# Patient Record
Sex: FEMALE | Race: WHITE | NOT HISPANIC OR LATINO | Employment: OTHER | RURAL
[De-identification: names, ages, dates, MRNs, and addresses within clinical notes are randomized per-mention and may not be internally consistent; named-entity substitution may affect disease eponyms.]

---

## 2019-07-30 ENCOUNTER — HISTORICAL (OUTPATIENT)
Dept: ADMINISTRATIVE | Facility: HOSPITAL | Age: 59
End: 2019-07-30

## 2019-07-31 LAB
LAB AP CLINICAL INFORMATION: NORMAL
LAB AP DIAGNOSIS - HISTORICAL: NORMAL
LAB AP GROSS PATHOLOGY - HISTORICAL: NORMAL
LAB AP SPECIMEN SUBMITTED - HISTORICAL: NORMAL

## 2020-06-02 ENCOUNTER — HISTORICAL (OUTPATIENT)
Dept: ADMINISTRATIVE | Facility: HOSPITAL | Age: 60
End: 2020-06-02

## 2020-06-02 LAB — GLUCOSE SERPL-MCNC: 100 MG/DL (ref 70–105)

## 2020-06-03 ENCOUNTER — HISTORICAL (OUTPATIENT)
Dept: ADMINISTRATIVE | Facility: HOSPITAL | Age: 60
End: 2020-06-03

## 2020-06-03 LAB
AMYLASE SERPL-CCNC: 42 U/L (ref 25–115)
BACTERIA #/AREA URNS HPF: ABNORMAL /HPF
BASOPHILS # BLD AUTO: 0.11 X10E3/UL (ref 0–0.2)
BASOPHILS NFR BLD AUTO: 1.1 % (ref 0–1)
BILIRUB UR QL STRIP: NEGATIVE MG/DL
BUN SERPL-MCNC: 6 MG/DL (ref 7–18)
CALCIUM SERPL-MCNC: 9.4 MG/DL (ref 8.5–10.1)
CHLORIDE SERPL-SCNC: 93 MMOL/L (ref 98–107)
CLARITY UR: ABNORMAL
CLARITY UR: ABNORMAL
CO2 SERPL-SCNC: 33 MMOL/L (ref 21–32)
COLOR UR: ABNORMAL
COLOR UR: ABNORMAL
CREAT SERPL-MCNC: 0.97 MG/DL (ref 0.55–1.02)
EOSINOPHIL # BLD AUTO: 0.26 X10E3/UL (ref 0–0.5)
EOSINOPHIL NFR BLD AUTO: 2.7 % (ref 1–4)
ERYTHROCYTE [DISTWIDTH] IN BLOOD BY AUTOMATED COUNT: 13.9 % (ref 11.5–14.5)
GLUCOSE SERPL-MCNC: 100 MG/DL (ref 74–106)
GLUCOSE UR STRIP-MCNC: NEGATIVE MG/DL
HCT VFR BLD AUTO: 47.7 % (ref 38–47)
HGB BLD-MCNC: 15.3 G/DL (ref 12–16)
IMM GRANULOCYTES # BLD AUTO: 0.07 X10E3/UL (ref 0–0.04)
IMM GRANULOCYTES NFR BLD: 0.7 % (ref 0–0.4)
INSULIN SERPL-ACNC: NORMAL U[IU]/ML
KETONES UR STRIP-SCNC: NEGATIVE MG/DL
LAB AP CLINICAL INFORMATION: NORMAL
LAB AP DIAGNOSIS - HISTORICAL: NORMAL
LAB AP GROSS PATHOLOGY - HISTORICAL: NORMAL
LAB AP SPECIMEN SUBMITTED - HISTORICAL: NORMAL
LEUKOCYTE ESTERASE UR QL STRIP: ABNORMAL LEU/UL
LIPASE SERPL-CCNC: 275 U/L (ref 73–393)
LYMPHOCYTES # BLD AUTO: 3.05 X10E3/UL (ref 1–4.8)
LYMPHOCYTES NFR BLD AUTO: 31.6 % (ref 27–41)
MCH RBC QN AUTO: 29 PG (ref 27–31)
MCHC RBC AUTO-ENTMCNC: 32.1 G/DL (ref 32–36)
MCV RBC AUTO: 90.3 FL (ref 80–96)
MONOCYTES # BLD AUTO: 0.67 X10E3/UL (ref 0–0.8)
MONOCYTES NFR BLD AUTO: 6.9 % (ref 2–6)
MPC BLD CALC-MCNC: 10.5 FL (ref 9.4–12.4)
MUCOUS THREADS #/AREA URNS HPF: ABNORMAL /HPF
NEUTROPHILS # BLD AUTO: 5.5 X10E3/UL (ref 1.8–7.7)
NEUTROPHILS NFR BLD AUTO: 57 % (ref 53–65)
NITRITE UR QL STRIP: NEGATIVE
NRBC # BLD AUTO: 0 X10E3/UL (ref 0–0)
NRBC, AUTO (.00): 0 /100 (ref 0–0)
PH UR STRIP: 7 PH UNITS (ref 5–8)
PLATELET # BLD AUTO: 306 X10E3/UL (ref 150–400)
POTASSIUM SERPL-SCNC: 4.1 MMOL/L (ref 3.5–5.1)
PROT UR QL STRIP: NEGATIVE MG/DL
RBC # BLD AUTO: 5.28 X10E6/UL (ref 4.2–5.4)
RBC # UR STRIP: NEGATIVE ERY/UL
RBC #/AREA URNS HPF: ABNORMAL /HPF (ref 0–3)
SODIUM SERPL-SCNC: 131 MMOL/L (ref 136–145)
SP GR UR STRIP: <=1.005 (ref 1–1.03)
SQUAMOUS #/AREA URNS LPF: ABNORMAL /LPF
TRICHOMONAS #/AREA URNS HPF: ABNORMAL /HPF
UROBILINOGEN UR STRIP-ACNC: 0.2 EU/DL
WBC # BLD AUTO: 9.66 X10E3/UL (ref 4.5–11)
WBC #/AREA URNS HPF: ABNORMAL /HPF (ref 0–5)
YEAST #/AREA URNS HPF: ABNORMAL /HPF

## 2020-06-05 LAB
REPORT: 38
REPORT: NORMAL

## 2020-06-11 ENCOUNTER — HISTORICAL (OUTPATIENT)
Dept: ADMINISTRATIVE | Facility: HOSPITAL | Age: 60
End: 2020-06-11

## 2020-06-11 LAB — GLUCOSE SERPL-MCNC: 100 MG/DL (ref 70–105)

## 2020-06-12 LAB

## 2020-07-01 ENCOUNTER — HISTORICAL (OUTPATIENT)
Dept: ADMINISTRATIVE | Facility: HOSPITAL | Age: 60
End: 2020-07-01

## 2020-07-01 LAB — GLUCOSE SERPL-MCNC: 99 MG/DL (ref 70–105)

## 2020-08-27 ENCOUNTER — HISTORICAL (OUTPATIENT)
Dept: ADMINISTRATIVE | Facility: HOSPITAL | Age: 60
End: 2020-08-27

## 2020-10-22 ENCOUNTER — HISTORICAL (OUTPATIENT)
Dept: ADMINISTRATIVE | Facility: HOSPITAL | Age: 60
End: 2020-10-22

## 2020-10-22 LAB
ANION GAP SERPL CALCULATED.3IONS-SCNC: 10 MMOL/L (ref 7–16)
BASOPHILS # BLD AUTO: 0.14 X10E3/UL (ref 0–0.2)
BASOPHILS NFR BLD AUTO: 1.8 % (ref 0–1)
BUN SERPL-MCNC: 12 MG/DL (ref 7–18)
CALCIUM SERPL-MCNC: 9.5 MG/DL (ref 8.5–10.1)
CHLORIDE SERPL-SCNC: 99 MMOL/L (ref 98–107)
CO2 SERPL-SCNC: 30 MMOL/L (ref 21–32)
CREAT SERPL-MCNC: 0.72 MG/DL (ref 0.5–1.02)
CRP SERPL-MCNC: 0.48 MG/DL (ref 0–0.8)
EOSINOPHIL # BLD AUTO: 0.46 X10E3/UL (ref 0–0.5)
EOSINOPHIL NFR BLD AUTO: 6 % (ref 1–4)
ERYTHROCYTE [DISTWIDTH] IN BLOOD BY AUTOMATED COUNT: 12 % (ref 11.5–14.5)
ERYTHROCYTE [SEDIMENTATION RATE] IN BLOOD BY WESTERGREN METHOD: 7 MM/HR (ref 0–30)
GLUCOSE SERPL-MCNC: 114 MG/DL (ref 74–106)
HCT VFR BLD AUTO: 41.1 % (ref 38–47)
HGB BLD-MCNC: 13.2 G/DL (ref 12–16)
IMM GRANULOCYTES # BLD AUTO: 0.07 X10E3/UL (ref 0–0.04)
IMM GRANULOCYTES NFR BLD: 0.9 % (ref 0–0.4)
LYMPHOCYTES # BLD AUTO: 3.01 X10E3/UL (ref 1–4.8)
LYMPHOCYTES NFR BLD AUTO: 39.3 % (ref 27–41)
MCH RBC QN AUTO: 29.3 PG (ref 27–31)
MCHC RBC AUTO-ENTMCNC: 32.1 G/DL (ref 32–36)
MCV RBC AUTO: 91.3 FL (ref 80–96)
MONOCYTES # BLD AUTO: 0.61 X10E3/UL (ref 0–0.8)
MONOCYTES NFR BLD AUTO: 8 % (ref 2–6)
MPC BLD CALC-MCNC: 10.3 FL (ref 9.4–12.4)
NEUTROPHILS # BLD AUTO: 3.37 X10E3/UL (ref 1.8–7.7)
NEUTROPHILS NFR BLD AUTO: 44 % (ref 53–65)
NRBC # BLD AUTO: 0 X10E3/UL (ref 0–0)
NRBC, AUTO (.00): 0 /100 (ref 0–0)
PLATELET # BLD AUTO: 328 X10E3/UL (ref 150–400)
POTASSIUM SERPL-SCNC: 4.2 MMOL/L (ref 3.5–5.1)
RBC # BLD AUTO: 4.5 X10E6/UL (ref 4.2–5.4)
SODIUM SERPL-SCNC: 135 MMOL/L (ref 136–145)
WBC # BLD AUTO: 7.66 X10E3/UL (ref 4.5–11)

## 2020-11-11 ENCOUNTER — HISTORICAL (OUTPATIENT)
Dept: ADMINISTRATIVE | Facility: HOSPITAL | Age: 60
End: 2020-11-11

## 2020-11-11 LAB
ALBUMIN SERPL BCP-MCNC: 3.8 G/DL (ref 3.5–5)
ALBUMIN/GLOB SERPL: 1.1 {RATIO}
ALP SERPL-CCNC: 72 U/L (ref 46–118)
ALT SERPL W P-5'-P-CCNC: 7 U/L (ref 13–56)
ANION GAP SERPL CALCULATED.3IONS-SCNC: 13 MMOL/L
AST SERPL W P-5'-P-CCNC: 14 U/L (ref 15–37)
BASOPHILS # BLD AUTO: 0.1 X10E3/UL (ref 0–0.2)
BASOPHILS NFR BLD AUTO: 1.6 % (ref 0–1)
BILIRUB SERPL-MCNC: 0.2 MG/DL (ref 0–1.2)
BUN SERPL-MCNC: 10 MG/DL (ref 7–18)
BUN/CREAT SERPL: 10.9
CALCIUM SERPL-MCNC: 9.3 MG/DL (ref 8.5–10.1)
CHLORIDE SERPL-SCNC: 100 MMOL/L (ref 98–107)
CO2 SERPL-SCNC: 29 MMOL/L (ref 21–32)
CREAT SERPL-MCNC: 0.92 MG/DL (ref 0.55–1.02)
EOSINOPHIL # BLD AUTO: 0.26 X10E3/UL (ref 0–0.5)
EOSINOPHIL NFR BLD AUTO: 4.1 % (ref 1–4)
EOSINOPHIL NFR BLD MANUAL: 6 % (ref 1–4)
ERYTHROCYTE [DISTWIDTH] IN BLOOD BY AUTOMATED COUNT: 11.7 % (ref 11.5–14.5)
GLOBULIN SER-MCNC: 3.6 G/DL (ref 2–4)
GLUCOSE SERPL-MCNC: 149 MG/DL (ref 74–106)
HCT VFR BLD AUTO: 38.1 % (ref 38–47)
HGB BLD-MCNC: 12.9 G/DL (ref 12–16)
IMM GRANULOCYTES # BLD AUTO: 0.04 X10E3/UL (ref 0–0.04)
IMM GRANULOCYTES NFR BLD: 0.6 % (ref 0–0.4)
LYMPHOCYTES # BLD AUTO: 2.73 X10E3/UL (ref 1–4.8)
LYMPHOCYTES NFR BLD AUTO: 43.5 % (ref 27–41)
LYMPHOCYTES NFR BLD MANUAL: 45 % (ref 27–41)
MCH RBC QN AUTO: 30.4 PG (ref 27–31)
MCHC RBC AUTO-ENTMCNC: 33.9 G/DL (ref 32–36)
MCV RBC AUTO: 89.9 FL (ref 80–96)
MONOCYTES # BLD AUTO: 0.6 X10E3/UL (ref 0–0.8)
MONOCYTES NFR BLD AUTO: 9.6 % (ref 2–6)
MONOCYTES NFR BLD MANUAL: 6 % (ref 2–6)
MPC BLD CALC-MCNC: 10.1 FL (ref 9.4–12.4)
NEUTROPHILS # BLD AUTO: 2.54 X10E3/UL (ref 1.8–7.7)
NEUTROPHILS NFR BLD AUTO: 40.6 % (ref 53–65)
NEUTS SEG NFR BLD MANUAL: 43 % (ref 50–62)
NRBC # BLD AUTO: 0 X10E3/UL (ref 0–0)
NRBC, AUTO (.00): 0 /100 (ref 0–0)
PLATELET # BLD AUTO: 274 X10E3/UL (ref 150–400)
PLATELET MORPHOLOGY: ABNORMAL
POTASSIUM SERPL-SCNC: 3.5 MMOL/L (ref 3.5–5.1)
PROT SERPL-MCNC: 7.4 G/DL (ref 6.4–8.2)
RBC # BLD AUTO: 4.24 X10E6/UL (ref 4.2–5.4)
RBC MORPH BLD: NORMAL
SODIUM SERPL-SCNC: 138 MMOL/L (ref 136–145)
TROPONIN I SERPL-MCNC: <0.017 NG/ML (ref 0–0.06)
TSH SERPL DL<=0.005 MIU/L-ACNC: 1.85 UIU/ML (ref 0.36–3.74)
WBC # BLD AUTO: 6.27 X10E3/UL (ref 4.5–11)

## 2020-11-12 ENCOUNTER — HISTORICAL (OUTPATIENT)
Dept: ADMINISTRATIVE | Facility: HOSPITAL | Age: 60
End: 2020-11-12

## 2021-01-02 ENCOUNTER — HISTORICAL (OUTPATIENT)
Dept: ADMINISTRATIVE | Facility: HOSPITAL | Age: 61
End: 2021-01-02

## 2021-03-30 ENCOUNTER — OFFICE VISIT (OUTPATIENT)
Dept: DERMATOLOGY | Facility: CLINIC | Age: 61
End: 2021-03-30
Payer: MEDICAID

## 2021-03-30 VITALS — BODY MASS INDEX: 27.23 KG/M2 | RESPIRATION RATE: 18 BRPM | WEIGHT: 148 LBS | HEIGHT: 62 IN

## 2021-03-30 DIAGNOSIS — L82.1 SEBORRHEIC KERATOSES: ICD-10-CM

## 2021-03-30 DIAGNOSIS — L57.8 OTHER SKIN CHANGES DUE TO CHRONIC EXPOSURE TO NONIONIZING RADIATION: ICD-10-CM

## 2021-03-30 DIAGNOSIS — L70.0 ACNE VULGARIS: Primary | ICD-10-CM

## 2021-03-30 PROCEDURE — 99213 OFFICE O/P EST LOW 20 MIN: CPT | Mod: ,,, | Performed by: DERMATOLOGY

## 2021-03-30 PROCEDURE — 99213 PR OFFICE/OUTPT VISIT, EST, LEVL III, 20-29 MIN: ICD-10-PCS | Mod: ,,, | Performed by: DERMATOLOGY

## 2021-03-30 RX ORDER — PRAZOSIN HYDROCHLORIDE 5 MG/1
5 CAPSULE ORAL NIGHTLY
COMMUNITY
Start: 2021-02-01 | End: 2023-01-04

## 2021-03-30 RX ORDER — FLUTICASONE PROPIONATE 50 MCG
2 SPRAY, SUSPENSION (ML) NASAL DAILY
COMMUNITY
Start: 2021-01-04 | End: 2023-03-24 | Stop reason: SDUPTHER

## 2021-03-30 RX ORDER — TRETINOIN 0.25 MG/G
CREAM TOPICAL
Qty: 20 G | Refills: 2 | Status: SHIPPED | OUTPATIENT
Start: 2021-03-30 | End: 2023-01-04

## 2021-03-30 RX ORDER — CARBIDOPA AND LEVODOPA 25; 100 MG/1; MG/1
TABLET ORAL
COMMUNITY
Start: 2021-03-04 | End: 2021-06-07 | Stop reason: SDUPTHER

## 2021-03-30 RX ORDER — LEVOTHYROXINE SODIUM 75 UG/1
TABLET ORAL
COMMUNITY
Start: 2021-03-03 | End: 2023-07-27 | Stop reason: DRUGHIGH

## 2021-03-30 RX ORDER — DOXYCYCLINE 100 MG/1
100 CAPSULE ORAL 2 TIMES DAILY
Qty: 60 CAPSULE | Refills: 2 | Status: SHIPPED | OUTPATIENT
Start: 2021-03-30 | End: 2022-01-11

## 2021-03-30 RX ORDER — CARVEDILOL 6.25 MG/1
TABLET ORAL
COMMUNITY
Start: 2021-02-11 | End: 2021-10-04 | Stop reason: SDUPTHER

## 2021-03-30 RX ORDER — PREGABALIN 100 MG/1
CAPSULE ORAL
COMMUNITY
Start: 2021-03-03 | End: 2021-06-03 | Stop reason: SDUPTHER

## 2021-03-30 RX ORDER — TRETINOIN 0.25 MG/G
CREAM TOPICAL
Qty: 20 G | Refills: 2 | Status: SHIPPED | OUTPATIENT
Start: 2021-03-30 | End: 2021-03-30

## 2021-03-30 RX ORDER — ESOMEPRAZOLE MAGNESIUM 40 MG/1
40 CAPSULE, DELAYED RELEASE ORAL DAILY
COMMUNITY
Start: 2021-03-03

## 2021-03-30 RX ORDER — CYANOCOBALAMIN 1000 UG/ML
INJECTION, SOLUTION INTRAMUSCULAR; SUBCUTANEOUS
COMMUNITY
Start: 2021-03-03

## 2021-03-30 RX ORDER — DULOXETIN HYDROCHLORIDE 60 MG/1
60 CAPSULE, DELAYED RELEASE ORAL 2 TIMES DAILY
COMMUNITY
Start: 2021-03-03 | End: 2021-10-04

## 2021-03-30 RX ORDER — BUSPIRONE HYDROCHLORIDE 15 MG/1
30 TABLET ORAL 2 TIMES DAILY
COMMUNITY
Start: 2021-03-03 | End: 2023-07-27 | Stop reason: DRUGHIGH

## 2021-03-30 RX ORDER — ACETAMINOPHEN AND CODEINE PHOSPHATE 300; 30 MG/1; MG/1
1 TABLET ORAL 3 TIMES DAILY
COMMUNITY
Start: 2021-03-22 | End: 2021-06-02 | Stop reason: SDUPTHER

## 2021-05-27 RX ORDER — METHOCARBAMOL 750 MG/1
500 TABLET, FILM COATED ORAL 3 TIMES DAILY
COMMUNITY
End: 2021-06-02 | Stop reason: SDUPTHER

## 2021-06-02 ENCOUNTER — OFFICE VISIT (OUTPATIENT)
Dept: PAIN MEDICINE | Facility: CLINIC | Age: 61
End: 2021-06-02
Attending: PAIN MEDICINE
Payer: MEDICAID

## 2021-06-02 VITALS
HEIGHT: 62 IN | WEIGHT: 151 LBS | DIASTOLIC BLOOD PRESSURE: 74 MMHG | SYSTOLIC BLOOD PRESSURE: 128 MMHG | HEART RATE: 82 BPM | BODY MASS INDEX: 27.79 KG/M2

## 2021-06-02 DIAGNOSIS — Z79.899 ENCOUNTER FOR LONG-TERM (CURRENT) USE OF OTHER MEDICATIONS: Primary | ICD-10-CM

## 2021-06-02 DIAGNOSIS — M54.17 LUMBOSACRAL RADICULOPATHY: Chronic | ICD-10-CM

## 2021-06-02 DIAGNOSIS — G89.4 CHRONIC PAIN DISORDER: Chronic | ICD-10-CM

## 2021-06-02 DIAGNOSIS — G62.9 NEUROPATHY: Chronic | ICD-10-CM

## 2021-06-02 LAB
CTP QC/QA: YES
POC (AMP) AMPHETAMINE: NEGATIVE
POC (BAR) BARBITURATES: NEGATIVE
POC (BUP) BUPRENORPHINE: NEGATIVE
POC (BZO) BENZODIAZEPINES: NEGATIVE
POC (COC) COCAINE: NEGATIVE
POC (MDMA) METHYLENEDIOXYMETHAMPHETAMINE 3,4: NEGATIVE
POC (MET) METHAMPHETAMINE: NEGATIVE
POC (MOP) OPIATES: ABNORMAL
POC (MTD) METHADONE: NEGATIVE
POC (OXY) OXYCODONE: NEGATIVE
POC (PCP) PHENCYCLIDINE: NEGATIVE
POC (TCA) TRICYCLIC ANTIDEPRESSANTS: NEGATIVE
POC TEMPERATURE (URINE): 92
POC THC: NEGATIVE

## 2021-06-02 PROCEDURE — 99214 PR OFFICE/OUTPT VISIT, EST, LEVL IV, 30-39 MIN: ICD-10-PCS | Mod: S$PBB,,, | Performed by: PAIN MEDICINE

## 2021-06-02 PROCEDURE — 80305 DRUG TEST PRSMV DIR OPT OBS: CPT | Mod: PBBFAC | Performed by: PAIN MEDICINE

## 2021-06-02 PROCEDURE — 99213 OFFICE O/P EST LOW 20 MIN: CPT | Mod: PBBFAC | Performed by: PAIN MEDICINE

## 2021-06-02 PROCEDURE — 99214 OFFICE O/P EST MOD 30 MIN: CPT | Mod: S$PBB,,, | Performed by: PAIN MEDICINE

## 2021-06-02 RX ORDER — METHOCARBAMOL 750 MG/1
750 TABLET, FILM COATED ORAL 3 TIMES DAILY
Qty: 90 TABLET | Refills: 5 | Status: SHIPPED | OUTPATIENT
Start: 2021-06-02 | End: 2021-11-30 | Stop reason: SDUPTHER

## 2021-06-02 RX ORDER — ACETAMINOPHEN AND CODEINE PHOSPHATE 300; 30 MG/1; MG/1
1 TABLET ORAL EVERY 6 HOURS PRN
Qty: 120 TABLET | Refills: 2 | Status: SHIPPED | OUTPATIENT
Start: 2021-06-02 | End: 2021-08-31

## 2021-06-02 RX ORDER — PREGABALIN 200 MG/1
200 CAPSULE ORAL 3 TIMES DAILY
Qty: 90 CAPSULE | Refills: 2 | Status: SHIPPED | OUTPATIENT
Start: 2021-06-02 | End: 2021-06-03 | Stop reason: SDUPTHER

## 2021-06-03 RX ORDER — PREGABALIN 100 MG/1
100 CAPSULE ORAL 3 TIMES DAILY
Qty: 180 CAPSULE | Refills: 2 | Status: SHIPPED | OUTPATIENT
Start: 2021-06-03 | End: 2021-09-01 | Stop reason: SDUPTHER

## 2021-06-03 RX ORDER — PREGABALIN 100 MG/1
100 CAPSULE ORAL
COMMUNITY
End: 2021-06-03 | Stop reason: SDUPTHER

## 2021-06-07 RX ORDER — CARBIDOPA AND LEVODOPA 25; 100 MG/1; MG/1
TABLET ORAL
Qty: 60 TABLET | Refills: 1 | Status: SHIPPED | OUTPATIENT
Start: 2021-06-07 | End: 2021-09-02 | Stop reason: SDUPTHER

## 2021-06-29 RX ORDER — ALBUTEROL SULFATE 90 UG/1
AEROSOL, METERED RESPIRATORY (INHALATION)
COMMUNITY
Start: 2021-04-01 | End: 2023-07-26 | Stop reason: SDUPTHER

## 2021-06-29 RX ORDER — LISINOPRIL AND HYDROCHLOROTHIAZIDE 10; 12.5 MG/1; MG/1
1 TABLET ORAL DAILY
COMMUNITY
Start: 2021-05-01 | End: 2021-10-04

## 2021-06-29 RX ORDER — CETIRIZINE HYDROCHLORIDE 10 MG/1
TABLET ORAL
COMMUNITY
Start: 2021-04-01 | End: 2022-08-19

## 2021-06-29 RX ORDER — ATORVASTATIN CALCIUM 40 MG/1
80 TABLET, FILM COATED ORAL DAILY
COMMUNITY
Start: 2022-05-09 | End: 2023-07-27 | Stop reason: DRUGHIGH

## 2021-06-29 RX ORDER — SITAGLIPTIN 100 MG/1
100 TABLET, FILM COATED ORAL DAILY
COMMUNITY
Start: 2021-06-02

## 2021-06-29 RX ORDER — PRAMIPEXOLE DIHYDROCHLORIDE 0.25 MG/1
0.25 TABLET ORAL NIGHTLY
COMMUNITY
Start: 2021-04-01 | End: 2022-01-11

## 2021-06-29 RX ORDER — ROSUVASTATIN CALCIUM 20 MG/1
TABLET, COATED ORAL
COMMUNITY
Start: 2021-05-01 | End: 2023-07-27

## 2021-06-30 ENCOUNTER — OFFICE VISIT (OUTPATIENT)
Dept: DERMATOLOGY | Facility: CLINIC | Age: 61
End: 2021-06-30
Payer: MEDICAID

## 2021-06-30 VITALS — RESPIRATION RATE: 18 BRPM | WEIGHT: 151 LBS | BODY MASS INDEX: 27.79 KG/M2 | HEIGHT: 62 IN

## 2021-06-30 DIAGNOSIS — L70.0 ACNE VULGARIS: Primary | ICD-10-CM

## 2021-06-30 DIAGNOSIS — L82.1 SEBORRHEIC KERATOSES: ICD-10-CM

## 2021-06-30 DIAGNOSIS — D22.9 BENIGN NEVUS OF SKIN: ICD-10-CM

## 2021-06-30 DIAGNOSIS — L57.8 OTHER SKIN CHANGES DUE TO CHRONIC EXPOSURE TO NONIONIZING RADIATION: ICD-10-CM

## 2021-06-30 PROCEDURE — 99213 PR OFFICE/OUTPT VISIT, EST, LEVL III, 20-29 MIN: ICD-10-PCS | Mod: ,,, | Performed by: DERMATOLOGY

## 2021-06-30 PROCEDURE — 99213 OFFICE O/P EST LOW 20 MIN: CPT | Mod: ,,, | Performed by: DERMATOLOGY

## 2021-06-30 RX ORDER — TRETINOIN 0.5 MG/G
CREAM TOPICAL
Qty: 20 G | Refills: 11 | Status: SHIPPED | OUTPATIENT
Start: 2021-06-30 | End: 2023-01-04

## 2021-08-04 ENCOUNTER — TELEPHONE (OUTPATIENT)
Dept: CARDIOLOGY | Facility: CLINIC | Age: 61
End: 2021-08-04

## 2021-09-01 ENCOUNTER — OFFICE VISIT (OUTPATIENT)
Dept: PAIN MEDICINE | Facility: CLINIC | Age: 61
End: 2021-09-01
Payer: MEDICAID

## 2021-09-01 VITALS
SYSTOLIC BLOOD PRESSURE: 119 MMHG | HEART RATE: 92 BPM | WEIGHT: 146 LBS | HEIGHT: 63 IN | DIASTOLIC BLOOD PRESSURE: 71 MMHG | BODY MASS INDEX: 25.87 KG/M2

## 2021-09-01 DIAGNOSIS — G89.4 CHRONIC PAIN DISORDER: ICD-10-CM

## 2021-09-01 DIAGNOSIS — M54.17 LUMBOSACRAL RADICULOPATHY: ICD-10-CM

## 2021-09-01 DIAGNOSIS — Z79.899 OTHER LONG TERM (CURRENT) DRUG THERAPY: ICD-10-CM

## 2021-09-01 DIAGNOSIS — G62.9 NEUROPATHY: Primary | ICD-10-CM

## 2021-09-01 PROCEDURE — 99215 OFFICE O/P EST HI 40 MIN: CPT | Mod: PBBFAC | Performed by: PAIN MEDICINE

## 2021-09-01 PROCEDURE — 99213 PR OFFICE/OUTPT VISIT, EST, LEVL III, 20-29 MIN: ICD-10-PCS | Mod: S$PBB,,, | Performed by: PAIN MEDICINE

## 2021-09-01 PROCEDURE — 80305 DRUG TEST PRSMV DIR OPT OBS: CPT | Mod: PBBFAC | Performed by: PAIN MEDICINE

## 2021-09-01 PROCEDURE — 99213 OFFICE O/P EST LOW 20 MIN: CPT | Mod: S$PBB,,, | Performed by: PAIN MEDICINE

## 2021-09-01 RX ORDER — ACETAMINOPHEN AND CODEINE PHOSPHATE 300; 30 MG/1; MG/1
1 TABLET ORAL EVERY 6 HOURS PRN
Qty: 120 TABLET | Refills: 2 | Status: SHIPPED | OUTPATIENT
Start: 2021-09-01 | End: 2021-11-30 | Stop reason: SDUPTHER

## 2021-09-01 RX ORDER — PREGABALIN 100 MG/1
100 CAPSULE ORAL 3 TIMES DAILY
Qty: 30 CAPSULE | Refills: 5 | Status: SHIPPED | OUTPATIENT
Start: 2021-09-01 | End: 2021-11-30 | Stop reason: SDUPTHER

## 2021-09-02 ENCOUNTER — OFFICE VISIT (OUTPATIENT)
Dept: NEUROLOGY | Facility: CLINIC | Age: 61
End: 2021-09-02
Payer: MEDICAID

## 2021-09-02 VITALS
SYSTOLIC BLOOD PRESSURE: 130 MMHG | OXYGEN SATURATION: 97 % | BODY MASS INDEX: 27.51 KG/M2 | HEIGHT: 62 IN | HEART RATE: 66 BPM | DIASTOLIC BLOOD PRESSURE: 72 MMHG | WEIGHT: 149.5 LBS

## 2021-09-02 DIAGNOSIS — R29.6 FREQUENT FALLS: ICD-10-CM

## 2021-09-02 DIAGNOSIS — M54.50 LOW BACK PAIN, UNSPECIFIED BACK PAIN LATERALITY, UNSPECIFIED CHRONICITY, UNSPECIFIED WHETHER SCIATICA PRESENT: ICD-10-CM

## 2021-09-02 DIAGNOSIS — F32.A DEPRESSION, UNSPECIFIED DEPRESSION TYPE: ICD-10-CM

## 2021-09-02 DIAGNOSIS — E11.42 DIABETIC POLYNEUROPATHY ASSOCIATED WITH TYPE 2 DIABETES MELLITUS: ICD-10-CM

## 2021-09-02 DIAGNOSIS — G25.81 RLS (RESTLESS LEGS SYNDROME): Primary | ICD-10-CM

## 2021-09-02 PROBLEM — E11.9 DIABETES MELLITUS WITHOUT COMPLICATION: Status: ACTIVE | Noted: 2021-09-02

## 2021-09-02 PROBLEM — E11.9 DIABETES MELLITUS WITHOUT COMPLICATION: Chronic | Status: ACTIVE | Noted: 2021-09-02

## 2021-09-02 PROCEDURE — 99213 PR OFFICE/OUTPT VISIT, EST, LEVL III, 20-29 MIN: ICD-10-PCS | Mod: S$PBB,,, | Performed by: NURSE PRACTITIONER

## 2021-09-02 PROCEDURE — 99215 OFFICE O/P EST HI 40 MIN: CPT | Mod: PBBFAC | Performed by: NURSE PRACTITIONER

## 2021-09-02 PROCEDURE — 99213 OFFICE O/P EST LOW 20 MIN: CPT | Mod: S$PBB,,, | Performed by: NURSE PRACTITIONER

## 2021-09-02 RX ORDER — DULOXETINE 40 MG/1
60 CAPSULE, DELAYED RELEASE ORAL 2 TIMES DAILY
COMMUNITY
Start: 2021-08-02 | End: 2023-07-27 | Stop reason: DRUGHIGH

## 2021-09-02 RX ORDER — CARBIDOPA AND LEVODOPA 25; 100 MG/1; MG/1
TABLET ORAL
Qty: 60 TABLET | Refills: 6 | Status: SHIPPED | OUTPATIENT
Start: 2021-09-02 | End: 2021-12-09 | Stop reason: SDUPTHER

## 2021-09-02 RX ORDER — LISINOPRIL AND HYDROCHLOROTHIAZIDE 20; 25 MG/1; MG/1
1 TABLET ORAL DAILY
COMMUNITY
Start: 2021-06-02 | End: 2021-10-04 | Stop reason: SDUPTHER

## 2021-09-02 RX ORDER — ADAPALENE 1 MG/G
GEL TOPICAL DAILY
COMMUNITY
End: 2023-01-04

## 2021-09-02 RX ORDER — CHOLECALCIFEROL (VITAMIN D3) 25 MCG
1000 TABLET ORAL DAILY
COMMUNITY
End: 2023-07-27

## 2021-10-04 ENCOUNTER — OFFICE VISIT (OUTPATIENT)
Dept: CARDIOLOGY | Facility: CLINIC | Age: 61
End: 2021-10-04
Payer: MEDICAID

## 2021-10-04 VITALS
DIASTOLIC BLOOD PRESSURE: 90 MMHG | HEART RATE: 82 BPM | BODY MASS INDEX: 27.6 KG/M2 | SYSTOLIC BLOOD PRESSURE: 160 MMHG | RESPIRATION RATE: 16 BRPM | HEIGHT: 62 IN | WEIGHT: 150 LBS

## 2021-10-04 DIAGNOSIS — E11.9 DIABETES MELLITUS WITHOUT COMPLICATION: ICD-10-CM

## 2021-10-04 DIAGNOSIS — I10 PRIMARY HYPERTENSION: ICD-10-CM

## 2021-10-04 DIAGNOSIS — E11.9 DIABETES MELLITUS WITHOUT COMPLICATION: Primary | ICD-10-CM

## 2021-10-04 PROCEDURE — 99214 OFFICE O/P EST MOD 30 MIN: CPT | Mod: S$PBB,,, | Performed by: STUDENT IN AN ORGANIZED HEALTH CARE EDUCATION/TRAINING PROGRAM

## 2021-10-04 PROCEDURE — 93010 EKG 12-LEAD: ICD-10-PCS | Mod: S$PBB,,, | Performed by: STUDENT IN AN ORGANIZED HEALTH CARE EDUCATION/TRAINING PROGRAM

## 2021-10-04 PROCEDURE — 99215 OFFICE O/P EST HI 40 MIN: CPT | Mod: PBBFAC | Performed by: STUDENT IN AN ORGANIZED HEALTH CARE EDUCATION/TRAINING PROGRAM

## 2021-10-04 PROCEDURE — 99214 PR OFFICE/OUTPT VISIT, EST, LEVL IV, 30-39 MIN: ICD-10-PCS | Mod: S$PBB,,, | Performed by: STUDENT IN AN ORGANIZED HEALTH CARE EDUCATION/TRAINING PROGRAM

## 2021-10-04 PROCEDURE — 93010 ELECTROCARDIOGRAM REPORT: CPT | Mod: S$PBB,,, | Performed by: STUDENT IN AN ORGANIZED HEALTH CARE EDUCATION/TRAINING PROGRAM

## 2021-10-04 PROCEDURE — 93005 ELECTROCARDIOGRAM TRACING: CPT | Mod: PBBFAC | Performed by: STUDENT IN AN ORGANIZED HEALTH CARE EDUCATION/TRAINING PROGRAM

## 2021-10-04 RX ORDER — LISINOPRIL AND HYDROCHLOROTHIAZIDE 20; 25 MG/1; MG/1
1 TABLET ORAL DAILY
Qty: 90 TABLET | Refills: 3 | Status: SHIPPED | OUTPATIENT
Start: 2021-10-04 | End: 2022-08-19

## 2021-10-04 RX ORDER — CARVEDILOL 12.5 MG/1
12.5 TABLET ORAL 2 TIMES DAILY WITH MEALS
Qty: 180 TABLET | Refills: 3 | Status: SHIPPED | OUTPATIENT
Start: 2021-10-04 | End: 2022-12-05 | Stop reason: SDUPTHER

## 2021-11-05 ENCOUNTER — TELEPHONE (OUTPATIENT)
Dept: PULMONOLOGY | Facility: CLINIC | Age: 61
End: 2021-11-05
Payer: MEDICAID

## 2021-11-08 ENCOUNTER — TELEPHONE (OUTPATIENT)
Dept: GASTROENTEROLOGY | Facility: CLINIC | Age: 61
End: 2021-11-08
Payer: MEDICAID

## 2021-11-09 ENCOUNTER — TELEPHONE (OUTPATIENT)
Dept: GASTROENTEROLOGY | Facility: CLINIC | Age: 61
End: 2021-11-09
Payer: MEDICAID

## 2021-11-25 ENCOUNTER — HOSPITAL ENCOUNTER (EMERGENCY)
Facility: HOSPITAL | Age: 61
Discharge: HOME | End: 2021-11-25
Payer: MEDICAID

## 2021-11-25 VITALS
OXYGEN SATURATION: 98 % | TEMPERATURE: 98 F | HEART RATE: 62 BPM | WEIGHT: 148 LBS | SYSTOLIC BLOOD PRESSURE: 152 MMHG | DIASTOLIC BLOOD PRESSURE: 89 MMHG | HEIGHT: 62 IN | RESPIRATION RATE: 20 BRPM | BODY MASS INDEX: 27.23 KG/M2

## 2021-11-25 DIAGNOSIS — M62.838 MUSCLE SPASM: Primary | ICD-10-CM

## 2021-11-25 PROCEDURE — 99283 PR EMERGENCY DEPT VISIT,LEVEL III: ICD-10-PCS | Mod: ,,, | Performed by: FAMILY MEDICINE

## 2021-11-25 PROCEDURE — 99284 EMERGENCY DEPT VISIT MOD MDM: CPT

## 2021-11-25 PROCEDURE — 63600175 PHARM REV CODE 636 W HCPCS: Performed by: FAMILY MEDICINE

## 2021-11-25 PROCEDURE — 96372 THER/PROPH/DIAG INJ SC/IM: CPT

## 2021-11-25 PROCEDURE — 99283 EMERGENCY DEPT VISIT LOW MDM: CPT | Mod: ,,, | Performed by: FAMILY MEDICINE

## 2021-11-25 RX ORDER — KETOROLAC TROMETHAMINE 30 MG/ML
60 INJECTION, SOLUTION INTRAMUSCULAR; INTRAVENOUS
Status: COMPLETED | OUTPATIENT
Start: 2021-11-25 | End: 2021-11-25

## 2021-11-25 RX ADMIN — KETOROLAC TROMETHAMINE 60 MG: 30 INJECTION, SOLUTION INTRAMUSCULAR at 05:11

## 2021-11-26 ENCOUNTER — TELEPHONE (OUTPATIENT)
Dept: EMERGENCY MEDICINE | Facility: HOSPITAL | Age: 61
End: 2021-11-26
Payer: MEDICAID

## 2021-11-30 ENCOUNTER — OFFICE VISIT (OUTPATIENT)
Dept: PAIN MEDICINE | Facility: CLINIC | Age: 61
End: 2021-11-30
Payer: MEDICAID

## 2021-11-30 VITALS
WEIGHT: 146 LBS | DIASTOLIC BLOOD PRESSURE: 84 MMHG | BODY MASS INDEX: 25.87 KG/M2 | HEART RATE: 87 BPM | HEIGHT: 63 IN | RESPIRATION RATE: 18 BRPM | SYSTOLIC BLOOD PRESSURE: 147 MMHG

## 2021-11-30 DIAGNOSIS — M54.16 LUMBAR RADICULOPATHY: Chronic | ICD-10-CM

## 2021-11-30 DIAGNOSIS — E11.42 DIABETIC POLYNEUROPATHY ASSOCIATED WITH TYPE 2 DIABETES MELLITUS: Chronic | ICD-10-CM

## 2021-11-30 DIAGNOSIS — Z96.9 PRESENCE OF FUNCTIONAL IMPLANT: ICD-10-CM

## 2021-11-30 DIAGNOSIS — M79.10 MYALGIA: ICD-10-CM

## 2021-11-30 DIAGNOSIS — M54.2 NECK PAIN: Primary | ICD-10-CM

## 2021-11-30 PROCEDURE — 99215 OFFICE O/P EST HI 40 MIN: CPT | Mod: PBBFAC | Performed by: PHYSICIAN ASSISTANT

## 2021-11-30 PROCEDURE — 99214 OFFICE O/P EST MOD 30 MIN: CPT | Mod: 25,S$PBB,, | Performed by: PHYSICIAN ASSISTANT

## 2021-11-30 PROCEDURE — 20552 TRIGGER POINT INJECTION: ICD-10-PCS | Mod: S$PBB,,, | Performed by: PHYSICIAN ASSISTANT

## 2021-11-30 PROCEDURE — 99214 PR OFFICE/OUTPT VISIT, EST, LEVL IV, 30-39 MIN: ICD-10-PCS | Mod: 25,S$PBB,, | Performed by: PHYSICIAN ASSISTANT

## 2021-11-30 PROCEDURE — 20552 NJX 1/MLT TRIGGER POINT 1/2: CPT | Mod: PBBFAC | Performed by: PHYSICIAN ASSISTANT

## 2021-11-30 RX ORDER — METHOCARBAMOL 750 MG/1
750 TABLET, FILM COATED ORAL 3 TIMES DAILY
Qty: 90 TABLET | Refills: 2 | Status: SHIPPED | OUTPATIENT
Start: 2021-11-30 | End: 2021-12-14 | Stop reason: SDUPTHER

## 2021-11-30 RX ORDER — PREGABALIN 100 MG/1
100 CAPSULE ORAL 3 TIMES DAILY
Qty: 180 CAPSULE | Refills: 2 | Status: SHIPPED | OUTPATIENT
Start: 2021-11-30 | End: 2021-12-14 | Stop reason: SDUPTHER

## 2021-11-30 RX ORDER — ACETAMINOPHEN AND CODEINE PHOSPHATE 300; 30 MG/1; MG/1
1 TABLET ORAL EVERY 6 HOURS PRN
Qty: 120 TABLET | Refills: 2 | Status: SHIPPED | OUTPATIENT
Start: 2021-11-30 | End: 2021-12-14 | Stop reason: SDUPTHER

## 2021-12-09 DIAGNOSIS — G25.81 RLS (RESTLESS LEGS SYNDROME): ICD-10-CM

## 2021-12-09 RX ORDER — CARBIDOPA AND LEVODOPA 25; 100 MG/1; MG/1
TABLET ORAL
Qty: 60 TABLET | Refills: 0 | Status: SHIPPED | OUTPATIENT
Start: 2021-12-09 | End: 2021-12-10 | Stop reason: SDUPTHER

## 2021-12-10 DIAGNOSIS — G25.81 RLS (RESTLESS LEGS SYNDROME): ICD-10-CM

## 2021-12-10 RX ORDER — CARBIDOPA AND LEVODOPA 25; 100 MG/1; MG/1
TABLET ORAL
Qty: 180 TABLET | Refills: 0 | Status: SHIPPED | OUTPATIENT
Start: 2021-12-10 | End: 2021-12-10 | Stop reason: SDUPTHER

## 2021-12-10 RX ORDER — CARBIDOPA AND LEVODOPA 25; 100 MG/1; MG/1
TABLET ORAL
Qty: 180 TABLET | Refills: 0 | Status: SHIPPED | OUTPATIENT
Start: 2021-12-10 | End: 2022-01-11 | Stop reason: SDUPTHER

## 2021-12-15 ENCOUNTER — OFFICE VISIT (OUTPATIENT)
Dept: PAIN MEDICINE | Facility: CLINIC | Age: 61
End: 2021-12-15
Payer: MEDICAID

## 2021-12-15 VITALS
BODY MASS INDEX: 26.13 KG/M2 | SYSTOLIC BLOOD PRESSURE: 148 MMHG | DIASTOLIC BLOOD PRESSURE: 84 MMHG | WEIGHT: 142 LBS | HEART RATE: 78 BPM | RESPIRATION RATE: 17 BRPM | HEIGHT: 62 IN

## 2021-12-15 DIAGNOSIS — Z79.899 ENCOUNTER FOR LONG-TERM (CURRENT) USE OF OTHER MEDICATIONS: ICD-10-CM

## 2021-12-15 DIAGNOSIS — M54.12 RADICULOPATHY, CERVICAL REGION: ICD-10-CM

## 2021-12-15 DIAGNOSIS — E11.42 DIABETIC POLYNEUROPATHY ASSOCIATED WITH TYPE 2 DIABETES MELLITUS: Chronic | ICD-10-CM

## 2021-12-15 DIAGNOSIS — M54.16 LUMBAR RADICULOPATHY: Primary | Chronic | ICD-10-CM

## 2021-12-15 PROCEDURE — 99214 PR OFFICE/OUTPT VISIT, EST, LEVL IV, 30-39 MIN: ICD-10-PCS | Mod: S$PBB,,, | Performed by: PHYSICIAN ASSISTANT

## 2021-12-15 PROCEDURE — 4010F PR ACE/ARB THEARPY RXD/TAKEN: ICD-10-PCS | Mod: CPTII,,, | Performed by: PHYSICIAN ASSISTANT

## 2021-12-15 PROCEDURE — 80305 DRUG TEST PRSMV DIR OPT OBS: CPT | Mod: PBBFAC | Performed by: PHYSICIAN ASSISTANT

## 2021-12-15 PROCEDURE — 99214 OFFICE O/P EST MOD 30 MIN: CPT | Mod: S$PBB,,, | Performed by: PHYSICIAN ASSISTANT

## 2021-12-15 PROCEDURE — 99215 OFFICE O/P EST HI 40 MIN: CPT | Mod: PBBFAC | Performed by: PHYSICIAN ASSISTANT

## 2021-12-15 PROCEDURE — 4010F ACE/ARB THERAPY RXD/TAKEN: CPT | Mod: CPTII,,, | Performed by: PHYSICIAN ASSISTANT

## 2021-12-15 RX ORDER — METHOCARBAMOL 750 MG/1
750 TABLET, FILM COATED ORAL 3 TIMES DAILY
Qty: 90 TABLET | Refills: 2 | Status: SHIPPED | OUTPATIENT
Start: 2021-12-15 | End: 2022-01-31 | Stop reason: SDUPTHER

## 2021-12-15 RX ORDER — PREGABALIN 100 MG/1
100 CAPSULE ORAL 3 TIMES DAILY
Qty: 180 CAPSULE | Refills: 2 | Status: SHIPPED | OUTPATIENT
Start: 2021-12-15 | End: 2022-01-31 | Stop reason: SDUPTHER

## 2021-12-15 RX ORDER — ACETAMINOPHEN AND CODEINE PHOSPHATE 300; 30 MG/1; MG/1
1 TABLET ORAL EVERY 6 HOURS PRN
Qty: 120 TABLET | Refills: 0 | Status: SHIPPED | OUTPATIENT
Start: 2021-12-15 | End: 2022-01-31 | Stop reason: SDUPTHER

## 2021-12-21 ENCOUNTER — CLINICAL SUPPORT (OUTPATIENT)
Dept: REHABILITATION | Facility: HOSPITAL | Age: 61
End: 2021-12-21
Payer: MEDICAID

## 2021-12-21 DIAGNOSIS — M79.18 MYOFASCIAL PAIN ON RIGHT SIDE: ICD-10-CM

## 2021-12-21 DIAGNOSIS — M54.16 LUMBAR RADICULOPATHY: Chronic | ICD-10-CM

## 2021-12-21 DIAGNOSIS — M47.812 CERVICAL SPONDYLOSIS WITHOUT MYELOPATHY: Primary | ICD-10-CM

## 2021-12-21 PROCEDURE — 97161 PT EVAL LOW COMPLEX 20 MIN: CPT

## 2021-12-29 ENCOUNTER — CLINICAL SUPPORT (OUTPATIENT)
Dept: REHABILITATION | Facility: HOSPITAL | Age: 61
End: 2021-12-29
Payer: MEDICAID

## 2021-12-29 DIAGNOSIS — M79.18 MYOFASCIAL PAIN ON RIGHT SIDE: Primary | ICD-10-CM

## 2021-12-29 PROCEDURE — 97110 THERAPEUTIC EXERCISES: CPT

## 2021-12-29 PROCEDURE — 97140 MANUAL THERAPY 1/> REGIONS: CPT

## 2022-01-05 ENCOUNTER — CLINICAL SUPPORT (OUTPATIENT)
Dept: REHABILITATION | Facility: HOSPITAL | Age: 62
End: 2022-01-05
Payer: MEDICAID

## 2022-01-05 DIAGNOSIS — M54.12 RADICULOPATHY, CERVICAL REGION: ICD-10-CM

## 2022-01-05 PROCEDURE — 97140 MANUAL THERAPY 1/> REGIONS: CPT

## 2022-01-05 NOTE — PROGRESS NOTES
[facility]   Physical Therapy Treatment Note     Name: Magali Cheung  Clinic Number: [id number]    Therapy Diagnosis: No diagnosis found.  Physician: Hung Crespo PA    Visit Date: 2022  Physician Orders: PT Eval and Treat   Medical Diagnosis from Referral: lumbar radiculopathy  Evaluation Date: 2021  Authorization Period Expiration: 12/15/2022  Plan of Care Expiration: 2022  Progress Note Due: 2022  Visit # / Visits authorized:    FOTO: 55%     PTA Visit #: 0/5     Time In: 1520  Time Out: 1655  Total Billable Time: 35 minutes    SUBJECTIVE     Pt reports: neck still hurts.    Response to previous treatment: no change  Functional change: still hurts when head is moved    Pain: 5/10  Location: bilateral neck      OBJECTIVE     Objective Measures updated at progress report unless specified.     Treatment     MAGALI received the treatments listed below:      therapeutic exercises to develop strength, endurance, ROM, flexibility, posture and core stabilization for 0 minutes includin    manual therapy techniques: Joint mobilizations to correct left cervical rotation; Manual traction, Myofacial release and Soft tissue Mobilization were applied to the: cervical suboccipitals, upper traps, C/T spine, grade 3-4 for 35 minutes    neuromuscular re-education activities to improve:  for 0 minutes. The following activities were included:  0    therapeutic activities to improve functional performance for 0  minutes, includin    gait training to improve functional mobility and safety for 0  minutes, includin    direct contact modalities after being cleared for contraindications:     supervised modalities after being cleared for contradictions:     hot pack for 0 minutes to 0.    cold pack for 0 minutes to 0.      Patient Education and Home Exercises     Home Exercises Provided and Patient Education Provided     Education provided:   - a/p of cervical and thoracic  spine      ASSESSMENT     Patient reported mile decrease in neck pain.   Patient demonstrated left rotation of cervical spine-  ZEV Is progressing slowly towards her goals.     Pt prognosis is Good.     Pt will continue to benefit from skilled outpatient physical therapy to address the deficits listed in the problem list box on initial evaluation, provide pt/family education and to maximize pt's level of independence in the home and community environment.     Pt's spiritual, cultural and educational needs considered and pt agreeable to plan of care and goals.     Anticipated barriers to physical therapy: pain, poor posture  Short Term Goals: 4 weeks      1. Pt will be independent with HEP to supplement PT in improving pain free cervical mobility.  2. Pt will improve cervical  AROM 10 deg to improve mobility for driving.. .  3.  Tolerate 45 minutes of exercise with <4/10 pain in cervical spine.  Long Term Goals (8 Weeks):   1. Pt will improve FOTO to </=55% limitation to improve perceived limitation with changing and maintaining mobility.  2.  Pt will report no pain with cervical rotation right, unsupported sitting.     PLAN     Continue with plan of care, progressing towards achievement of established rehab goals and increased function.    MILAD HAZEL, PT

## 2022-01-07 NOTE — PROGRESS NOTES
Subjective:       Patient ID: Magali Cheung is a 61 y.o. female.    Chief Complaint:  No chief complaint on file.      History of Present Illness  This pleasant right-handed 61-year-old  female presents to clinic with her  for follow-up of restless leg syndrome and neuropathy. Patient states she is doing well with the RLS and diabetic polyneuropathy. Patient states neuropathy started 4 years ago with the burning, numbness, and tingling that she rates as a 5 on a scale of 0-5 in the bilateral feet. She states the symptoms are constant and the pain is a 6 on a scale of 0-10 that is made worse with walking or standing. She reports diabetes and her last hemoglobin A1c was 7.0 on December 29, 2020. She states her more recent A1C was better but we do not have a copy of this. She denies neck or back injuries. Patient is also followed by pain treatment PA Shows for the neuropathy, back pain, and muscle spasms. She currently has Robaxin 750 mg for the muscle spasms and Lyrica was increased by Dr. Barclay to 200 mg three times a day.  She has Tylenol with codeine for the back pain. Patient reports today that her mental health provider has her on Cymbalta 40 mg twice a day that also helps with the neuropathy. She is tolerating all her medications without side effects. Patient reports neuropathy is much improved with the increase of the Lyrica. Discussed keeping her blood sugars under control. She denies alcohol but reports vaping instead of smoking. Discussed vaping cessation.      Patient reports  4 falls since her last visit in September 2021. She denies any head injuries. Discussed fall precautions in detail. She is currently doing physical therapy for her neck, back and feet. She will discuss with them adding gait and balance. We will provide a prescription for this if needed. She reports dizziness with the falls that started a little over one year ago. She states the dizziness is worse with head movement.  She states the dizziness occurs after she gets up and walks a short distance. She is followed by Dr. Zhu in cardiology. She has not had any imaging of the head to evaluate the recent falls or dizziness. Discussed this with Neurologist Dr. ELDER Muñoz and the patient. We ordered a CT of the head to evaluate the falls and dizziness at the last visit that the patient states was declined by the insurance. We will order the CT of the head and carotid doppler US to evaluate the dizziness and frequent falls. We cannot do an MRI due to the implanted pain stimulator. We will consider referral to ENT after work up if needed.      Patient was previously followed by a pain specialist in [place], [place] Dr. Gallo but is no longer seeing Dr. Gallo. Patient has an implanted pain stimulator that is turned off. She is currently on Sinemet 25 mg/100 mg two tablets three times a day for her RLS. Discussed with the patient that the sinemet may cause orthostatic hypotension that may be contributing to her dizziness. She is also on multiple medications that may also contribute to her dizziness. She desires to decrease the sinemet to 25/100 mg one tablet in the morning, one tablet in the afternoon, and two tablets at bedtime.  Patient states her restless leg syndrome is worse at night and she has a constant desire to move her legs that is relieved with movement and returns shortly thereafter without the sinemet. She reports this was interrupting her sleep. Discussed the plan in detail with the patient and  and they are in agreement with the plan. All their questions were answered at today's visit.     EMG nerve conduction study of the right upper and lower extremity done on April 4, 2017 showed a normal nerve conduction study and needle EMG of the right upper and lower extremity.     EMG nerve conduction study of the BLE done on April 10, 2021 by Dr. LEXY Givens suggest a distal symmetrical axonal polyneuropathy affecting  sensory more than motor fibers. Compared with the prior study in 2017 there has been progression in the polyneuropathy. In conjunction with the history of diabetes, the findings suggest diabetic polyneuropathy. There is no evidence of coexisting diabetic amyotrophy.      EKG done on 10/4/21 showed NSR, with 1 degree AVB, LAD,  IRBBB, LVH    Echocardiogram done on 3/3/21 showed a Limited study; LVEF 55-60%      Orthostatic blood pressure check: layin/74, sittin/82, standin/70        Review of Systems  Review of Systems   Constitutional: Negative for activity change, diaphoresis, fever and unexpected weight change.   HENT: Negative for congestion, ear pain, facial swelling, hearing loss, tinnitus, trouble swallowing and voice change.    Eyes: Negative for photophobia, pain and visual disturbance.   Respiratory: Negative for chest tightness, shortness of breath and wheezing.    Cardiovascular: Negative for chest pain, palpitations and leg swelling.   Gastrointestinal: Negative for constipation, diarrhea, nausea and vomiting.   Genitourinary: Negative for difficulty urinating.   Musculoskeletal: Positive for back pain and gait problem. Negative for neck pain and neck stiffness.   Skin: Negative for color change, pallor, rash and wound.   Neurological: Positive for dizziness and numbness. Negative for tremors, seizures, syncope, facial asymmetry, speech difficulty, weakness, light-headedness and headaches.        RLS   Psychiatric/Behavioral: Negative for agitation, behavioral problems, confusion, decreased concentration and hallucinations. The patient is not nervous/anxious and is not hyperactive.        Objective:      Neurologic Exam     Mental Status   Oriented to person, place, and time.   Oriented to person.   Oriented to place.   Oriented to time.   Speech: speech is normal   Level of consciousness: alert  Knowledge: good.     Cranial Nerves     CN II   Visual fields full to confrontation.     CN  III, IV, VI   Pupils are equal, round, and reactive to light.  Extraocular motions are normal.   Right pupil: Size: 3 mm. Shape: regular. Reactivity: brisk.   Left pupil: Size: 3 mm. Shape: regular. Reactivity: brisk.   CN III: no CN III palsy  CN VI: no CN VI palsy    CN V   Facial sensation intact.     CN VII   Facial expression full, symmetric.     CN VIII   CN VIII normal.   Hearing: intact    CN IX, X   CN IX normal.   CN X normal.     CN XI   CN XI normal.     CN XII   CN XII normal.     Motor Exam   Muscle bulk: normal  Overall muscle tone: normal  Right arm pronator drift: absent  Left arm pronator drift: absent    Strength   Right deltoid: 5/5  Left deltoid: 5/5  Right biceps: 5/5  Left biceps: 5/5  Right triceps: 5/5  Left triceps: 5/5  Right quadriceps: 4/5  Left quadriceps: 5/5  Right hamstrin/5  Left hamstrin/5    Sensory Exam   Light touch normal.     Gait, Coordination, and Reflexes     Gait  Gait: normal    Coordination   Romberg: positive  Finger to nose coordination: normal    Tremor   Resting tremor: absent  Intention tremor: absent  Action tremor: absent    Reflexes   Right brachioradialis: 2+  Left brachioradialis: 2+  Right biceps: 2+  Left biceps: 2+  Right triceps: 2+  Left triceps: 2+  Right patellar: 2+  Left patellar: 2+  Right achilles: 2+  Left achilles: 2+  Right : 4+  Left : 4+  Right Madrid: absent  Left Madrid: absent      Physical Exam  Constitutional:       General: She is not in acute distress.  HENT:      Head: Normocephalic.      Nose: Nose normal.      Mouth/Throat:      Mouth: Mucous membranes are moist.   Eyes:      Extraocular Movements: Extraocular movements intact and EOM normal.      Pupils: Pupils are equal, round, and reactive to light.   Cardiovascular:      Rate and Rhythm: Normal rate and regular rhythm.      Heart sounds: Normal heart sounds. No murmur heard.      Pulmonary:      Effort: Pulmonary effort is normal. No respiratory distress.       Breath sounds: Normal breath sounds. No wheezing, rhonchi or rales.   Musculoskeletal:         General: No swelling, tenderness, deformity or signs of injury. Normal range of motion.      Cervical back: Normal range of motion. No rigidity or tenderness.      Right lower leg: No edema.      Left lower leg: No edema.   Skin:     General: Skin is warm and dry.      Capillary Refill: Capillary refill takes less than 2 seconds.      Coloration: Skin is not jaundiced or pale.      Findings: No bruising, erythema, lesion or rash.   Neurological:      Mental Status: She is alert and oriented to person, place, and time.      Coordination: Romberg Test abnormal. Finger-Nose-Finger Test normal.      Gait: Gait is intact.      Deep Tendon Reflexes:      Reflex Scores:       Tricep reflexes are 2+ on the right side and 2+ on the left side.       Bicep reflexes are 2+ on the right side and 2+ on the left side.       Brachioradialis reflexes are 2+ on the right side and 2+ on the left side.       Patellar reflexes are 2+ on the right side and 2+ on the left side.       Achilles reflexes are 2+ on the right side and 2+ on the left side.  Psychiatric:         Attention and Perception: Attention and perception normal.         Mood and Affect: Mood normal.         Speech: Speech normal.         Behavior: Behavior normal. Behavior is cooperative.         Thought Content: Thought content normal.           Assessment:     Problem List Items Addressed This Visit        Neuro    RLS (restless legs syndrome) - Primary (Chronic)    Relevant Medications    carbidopa-levodopa  mg (SINEMET)  mg per tablet    Other Relevant Orders    CBC Auto Differential    Comprehensive Metabolic Panel    Diabetic polyneuropathy associated with type 2 diabetes mellitus (Chronic)    Relevant Orders    CBC Auto Differential    Comprehensive Metabolic Panel       Psychiatric    Depression (Chronic)       Orthopedic    Low back pain (Chronic)        Other    Frequent falls    Relevant Orders    Radiology US Carotid Bilateral    CT Head Without Contrast    Dizziness    Relevant Orders    Radiology US Carotid Bilateral    CT Head Without Contrast            Plan:       1. CT of the head without contrast to evaluate frequent falls and dizziness  2. Renew and continue sinemet 25/100 mg one tablet in the morning, one tablet in afternoon and two tablets at bedtime  3. Vaping cessation  4. Keep follow up with GUSTABO Crespo for neuropathy, back pain  5. Keep follow up with Dr. Hines for depression  6. Keep follow up with Dr. Zhu cardiologist for dizziness  7. Keep follow up with PCP for medical management  8. Keep blood sugars under control  9. Fall precautions, stay well hydrated if no contraindications  10. Labs cbc, cmp  11. Carotid Doppler US to evaluate dizziness  11. Follow up with neurology in 3 months or sooner if needed

## 2022-01-11 ENCOUNTER — OFFICE VISIT (OUTPATIENT)
Dept: NEUROLOGY | Facility: CLINIC | Age: 62
End: 2022-01-11
Payer: MEDICAID

## 2022-01-11 VITALS
HEART RATE: 86 BPM | SYSTOLIC BLOOD PRESSURE: 118 MMHG | DIASTOLIC BLOOD PRESSURE: 76 MMHG | OXYGEN SATURATION: 95 % | HEIGHT: 62 IN | BODY MASS INDEX: 27.9 KG/M2 | WEIGHT: 151.63 LBS

## 2022-01-11 DIAGNOSIS — R29.6 FREQUENT FALLS: ICD-10-CM

## 2022-01-11 DIAGNOSIS — E11.42 DIABETIC POLYNEUROPATHY ASSOCIATED WITH TYPE 2 DIABETES MELLITUS: Chronic | ICD-10-CM

## 2022-01-11 DIAGNOSIS — R42 DIZZINESS: ICD-10-CM

## 2022-01-11 DIAGNOSIS — M54.50 LOW BACK PAIN, UNSPECIFIED BACK PAIN LATERALITY, UNSPECIFIED CHRONICITY, UNSPECIFIED WHETHER SCIATICA PRESENT: Chronic | ICD-10-CM

## 2022-01-11 DIAGNOSIS — G25.81 RLS (RESTLESS LEGS SYNDROME): Primary | Chronic | ICD-10-CM

## 2022-01-11 DIAGNOSIS — F32.A DEPRESSION, UNSPECIFIED DEPRESSION TYPE: Chronic | ICD-10-CM

## 2022-01-11 PROCEDURE — 3074F PR MOST RECENT SYSTOLIC BLOOD PRESSURE < 130 MM HG: ICD-10-PCS | Mod: CPTII,,, | Performed by: NURSE PRACTITIONER

## 2022-01-11 PROCEDURE — 4010F ACE/ARB THERAPY RXD/TAKEN: CPT | Mod: CPTII,,, | Performed by: NURSE PRACTITIONER

## 2022-01-11 PROCEDURE — 99214 PR OFFICE/OUTPT VISIT, EST, LEVL IV, 30-39 MIN: ICD-10-PCS | Mod: S$PBB,,, | Performed by: NURSE PRACTITIONER

## 2022-01-11 PROCEDURE — 3074F SYST BP LT 130 MM HG: CPT | Mod: CPTII,,, | Performed by: NURSE PRACTITIONER

## 2022-01-11 PROCEDURE — 1159F PR MEDICATION LIST DOCUMENTED IN MEDICAL RECORD: ICD-10-PCS | Mod: CPTII,,, | Performed by: NURSE PRACTITIONER

## 2022-01-11 PROCEDURE — 3078F DIAST BP <80 MM HG: CPT | Mod: CPTII,,, | Performed by: NURSE PRACTITIONER

## 2022-01-11 PROCEDURE — 4010F PR ACE/ARB THEARPY RXD/TAKEN: ICD-10-PCS | Mod: CPTII,,, | Performed by: NURSE PRACTITIONER

## 2022-01-11 PROCEDURE — 99214 OFFICE O/P EST MOD 30 MIN: CPT | Mod: S$PBB,,, | Performed by: NURSE PRACTITIONER

## 2022-01-11 PROCEDURE — 99215 OFFICE O/P EST HI 40 MIN: CPT | Mod: PBBFAC | Performed by: NURSE PRACTITIONER

## 2022-01-11 PROCEDURE — 1159F MED LIST DOCD IN RCRD: CPT | Mod: CPTII,,, | Performed by: NURSE PRACTITIONER

## 2022-01-11 PROCEDURE — 3078F PR MOST RECENT DIASTOLIC BLOOD PRESSURE < 80 MM HG: ICD-10-PCS | Mod: CPTII,,, | Performed by: NURSE PRACTITIONER

## 2022-01-11 PROCEDURE — 1160F RVW MEDS BY RX/DR IN RCRD: CPT | Mod: CPTII,,, | Performed by: NURSE PRACTITIONER

## 2022-01-11 PROCEDURE — 1160F PR REVIEW ALL MEDS BY PRESCRIBER/CLIN PHARMACIST DOCUMENTED: ICD-10-PCS | Mod: CPTII,,, | Performed by: NURSE PRACTITIONER

## 2022-01-11 PROCEDURE — 3008F PR BODY MASS INDEX (BMI) DOCUMENTED: ICD-10-PCS | Mod: CPTII,,, | Performed by: NURSE PRACTITIONER

## 2022-01-11 PROCEDURE — 3008F BODY MASS INDEX DOCD: CPT | Mod: CPTII,,, | Performed by: NURSE PRACTITIONER

## 2022-01-11 RX ORDER — LEVOTHYROXINE SODIUM 50 UG/1
50 TABLET ORAL EVERY MORNING
COMMUNITY
Start: 2022-01-03 | End: 2023-01-04

## 2022-01-11 RX ORDER — CARBIDOPA AND LEVODOPA 25; 100 MG/1; MG/1
TABLET ORAL
Qty: 360 TABLET | Refills: 1 | Status: SHIPPED | OUTPATIENT
Start: 2022-01-11 | End: 2022-01-13 | Stop reason: SDUPTHER

## 2022-01-11 NOTE — PATIENT INSTRUCTIONS
Patient Education       Chronic Pain Discharge Instructions   About this topic   Pain can be an unpleasant feeling that happens in any part of the body. It can be mild or very bad. Pain may come and go or you may feel it all of the time. It may be dull, sharp, or throbbing. When you are in pain, you may not feel hungry. Pain can cause upset stomach and throwing up. You may also feel nervous or have problems sleeping.  Pain is most often a warning that something is wrong. You may have had surgery or an injury. Pain may be from health problems such as migraine or cancer. Acute pain lasts for only a short time and then goes away. Chronic pain lasts for a long time and most often does not go away completely. Chronic pain may disrupt your daily activities. How a doctor treats chronic pain depends on things like the kind of pain, how bad it is, and what is causing the pain.       What care is needed at home?   · Ask your doctor what you need to do when you go home. Make sure you ask questions if you do not understand what the doctor says. This way you will know what you need to do.  · Pay attention to your levels of pain.  · Take your drugs safely.  ? Take drugs only as directed and take only drugs ordered for you. Do not share drugs.  · Store your drugs safely.  ? Keep drugs out of the reach of children and pets. A locked cabinet is the safest place to store drugs.  ? Make sure you store your drug in a safe location after every use. Set an alarm to remind you of the next dosing time rather than leaving the drug out to serve as a reminder.  · It is a good idea to keep a diary and write about your pain. This might help to see if there is a pattern to your pain. Make notes about:  ? Where your pain is  ? When you have the pain  ? How your pain feels. Is it dull, sharp, burning, stabbing, or cramping?  ? What causes your pain  ? What makes your pain better or worse  · Ice or heat may be used to ease pain.  ? Ice may help  with swelling that may happen with some pain. Place an ice pack or a bag of frozen peas wrapped in a towel over the painful part. Never put ice right on the skin. Do not leave the ice on more than 10 to 15 minutes at a time.  ? If your doctor tells you to use heat, put a heating pad on the painful part for no more than 20 minutes at a time. Never go to sleep with a heating pad on as this can cause burns.  · Try to stay calm. Anxiety and stress may make your pain worse.  · Try using massage, relaxation, breathing exercises, yoga, amador chi, and music therapy.  · You may consider other ways to help with pain. Some of them are acupuncture, biofeedback, physical therapy, electrical stimulation, counseling, or meditation. Ask your doctor if these may help manage your pain.  What follow-up care is needed?   · Your doctor may ask you to make visits to the office to check on your progress. Be sure to keep these visits.  · If the pain is worse or comes more often, see your doctor. Also talk to your doctor if you are having trouble sleeping at night.  · You may also need to see a:  ? Physical therapist to teach you exercises to help you stretch  ? Occupational therapist to help you find ways to make you more comfortable doing your regular daily activities  ? Psychological therapist to help deal with the stress of chronic pain  ? Doctor who specializes in managing ongoing pain  What drugs may be needed?   The doctor may order drugs to:  · Help with pain and swelling  · Help treat other problems that may go along with chronic pain, such as low mood or being worried  Take your drugs as ordered by your doctor. Some of these drugs can be habit forming and may cause side effects.  Will physical activity be limited?   Physical activities may be limited due to the pain that you have.  What changes to diet are needed?   Changes in food or diet may depend on what kind of pain you have. Talk with your doctor about what kind of food is  good for you.  What problems could happen?   · Not able to function well  · Irritation, sadness, anxiety, and low mood  · Sexual dysfunction  · Loss of appetite  · Need more drugs for pain  · Side effects from drugs for pain, such as constipation, upset stomach, and dizziness  · Addiction to certain drugs for pain  What can be done to prevent this health problem?   The best thing you can do is talk to your doctor about any pain you have. Your doctor can help you make a plan to lower your pain.  Some causes of pain get better by staying active and working out. Your doctor may send you to a physical therapist to help you work on strength exercises and stretching.  Stop smoking if you are a smoker. Studies show that smokers tend to have more pain than people who do not smoke.  When do I need to call the doctor?   · Signs of infection. These include a fever of 100.4°F (38°C) or higher, chills, very bad sore throat, ear or sinus pain, pain or blood with passing urine.  · Very bad upset stomach, throwing up, or belly pain; not able to eat or drink anything  · Weight loss without trying to lose weight  · Dizziness or seeing things that are not really there  · Chest pain or trouble breathing  · Back or side pain that lasts and you dont know why. (You have not done any hard exercises or other activity that may have pulled a muscle.)  · Not able to move or do daily actions  · Very bad pain that is not helped by your drugs  · Health problem is not better or you are feeling worse  Helpful tips   · Get rid of any drug that is no longer needed. Check with your pharmacy to learn about how to get rid of unused drugs.  ? Most drugs may be thrown away in household trash after mixing with coffee grounds or dylan litter and sealing in a plastic bag.  ? In [place], take any unused drugs to the pharmacy.  Teach Back: Helping You Understand   The Teach Back Method helps you understand the information we are giving you. After you talk with  the staff, tell them in your own words what you learned. This helps to make sure the staff has described each thing clearly. It also helps to explain things that may have been confusing. Before going home, make sure you can do these:  · I can tell you about my pain.  · I can tell you what may help ease my pain.  · I can tell you what I will do if I have very bad back, side, chest, or belly pain, or the pain is not helped by my drugs.  Where can I learn more?   American Academy of Family Physicians  [email or web address]   [facility]  https://www.Count includes the [facility].nih.gov/health/chronic-pain-in-depth   National [place] of Neurological Disorders and Stroke  https://www.ninds.nih.gov/Disorders/All-Disorders/Chronic-Pain-Information-Page   Last Reviewed Date   2021-07-09  Consumer Information Use and Disclaimer   This information is not specific medical advice and does not replace information you receive from your health care provider. This is only a brief summary of general information. It does NOT include all information about conditions, illnesses, injuries, tests, procedures, treatments, therapies, discharge instructions or life-style choices that may apply to you. You must talk with your health care provider for complete information about your health and treatment options. This information should not be used to decide whether or not to accept your health care providers advice, instructions or recommendations. Only your health care provider has the knowledge and training to provide advice that is right for you.  Copyright   Copyright © 2021 UpToDate, Inc. and its affiliates and/or licensors. All rights reserved.  Patient Education       Depression   The Basics   Written by the doctors and editors at [facility]   What is depression? -- Depression is a disorder that makes you sad, but it is different than normal sadness (figure 1). Depression can make it hard for  "you to work, study, or do everyday tasks.  How do I know if I am depressed? -- Depressed people feel down most of the time for at least 2 weeks. They also have at least 1 of these 2 symptoms:  · They no longer enjoy or care about doing the things they used to like to do.  · They feel sad, down, hopeless, or cranky most of the day, almost every day.  Depression can also make you:  · Lose or gain weight  · Sleep too much or too little  · Feel tired or like you have no energy  · Feel guilty or like you are worth nothing  · Forget things or feel confused  · Move and speak more slowly than usual  · Act restless or have trouble staying still  · Think about death or suicide  If you think you might be depressed, see your doctor or nurse. Only someone trained in mental health can tell for sure if you are depressed.  See someone right away if you want to hurt or kill yourself! -- If you ever feel like you might hurt yourself or someone else, do one of these things:  · Call your doctor or nurse and tell them it is urgent  · Call for an ambulance (in the [place] and [place], dial 9-1-1)  · Go to the emergency room at your local hospital  · Call the National Suicide Prevention Lifeline:  ? 1-871.936.2933  ? www.suicidepreventionlifeline.org  What are the treatments for depression? -- People who have depression can get 1 or more of the following treatments:  · Medicines that relieve depression  · Counseling (with a psychiatrist, psychologist, nurse, or )  · A device that passes magnetic waves or electricity into the brain  People with depression that is not too severe can get better by taking medicines or talking with a counselor. People with severe depression usually need medicines to get better, and might also need to see a counselor.  Another treatment involves placing a device against the scalp to pass magnetic waves into the brain. This is called "transcranial magnetic stimulation" or "TMS." Doctors might suggest TMS " "if medicines and counseling have not helped.   Some people whose depression is severe might need a treatment called "electroconvulsive therapy" or "ECT." During ECT, doctors pass an electric current through a person's brain in a safe way.  When will I feel better? -- Both treatment options take a little while to start working.  · Many people who take medicines start to feel better within 2 weeks, but it might be 4 to 8 weeks before the medicine has its full effect.  · Many people who see a counselor start to feel better within a few weeks, but it might take 8 to 10 weeks to get the greatest benefit.  If the first treatment you try does not help you, tell your doctor or nurse, but do not give up. Some people need to try different treatments or combinations of treatments before they find an approach that works. Your doctor, nurse, or counselor can work with you to find the treatment that is right for you. They can also help you figure out how to cope while you search for the right treatment or are waiting for your treatment to start working.  How do I decide which treatment to have? -- You and your doctor or nurse will need to work together to choose a treatment for you. Medicines might work a little faster than counseling. But medicines can also cause side effects. Plus, some people do not like the idea of taking medicine.  On the other hand, seeing a counselor involves talking about your feelings with a stranger. That is hard for some people.  Is depression the same for teenagers? -- No. The symptoms of depression are a little different for teenagers than they are for adults. Some teenagers are jones or sad a lot of the time. That makes it hard to tell when they are really depressed. Teenagers who are depressed often seem cranky. They get easily "annoyed" or "bothered." They might even pick fights with people. Also, when treating a teenager, doctors and nurses usually suggest trying counseling first, before trying " medicine. That's because there is a small chance that depression medicines can cause problems for some teenagers. Even so, some depressed teenagers need medicine. And most experts agree that depression medicine is safe and appropriate to use in teenagers who really need it.  What if I take medicine for depression and I want to have a baby? -- Some depression medicines can cause problems for an unborn baby. But having untreated depression during pregnancy can also cause problems. If you want to get pregnant, tell your doctor but do not stop taking your medicines. The two of you can plan the safest way for you to have your baby.  It's also important to talk with your doctor if you want to breastfeed after your baby is born. Breastfeeding has lots of benefits for both mother and baby. Some depression medicines are safer than others to use while breastfeeding. But having untreated depression after giving birth can also cause problems, so do not stop taking your medicines. Your doctor can work with you to plan the safest way for you to feed your baby.  All topics are updated as new evidence becomes available and our peer review process is complete.  This topic retrieved from [employer] on: Sep 21, 2021.  Topic 57224 Version 16.0  Release: 29.4.2 - C29.263  © 2021 UpToDate, Inc. and/or its affiliates. All rights reserved.  figure 1: Mood disorders caused by problems in the brain     Mood disorders, such as depression and bipolar disorder, are caused by chemical imbalances in the brain. Treatments for these conditions work by changing the chemistry of the brain.  Graphic 07005 Version 3.0    Consumer Information Use and Disclaimer   This information is not specific medical advice and does not replace information you receive from your health care provider. This is only a brief summary of general information. It does NOT include all information about conditions, illnesses, injuries, tests, procedures, treatments, therapies,  discharge instructions or life-style choices that may apply to you. You must talk with your health care provider for complete information about your health and treatment options. This information should not be used to decide whether or not to accept your health care provider's advice, instructions or recommendations. Only your health care provider has the knowledge and training to provide advice that is right for you. The use of this information is governed by the [employer] End User License Agreement, available at https://www.YEVVO/en/solutions/Webchutney/about/bernabe.The use of [employer] content is governed by the [employer] Terms of Use. ©2021 UpToDate, Inc. All rights reserved.  Copyright   © 2021 UpToDate, Inc. and/or its affiliates. All rights reserved.  Patient Education       Diabetic Neuropathy Discharge Instructions   About this topic   Diabetes is an illness that makes your blood sugar too high. If your blood sugar is not controlled, you may have problems with how well your nerves work. High blood sugars can damage the small blood vessels that carry food and oxygen to these nerves. Nerve damage in patients with diabetes is called diabetic neuropathy.  Your nerves carry information to the brain. This gives your body information about sensation, movement, and your environment. Damage to these nerves can cause problems with your legs, feet, arms, and hands. Your nerves in your hands and feet carry information to the brain about pain and the sense of touch, such as something being too hot or too cold. Specific nerves in the face or eyes may also be affected.  There is no cure for this illness. Diabetic neuropathy can be prevented by managing your blood sugar levels. Doctors treat this illness by managing the signs.  What care is needed at home?   · Ask your doctor what you need to do when you go home. Make sure you ask questions if you do not understand what the doctor says. This way you will know what you  need to do.  · Take care of your blood sugar.  ? Check your blood sugar. Keep a record of your results.  ? Note how you feel when your blood sugar is high versus when your blood sugar is within normal limits.  ? Control your blood sugar by following your diet. Work with a nurse or dietician if needed.  ? Take your antidiabetic drugs each day as ordered.  · Take care of your skin.  ? Bathe each day. Pat yourself dry. Dry skin between toes and any skin folds.  ? Check your skin daily for any signs of sores, wounds, or infection.  ? Use lotion or moisturizer to avoid skin dryness.  · Take care of your feet.  ? Keep feet moisturized. Do not put lotion in between toes.  ? Do not walk bare foot. Do not wear shoes without socks.  ? Wear shoes that fit the right way. Wear socks made of cotton.  ? Regular walking helps blood flow. You can wear a support stocking to treat blood pressure problems. Make sure the stocking fits properly to avoid pressure on one area of the foot or leg.  ? Check your feet using a mirror if you cannot see the bottoms.  · Use lubricating creams to prevent vaginal dryness.  · Your doctor may suggest you use drugs or devices to help erections.     What follow-up care is needed?   Your doctor may ask you to make visits to the office to check on your progress. Be sure to keep these visits.  What drugs may be needed?   The doctor may order drugs to:  · Control blood sugar  · Help with pain  · Fight an infection  · Help lower blood pressure and cholesterol levels  Will physical activity be limited?   · Your activity may be limited due to signs from diabetic neuropathy.  · You may need a cane or walker to help with balance.  · Ask your doctor when you can return to your normal activities.  · Ask your doctor about the right amount and type of exercise for you.  What changes to diet are needed?   · Limit your intake of beer, wine, and mixed drinks (alcohol).  · Stop smoking.  What problems could happen?    · Damage to the feet because of loss of feeling  · Muscle injury  · Skin and soft tissue injury or infection that may lead to amputation  When do I need to call the doctor?   · Signs of infection. These include a fever of 100.4°F (38°C) or higher, chills, or a wound that will not heal.  · New sores or signs of wound infection. These include swelling, redness, warmth around the wound; too much pain when touched; yellowish, greenish, or bloody discharge; foul smell coming from the wound.  · Numbness on the foot or legs  · Blood sugar is lower or higher than normal  · Chest pain  · Loose or hard stools  · Upset stomach and throwing up  · Sexual dysfunction  · You are not feeling better in 2 to 3 days or you are feeling worse  Teach Back: Helping You Understand   The Teach Back Method helps you understand the information we are giving you. After you talk with the staff, tell them in your own words what you learned. This helps to make sure the staff has described each thing clearly. It also helps to explain things that may have been confusing. Before going home, make sure you can do these:  · I can tell you about my condition.  · I can tell you how I will take care of my feet and skin.  · I can tell you what I will do if I have a new sore or signs of a wound infection.  Where can I learn more?   American Academy of Family Physicians  https://familydoctor.org/condition/diabetic-neuropathy/   National [place] of Diabetes and Digestive and Kidney Diseases  https://www.niddk.nih.gov/health-information/diabetes/overview/preventing-problems/nerve-damage-diabetic-neuropathies/what-is-diabetic-neuropathy   Last Reviewed Date   2020-10-30  Consumer Information Use and Disclaimer   This information is not specific medical advice and does not replace information you receive from your health care provider. This is only a brief summary of general information. It does NOT include all information about conditions, illnesses,  injuries, tests, procedures, treatments, therapies, discharge instructions or life-style choices that may apply to you. You must talk with your health care provider for complete information about your health and treatment options. This information should not be used to decide whether or not to accept your health care providers advice, instructions or recommendations. Only your health care provider has the knowledge and training to provide advice that is right for you.  Copyright   Copyright © 2021 UpToDate, Inc. and its affiliates and/or licensors. All rights reserved.  Patient Education       Low Back Pain in Adults   The Basics   Written by the doctors and editors at [employer]   How worried should I be about low back pain? -- Do not assume the worst. Almost everyone gets back pain at some point. Low back pain can be scary. But even when the pain is severe, it usually goes away on its own within a few weeks. The cases that require urgent care or surgery are rare.  See your doctor or nurse if you have back pain and you:  · Recently had a fall or an injury to your back  · Have numbness or weakness in your legs  · Have problems with bladder or bowel control  · Have unexplained weight loss  · Have a fever or feel sick in other ways  · Take steroid medicine, such as prednisone, on a regular basis  · Have diabetes or a medical problem that weakens your immune system  · Have a history of cancer or osteoporosis  You should also see a doctor if:  · Your back pain is so severe that you cannot perform simple tasks  · Your back pain does not start to improve within 4 weeks   What are the parts of the back? -- The back is made up of (figure 1):  · Vertebrae - A stack of bones that sit on top of one another like a stack of coins. Each of these bones has a hole in the center. When stacked, the bones form a hollow tube that protects the spinal cord.  · Discs - Rubbery discs sit in between each of the vertebrae to add cushion and  "allow movement.  · Spinal cord and nerves - The spinal cord is the highway of nerves that connects the brain to the rest of the body. It runs through the vertebrae within the spinal canal. Nerves branch from the spinal cord and pass in between the vertebrae. From there they connect to the arms, the legs, and the organs. (This is why problems in the back can cause leg pain or bladder or bowel problems.)  · Muscles, tendons, and ligaments - Together the muscles, tendons, and ligaments are called the "soft tissues" of the back. These soft tissues support the back and help hold it together.  What causes low back pain? -- Many different things can cause low back pain. Most of the time, doctors do not know the exact cause.  Back pain can happen if you strain a muscle. This is often what has happened when a person "throws out" their back. This refers to pain that starts suddenly after physical activity, like lifting something heavy or bending the back.  Back pain can also happen if you have:  · Damaged, bulging, or torn discs  · Arthritis affecting the joints of the spine  · Bony growths on the vertebrae that crowd nearby nerves  · A vertebra out of place  · Narrowing in the spinal canal  · A tumor or infection (but this is very rare)  Should I get an imaging test? -- Most people do not need an imaging test such an X-ray, CT scan, or MRI. Most cases of back pain go away a few weeks. Doctors usually do not order imaging tests unless there are signs of something unusual.  If your doctor does not order an imaging test, do not worry. They can still learn a lot about your pain just from looking you over and talking with you.  How can the doctor or nurse tell what is wrong just by talking to me? -- Your symptoms tell your doctor or nurse a lot about the cause of your pain. For example:  · If your pain started after you did something specific, like lifting a heavy object or twisting your back, you might have strained a " "muscle.  · If your pain spreads down the back of one thigh, it could be a sign that one of the nerves that go to your leg is being pinched by a bulging or torn disc.  · If your pain goes all the way down both legs, it could be a sign that you have a narrowed spinal canal. This is most often due to bony growths on your spine.  How is back pain treated? -- Most people with an episode of low back pain do not have a serious medical problem, and can try simple treatments such as:  · Staying active - The best thing you can do is to stay as active as possible. People with low back pain recover faster if they stay active. If your pain is severe, you might need to rest for a day or 2. But it's important to get back to walking and moving as soon as possible. While you should avoid heavy lifting and sports while your back hurts, try to keep doing your normal daily activities.  · Heat - Some people find that it helps to use a heating pad or heated wrap. Be careful to avoid high heat settings to prevent skin burns.  · Medicines - First, you can try pain medicines that you can get without a prescription. In many cases, doctors suggest first trying a nonsteroidal antiinflammatory drug, or "NSAID." NSAIDs include ibuprofen (sample brand names: Advil, Motrin) and naproxen (sample brand name: Aleve). These might work better than acetaminophen (Tylenol) for back pain.  If non-prescription medicines do not help, let your doctor or nurse know. In some cases, doctors prescribe a medicine to relax the muscles (called a "muscle relaxant"). But keep in mind that muscle relaxants are not generally used in people older than 65. In older people, these medicines can cause side effects such as trouble urinating or confusion.  · Treatments to help with symptoms - Some treatments might help you feel better for a little while. They include:  ? Spinal manipulation - This is when a chiropractor, physical therapist, or other professional moves or " ""adjusts" the joints of your back. If you want to try this, talk to your doctor or nurse first.  ? Acupuncture - This is when someone who knows traditional Chinese medicine inserts tiny needles into your body to block pain signals.  ? Massage  While back pain usually goes away within a few weeks, some people do continue to have pain for longer. In this case, additional treatments might include:  · Self care - This involves being aware of your pain. While you should rest when you need to, it's important to stay active as much as you can. Things like applying heat and doing gentle stretches can help you feel better, too.  · Physical therapy - A physical therapist is an exercise expert who can teach you stretches and movements to help strengthen your muscles. The goal is to relieve pain but also help you get back to your normal activities.   Exercises you can try include walking, swimming, or using an exercise bike. Some people also find that Thomas Chi or yoga can help with their back pain. Finding activities you enjoy can help you stay active.  · Reducing stress - Some people find that it helps to try something called "mindfulness-based stress reduction." This involves going to a group program to practice relaxation and meditation. If your back pain is making you feel anxious or depressed, talk to your doctor or nurse. There are other treatments that can help with these problems.  Some people wonder if injections (shots) can help to relieve back pain. In some cases, doctors might recommend a shot of medicine to numb the area or reduce swelling. But this has only been proven to work in specific situations.  Only a small number of people end up needing surgery to treat back pain.  What can I do to keep from getting back pain again? -- The best thing you can do is to stay active. Doing exercises to strengthen and stretch your back can help. You can also:  · Learn to lift using your legs instead of your back  · Avoid " sitting or standing in the same position for too long  Having back pain can be frustrating and scary. But it can help to know that doing these things can lower your risk of having another episode.  All topics are updated as new evidence becomes available and our peer review process is complete.  This topic retrieved from [employer] on: Sep 21, 2021.  Topic 99668 Version 18.0  Release: 29.4.2 - C29.263  © 2021 UpToDate, Inc. and/or its affiliates. All rights reserved.  figure 1: Anatomy of the back     Low back pain can be caused by problems with the muscles, ligaments, discs, bones (vertebrae), or nerves. Often, back pain is caused by strains or sprains involving the muscles or ligaments. These problems cannot always be seen on imaging tests, such as MRI or CT scans.  Graphic 16908 Version 5.0    Consumer Information Use and Disclaimer   This information is not specific medical advice and does not replace information you receive from your health care provider. This is only a brief summary of general information. It does NOT include all information about conditions, illnesses, injuries, tests, procedures, treatments, therapies, discharge instructions or life-style choices that may apply to you. You must talk with your health care provider for complete information about your health and treatment options. This information should not be used to decide whether or not to accept your health care provider's advice, instructions or recommendations. Only your health care provider has the knowledge and training to provide advice that is right for you. The use of this information is governed by the [employer] End User License Agreement, available at https://www.[employer]/en/solutions/[employer]/about/bernabe.The use of [employer] content is governed by the [employer] Terms of Use. ©2021 UpToDate, Inc. All rights reserved.  Copyright   © 2021 UpToDate, Inc. and/or its affiliates. All rights reserved.  Patient Education       Restless  Legs Syndrome Discharge Instructions   About this topic   Restless legs syndrome causes you to have strange feelings in your leg that are not comfortable. This most often happens in the lower leg between the knee and the ankle. The problem often goes away after you move your legs or take a walk. It may happen more at night or if you sit for long periods of time. Your legs may feel achy, tingly, or may throb. Sometimes, you may feel a pulling, crawling, creeping, or burning feeling in your leg. Itching and cramping are also signs of restless legs syndrome. Restless legs syndrome may only affect one side of the body, but most often it affects both.  Restless legs syndrome can cause problems falling asleep or getting a good night's sleep. Treatment may include drugs, easing your signs, and lifestyle changes.  What care is needed at home?   · Ask your doctor what you need to do when you go home. Make sure you ask questions if you do not understand what the doctor says. This way you will know what you need to do.  · Take a warm bath. Massage your legs in the warm water.  · Exercise during the day, but do not exercise close to bedtime.  · Try doing some gentle leg stretches.  · Practice good sleep habits. Go to bed at the same time and get up at the same time each day.  · Try ice or heat to see if your problems lessen. Talk to your doctor first if you are diabetic. Place an ice pack or a bag of frozen peas wrapped in a towel on your leg or legs. Never put ice right on the skin. Do not leave the ice on more than 10 to 15 minutes at a time. If you use a heating pad on your legs, do not leave it on for more than 20 minutes at a time. Never go to sleep with a heating pad on as this can cause burns. Alternating heat and ice may help lessen signs.  · Avoid smoking or drinking beer, wine, and mixed drinks (alcohol). Tobacco and alcohol can make signs worse.  · Try doing a crossword or another type of puzzle right before bed.  This may help you to focus on something else and fall asleep easier.  What follow-up care is needed?   Your doctor may ask you to make visits to the office to check on your progress. Be sure to keep these visits.   What drugs may be needed?   The doctor may order drugs to:  · Help you sleep  · Ease the signs  · Build up your iron, folate, and magnesium levels if you have low levels in your blood  Will physical activity be limited?   Your activity should not be limited. You may ease your signs by moving your legs. Try jiggling your legs, walking, or stretching.  What problems could happen?   · Lack of sleep  · Trouble performing daily living and work tasks  · Trouble concentrating or remembering things   · Low mood  What can be done to prevent this health problem?   · Look for ways to avoid stress. Try meditation, yoga, amador chi, or deep breathing.  · Avoid sitting for long periods of time.  · When traveling by plane, get up and move around at times. When taking long car trips, make stops to stretch your legs and walk.  · Avoid liquids and foods that have caffeine in them.  When do I need to call the doctor?   · Have trouble sleeping  · Health problem is not better or you are feeling worse  Teach Back: Helping You Understand   The Teach Back Method helps you understand the information we are giving you. After you talk with the staff, tell them in your own words what you learned. This helps to make sure the staff has described each thing clearly. It also helps to explain things that may have been confusing. Before going home, make sure you can do these:  · I can tell you about my condition.  · I can tell you what to do to help with the feelings in my legs.  · I can tell you what I will do if I have trouble sleeping.  Where can I learn more?   [facility] Choices  http://www.nhs.uk/conditions/restless-leg-syndrome/pages/introduction.aspx   Last Reviewed Date   2021-08-16  Consumer Information Use and Disclaimer   This  information is not specific medical advice and does not replace information you receive from your health care provider. This is only a brief summary of general information. It does NOT include all information about conditions, illnesses, injuries, tests, procedures, treatments, therapies, discharge instructions or life-style choices that may apply to you. You must talk with your health care provider for complete information about your health and treatment options. This information should not be used to decide whether or not to accept your health care providers advice, instructions or recommendations. Only your health care provider has the knowledge and training to provide advice that is right for you.  Copyright   Copyright © 2021 UpToDate, Inc. and its affiliates and/or licensors. All rights reserved.  Patient Education       Preventing Falls   The Basics   Written by the doctors and editors at [employer]   Am I at risk of falling? -- Your risk of falling increases as you grow older. That's because getting older can make it harder to walk steadily and keep your balance. Also, the effects of falls are more serious in older people.  Overall, 3 to 4 out of every 10 people over the age of 65 fall each year. Up to 75 percent of people who fracture a hip never recover to the point they were before they had their fracture. If you have fallen in the past, you are at higher risk of falling again.  Several things can increase your risk of a fall, including:  · Illness  · A change in the medicines you take  · An unsafe or unfamiliar setting (for example, a room with rugs or furniture that might trip you, or an area you don't know well)  How can my doctor help me to avoid falling? -- Your doctor can talk to you about the following things:  · Past falls - It is important to tell your doctor about any times you have fallen or almost fallen. He or she can then suggest ways to prevent another fall.  · Your health conditions - Some  health problems can put you at risk of falling. These include conditions that affect eyesight, hearing, muscle strength, or balance.  · The medicines you take - Certain medicines can increase the risk of falling. These include some medicines that are used for sleeping problems, anxiety, high blood pressure, or depression. Adding new medicines, or changing doses of some medicines, can also affect your risk of falling.  The more your doctor knows about your situation, the better he or she will be able to help you. For example, if you fell because you have a condition that causes pain, your doctor might suggest treatments to deal with the pain. Or if one of your medicines is making you dizzy and more likely to fall, your doctor might switch you to a different medicine.  Is there anything I can do on my own? -- Yes. To help keep from falling, you can:  · Make your home safer - To avoid falling at home, get rid of things that might make you trip or slip. This might include furniture, electrical cords, clutter, and loose rugs (figure 1). Keep your home well-lit so that you can easily see where you are going. Avoid storing things in high places so you don't have to reach or climb.  · Wear sturdy shoes that fit well - Wearing shoes with high heels or slippery soles, or shoes that are too loose, can lead to falls. Walking around in bare feet, or only socks, can also increase your risk of falling.  · Take vitamin D pills - Taking vitamin D might lower the risk of falls in older people. This is because vitamin D helps make bones and muscles stronger. Your doctor can talk to you about whether you should take extra vitamin D, and how much.  · Stay active - Exercising on a regular basis can help lower your risk of falling. It might also help prevent you from getting hurt if you do fall. It is best to do a few different activities that help with both strength and balance. There are many kinds of exercise that can be safe for older  people. These include walking, swimming, and Thomas Chi (a Chinese martial art that involves slow, gentle movements).  · Use a cane, walker, and other safety devices - If your doctor recommends that you use a cane or walker, be sure that it's the right size and you know how to use it. There are other devices that might help you avoid falling, too. These include grab bars or a sturdy seat for the shower, non-slip bath mats, and hand rails or treads for the stairs (to prevent slipping).  If you worry that you could fall, there are also alarm buttons that let you call for help if you fall and can't get up.  What should I do if I fall? -- If you fall, see your doctor right away, even if you aren't hurt. Your doctor can try to figure out what caused you to fall, and how likely you are to fall again. He or she will do an exam and talk to you about your health problems, medicines, and activities. Then he or she can suggest things you can do to avoid falling again.  Many older people have a hard time recovering after a fall. Doing things to prevent falling can help you to protect your health and independence.  All topics are updated as new evidence becomes available and our peer review process is complete.  This topic retrieved from [employer] on: Sep 21, 2021.  Topic 60301 Version 18.0  Release: 29.4.2 - C29.263  © 2021 UpToDate, Inc. and/or its affiliates. All rights reserved.  figure 1: How to avoid falling at home     This picture shows some of the things that can cause a fall in your home. Look around and remove any loose rugs, electrical cords, clutter, or furniture that could trip you.  Graphic 57852 Version 1.0    Consumer Information Use and Disclaimer   This information is not specific medical advice and does not replace information you receive from your health care provider. This is only a brief summary of general information. It does NOT include all information about conditions, illnesses, injuries, tests,  procedures, treatments, therapies, discharge instructions or life-style choices that may apply to you. You must talk with your health care provider for complete information about your health and treatment options. This information should not be used to decide whether or not to accept your health care provider's advice, instructions or recommendations. Only your health care provider has the knowledge and training to provide advice that is right for you. The use of this information is governed by the [employer] End User License Agreement, available at https://www.[employer]/en/solutions/[employer]/about/bernabe.The use of [employer] content is governed by the [employer] Terms of Use. ©2021 UpToDate, Inc. All rights reserved.  Copyright   © 2021 UpToDate, Inc. and/or its affiliates. All rights reserved.    1. CT of the head without contrast to evaluate frequent falls and dizziness  2. Renew and continue sinemet 25/100 mg one tablet in the morning, one tablet in afternoon and two tablets at bedtime  3. Vaping cessation  4. Keep follow up with GUSTABO Crespo for neuropathy, back pain  5. Keep follow up with Dr. Hines for depression  6. Keep follow up with Dr. Zhu cardiologist for dizziness  7. Keep follow up with PCP for medical management  8. Keep blood sugars under control  9. Fall precautions, stay well hydrated if no contraindications  10. Labs cbc, cmp  11. Carotid Doppler US to evaluate dizziness  11. Follow up with neurology in 3 months or sooner if needed

## 2022-01-13 ENCOUNTER — TELEPHONE (OUTPATIENT)
Dept: NEUROLOGY | Facility: CLINIC | Age: 62
End: 2022-01-13
Payer: MEDICAID

## 2022-01-13 DIAGNOSIS — G25.81 RLS (RESTLESS LEGS SYNDROME): Chronic | ICD-10-CM

## 2022-01-13 RX ORDER — CARBIDOPA AND LEVODOPA 25; 100 MG/1; MG/1
TABLET ORAL
Qty: 360 TABLET | Refills: 1 | Status: SHIPPED | OUTPATIENT
Start: 2022-01-13 | End: 2022-01-13 | Stop reason: SDUPTHER

## 2022-01-13 RX ORDER — CARBIDOPA AND LEVODOPA 25; 100 MG/1; MG/1
TABLET ORAL
Qty: 360 TABLET | Refills: 1 | Status: SHIPPED | OUTPATIENT
Start: 2022-01-13 | End: 2022-04-13 | Stop reason: SDUPTHER

## 2022-01-13 NOTE — TELEPHONE ENCOUNTER
Pt voiced understanding  ----- Message from Diony Hayward NP sent at 1/11/2022  4:29 PM CST -----  Please let patient know her sodium is a little low at 132 and other labs are ok, have her add some salt to her diet and keep an eye on her blood pressure or follow up with pcp for the low sodium, thanks
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
## 2022-02-01 ENCOUNTER — HOSPITAL ENCOUNTER (OUTPATIENT)
Dept: RADIOLOGY | Facility: HOSPITAL | Age: 62
Discharge: HOME | End: 2022-02-01
Attending: PHYSICIAN ASSISTANT
Payer: MEDICAID

## 2022-02-01 ENCOUNTER — OFFICE VISIT (OUTPATIENT)
Dept: PAIN MEDICINE | Facility: CLINIC | Age: 62
End: 2022-02-01
Payer: MEDICAID

## 2022-02-01 VITALS
SYSTOLIC BLOOD PRESSURE: 141 MMHG | HEIGHT: 62 IN | DIASTOLIC BLOOD PRESSURE: 80 MMHG | HEART RATE: 71 BPM | WEIGHT: 152 LBS | BODY MASS INDEX: 27.97 KG/M2

## 2022-02-01 DIAGNOSIS — M54.9 DORSALGIA, UNSPECIFIED: ICD-10-CM

## 2022-02-01 DIAGNOSIS — M54.6 THORACIC SPINE PAIN: ICD-10-CM

## 2022-02-01 DIAGNOSIS — M54.16 LUMBAR RADICULOPATHY: Primary | Chronic | ICD-10-CM

## 2022-02-01 DIAGNOSIS — E11.42 DIABETIC POLYNEUROPATHY ASSOCIATED WITH TYPE 2 DIABETES MELLITUS: Chronic | ICD-10-CM

## 2022-02-01 DIAGNOSIS — M54.12 RADICULOPATHY, CERVICAL REGION: Chronic | ICD-10-CM

## 2022-02-01 DIAGNOSIS — M54.16 LUMBAR RADICULOPATHY: ICD-10-CM

## 2022-02-01 PROCEDURE — 72100 X-RAY EXAM L-S SPINE 2/3 VWS: CPT | Mod: 26,,, | Performed by: RADIOLOGY

## 2022-02-01 PROCEDURE — 3077F PR MOST RECENT SYSTOLIC BLOOD PRESSURE >= 140 MM HG: ICD-10-PCS | Mod: CPTII,,, | Performed by: PHYSICIAN ASSISTANT

## 2022-02-01 PROCEDURE — 72070 XR THORACIC SPINE AP LATERAL: ICD-10-PCS | Mod: 26,,,

## 2022-02-01 PROCEDURE — 3008F PR BODY MASS INDEX (BMI) DOCUMENTED: ICD-10-PCS | Mod: CPTII,,, | Performed by: PHYSICIAN ASSISTANT

## 2022-02-01 PROCEDURE — 1159F PR MEDICATION LIST DOCUMENTED IN MEDICAL RECORD: ICD-10-PCS | Mod: CPTII,,, | Performed by: PHYSICIAN ASSISTANT

## 2022-02-01 PROCEDURE — 4010F PR ACE/ARB THEARPY RXD/TAKEN: ICD-10-PCS | Mod: CPTII,,, | Performed by: PHYSICIAN ASSISTANT

## 2022-02-01 PROCEDURE — 72070 X-RAY EXAM THORAC SPINE 2VWS: CPT | Mod: 26,,,

## 2022-02-01 PROCEDURE — 73521 XR HIPS BILATERAL 2 VIEW INCL AP PELVIS: ICD-10-PCS | Mod: 26,,, | Performed by: RADIOLOGY

## 2022-02-01 PROCEDURE — 73521 X-RAY EXAM HIPS BI 2 VIEWS: CPT | Mod: 26,,, | Performed by: RADIOLOGY

## 2022-02-01 PROCEDURE — 3008F BODY MASS INDEX DOCD: CPT | Mod: CPTII,,, | Performed by: PHYSICIAN ASSISTANT

## 2022-02-01 PROCEDURE — 72100 X-RAY EXAM L-S SPINE 2/3 VWS: CPT | Mod: TC

## 2022-02-01 PROCEDURE — 3077F SYST BP >= 140 MM HG: CPT | Mod: CPTII,,, | Performed by: PHYSICIAN ASSISTANT

## 2022-02-01 PROCEDURE — 1159F MED LIST DOCD IN RCRD: CPT | Mod: CPTII,,, | Performed by: PHYSICIAN ASSISTANT

## 2022-02-01 PROCEDURE — 3079F DIAST BP 80-89 MM HG: CPT | Mod: CPTII,,, | Performed by: PHYSICIAN ASSISTANT

## 2022-02-01 PROCEDURE — 3079F PR MOST RECENT DIASTOLIC BLOOD PRESSURE 80-89 MM HG: ICD-10-PCS | Mod: CPTII,,, | Performed by: PHYSICIAN ASSISTANT

## 2022-02-01 PROCEDURE — 4010F ACE/ARB THERAPY RXD/TAKEN: CPT | Mod: CPTII,,, | Performed by: PHYSICIAN ASSISTANT

## 2022-02-01 PROCEDURE — 72100 XR LUMBAR SPINE AP AND LATERAL: ICD-10-PCS | Mod: 26,,, | Performed by: RADIOLOGY

## 2022-02-01 PROCEDURE — 73521 X-RAY EXAM HIPS BI 2 VIEWS: CPT | Mod: TC

## 2022-02-01 PROCEDURE — 99214 PR OFFICE/OUTPT VISIT, EST, LEVL IV, 30-39 MIN: ICD-10-PCS | Mod: S$PBB,,, | Performed by: PHYSICIAN ASSISTANT

## 2022-02-01 PROCEDURE — 99215 OFFICE O/P EST HI 40 MIN: CPT | Mod: PBBFAC | Performed by: PHYSICIAN ASSISTANT

## 2022-02-01 PROCEDURE — 99214 OFFICE O/P EST MOD 30 MIN: CPT | Mod: S$PBB,,, | Performed by: PHYSICIAN ASSISTANT

## 2022-02-01 PROCEDURE — 72070 X-RAY EXAM THORAC SPINE 2VWS: CPT | Mod: TC

## 2022-02-01 RX ORDER — ACETAMINOPHEN AND CODEINE PHOSPHATE 300; 30 MG/1; MG/1
1 TABLET ORAL EVERY 6 HOURS PRN
Qty: 120 TABLET | Refills: 0 | Status: SHIPPED | OUTPATIENT
Start: 2022-02-01 | End: 2022-03-29 | Stop reason: SDUPTHER

## 2022-02-01 RX ORDER — PREGABALIN 100 MG/1
CAPSULE ORAL
Qty: 180 CAPSULE | Refills: 0 | Status: SHIPPED | OUTPATIENT
Start: 2022-02-01 | End: 2022-03-29 | Stop reason: SDUPTHER

## 2022-02-01 RX ORDER — METHOCARBAMOL 750 MG/1
750 TABLET, FILM COATED ORAL EVERY 8 HOURS
Qty: 90 TABLET | Refills: 0 | Status: SHIPPED | OUTPATIENT
Start: 2022-02-01 | End: 2022-02-28 | Stop reason: SDUPTHER

## 2022-02-28 PROBLEM — M54.12 RADICULOPATHY, CERVICAL REGION: Chronic | Status: ACTIVE | Noted: 2022-02-01

## 2022-02-28 NOTE — PROGRESS NOTES
Disclaimer:  This note has been generated using voice recognition software.  There may be type of graft focal areas that have been missed during a proof reading      Subjective:      Patient ID: Magali Cheung is a 61 y.o. female.    Chief Complaint: Low-back Pain, Foot Pain, and Leg Pain      Low-back Pain  This is a chronic problem. The current episode started more than 1 year ago. The problem occurs daily. The problem has been waxing and waning since onset. Associated symptoms include leg pain. Pertinent negatives include no bladder incontinence, chest pain, dysuria or fever.   Leg Pain   Pertinent negatives include no fever.   Foot Pain   Pertinent negatives include no fever.   Neck Pain   Associated symptoms include leg pain. Pertinent negatives include no chest pain, fever, photophobia or trouble swallowing.     Review of Systems   Constitutional: Negative for activity change, appetite change, diaphoresis, fever and unexpected weight change.   HENT: Negative for drooling, ear discharge, ear pain, facial swelling, nosebleeds, sore throat, trouble swallowing, voice change and goiter.    Eyes: Negative for photophobia, pain, discharge, redness and visual disturbance.   Respiratory: Negative for apnea, cough, choking, chest tightness, shortness of breath, wheezing and stridor.    Cardiovascular: Negative for chest pain, palpitations and leg swelling.   Gastrointestinal: Negative for abdominal distention, change in bowel habit, diarrhea, rectal pain, vomiting, fecal incontinence and change in bowel habit.   Endocrine: Negative for cold intolerance, heat intolerance, polydipsia, polyphagia and polyuria.   Genitourinary: Negative for bladder incontinence, dysuria, flank pain, frequency and hot flashes.   Musculoskeletal: Positive for arthralgias, back pain, leg pain, neck pain and neck stiffness.   Integumentary:  Negative for color change, pallor and rash.   Allergic/Immunologic: Negative for immunocompromised  state.   Neurological: Negative for dizziness, vertigo, seizures, syncope, facial asymmetry, speech difficulty, light-headedness, disturbances in coordination, memory loss and coordination difficulties.   Hematological: Negative for adenopathy. Does not bruise/bleed easily.   Psychiatric/Behavioral: Negative for agitation, behavioral problems, confusion, decreased concentration, dysphoric mood, hallucinations, self-injury and suicidal ideas. The patient is not nervous/anxious and is not hyperactive.             Objective:  Vitals:    03/01/22 1326   BP: 132/84   Pulse: 81   Weight: 71 kg (156 lb 9.6 oz)   PainSc:   6         Physical Exam  Vitals and nursing note reviewed. Exam conducted with a chaperone present.   Constitutional:       General: She is awake.      Appearance: Normal appearance. She is not ill-appearing or toxic-appearing.   HENT:      Head: Normocephalic and atraumatic.      Nose: Nose normal.      Mouth/Throat:      Mouth: Mucous membranes are moist.      Pharynx: Oropharynx is clear.   Eyes:      Conjunctiva/sclera: Conjunctivae normal.      Pupils: Pupils are equal, round, and reactive to light.   Cardiovascular:      Rate and Rhythm: Normal rate.   Pulmonary:      Effort: Pulmonary effort is normal. No respiratory distress.   Abdominal:      Palpations: Abdomen is soft.      Tenderness: There is no guarding.   Musculoskeletal:      Cervical back: Normal range of motion and neck supple. Tenderness present. No rigidity.      Thoracic back: Tenderness present.      Lumbar back: Tenderness present. Decreased range of motion.   Skin:     General: Skin is warm and dry.      Coloration: Skin is not jaundiced or pale.   Neurological:      General: No focal deficit present.      Mental Status: She is alert and oriented to person, place, and time. Mental status is at baseline.      Cranial Nerves: Cranial nerves are intact. No cranial nerve deficit (II-XII).      Deep Tendon Reflexes:      Reflex  Scores:       Brachioradialis reflexes are 2+ on the right side.  Psychiatric:         Mood and Affect: Mood normal.         Behavior: Behavior normal. Behavior is cooperative.         Thought Content: Thought content normal.           X-Ray Thoracic Spine AP Lateral  Narrative: EXAMINATION:  XR THORACIC SPINE AP LATERAL    CLINICAL HISTORY:  Pain in thoracic spine    FINDINGS:  Alignment in the lateral plane is normal throughout the thoracic spine with minimal osteophytes present.  No focal vertebral body height loss seen    Mild scoliosis present    Epidural catheters present in the lower thoracic spine  Impression: 1. Mild scoliosis  2. Minimal degenerative changes    Electronically signed by: Lupis Ascencio  Date:    02/01/2022  Time:    15:31  X-Ray Hips Bilateral 2 View Inc AP Pelvis  Narrative: EXAMINATION:  XR HIPS BILATERAL 2 VIEW INCL AP PELVIS    CLINICAL HISTORY:  Radiculopathy, lumbar region    TECHNIQUE:  AP view of the pelvis and frogleg lateral views of both hips were performed.    COMPARISON:  None.    FINDINGS:  There is no fracture or dislocation.  Mild degenerative changes seen of both hips.  Impression: As above    Electronically signed by: Rocky Alas  Date:    02/01/2022  Time:    15:30  X-Ray Lumbar Spine AP And Lateral  Narrative: EXAMINATION:  XR LUMBAR SPINE AP AND LATERAL    CLINICAL HISTORY:  Low back pain, symptoms persist with > 6wks conservative treatment;Dorsalgia, unspecified    TECHNIQUE:  AP, lateral images were performed of the lumbar spine.    COMPARISON:  01/04/2018    FINDINGS:  Since the prior exam there is now a spinal stimulator partially assessed.  There is retrolisthesis of L1 on L2 that is similar.  Degenerative endplate changes most notably at L1-L2 are similar.  Additional multilevel degenerative endplate and facet changes throughout the lumbar spine.  Impression: Multilevel degenerative changes.    Electronically signed by: Rocky Alas  Date:    02/01/2022  Time:    15:29        Lab Visit on 01/11/2022   Component Date Value Ref Range Status    Sodium 01/11/2022 132 (A) 136 - 145 mmol/L Final    Potassium 01/11/2022 4.8  3.5 - 5.1 mmol/L Final    Chloride 01/11/2022 96 (A) 98 - 107 mmol/L Final    CO2 01/11/2022 30  21 - 32 mmol/L Final    Anion Gap 01/11/2022 11  7 - 16 mmol/L Final    Glucose 01/11/2022 76  74 - 106 mg/dL Final    BUN 01/11/2022 11  7 - 18 mg/dL Final    Creatinine 01/11/2022 0.72  0.55 - 1.02 mg/dL Final    BUN/Creatinine Ratio 01/11/2022 15  6 - 20 Final    Calcium 01/11/2022 9.1  8.5 - 10.1 mg/dL Final    Total Protein 01/11/2022 7.2  6.4 - 8.2 g/dL Final    Albumin 01/11/2022 3.9  3.5 - 5.0 g/dL Final    Globulin 01/11/2022 3.3  2.0 - 4.0 g/dL Final    A/G Ratio 01/11/2022 1.2   Final    Bilirubin, Total 01/11/2022 0.4  0.0 - 1.2 mg/dL Final    Alk Phos 01/11/2022 69  50 - 130 U/L Final    ALT 01/11/2022 10 (A) 13 - 56 U/L Final    AST 01/11/2022 14 (A) 15 - 37 U/L Final    eGFR 01/11/2022 88  >=60 mL/min/1.73m² Final    WBC 01/11/2022 6.55  4.50 - 11.00 K/uL Final    RBC 01/11/2022 4.42  4.20 - 5.40 M/uL Final    Hemoglobin 01/11/2022 12.5  12.0 - 16.0 g/dL Final    Hematocrit 01/11/2022 38.5  38.0 - 47.0 % Final    MCV 01/11/2022 87.1  80.0 - 96.0 fL Final    MCH 01/11/2022 28.3  27.0 - 31.0 pg Final    MCHC 01/11/2022 32.5  32.0 - 36.0 g/dL Final    RDW 01/11/2022 13.2  11.5 - 14.5 % Final    Platelet Count 01/11/2022 397  150 - 400 K/uL Final    MPV 01/11/2022 10.5  9.4 - 12.4 fL Final    Neutrophils % 01/11/2022 44.1 (A) 53.0 - 65.0 % Final    Lymphocytes % 01/11/2022 41.8 (A) 27.0 - 41.0 % Final    Monocytes % 01/11/2022 8.7 (A) 2.0 - 6.0 % Final    Eosinophils % 01/11/2022 2.4  1.0 - 4.0 % Final    Basophils % 01/11/2022 1.5 (A) 0.0 - 1.0 % Final    Immature Granulocytes % 01/11/2022 1.5 (A) 0.0 - 0.4 % Final    nRBC, Auto 01/11/2022 0.0  <=0.0 % Final    Neutrophils, Abs 01/11/2022 2.88  1.80 - 7.70 K/uL Final     Lymphocytes, Absolute 01/11/2022 2.74  1.00 - 4.80 K/uL Final    Monocytes, Absolute 01/11/2022 0.57  0.00 - 0.80 K/uL Final    Eosinophils, Absolute 01/11/2022 0.16  0.00 - 0.50 K/uL Final    Basophils, Absolute 01/11/2022 0.10  0.00 - 0.20 K/uL Final    Immature Granulocytes, Absolute 01/11/2022 0.10 (A) 0.00 - 0.04 K/uL Final    nRBC, Absolute 01/11/2022 0.00  <=0.00 x10e3/uL Final    Diff Type 01/11/2022 Auto   Final   Office Visit on 12/15/2021   Component Date Value Ref Range Status    POC Amphetamines 12/15/2021 Negative  Negative, Inconclusive Final    POC Barbiturates 12/15/2021 Negative  Negative, Inconclusive Final    POC Benzodiazepines 12/15/2021 Negative  Negative, Inconclusive Final    POC Cocaine 12/15/2021 Negative  Negative, Inconclusive Final    POC THC 12/15/2021 Negative  Negative, Inconclusive Final    POC Methadone 12/15/2021 Negative  Negative, Inconclusive Final    POC Methamphetamine 12/15/2021 Negative  Negative, Inconclusive Final    POC Opiates 12/15/2021 Presumptive Positive (A) Negative, Inconclusive Final    POC Oxycodone 12/15/2021 Negative  Negative, Inconclusive Final    POC Phencyclidine 12/15/2021 Negative  Negative, Inconclusive Final    POC Methylenedioxymethamphetamine * 12/15/2021 Negative  Negative, Inconclusive Final    POC Tricyclic Antidepressants 12/15/2021 Negative  Negative, Inconclusive Final    POC Buprenorphine 12/15/2021 Negative   Final     Acceptable 12/15/2021 Yes   Final    POC Temperature (Urine) 12/15/2021 92   Final           Assessment:      1. Lumbar radiculopathy    2. Diabetic polyneuropathy associated with type 2 diabetes mellitus    3. Radiculopathy, cervical region    4. Encounter for long-term (current) use of other medications          Orders Placed This Encounter   Procedures    POCT Urine Drug Screen Presump     Interpretive Information:     Negative:  No drug detected at the cut off level.   Positive:  This  result represents presumptive positive for the   tested drug, other substances may yield a positive response other   than the analyte of interest. This result should be utilized for   diagnostic purpose only. Confirmation testing will be performed upon physician request only.            A's of Opioid Risk Assessment  Activity:Patient can perform ADL.   Analgesia:Patients pain is partially controlled by current medication. Patient has tried OTC medications such as Tylenol and Ibuprofen with out relief.   Adverse Effects: Patient denies constipation or sedation.  Aberrant Behavior:  reviewed with no aberrant drug seeking/taking behavior.  Overdose reversal drug naloxone discussed    Drug screen reviewed      Requested Prescriptions     Signed Prescriptions Disp Refills    methocarbamoL (ROBAXIN) 750 MG Tab 90 tablet 0     Sig: Take 1 tablet (750 mg total) by mouth every 8 (eight) hours.         Plan:    Spinal cord stimulator not functioning it is off at this time     Complaint of neck thoracic and lumbar pain worse with flexion extension rotation ongoing for many months    X-ray thoracic spine [facility] February 2022, degenerative changes    X-ray lumbar spine [facility] February 2022, degenerative changes spinal cord stimulator no    X-ray hips [facility] February 2022 minimal degenerative changes    She would like to follow up next month and resume physical therapy at that time    Continue home exercise program as directed    Follow-up 1 month discuss starting physical therapy    Dr. Barclay, September 2022    Bring original prescription medication bottles/container/box with labels to each visit

## 2022-03-01 ENCOUNTER — OFFICE VISIT (OUTPATIENT)
Dept: PAIN MEDICINE | Facility: CLINIC | Age: 62
End: 2022-03-01
Payer: MEDICAID

## 2022-03-01 VITALS
HEART RATE: 81 BPM | BODY MASS INDEX: 28.64 KG/M2 | DIASTOLIC BLOOD PRESSURE: 84 MMHG | SYSTOLIC BLOOD PRESSURE: 132 MMHG | WEIGHT: 156.63 LBS

## 2022-03-01 DIAGNOSIS — Z79.899 ENCOUNTER FOR LONG-TERM (CURRENT) USE OF OTHER MEDICATIONS: ICD-10-CM

## 2022-03-01 DIAGNOSIS — M54.12 RADICULOPATHY, CERVICAL REGION: Chronic | ICD-10-CM

## 2022-03-01 DIAGNOSIS — M54.16 LUMBAR RADICULOPATHY: Primary | Chronic | ICD-10-CM

## 2022-03-01 DIAGNOSIS — E11.42 DIABETIC POLYNEUROPATHY ASSOCIATED WITH TYPE 2 DIABETES MELLITUS: Chronic | ICD-10-CM

## 2022-03-01 LAB
CTP QC/QA: YES
POC (AMP) AMPHETAMINE: NEGATIVE
POC (BAR) BARBITURATES: NEGATIVE
POC (BUP) BUPRENORPHINE: NEGATIVE
POC (BZO) BENZODIAZEPINES: NEGATIVE
POC (COC) COCAINE: NEGATIVE
POC (MDMA) METHYLENEDIOXYMETHAMPHETAMINE 3,4: NEGATIVE
POC (MET) METHAMPHETAMINE: NEGATIVE
POC (MOP) OPIATES: ABNORMAL
POC (MTD) METHADONE: NEGATIVE
POC (OXY) OXYCODONE: NEGATIVE
POC (PCP) PHENCYCLIDINE: NEGATIVE
POC (TCA) TRICYCLIC ANTIDEPRESSANTS: NEGATIVE
POC TEMPERATURE (URINE): 94
POC THC: NEGATIVE

## 2022-03-01 PROCEDURE — 3079F PR MOST RECENT DIASTOLIC BLOOD PRESSURE 80-89 MM HG: ICD-10-PCS | Mod: CPTII,,, | Performed by: PHYSICIAN ASSISTANT

## 2022-03-01 PROCEDURE — 3075F PR MOST RECENT SYSTOLIC BLOOD PRESS GE 130-139MM HG: ICD-10-PCS | Mod: CPTII,,, | Performed by: PHYSICIAN ASSISTANT

## 2022-03-01 PROCEDURE — 99214 OFFICE O/P EST MOD 30 MIN: CPT | Mod: PBBFAC | Performed by: PHYSICIAN ASSISTANT

## 2022-03-01 PROCEDURE — 4010F ACE/ARB THERAPY RXD/TAKEN: CPT | Mod: CPTII,,, | Performed by: PHYSICIAN ASSISTANT

## 2022-03-01 PROCEDURE — 1159F MED LIST DOCD IN RCRD: CPT | Mod: CPTII,,, | Performed by: PHYSICIAN ASSISTANT

## 2022-03-01 PROCEDURE — 99214 OFFICE O/P EST MOD 30 MIN: CPT | Mod: S$PBB,,, | Performed by: PHYSICIAN ASSISTANT

## 2022-03-01 PROCEDURE — 3075F SYST BP GE 130 - 139MM HG: CPT | Mod: CPTII,,, | Performed by: PHYSICIAN ASSISTANT

## 2022-03-01 PROCEDURE — 4010F PR ACE/ARB THEARPY RXD/TAKEN: ICD-10-PCS | Mod: CPTII,,, | Performed by: PHYSICIAN ASSISTANT

## 2022-03-01 PROCEDURE — 99214 PR OFFICE/OUTPT VISIT, EST, LEVL IV, 30-39 MIN: ICD-10-PCS | Mod: S$PBB,,, | Performed by: PHYSICIAN ASSISTANT

## 2022-03-01 PROCEDURE — 3008F PR BODY MASS INDEX (BMI) DOCUMENTED: ICD-10-PCS | Mod: CPTII,,, | Performed by: PHYSICIAN ASSISTANT

## 2022-03-01 PROCEDURE — 80305 DRUG TEST PRSMV DIR OPT OBS: CPT | Mod: PBBFAC | Performed by: PHYSICIAN ASSISTANT

## 2022-03-01 PROCEDURE — 3079F DIAST BP 80-89 MM HG: CPT | Mod: CPTII,,, | Performed by: PHYSICIAN ASSISTANT

## 2022-03-01 PROCEDURE — 1159F PR MEDICATION LIST DOCUMENTED IN MEDICAL RECORD: ICD-10-PCS | Mod: CPTII,,, | Performed by: PHYSICIAN ASSISTANT

## 2022-03-01 PROCEDURE — 3008F BODY MASS INDEX DOCD: CPT | Mod: CPTII,,, | Performed by: PHYSICIAN ASSISTANT

## 2022-03-01 RX ORDER — PREGABALIN 100 MG/1
CAPSULE ORAL
Qty: 180 CAPSULE | Refills: 0 | Status: CANCELLED | OUTPATIENT
Start: 2022-03-01

## 2022-03-01 RX ORDER — METHOCARBAMOL 750 MG/1
750 TABLET, FILM COATED ORAL EVERY 8 HOURS
Qty: 90 TABLET | Refills: 0 | Status: SHIPPED | OUTPATIENT
Start: 2022-03-01 | End: 2022-03-29 | Stop reason: SDUPTHER

## 2022-03-01 RX ORDER — ACETAMINOPHEN AND CODEINE PHOSPHATE 300; 30 MG/1; MG/1
1 TABLET ORAL EVERY 6 HOURS PRN
Qty: 120 TABLET | Refills: 0 | Status: CANCELLED | OUTPATIENT
Start: 2022-03-01

## 2022-03-29 ENCOUNTER — OFFICE VISIT (OUTPATIENT)
Dept: PAIN MEDICINE | Facility: CLINIC | Age: 62
End: 2022-03-29
Payer: MEDICAID

## 2022-03-29 VITALS
HEART RATE: 68 BPM | RESPIRATION RATE: 19 BRPM | WEIGHT: 156 LBS | SYSTOLIC BLOOD PRESSURE: 128 MMHG | DIASTOLIC BLOOD PRESSURE: 78 MMHG | HEIGHT: 62 IN | BODY MASS INDEX: 28.71 KG/M2

## 2022-03-29 DIAGNOSIS — M54.16 LUMBAR RADICULOPATHY: Primary | Chronic | ICD-10-CM

## 2022-03-29 DIAGNOSIS — E11.42 DIABETIC POLYNEUROPATHY ASSOCIATED WITH TYPE 2 DIABETES MELLITUS: Chronic | ICD-10-CM

## 2022-03-29 DIAGNOSIS — M54.12 RADICULOPATHY, CERVICAL REGION: Chronic | ICD-10-CM

## 2022-03-29 PROCEDURE — 3074F PR MOST RECENT SYSTOLIC BLOOD PRESSURE < 130 MM HG: ICD-10-PCS | Mod: CPTII,,, | Performed by: PHYSICIAN ASSISTANT

## 2022-03-29 PROCEDURE — 4010F PR ACE/ARB THEARPY RXD/TAKEN: ICD-10-PCS | Mod: CPTII,,, | Performed by: PHYSICIAN ASSISTANT

## 2022-03-29 PROCEDURE — 3078F DIAST BP <80 MM HG: CPT | Mod: CPTII,,, | Performed by: PHYSICIAN ASSISTANT

## 2022-03-29 PROCEDURE — 1159F PR MEDICATION LIST DOCUMENTED IN MEDICAL RECORD: ICD-10-PCS | Mod: CPTII,,, | Performed by: PHYSICIAN ASSISTANT

## 2022-03-29 PROCEDURE — 3008F PR BODY MASS INDEX (BMI) DOCUMENTED: ICD-10-PCS | Mod: CPTII,,, | Performed by: PHYSICIAN ASSISTANT

## 2022-03-29 PROCEDURE — 3074F SYST BP LT 130 MM HG: CPT | Mod: CPTII,,, | Performed by: PHYSICIAN ASSISTANT

## 2022-03-29 PROCEDURE — 99215 OFFICE O/P EST HI 40 MIN: CPT | Mod: PBBFAC | Performed by: PHYSICIAN ASSISTANT

## 2022-03-29 PROCEDURE — 99214 PR OFFICE/OUTPT VISIT, EST, LEVL IV, 30-39 MIN: ICD-10-PCS | Mod: S$PBB,,, | Performed by: PHYSICIAN ASSISTANT

## 2022-03-29 PROCEDURE — 3008F BODY MASS INDEX DOCD: CPT | Mod: CPTII,,, | Performed by: PHYSICIAN ASSISTANT

## 2022-03-29 PROCEDURE — 3078F PR MOST RECENT DIASTOLIC BLOOD PRESSURE < 80 MM HG: ICD-10-PCS | Mod: CPTII,,, | Performed by: PHYSICIAN ASSISTANT

## 2022-03-29 PROCEDURE — 1159F MED LIST DOCD IN RCRD: CPT | Mod: CPTII,,, | Performed by: PHYSICIAN ASSISTANT

## 2022-03-29 PROCEDURE — 4010F ACE/ARB THERAPY RXD/TAKEN: CPT | Mod: CPTII,,, | Performed by: PHYSICIAN ASSISTANT

## 2022-03-29 PROCEDURE — 99214 OFFICE O/P EST MOD 30 MIN: CPT | Mod: S$PBB,,, | Performed by: PHYSICIAN ASSISTANT

## 2022-03-29 RX ORDER — METHOCARBAMOL 750 MG/1
750 TABLET, FILM COATED ORAL EVERY 8 HOURS
Qty: 90 TABLET | Refills: 2 | Status: SHIPPED | OUTPATIENT
Start: 2022-03-29 | End: 2022-06-23 | Stop reason: SDUPTHER

## 2022-03-29 RX ORDER — PREGABALIN 100 MG/1
CAPSULE ORAL
Qty: 180 CAPSULE | Refills: 2 | Status: SHIPPED | OUTPATIENT
Start: 2022-03-29 | End: 2022-06-23 | Stop reason: SDUPTHER

## 2022-03-29 RX ORDER — ACETAMINOPHEN AND CODEINE PHOSPHATE 300; 30 MG/1; MG/1
1 TABLET ORAL EVERY 6 HOURS
Qty: 120 TABLET | Refills: 2 | Status: SHIPPED | OUTPATIENT
Start: 2022-03-29 | End: 2022-06-23 | Stop reason: SDUPTHER

## 2022-03-29 NOTE — PROGRESS NOTES
Disclaimer:  This note has been generated using voice recognition software.  There may be type of graft focal areas that have been missed during a proof reading      Subjective:      Patient ID: Magali Cheung is a 62 y.o. female.    Chief Complaint: Foot Pain, Low-back Pain, and Leg Pain      Low-back Pain  This is a chronic problem. The current episode started more than 1 year ago. The problem occurs daily. The problem is unchanged. Associated symptoms include leg pain. Pertinent negatives include no bladder incontinence, chest pain, dysuria or fever.   Leg Pain   Pertinent negatives include no fever.   Foot Pain   Pertinent negatives include no fever.   Neck Pain   Associated symptoms include leg pain. Pertinent negatives include no chest pain, fever, photophobia or trouble swallowing.     Review of Systems   Constitutional: Negative for activity change, appetite change, diaphoresis and fever.   HENT: Negative for drooling, ear discharge, ear pain, facial swelling, nosebleeds, sore throat, trouble swallowing, voice change and goiter.    Eyes: Negative for photophobia, pain, discharge, redness and visual disturbance.   Respiratory: Negative for apnea, cough, choking, chest tightness, shortness of breath, wheezing and stridor.    Cardiovascular: Negative for chest pain, palpitations and leg swelling.   Gastrointestinal: Negative for abdominal distention, change in bowel habit, diarrhea, rectal pain, vomiting, fecal incontinence and change in bowel habit.   Endocrine: Negative for cold intolerance, heat intolerance, polydipsia, polyphagia and polyuria.   Genitourinary: Negative for bladder incontinence, dysuria, flank pain, frequency and hot flashes.   Musculoskeletal: Positive for arthralgias, back pain, leg pain, neck pain and neck stiffness.   Integumentary:  Negative for color change, pallor and rash.   Allergic/Immunologic: Negative for immunocompromised state.   Neurological: Negative for dizziness, vertigo,  "seizures, syncope, facial asymmetry, speech difficulty, light-headedness, disturbances in coordination, memory loss and coordination difficulties.   Hematological: Negative for adenopathy. Does not bruise/bleed easily.   Psychiatric/Behavioral: Negative for agitation, behavioral problems, confusion, decreased concentration, dysphoric mood, hallucinations, self-injury and suicidal ideas. The patient is not nervous/anxious and is not hyperactive.             Objective:  Vitals:    03/29/22 1310   BP: 128/78   Pulse: 68   Resp: 19   Weight: 70.8 kg (156 lb)   Height: 5' 2" (1.575 m)   PainSc:   5         Physical Exam  Vitals and nursing note reviewed. Exam conducted with a chaperone present.   Constitutional:       General: She is awake.      Appearance: Normal appearance. She is not ill-appearing.   HENT:      Head: Normocephalic and atraumatic.      Nose: Nose normal.      Mouth/Throat:      Mouth: Mucous membranes are moist.      Pharynx: Oropharynx is clear.   Eyes:      Conjunctiva/sclera: Conjunctivae normal.      Pupils: Pupils are equal, round, and reactive to light.   Cardiovascular:      Rate and Rhythm: Normal rate.   Pulmonary:      Effort: Pulmonary effort is normal. No respiratory distress.   Abdominal:      Palpations: Abdomen is soft.      Tenderness: There is no guarding.   Musculoskeletal:      Cervical back: Normal range of motion and neck supple. Tenderness present. No rigidity.      Thoracic back: Tenderness present.      Lumbar back: Tenderness present. Decreased range of motion.   Skin:     General: Skin is warm and dry.      Coloration: Skin is not jaundiced or pale.   Neurological:      General: No focal deficit present.      Mental Status: She is alert and oriented to person, place, and time. Mental status is at baseline.      Cranial Nerves: Cranial nerves are intact. No cranial nerve deficit (II-XII).      Deep Tendon Reflexes:      Reflex Scores:       Brachioradialis reflexes are 2+ on " the right side.  Psychiatric:         Mood and Affect: Mood normal.         Behavior: Behavior normal. Behavior is cooperative.         Thought Content: Thought content normal.           X-Ray Thoracic Spine AP Lateral  Narrative: EXAMINATION:  XR THORACIC SPINE AP LATERAL    CLINICAL HISTORY:  Pain in thoracic spine    FINDINGS:  Alignment in the lateral plane is normal throughout the thoracic spine with minimal osteophytes present.  No focal vertebral body height loss seen    Mild scoliosis present    Epidural catheters present in the lower thoracic spine  Impression: 1. Mild scoliosis  2. Minimal degenerative changes    Electronically signed by: Lupis Ascencio  Date:    02/01/2022  Time:    15:31  X-Ray Hips Bilateral 2 View Inc AP Pelvis  Narrative: EXAMINATION:  XR HIPS BILATERAL 2 VIEW INCL AP PELVIS    CLINICAL HISTORY:  Radiculopathy, lumbar region    TECHNIQUE:  AP view of the pelvis and frogleg lateral views of both hips were performed.    COMPARISON:  None.    FINDINGS:  There is no fracture or dislocation.  Mild degenerative changes seen of both hips.  Impression: As above    Electronically signed by: Rocky Alas  Date:    02/01/2022  Time:    15:30  X-Ray Lumbar Spine AP And Lateral  Narrative: EXAMINATION:  XR LUMBAR SPINE AP AND LATERAL    CLINICAL HISTORY:  Low back pain, symptoms persist with > 6wks conservative treatment;Dorsalgia, unspecified    TECHNIQUE:  AP, lateral images were performed of the lumbar spine.    COMPARISON:  01/04/2018    FINDINGS:  Since the prior exam there is now a spinal stimulator partially assessed.  There is retrolisthesis of L1 on L2 that is similar.  Degenerative endplate changes most notably at L1-L2 are similar.  Additional multilevel degenerative endplate and facet changes throughout the lumbar spine.  Impression: Multilevel degenerative changes.    Electronically signed by: Rocky Alas  Date:    02/01/2022  Time:    15:29       Office Visit on 03/01/2022   Component Date  Value Ref Range Status    POC Amphetamines 03/01/2022 Negative  Negative, Inconclusive Final    POC Barbiturates 03/01/2022 Negative  Negative, Inconclusive Final    POC Benzodiazepines 03/01/2022 Negative  Negative, Inconclusive Final    POC Cocaine 03/01/2022 Negative  Negative, Inconclusive Final    POC THC 03/01/2022 Negative  Negative, Inconclusive Final    POC Methadone 03/01/2022 Negative  Negative, Inconclusive Final    POC Methamphetamine 03/01/2022 Negative  Negative, Inconclusive Final    POC Opiates 03/01/2022 Presumptive Positive (A) Negative, Inconclusive Final    POC Oxycodone 03/01/2022 Negative  Negative, Inconclusive Final    POC Phencyclidine 03/01/2022 Negative  Negative, Inconclusive Final    POC Methylenedioxymethamphetamine * 03/01/2022 Negative  Negative, Inconclusive Final    POC Tricyclic Antidepressants 03/01/2022 Negative  Negative, Inconclusive Final    POC Buprenorphine 03/01/2022 Negative   Final     Acceptable 03/01/2022 Yes   Final    POC Temperature (Urine) 03/01/2022 94   Final   Lab Visit on 01/11/2022   Component Date Value Ref Range Status    Sodium 01/11/2022 132 (A) 136 - 145 mmol/L Final    Potassium 01/11/2022 4.8  3.5 - 5.1 mmol/L Final    Chloride 01/11/2022 96 (A) 98 - 107 mmol/L Final    CO2 01/11/2022 30  21 - 32 mmol/L Final    Anion Gap 01/11/2022 11  7 - 16 mmol/L Final    Glucose 01/11/2022 76  74 - 106 mg/dL Final    BUN 01/11/2022 11  7 - 18 mg/dL Final    Creatinine 01/11/2022 0.72  0.55 - 1.02 mg/dL Final    BUN/Creatinine Ratio 01/11/2022 15  6 - 20 Final    Calcium 01/11/2022 9.1  8.5 - 10.1 mg/dL Final    Total Protein 01/11/2022 7.2  6.4 - 8.2 g/dL Final    Albumin 01/11/2022 3.9  3.5 - 5.0 g/dL Final    Globulin 01/11/2022 3.3  2.0 - 4.0 g/dL Final    A/G Ratio 01/11/2022 1.2   Final    Bilirubin, Total 01/11/2022 0.4  0.0 - 1.2 mg/dL Final    Alk Phos 01/11/2022 69  50 - 130 U/L Final    ALT 01/11/2022 10 (A)  13 - 56 U/L Final    AST 01/11/2022 14 (A) 15 - 37 U/L Final    eGFR 01/11/2022 88  >=60 mL/min/1.73m² Final    WBC 01/11/2022 6.55  4.50 - 11.00 K/uL Final    RBC 01/11/2022 4.42  4.20 - 5.40 M/uL Final    Hemoglobin 01/11/2022 12.5  12.0 - 16.0 g/dL Final    Hematocrit 01/11/2022 38.5  38.0 - 47.0 % Final    MCV 01/11/2022 87.1  80.0 - 96.0 fL Final    MCH 01/11/2022 28.3  27.0 - 31.0 pg Final    MCHC 01/11/2022 32.5  32.0 - 36.0 g/dL Final    RDW 01/11/2022 13.2  11.5 - 14.5 % Final    Platelet Count 01/11/2022 397  150 - 400 K/uL Final    MPV 01/11/2022 10.5  9.4 - 12.4 fL Final    Neutrophils % 01/11/2022 44.1 (A) 53.0 - 65.0 % Final    Lymphocytes % 01/11/2022 41.8 (A) 27.0 - 41.0 % Final    Monocytes % 01/11/2022 8.7 (A) 2.0 - 6.0 % Final    Eosinophils % 01/11/2022 2.4  1.0 - 4.0 % Final    Basophils % 01/11/2022 1.5 (A) 0.0 - 1.0 % Final    Immature Granulocytes % 01/11/2022 1.5 (A) 0.0 - 0.4 % Final    nRBC, Auto 01/11/2022 0.0  <=0.0 % Final    Neutrophils, Abs 01/11/2022 2.88  1.80 - 7.70 K/uL Final    Lymphocytes, Absolute 01/11/2022 2.74  1.00 - 4.80 K/uL Final    Monocytes, Absolute 01/11/2022 0.57  0.00 - 0.80 K/uL Final    Eosinophils, Absolute 01/11/2022 0.16  0.00 - 0.50 K/uL Final    Basophils, Absolute 01/11/2022 0.10  0.00 - 0.20 K/uL Final    Immature Granulocytes, Absolute 01/11/2022 0.10 (A) 0.00 - 0.04 K/uL Final    nRBC, Absolute 01/11/2022 0.00  <=0.00 x10e3/uL Final    Diff Type 01/11/2022 Auto   Final   Office Visit on 12/15/2021   Component Date Value Ref Range Status    POC Amphetamines 12/15/2021 Negative  Negative, Inconclusive Final    POC Barbiturates 12/15/2021 Negative  Negative, Inconclusive Final    POC Benzodiazepines 12/15/2021 Negative  Negative, Inconclusive Final    POC Cocaine 12/15/2021 Negative  Negative, Inconclusive Final    POC THC 12/15/2021 Negative  Negative, Inconclusive Final    POC Methadone 12/15/2021 Negative  Negative,  Inconclusive Final    POC Methamphetamine 12/15/2021 Negative  Negative, Inconclusive Final    POC Opiates 12/15/2021 Presumptive Positive (A) Negative, Inconclusive Final    POC Oxycodone 12/15/2021 Negative  Negative, Inconclusive Final    POC Phencyclidine 12/15/2021 Negative  Negative, Inconclusive Final    POC Methylenedioxymethamphetamine * 12/15/2021 Negative  Negative, Inconclusive Final    POC Tricyclic Antidepressants 12/15/2021 Negative  Negative, Inconclusive Final    POC Buprenorphine 12/15/2021 Negative   Final     Acceptable 12/15/2021 Yes   Final    POC Temperature (Urine) 12/15/2021 92   Final           Assessment:      1. Lumbar radiculopathy    2. Diabetic polyneuropathy associated with type 2 diabetes mellitus    3. Radiculopathy, cervical region          No orders of the defined types were placed in this encounter.        A's of Opioid Risk Assessment  Activity:Patient can perform ADL.   Analgesia:Patients pain is partially controlled by current medication. Patient has tried OTC medications such as Tylenol and Ibuprofen with out relief.   Adverse Effects: Patient denies constipation or sedation.  Aberrant Behavior:  reviewed with no aberrant drug seeking/taking behavior.  Overdose reversal drug naloxone discussed    Drug screen reviewed      Requested Prescriptions     Signed Prescriptions Disp Refills    LYRICA 100 mg capsule 180 capsule 2     Si capsules po TID    acetaminophen-codeine 300-30mg (TYLENOL #3) 300-30 mg Tab 120 tablet 2     Sig: Take 1 tablet by mouth every 6 (six) hours.    methocarbamoL (ROBAXIN) 750 MG Tab 90 tablet 2     Sig: Take 1 tablet (750 mg total) by mouth every 8 (eight) hours.         Plan:    Spinal cord stimulator not functioning     No new complaints, she states current medications helping control her chronic neck back and joint pain    She would like to continue with conservative management    Continue home exercise program as  directed    Follow-up 3 months discuss starting physical therapy    Dr. Barclay, September 2022    Bring original prescription medication bottles/container/box with labels to each visit

## 2022-04-11 NOTE — PROGRESS NOTES
Subjective:       Patient ID: Magali Cheung is a 62 y.o. female.    Chief Complaint:  No chief complaint on file.      History of Present Illness  This pleasant right-handed 62-year-old  female presents to clinic with her  for follow-up of restless leg syndrome and neuropathy. Patient states she is doing well with the RLS and diabetic polyneuropathy. Patient states neuropathy started 4 years ago with the burning, numbness, and tingling that she rates as a 5 on a scale of 0-5 in the bilateral feet. She states the symptoms are constant and the pain is a 6 on a scale of 0-10 that is made worse with walking or standing. She reports diabetes and her last hemoglobin A1c in our record was 7.0 on December 29, 2020. She states her more recent A1C was better but we do not have a copy of this. She denies neck or back injuries. Patient is also followed by pain treatment PA Shows for the neuropathy, back pain, and muscle spasms. She currently has Robaxin 750 mg for the muscle spasms and Lyrica was increased by Dr. Barclay to 200 mg three times a day.  She has Tylenol with codeine for the back pain. Patient reports today that her mental health provider has her on Cymbalta 40 mg twice a day that also helps with the neuropathy. She is tolerating all her medications without side effects. Patient reports neuropathy is much improved with the increase of the Lyrica. Discussed keeping her blood sugars under control. She denies alcohol but reports vaping instead of smoking. Discussed vaping cessation.      Patient reports 2 falls since her last visit in January 2022. She denies any head injuries. Discussed fall precautions in detail. She had to postpone doing physical therapy due to family commitment. She reports dizziness with the falls that started a little over one year ago. She states the dizziness is worse with head movement. She states the dizziness occurs after she gets up and walks a short distance. She is  followed by Dr. Zhu in cardiology. She has not had any imaging of the head to evaluate the recent falls or dizziness. Discussed this with Neurologist Dr. ELDER Muñoz and the patient. We ordered a CT of the head to evaluate the falls and dizziness at the last two visits that the patient states was declined by the insurance. We will order the CT of the head and carotid doppler US to evaluate the dizziness and frequent falls. We cannot do an MRI due to the implanted pain stimulator. Patient has an implanted pain stimulator that is turned off. We will consider referral to ENT after work up if needed.      She reports tapering the Sinemet at the last visit to see if the dizziness would improve. She states there was no improvement in the dizziness and the RLS got worse. She is currently on Sinemet 25 mg/100 mg two tablets three times a day for her RLS. She states she is tolerating the sinemet two tablets three times a day without side effects and RLS is improved. She is also on multiple medications that may also contribute to her dizziness.  Patient states her restless leg syndrome is worse at night and she has a constant desire to move her legs that is relieved with movement and returns shortly thereafter without the sinemet. She reports this was interrupting her sleep. Discussed the plan in detail with Dr. ELDER Muñoz, the patient and  and they are in agreement with the plan. All their questions were answered at today's visit.     EMG nerve conduction study of the right upper and lower extremity done on April 4, 2017 showed a normal nerve conduction study and needle EMG of the right upper and lower extremity.     EMG nerve conduction study of the BLE done on April 10, 2021 by Dr. 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 Compared with the prior study in 2017 there has been progression in the polyneuropathy. In conjunction with the history of diabetes,  the findings suggest diabetic polyneuropathy. There is no evidence of coexisting diabetic amyotrophy.      EKG done on 10/4/21 showed NSR, with 1 degree AVB, LAD,  IRBBB, LVH     Echocardiogram done on 3/3/21 showed a Limited study; LVEF 55-60%          Review of Systems  Review of Systems   Constitutional: Negative for activity change, diaphoresis, fever and unexpected weight change.   HENT: Negative for congestion, ear pain, facial swelling, hearing loss, tinnitus, trouble swallowing and voice change.    Eyes: Negative for photophobia, pain and visual disturbance.   Respiratory: Negative for chest tightness, shortness of breath and wheezing.    Cardiovascular: Negative for chest pain, palpitations and leg swelling.   Gastrointestinal: Negative for constipation, diarrhea, nausea and vomiting.   Genitourinary: Negative for difficulty urinating.   Musculoskeletal: Positive for back pain and gait problem. Negative for neck pain and neck stiffness.   Skin: Negative for color change, pallor, rash and wound.   Neurological: Positive for dizziness and numbness. Negative for tremors, seizures, syncope, facial asymmetry, speech difficulty, weakness, light-headedness and headaches.        RLS   Psychiatric/Behavioral: Negative for agitation, behavioral problems, confusion, decreased concentration and hallucinations. The patient is not nervous/anxious and is not hyperactive.        Objective:      Neurologic Exam     Mental Status   Oriented to person, place, and time.   Oriented to person.   Oriented to place.   Oriented to time.   Attention: normal. Concentration: normal.   Speech: speech is normal   Level of consciousness: alert  Knowledge: good.     Cranial Nerves     CN II   Visual fields full to confrontation.     CN III, IV, VI   Pupils are equal, round, and reactive to light.  Extraocular motions are normal.   Right pupil: Size: 3 mm. Shape: regular. Reactivity: brisk.   Left pupil: Size: 3 mm. Shape: regular.  Reactivity: brisk.   CN III: no CN III palsy  CN VI: no CN VI palsy  Nystagmus: none     CN V   Facial sensation intact.     CN VII   Facial expression full, symmetric.     CN VIII   CN VIII normal.   Hearing: intact    CN IX, X   CN IX normal.     CN XI   CN XI normal.     CN XII   CN XII normal.     Motor Exam   Muscle bulk: normal  Overall muscle tone: normal    Strength   Right deltoid: 5/5  Left deltoid: 5/5  Right biceps: 5/5  Left biceps: 5/5  Right triceps: 5/5  Left triceps: 5/5  Right quadriceps: 4/5  Left quadriceps: 5/5  Right hamstrin/5  Left hamstrin/5    Sensory Exam   Light touch normal.     Gait, Coordination, and Reflexes     Gait  Gait: normal    Coordination   Romberg: positive  Finger to nose coordination: normal    Tremor   Resting tremor: absent  Intention tremor: absent  Action tremor: absent    Reflexes   Right brachioradialis: 2+  Left brachioradialis: 2+  Right biceps: 2+  Left biceps: 2+  Right triceps: 2+  Left triceps: 2+  Right patellar: 2+  Left patellar: 2+  Right achilles: 2+  Left achilles: 2+  Right : 4+  Left : 4+      Physical Exam  Constitutional:       General: She is not in acute distress.  HENT:      Head: Normocephalic.      Nose: Nose normal.      Mouth/Throat:      Mouth: Mucous membranes are moist.   Eyes:      Extraocular Movements: Extraocular movements intact and EOM normal.      Pupils: Pupils are equal, round, and reactive to light.   Cardiovascular:      Rate and Rhythm: Normal rate and regular rhythm.      Heart sounds: Normal heart sounds. No murmur heard.  Pulmonary:      Effort: Pulmonary effort is normal. No respiratory distress.      Breath sounds: Normal breath sounds. No wheezing, rhonchi or rales.   Musculoskeletal:         General: No swelling, tenderness, deformity or signs of injury. Normal range of motion.      Cervical back: Normal range of motion. No rigidity or tenderness.      Right lower leg: No edema.      Left lower leg: No  edema.   Skin:     General: Skin is warm and dry.      Capillary Refill: Capillary refill takes less than 2 seconds.      Coloration: Skin is not jaundiced or pale.      Findings: No bruising, erythema, lesion or rash.   Neurological:      Mental Status: She is alert and oriented to person, place, and time.      Coordination: Romberg Test abnormal. Finger-Nose-Finger Test normal.      Gait: Gait is intact.      Deep Tendon Reflexes:      Reflex Scores:       Tricep reflexes are 2+ on the right side and 2+ on the left side.       Bicep reflexes are 2+ on the right side and 2+ on the left side.       Brachioradialis reflexes are 2+ on the right side and 2+ on the left side.       Patellar reflexes are 2+ on the right side and 2+ on the left side.       Achilles reflexes are 2+ on the right side and 2+ on the left side.  Psychiatric:         Mood and Affect: Mood normal.         Speech: Speech normal.         Behavior: Behavior normal.           Assessment:     Problem List Items Addressed This Visit        Neuro    RLS (restless legs syndrome) - Primary (Chronic)    Relevant Medications    carbidopa-levodopa  mg (SINEMET)  mg per tablet    Diabetic polyneuropathy associated with type 2 diabetes mellitus (Chronic)       Psychiatric    Depression (Chronic)       Orthopedic    Low back pain (Chronic)       Other    Frequent falls    Relevant Orders    CT Head Without Contrast    Radiology US Carotid Bilateral    Dizziness    Relevant Orders    CT Head Without Contrast    Radiology US Carotid Bilateral            Plan:       1. CT of the head without contrast to evaluate frequent falls and dizziness  2. Renew and continue sinemet 25/100 mg one tablet in the morning, one tablet in afternoon and two tablets at bedtime  3. Carotid Doppler US to evaluate dizziness  4. Keep follow up with GUSTABO Crespo for neuropathy, back pain  5. Keep follow up with Dr. Hines for depression  6. Keep follow up with Dr. Zhu  cardiologist for dizziness  7. Keep follow up with PCP for medical management  8. Keep blood sugars under control  9. Fall precautions, stay well hydrated if no contraindications  10. Vaping cessation  11. Restart physical therapy when patient is ready  12. Follow up with neurology in 3 months or sooner if needed

## 2022-04-13 ENCOUNTER — OFFICE VISIT (OUTPATIENT)
Dept: NEUROLOGY | Facility: CLINIC | Age: 62
End: 2022-04-13
Payer: MEDICAID

## 2022-04-13 VITALS
SYSTOLIC BLOOD PRESSURE: 116 MMHG | HEART RATE: 93 BPM | DIASTOLIC BLOOD PRESSURE: 68 MMHG | OXYGEN SATURATION: 94 % | WEIGHT: 157.81 LBS | HEIGHT: 62 IN | BODY MASS INDEX: 29.04 KG/M2

## 2022-04-13 DIAGNOSIS — G25.81 RLS (RESTLESS LEGS SYNDROME): Primary | Chronic | ICD-10-CM

## 2022-04-13 DIAGNOSIS — E11.42 DIABETIC POLYNEUROPATHY ASSOCIATED WITH TYPE 2 DIABETES MELLITUS: Chronic | ICD-10-CM

## 2022-04-13 DIAGNOSIS — F32.A DEPRESSION, UNSPECIFIED DEPRESSION TYPE: Chronic | ICD-10-CM

## 2022-04-13 DIAGNOSIS — R42 DIZZINESS: ICD-10-CM

## 2022-04-13 DIAGNOSIS — M54.50 LOW BACK PAIN, UNSPECIFIED BACK PAIN LATERALITY, UNSPECIFIED CHRONICITY, UNSPECIFIED WHETHER SCIATICA PRESENT: Chronic | ICD-10-CM

## 2022-04-13 DIAGNOSIS — R29.6 FREQUENT FALLS: ICD-10-CM

## 2022-04-13 PROCEDURE — 1159F PR MEDICATION LIST DOCUMENTED IN MEDICAL RECORD: ICD-10-PCS | Mod: CPTII,,, | Performed by: NURSE PRACTITIONER

## 2022-04-13 PROCEDURE — 1160F RVW MEDS BY RX/DR IN RCRD: CPT | Mod: CPTII,,, | Performed by: NURSE PRACTITIONER

## 2022-04-13 PROCEDURE — 4010F ACE/ARB THERAPY RXD/TAKEN: CPT | Mod: CPTII,,, | Performed by: NURSE PRACTITIONER

## 2022-04-13 PROCEDURE — 1160F PR REVIEW ALL MEDS BY PRESCRIBER/CLIN PHARMACIST DOCUMENTED: ICD-10-PCS | Mod: CPTII,,, | Performed by: NURSE PRACTITIONER

## 2022-04-13 PROCEDURE — 99215 OFFICE O/P EST HI 40 MIN: CPT | Mod: PBBFAC | Performed by: NURSE PRACTITIONER

## 2022-04-13 PROCEDURE — 3078F DIAST BP <80 MM HG: CPT | Mod: CPTII,,, | Performed by: NURSE PRACTITIONER

## 2022-04-13 PROCEDURE — 3008F PR BODY MASS INDEX (BMI) DOCUMENTED: ICD-10-PCS | Mod: CPTII,,, | Performed by: NURSE PRACTITIONER

## 2022-04-13 PROCEDURE — 99214 OFFICE O/P EST MOD 30 MIN: CPT | Mod: S$PBB,,, | Performed by: NURSE PRACTITIONER

## 2022-04-13 PROCEDURE — 3074F SYST BP LT 130 MM HG: CPT | Mod: CPTII,,, | Performed by: NURSE PRACTITIONER

## 2022-04-13 PROCEDURE — 3008F BODY MASS INDEX DOCD: CPT | Mod: CPTII,,, | Performed by: NURSE PRACTITIONER

## 2022-04-13 PROCEDURE — 3078F PR MOST RECENT DIASTOLIC BLOOD PRESSURE < 80 MM HG: ICD-10-PCS | Mod: CPTII,,, | Performed by: NURSE PRACTITIONER

## 2022-04-13 PROCEDURE — 3074F PR MOST RECENT SYSTOLIC BLOOD PRESSURE < 130 MM HG: ICD-10-PCS | Mod: CPTII,,, | Performed by: NURSE PRACTITIONER

## 2022-04-13 PROCEDURE — 99214 PR OFFICE/OUTPT VISIT, EST, LEVL IV, 30-39 MIN: ICD-10-PCS | Mod: S$PBB,,, | Performed by: NURSE PRACTITIONER

## 2022-04-13 PROCEDURE — 1159F MED LIST DOCD IN RCRD: CPT | Mod: CPTII,,, | Performed by: NURSE PRACTITIONER

## 2022-04-13 PROCEDURE — 4010F PR ACE/ARB THEARPY RXD/TAKEN: ICD-10-PCS | Mod: CPTII,,, | Performed by: NURSE PRACTITIONER

## 2022-04-13 RX ORDER — CARBIDOPA AND LEVODOPA 25; 100 MG/1; MG/1
TABLET ORAL
Qty: 540 TABLET | Refills: 1 | Status: SHIPPED | OUTPATIENT
Start: 2022-04-13 | End: 2023-02-07 | Stop reason: SDUPTHER

## 2022-04-13 NOTE — PATIENT INSTRUCTIONS
1. CT of the head without contrast to evaluate frequent falls and dizziness  2. Renew and continue sinemet 25/100 mg one tablet in the morning, one tablet in afternoon and two tablets at bedtime  3. Carotid Doppler US to evaluate dizziness  4. Keep follow up with GUSTABO Crespo for neuropathy, back pain  5. Keep follow up with Dr. Hines for depression  6. Keep follow up with Dr. Zhu cardiologist for dizziness  7. Keep follow up with PCP for medical management  8. Keep blood sugars under control  9. Fall precautions, stay well hydrated if no contraindications  10. Vaping cessation  11. Restart physical therapy when patient is ready  12. Follow up with neurology in 3 months or sooner if needed

## 2022-06-23 NOTE — PROGRESS NOTES
Subjective:      Patient ID: Magali Cheung is a 62 y.o. female.    Chief Complaint: Neck Pain (Neck pain radiate to left arm), Back Pain, and Leg Pain      Pain  This is a chronic problem. The current episode started more than 1 year ago. The problem occurs daily. The problem has been unchanged. Associated symptoms include arthralgias. Pertinent negatives include no change in bowel habit, coughing, diaphoresis, rash, sore throat, vertigo or vomiting.     Review of Systems   Constitutional: Negative for activity change, appetite change and diaphoresis.   HENT: Negative for drooling, ear discharge, ear pain, facial swelling, nosebleeds, sore throat, voice change and goiter.    Eyes: Negative for pain, discharge, redness and visual disturbance.   Respiratory: Negative for apnea, cough, choking, chest tightness, shortness of breath, wheezing and stridor.    Cardiovascular: Negative for palpitations and leg swelling.   Gastrointestinal: Negative for abdominal distention, change in bowel habit, diarrhea, rectal pain, vomiting, fecal incontinence and change in bowel habit.   Endocrine: Negative for cold intolerance, heat intolerance, polydipsia, polyphagia and polyuria.   Genitourinary: Negative for flank pain, frequency and hot flashes.   Musculoskeletal: Positive for arthralgias, back pain and neck stiffness.   Integumentary:  Negative for color change, pallor and rash.   Allergic/Immunologic: Negative for immunocompromised state.   Neurological: Negative for dizziness, vertigo, seizures, syncope, facial asymmetry, speech difficulty, light-headedness, disturbances in coordination, memory loss and coordination difficulties.   Hematological: Negative for adenopathy. Does not bruise/bleed easily.   Psychiatric/Behavioral: Negative for agitation, behavioral problems, confusion, decreased concentration, dysphoric mood, hallucinations, self-injury and suicidal ideas. The patient is not nervous/anxious and is not hyperactive.   "           Objective:  Vitals:    06/28/22 1336 06/28/22 1337   BP: (!) 161/82    Pulse: 70    Weight: 68.5 kg (151 lb)    Height: 5' 2" (1.575 m)    PainSc:   6   6         Physical Exam  Vitals and nursing note reviewed. Exam conducted with a chaperone present.   Constitutional:       General: She is awake.      Appearance: Normal appearance. She is not ill-appearing.   HENT:      Head: Normocephalic and atraumatic.      Nose: Nose normal.      Mouth/Throat:      Mouth: Mucous membranes are moist.      Pharynx: Oropharynx is clear.   Eyes:      Conjunctiva/sclera: Conjunctivae normal.      Pupils: Pupils are equal, round, and reactive to light.   Cardiovascular:      Rate and Rhythm: Normal rate.   Pulmonary:      Effort: Pulmonary effort is normal. No respiratory distress.   Abdominal:      Palpations: Abdomen is soft.      Tenderness: There is no guarding.   Musculoskeletal:      Cervical back: Normal range of motion and neck supple. Tenderness present. No rigidity.      Thoracic back: Tenderness present.      Lumbar back: Tenderness present. Decreased range of motion.   Skin:     General: Skin is warm and dry.      Coloration: Skin is not jaundiced or pale.   Neurological:      General: No focal deficit present.      Mental Status: She is alert and oriented to person, place, and time. Mental status is at baseline.      Cranial Nerves: No cranial nerve deficit (II-XII).      Deep Tendon Reflexes:      Reflex Scores:       Brachioradialis reflexes are 2+ on the right side.  Psychiatric:         Mood and Affect: Mood normal.         Behavior: Behavior normal. Behavior is cooperative.         Thought Content: Thought content normal.           X-Ray Thoracic Spine AP Lateral  Narrative: EXAMINATION:  XR THORACIC SPINE AP LATERAL    CLINICAL HISTORY:  Pain in thoracic spine    FINDINGS:  Alignment in the lateral plane is normal throughout the thoracic spine with minimal osteophytes present.  No focal vertebral body " height loss seen    Mild scoliosis present    Epidural catheters present in the lower thoracic spine  Impression: 1. Mild scoliosis  2. Minimal degenerative changes    Electronically signed by: Lupis Ascencio  Date:    02/01/2022  Time:    15:31  X-Ray Hips Bilateral 2 View Inc AP Pelvis  Narrative: EXAMINATION:  XR HIPS BILATERAL 2 VIEW INCL AP PELVIS    CLINICAL HISTORY:  Radiculopathy, lumbar region    TECHNIQUE:  AP view of the pelvis and frogleg lateral views of both hips were performed.    COMPARISON:  None.    FINDINGS:  There is no fracture or dislocation.  Mild degenerative changes seen of both hips.  Impression: As above    Electronically signed by: Rocky Alas  Date:    02/01/2022  Time:    15:30  X-Ray Lumbar Spine AP And Lateral  Narrative: EXAMINATION:  XR LUMBAR SPINE AP AND LATERAL    CLINICAL HISTORY:  Low back pain, symptoms persist with > 6wks conservative treatment;Dorsalgia, unspecified    TECHNIQUE:  AP, lateral images were performed of the lumbar spine.    COMPARISON:  01/04/2018    FINDINGS:  Since the prior exam there is now a spinal stimulator partially assessed.  There is retrolisthesis of L1 on L2 that is similar.  Degenerative endplate changes most notably at L1-L2 are similar.  Additional multilevel degenerative endplate and facet changes throughout the lumbar spine.  Impression: Multilevel degenerative changes.    Electronically signed by: Rocky Alas  Date:    02/01/2022  Time:    15:29       Office Visit on 03/01/2022   Component Date Value Ref Range Status    POC Amphetamines 03/01/2022 Negative  Negative, Inconclusive Final    POC Barbiturates 03/01/2022 Negative  Negative, Inconclusive Final    POC Benzodiazepines 03/01/2022 Negative  Negative, Inconclusive Final    POC Cocaine 03/01/2022 Negative  Negative, Inconclusive Final    POC THC 03/01/2022 Negative  Negative, Inconclusive Final    POC Methadone 03/01/2022 Negative  Negative, Inconclusive Final    POC Methamphetamine  03/01/2022 Negative  Negative, Inconclusive Final    POC Opiates 03/01/2022 Presumptive Positive (A) Negative, Inconclusive Final    POC Oxycodone 03/01/2022 Negative  Negative, Inconclusive Final    POC Phencyclidine 03/01/2022 Negative  Negative, Inconclusive Final    POC Methylenedioxymethamphetamine * 03/01/2022 Negative  Negative, Inconclusive Final    POC Tricyclic Antidepressants 03/01/2022 Negative  Negative, Inconclusive Final    POC Buprenorphine 03/01/2022 Negative   Final     Acceptable 03/01/2022 Yes   Final    POC Temperature (Urine) 03/01/2022 94   Final   Lab Visit on 01/11/2022   Component Date Value Ref Range Status    Sodium 01/11/2022 132 (A) 136 - 145 mmol/L Final    Potassium 01/11/2022 4.8  3.5 - 5.1 mmol/L Final    Chloride 01/11/2022 96 (A) 98 - 107 mmol/L Final    CO2 01/11/2022 30  21 - 32 mmol/L Final    Anion Gap 01/11/2022 11  7 - 16 mmol/L Final    Glucose 01/11/2022 76  74 - 106 mg/dL Final    BUN 01/11/2022 11  7 - 18 mg/dL Final    Creatinine 01/11/2022 0.72  0.55 - 1.02 mg/dL Final    BUN/Creatinine Ratio 01/11/2022 15  6 - 20 Final    Calcium 01/11/2022 9.1  8.5 - 10.1 mg/dL Final    Total Protein 01/11/2022 7.2  6.4 - 8.2 g/dL Final    Albumin 01/11/2022 3.9  3.5 - 5.0 g/dL Final    Globulin 01/11/2022 3.3  2.0 - 4.0 g/dL Final    A/G Ratio 01/11/2022 1.2   Final    Bilirubin, Total 01/11/2022 0.4  0.0 - 1.2 mg/dL Final    Alk Phos 01/11/2022 69  50 - 130 U/L Final    ALT 01/11/2022 10 (A) 13 - 56 U/L Final    AST 01/11/2022 14 (A) 15 - 37 U/L Final    eGFR 01/11/2022 88  >=60 mL/min/1.73m² Final    WBC 01/11/2022 6.55  4.50 - 11.00 K/uL Final    RBC 01/11/2022 4.42  4.20 - 5.40 M/uL Final    Hemoglobin 01/11/2022 12.5  12.0 - 16.0 g/dL Final    Hematocrit 01/11/2022 38.5  38.0 - 47.0 % Final    MCV 01/11/2022 87.1  80.0 - 96.0 fL Final    MCH 01/11/2022 28.3  27.0 - 31.0 pg Final    MCHC 01/11/2022 32.5  32.0 - 36.0 g/dL Final     RDW 01/11/2022 13.2  11.5 - 14.5 % Final    Platelet Count 01/11/2022 397  150 - 400 K/uL Final    MPV 01/11/2022 10.5  9.4 - 12.4 fL Final    Neutrophils % 01/11/2022 44.1 (A) 53.0 - 65.0 % Final    Lymphocytes % 01/11/2022 41.8 (A) 27.0 - 41.0 % Final    Monocytes % 01/11/2022 8.7 (A) 2.0 - 6.0 % Final    Eosinophils % 01/11/2022 2.4  1.0 - 4.0 % Final    Basophils % 01/11/2022 1.5 (A) 0.0 - 1.0 % Final    Immature Granulocytes % 01/11/2022 1.5 (A) 0.0 - 0.4 % Final    nRBC, Auto 01/11/2022 0.0  <=0.0 % Final    Neutrophils, Abs 01/11/2022 2.88  1.80 - 7.70 K/uL Final    Lymphocytes, Absolute 01/11/2022 2.74  1.00 - 4.80 K/uL Final    Monocytes, Absolute 01/11/2022 0.57  0.00 - 0.80 K/uL Final    Eosinophils, Absolute 01/11/2022 0.16  0.00 - 0.50 K/uL Final    Basophils, Absolute 01/11/2022 0.10  0.00 - 0.20 K/uL Final    Immature Granulocytes, Absolute 01/11/2022 0.10 (A) 0.00 - 0.04 K/uL Final    nRBC, Absolute 01/11/2022 0.00  <=0.00 x10e3/uL Final    Diff Type 01/11/2022 Auto   Final           Assessment:      1. Radiculopathy, cervical region    2. Diabetic polyneuropathy associated with type 2 diabetes mellitus    3. Lumbar radiculopathy    4. Encounter for long-term (current) use of other medications          Orders Placed This Encounter   Procedures    POCT Urine Drug Screen Presump     Interpretive Information:     Negative:  No drug detected at the cut off level.   Positive:  This result represents presumptive positive for the   tested drug, other substances may yield a positive response other   than the analyte of interest. This result should be utilized for   diagnostic purpose only. Confirmation testing will be performed upon physician request only.            A's of Opioid Risk Assessment  Activity:Patient can perform ADL.   Analgesia:Patients pain is partially controlled by current medication. Patient has tried OTC medications such as Tylenol and Ibuprofen with out relief.    Adverse Effects: Patient denies constipation or sedation.  Aberrant Behavior:  reviewed with no aberrant drug seeking/taking behavior.  Overdose reversal drug naloxone discussed    Drug screen reviewed      Requested Prescriptions     Signed Prescriptions Disp Refills    LYRICA 100 mg capsule 180 capsule 2     Si capsules po TID    methocarbamoL (ROBAXIN) 750 MG Tab 90 tablet 2     Sig: Take 1 tablet (750 mg total) by mouth every 8 (eight) hours.    acetaminophen-codeine 300-30mg (TYLENOL #3) 300-30 mg Tab 120 tablet 2     Sig: Take 1 tablet by mouth every 6 (six) hours.         Plan:    Spinal cord stimulator not functioning     Complaint of cervical spine discomfort left greater than right requesting left cervical trigger-point injection, she states she has had these in the past it does help control her discomfort    CT cervical spine [facility] 2021 multiple level degenerative changes no fracture noted    Continue home exercise program as directed    Continue current medication    Has a long history neck pain muscle skeletal facet joint in nature  Pain is consistent with trigger-point pattern  Has pain with range of motion cervical pain  Because of pain which limits activity does have some deconditioning in this area due to pain  Focal tenderness paraspinous muscle in this area  Multiple multilevel band paraspinous muscle in this area  Taut response to snapping palpation in this area  Has tried over-the-counter anti-inflammatories tried home exercise program physical therapy none have alleviated the discomfort    Cervical paraspinous muscle left-sided trigger point injection once procedures pre-surgery    Follow-up 3 months discuss starting physical therapy    Dr. Barclay, 2022    Bring original prescription medication bottles/container/box with labels to each visit

## 2022-06-28 ENCOUNTER — OFFICE VISIT (OUTPATIENT)
Dept: PAIN MEDICINE | Facility: CLINIC | Age: 62
End: 2022-06-28
Payer: MEDICAID

## 2022-06-28 VITALS
HEIGHT: 62 IN | BODY MASS INDEX: 27.79 KG/M2 | SYSTOLIC BLOOD PRESSURE: 161 MMHG | WEIGHT: 151 LBS | HEART RATE: 70 BPM | DIASTOLIC BLOOD PRESSURE: 82 MMHG

## 2022-06-28 DIAGNOSIS — M54.2 NECK PAIN: Primary | Chronic | ICD-10-CM

## 2022-06-28 DIAGNOSIS — M54.16 LUMBAR RADICULOPATHY: Chronic | ICD-10-CM

## 2022-06-28 DIAGNOSIS — Z79.899 ENCOUNTER FOR LONG-TERM (CURRENT) USE OF OTHER MEDICATIONS: ICD-10-CM

## 2022-06-28 DIAGNOSIS — E11.42 DIABETIC POLYNEUROPATHY ASSOCIATED WITH TYPE 2 DIABETES MELLITUS: Chronic | ICD-10-CM

## 2022-06-28 DIAGNOSIS — M54.12 RADICULOPATHY, CERVICAL REGION: Chronic | ICD-10-CM

## 2022-06-28 DIAGNOSIS — M79.10 MYALGIA: Chronic | ICD-10-CM

## 2022-06-28 PROCEDURE — 3077F PR MOST RECENT SYSTOLIC BLOOD PRESSURE >= 140 MM HG: ICD-10-PCS | Mod: CPTII,,, | Performed by: PHYSICIAN ASSISTANT

## 2022-06-28 PROCEDURE — 80305 DRUG TEST PRSMV DIR OPT OBS: CPT | Mod: PBBFAC | Performed by: PHYSICIAN ASSISTANT

## 2022-06-28 PROCEDURE — 99214 PR OFFICE/OUTPT VISIT, EST, LEVL IV, 30-39 MIN: ICD-10-PCS | Mod: S$PBB,,, | Performed by: PHYSICIAN ASSISTANT

## 2022-06-28 PROCEDURE — 4010F PR ACE/ARB THEARPY RXD/TAKEN: ICD-10-PCS | Mod: CPTII,,, | Performed by: PHYSICIAN ASSISTANT

## 2022-06-28 PROCEDURE — 99214 OFFICE O/P EST MOD 30 MIN: CPT | Mod: S$PBB,,, | Performed by: PHYSICIAN ASSISTANT

## 2022-06-28 PROCEDURE — 3077F SYST BP >= 140 MM HG: CPT | Mod: CPTII,,, | Performed by: PHYSICIAN ASSISTANT

## 2022-06-28 PROCEDURE — 3079F PR MOST RECENT DIASTOLIC BLOOD PRESSURE 80-89 MM HG: ICD-10-PCS | Mod: CPTII,,, | Performed by: PHYSICIAN ASSISTANT

## 2022-06-28 PROCEDURE — 3008F BODY MASS INDEX DOCD: CPT | Mod: CPTII,,, | Performed by: PHYSICIAN ASSISTANT

## 2022-06-28 PROCEDURE — 1159F PR MEDICATION LIST DOCUMENTED IN MEDICAL RECORD: ICD-10-PCS | Mod: CPTII,,, | Performed by: PHYSICIAN ASSISTANT

## 2022-06-28 PROCEDURE — 1159F MED LIST DOCD IN RCRD: CPT | Mod: CPTII,,, | Performed by: PHYSICIAN ASSISTANT

## 2022-06-28 PROCEDURE — 3079F DIAST BP 80-89 MM HG: CPT | Mod: CPTII,,, | Performed by: PHYSICIAN ASSISTANT

## 2022-06-28 PROCEDURE — 4010F ACE/ARB THERAPY RXD/TAKEN: CPT | Mod: CPTII,,, | Performed by: PHYSICIAN ASSISTANT

## 2022-06-28 PROCEDURE — 3008F PR BODY MASS INDEX (BMI) DOCUMENTED: ICD-10-PCS | Mod: CPTII,,, | Performed by: PHYSICIAN ASSISTANT

## 2022-06-28 PROCEDURE — 99215 OFFICE O/P EST HI 40 MIN: CPT | Mod: PBBFAC | Performed by: PHYSICIAN ASSISTANT

## 2022-06-28 RX ORDER — METHOCARBAMOL 750 MG/1
750 TABLET, FILM COATED ORAL EVERY 8 HOURS
Qty: 90 TABLET | Refills: 2 | Status: SHIPPED | OUTPATIENT
Start: 2022-06-28 | End: 2022-09-22 | Stop reason: SDUPTHER

## 2022-06-28 RX ORDER — ACETAMINOPHEN AND CODEINE PHOSPHATE 300; 30 MG/1; MG/1
1 TABLET ORAL EVERY 6 HOURS
Qty: 120 TABLET | Refills: 2 | Status: SHIPPED | OUTPATIENT
Start: 2022-07-02 | End: 2022-09-22 | Stop reason: SDUPTHER

## 2022-06-28 RX ORDER — PREGABALIN 100 MG/1
CAPSULE ORAL
Qty: 180 CAPSULE | Refills: 2 | Status: SHIPPED | OUTPATIENT
Start: 2022-06-28 | End: 2022-09-22 | Stop reason: SDUPTHER

## 2022-08-19 ENCOUNTER — OFFICE VISIT (OUTPATIENT)
Dept: FAMILY MEDICINE | Facility: CLINIC | Age: 62
End: 2022-08-19
Payer: MEDICAID

## 2022-08-19 VITALS
HEIGHT: 62 IN | DIASTOLIC BLOOD PRESSURE: 82 MMHG | TEMPERATURE: 98 F | WEIGHT: 150.19 LBS | OXYGEN SATURATION: 97 % | RESPIRATION RATE: 20 BRPM | HEART RATE: 73 BPM | SYSTOLIC BLOOD PRESSURE: 138 MMHG | BODY MASS INDEX: 27.64 KG/M2

## 2022-08-19 DIAGNOSIS — R52 BODY ACHES: ICD-10-CM

## 2022-08-19 DIAGNOSIS — J01.00 ACUTE NON-RECURRENT MAXILLARY SINUSITIS: Primary | ICD-10-CM

## 2022-08-19 DIAGNOSIS — J02.9 SORE THROAT: ICD-10-CM

## 2022-08-19 DIAGNOSIS — R05.9 COUGH: ICD-10-CM

## 2022-08-19 LAB
CTP QC/QA: YES
FLUAV AG NPH QL: NEGATIVE
FLUBV AG NPH QL: NEGATIVE
SARS-COV-2 AG RESP QL IA.RAPID: NEGATIVE

## 2022-08-19 PROCEDURE — 1160F PR REVIEW ALL MEDS BY PRESCRIBER/CLIN PHARMACIST DOCUMENTED: ICD-10-PCS | Mod: CPTII,,, | Performed by: NURSE PRACTITIONER

## 2022-08-19 PROCEDURE — 3008F BODY MASS INDEX DOCD: CPT | Mod: CPTII,,, | Performed by: NURSE PRACTITIONER

## 2022-08-19 PROCEDURE — 4010F PR ACE/ARB THEARPY RXD/TAKEN: ICD-10-PCS | Mod: CPTII,,, | Performed by: NURSE PRACTITIONER

## 2022-08-19 PROCEDURE — 3075F SYST BP GE 130 - 139MM HG: CPT | Mod: CPTII,,, | Performed by: NURSE PRACTITIONER

## 2022-08-19 PROCEDURE — 1160F RVW MEDS BY RX/DR IN RCRD: CPT | Mod: CPTII,,, | Performed by: NURSE PRACTITIONER

## 2022-08-19 PROCEDURE — 87428 SARSCOV & INF VIR A&B AG IA: CPT | Mod: RHCUB | Performed by: NURSE PRACTITIONER

## 2022-08-19 PROCEDURE — 3079F DIAST BP 80-89 MM HG: CPT | Mod: CPTII,,, | Performed by: NURSE PRACTITIONER

## 2022-08-19 PROCEDURE — 4010F ACE/ARB THERAPY RXD/TAKEN: CPT | Mod: CPTII,,, | Performed by: NURSE PRACTITIONER

## 2022-08-19 PROCEDURE — 96372 PR INJECTION,THERAP/PROPH/DIAG2ST, IM OR SUBCUT: ICD-10-PCS | Mod: ,,, | Performed by: NURSE PRACTITIONER

## 2022-08-19 PROCEDURE — 99213 PR OFFICE/OUTPT VISIT, EST, LEVL III, 20-29 MIN: ICD-10-PCS | Mod: 25,,, | Performed by: NURSE PRACTITIONER

## 2022-08-19 PROCEDURE — 1159F PR MEDICATION LIST DOCUMENTED IN MEDICAL RECORD: ICD-10-PCS | Mod: CPTII,,, | Performed by: NURSE PRACTITIONER

## 2022-08-19 PROCEDURE — 3079F PR MOST RECENT DIASTOLIC BLOOD PRESSURE 80-89 MM HG: ICD-10-PCS | Mod: CPTII,,, | Performed by: NURSE PRACTITIONER

## 2022-08-19 PROCEDURE — 1159F MED LIST DOCD IN RCRD: CPT | Mod: CPTII,,, | Performed by: NURSE PRACTITIONER

## 2022-08-19 PROCEDURE — 3075F PR MOST RECENT SYSTOLIC BLOOD PRESS GE 130-139MM HG: ICD-10-PCS | Mod: CPTII,,, | Performed by: NURSE PRACTITIONER

## 2022-08-19 PROCEDURE — 3008F PR BODY MASS INDEX (BMI) DOCUMENTED: ICD-10-PCS | Mod: CPTII,,, | Performed by: NURSE PRACTITIONER

## 2022-08-19 PROCEDURE — 96372 THER/PROPH/DIAG INJ SC/IM: CPT | Mod: ,,, | Performed by: NURSE PRACTITIONER

## 2022-08-19 PROCEDURE — 99213 OFFICE O/P EST LOW 20 MIN: CPT | Mod: 25,,, | Performed by: NURSE PRACTITIONER

## 2022-08-19 RX ORDER — HYDROCHLOROTHIAZIDE 12.5 MG/1
12.5-25 TABLET ORAL DAILY
COMMUNITY
Start: 2022-08-01 | End: 2023-04-04

## 2022-08-19 RX ORDER — BENZONATATE 200 MG/1
200 CAPSULE ORAL 3 TIMES DAILY PRN
Qty: 30 CAPSULE | Refills: 0 | Status: SHIPPED | OUTPATIENT
Start: 2022-08-19 | End: 2022-08-29

## 2022-08-19 RX ORDER — TRAZODONE HYDROCHLORIDE 50 MG/1
1 TABLET ORAL NIGHTLY PRN
COMMUNITY
Start: 2022-04-26 | End: 2023-04-04

## 2022-08-19 RX ORDER — CLOTRIMAZOLE AND BETAMETHASONE DIPROPIONATE 10; .64 MG/G; MG/G
1 CREAM TOPICAL
COMMUNITY
Start: 2022-07-01 | End: 2023-03-24

## 2022-08-19 RX ORDER — DOXYCYCLINE 100 MG/1
100 CAPSULE ORAL 2 TIMES DAILY
Qty: 20 CAPSULE | Refills: 0 | Status: SHIPPED | OUTPATIENT
Start: 2022-08-19 | End: 2023-01-04

## 2022-08-19 RX ORDER — CEFTRIAXONE 1 G/1
1 INJECTION, POWDER, FOR SOLUTION INTRAMUSCULAR; INTRAVENOUS
Status: COMPLETED | OUTPATIENT
Start: 2022-08-19 | End: 2022-08-19

## 2022-08-19 RX ORDER — HYDROXYZINE HYDROCHLORIDE 25 MG/1
1 TABLET, FILM COATED ORAL EVERY 8 HOURS PRN
COMMUNITY
Start: 2022-06-10 | End: 2023-03-24

## 2022-08-19 RX ADMIN — CEFTRIAXONE 1 G: 1 INJECTION, POWDER, FOR SOLUTION INTRAMUSCULAR; INTRAVENOUS at 10:08

## 2022-08-19 NOTE — PROGRESS NOTES
CARROLL Curry   [facility]  [address]  [place] [address]     PATIENT NAME: Magali Cheung  : 1960  DATE: 22  MRN: [id number]      Billing Provider: CARROLL Curry  Level of Service: [place] OFFICE/OUTPT VISIT, EST, LEVL III, 20-29 MIN  Patient PCP Information     Provider PCP Type    CARROLL Bhandari General          Reason for Visit / Chief Complaint: Sinus Problem (Since Tuesday- [facility] has had RSV), Fever (Since tuesday), Sore Throat (Since tuesday), and Cough (Since tuesday)       Update PCP  Update Chief Complaint         History of Present Illness / Problem Focused Workflow     Magali Cheung presents to the clinic c/o sinus congestion, low grade fever, fever and occasional prod cough with yellow sputum for the past 3 days.       Review of Systems     Review of Systems   Constitutional: Positive for chills and fever. Negative for activity change, appetite change and diaphoresis.   HENT: Positive for nasal congestion, postnasal drip, sinus pressure/congestion and sore throat.    Eyes: Negative for visual disturbance.   Respiratory: Positive for cough. Negative for chest tightness and shortness of breath.    Cardiovascular: Negative for chest pain.   Gastrointestinal: Negative for abdominal pain, blood in stool, nausea and vomiting.   Endocrine: Negative for cold intolerance, heat intolerance, polydipsia, polyphagia and polyuria.   Neurological: Positive for headaches. Negative for dizziness and weakness.        Medical / Social / Family History     Past Medical History:   Diagnosis Date    Anxiety     Chronic pain syndrome     Depression     Diabetes mellitus     GERD (gastroesophageal reflux disease)     Hypertension     Hypothyroidism     Low back pain     Low vitamin B12 level     Lumbar radiculopathy     Neuropathy        Past Surgical History:   Procedure Laterality Date    Bilateral L3-S1 MBB Bilateral 2019, 2019    Dr Barclay      SECTION      COLON SURGERY      HERNIA REPAIR      Left L3-S1 RFTC Left 10/23/2019    Dr Barclay    Right L3-S1 RFTC Right 2019    Dr Barclay    SPINAL CORD STIMULATOR IMPLANT         Social History  Ms.  reports that she has been smoking vaping with nicotine. She has never used smokeless tobacco. She reports current alcohol use. She reports that she does not use drugs.    Family History  Ms.'s family history includes Bipolar disorder in her paternal grandmother; Heart disease in her father and paternal grandfather; Hypertension in her father; Stroke in her maternal grandfather.    Medications and Allergies     Medications  Outpatient Medications Marked as Taking for the 22 encounter (Office Visit) with CARROLL Orta   Medication Sig Dispense Refill    adapalene (DIFFERIN) 0.1 % gel Apply topically once daily.      albuterol (PROVENTIL/VENTOLIN HFA) 90 mcg/actuation inhaler INHALE ONE TO TWO PUFFS BY MOUTH EVERY 4 HOURS AS NEEDED FOR SHORTNESS OF BREATH OR WHEEZING      atorvastatin (LIPITOR) 40 MG tablet Take 80 mg by mouth once daily.      busPIRone (BUSPAR) 15 MG tablet Take 15 mg by mouth 2 (two) times daily.      carbidopa-levodopa  mg (SINEMET)  mg per tablet Take two tablets three times a day 540 tablet 1    carvediloL (COREG) 12.5 MG tablet Take 1 tablet (12.5 mg total) by mouth 2 (two) times daily with meals. 180 tablet 3    clotrimazole-betamethasone 1-0.05% (LOTRISONE) cream Apply 1 g topically as needed.      cyanocobalamin 1,000 mcg/mL injection inject ONE ML INTRAMUSCULARLY EACH MONTH      DULoxetine 40 mg CpDR TAKE ONE CAPSULE BY MOUTH TWICE DAILY FOR 30 DAYS      esomeprazole (NEXIUM) 40 MG capsule Take 40 mg by mouth once daily.      fluticasone propionate (FLONASE) 50 mcg/actuation nasal spray 2 sprays by Each Nostril route once daily.      hydroCHLOROthiazide (HYDRODIURIL) 12.5 MG Tab Take 12.5-25 mg by mouth once daily.      hydrOXYzine HCL  "(ATARAX) 25 MG tablet Take 1 tablet by mouth every 8 (eight) hours as needed.      JANUVIA 100 mg Tab Take 100 mg by mouth once daily.      levothyroxine (SYNTHROID) 50 MCG tablet Take 50 mcg by mouth every morning.      LYRICA 100 mg capsule 2 capsules po  capsule 2    methocarbamoL (ROBAXIN) 750 MG Tab Take 1 tablet (750 mg total) by mouth every 8 (eight) hours. 90 tablet 2    traZODone (DESYREL) 50 MG tablet Take 1 mg by mouth nightly as needed.      vitamin D (VITAMIN D3) 1000 units Tab Take 1,000 Units by mouth once daily.       Current Facility-Administered Medications for the 8/19/22 encounter (Office Visit) with CARROLL Orta   Medication Dose Route Frequency Provider Last Rate Last Admin    cefTRIAXone injection 1 g  1 g Intramuscular 1 time in Clinic/HOD CARROLL Otra           Allergies  Review of patient's allergies indicates:   Allergen Reactions    Opioids - morphine analogues        Physical Examination     Vitals:    08/19/22 0944   BP: 138/82   BP Location: Left arm   Patient Position: Sitting   BP Method: Medium (Manual)   Pulse: 73   Resp: 20   Temp: 98.2 °F (36.8 °C)   TempSrc: Oral   SpO2: 97%   Weight: 68.1 kg (150 lb 3.2 oz)   Height: 5' 2" (1.575 m)      Physical Exam  Constitutional:       Appearance: Normal appearance.   HENT:      Head: Normocephalic.      Right Ear: Hearing and ear canal normal. No tenderness. A middle ear effusion is present. Tympanic membrane is not injected, erythematous, retracted or bulging.      Left Ear: Hearing and ear canal normal. No tenderness. A middle ear effusion is present. Tympanic membrane is not injected, erythematous, retracted or bulging.      Ears:      Comments: Small amt of clear fluid behind TM     Nose: Congestion and rhinorrhea present. No nasal deformity.      Right Turbinates: Not swollen.      Left Turbinates: Not swollen.      Right Sinus: Maxillary sinus tenderness present.      Left Sinus: Maxillary sinus " tenderness present.      Mouth/Throat:      Lips: Pink.      Mouth: Mucous membranes are moist.      Pharynx: No oropharyngeal exudate or posterior oropharyngeal erythema.      Tonsils: No tonsillar exudate.   Eyes:      General: Lids are normal. No allergic shiner.        Right eye: No discharge.         Left eye: No discharge.      Conjunctiva/sclera:      Right eye: Right conjunctiva is not injected.      Left eye: Left conjunctiva is not injected.   Neck:      Trachea: Trachea normal.   Cardiovascular:      Rate and Rhythm: Normal rate and regular rhythm.      Pulses: Normal pulses.      Heart sounds: Normal heart sounds.   Pulmonary:      Effort: Pulmonary effort is normal. No tachypnea, accessory muscle usage or respiratory distress.      Breath sounds: Normal breath sounds. No wheezing, rhonchi or rales.   Abdominal:      General: Bowel sounds are normal.      Palpations: Abdomen is soft.      Tenderness: There is no abdominal tenderness.   Musculoskeletal:      Cervical back: Neck supple.   Lymphadenopathy:      Cervical: Cervical adenopathy present.   Skin:     General: Skin is warm and dry.      Capillary Refill: Capillary refill takes less than 2 seconds.      Coloration: Skin is not ashen, cyanotic or pale.   Neurological:      Mental Status: She is alert.          Assessment and Plan (including Health Maintenance)      Problem List  Smart Sets  Document Outside HM   :          Health Maintenance Due   Topic Date Due    Foot Exam  Never done       Problem List Items Addressed This Visit    None     Visit Diagnoses     Acute non-recurrent maxillary sinusitis    -  Primary    COVID and influenza negative; will treat with rocephin injection along with doxycycline and tessalon perles for cough. Increase fluid intake.    Relevant Medications    cefTRIAXone injection 1 g (Start on 8/19/2022 10:30 AM)    doxycycline (VIBRAMYCIN) 100 MG Cap    benzonatate (TESSALON) 200 MG capsule    Cough        Relevant  Orders    POCT SARS-COV2 (COVID) with Flu Antigen (Completed)    Sore throat        Relevant Orders    POCT SARS-COV2 (COVID) with Flu Antigen (Completed)    Body aches        Relevant Orders    POCT SARS-COV2 (COVID) with Flu Antigen (Completed)        Acute non-recurrent maxillary sinusitis  Comments:  COVID and influenza negative; will treat with rocephin injection along with doxycycline and tessalon perles for cough. Increase fluid intake.  Orders:  -     cefTRIAXone injection 1 g  -     doxycycline (VIBRAMYCIN) 100 MG Cap; Take 1 capsule (100 mg total) by mouth 2 (two) times daily.  Dispense: 20 capsule; Refill: 0  -     benzonatate (TESSALON) 200 MG capsule; Take 1 capsule (200 mg total) by mouth 3 (three) times daily as needed for Cough.  Dispense: 30 capsule; Refill: 0    Cough  -     POCT SARS-COV2 (COVID) with Flu Antigen    Sore throat  -     POCT SARS-COV2 (COVID) with Flu Antigen    Body aches  -     POCT SARS-COV2 (COVID) with Flu Antigen       Health Maintenance Topics with due status: Not Due       Topic Last Completion Date    Influenza Vaccine Not Due       Procedures     Future Appointments   Date Time Provider Department Center   9/26/2022 12:45 PM GUSTABO Rice [facility] P[facility]   10/4/2022  1:15 PM Ulises Zhu MD [facility] CARD [facility]        Follow up with PCP if symptoms persist.     Signature:  CARROLL Curry    Date of encounter: 8/19/22

## 2022-09-22 NOTE — PROGRESS NOTES
Subjective:         Patient ID: Magali Cheung is a 62 y.o. female.    Chief Complaint: Low-back Pain, Foot Pain, and Leg Pain      Pain  This is a chronic problem. The current episode started more than 1 year ago. The problem occurs daily. The problem has been gradually improving. Associated symptoms include arthralgias and neck pain. Pertinent negatives include no abdominal pain, anorexia, change in bowel habit, coughing, diaphoresis, fever, rash, sore throat, vertigo or vomiting.   Review of Systems   Constitutional:  Negative for activity change, appetite change, diaphoresis and fever.   HENT:  Negative for drooling, ear discharge, ear pain, facial swelling, nosebleeds, sore throat, voice change and goiter.    Eyes:  Negative for pain, discharge, redness and visual disturbance.   Respiratory:  Negative for apnea, cough, choking, chest tightness, shortness of breath, wheezing and stridor.    Cardiovascular:  Negative for palpitations and leg swelling.   Gastrointestinal:  Negative for abdominal distention, abdominal pain, anorexia, change in bowel habit, diarrhea, rectal pain, vomiting, fecal incontinence and change in bowel habit.   Endocrine: Negative for cold intolerance, heat intolerance, polydipsia, polyphagia and polyuria.   Genitourinary:  Negative for flank pain, frequency and hot flashes.   Musculoskeletal:  Positive for arthralgias, back pain, leg pain, neck pain and neck stiffness.   Integumentary:  Negative for color change, pallor and rash.   Allergic/Immunologic: Negative for immunocompromised state.   Neurological:  Negative for dizziness, vertigo, seizures, syncope, facial asymmetry, speech difficulty, light-headedness, coordination difficulties, memory loss and coordination difficulties.   Hematological:  Negative for adenopathy. Does not bruise/bleed easily.   Psychiatric/Behavioral:  Negative for agitation, behavioral problems, confusion, decreased concentration, dysphoric mood,  "hallucinations, self-injury and suicidal ideas. The patient is not nervous/anxious and is not hyperactive.          Past Medical History:   Diagnosis Date    Anxiety     Chronic pain syndrome     Depression     Diabetes mellitus     GERD (gastroesophageal reflux disease)     Hypertension     Hypothyroidism     Low back pain     Low vitamin B12 level     Lumbar radiculopathy     Neuropathy      Past Surgical History:   Procedure Laterality Date    Bilateral L3-S1 MBB Bilateral 2019, 2019    Dr Barclay     SECTION      COLON SURGERY      HERNIA REPAIR      Left L3-S1 RFTC Left 10/23/2019    Dr Barclay    Right L3-S1 RFTC Right 2019    Dr Barclay    SPINAL CORD STIMULATOR IMPLANT       Social History     Socioeconomic History    Marital status:    Tobacco Use    Smoking status: Every Day     Types: Vaping with nicotine    Smokeless tobacco: Never   Substance and Sexual Activity    Alcohol use: Yes    Drug use: Never    Sexual activity: Yes     Partners: Male     Family History   Problem Relation Age of Onset    Hypertension Father     Heart disease Father     Stroke Maternal Grandfather     Bipolar disorder Paternal Grandmother     Heart disease Paternal Grandfather      Review of patient's allergies indicates:   Allergen Reactions    Opioids - morphine analogues         Objective:  Vitals:    22 0904 22 0905   BP:  (!) 181/88   Pulse:  77   Weight: 66.4 kg (146 lb 6.4 oz)    Height: 5' 2.4" (1.585 m)    PainSc:   6   6         Physical Exam  Vitals and nursing note reviewed. Exam conducted with a chaperone present.   Constitutional:       General: She is awake. She is not in acute distress.     Appearance: Normal appearance. She is not ill-appearing or toxic-appearing.   HENT:      Head: Normocephalic and atraumatic.      Nose: Nose normal.      Mouth/Throat:      Mouth: Mucous membranes are moist.      Pharynx: Oropharynx is clear.   Eyes:      Conjunctiva/sclera: Conjunctivae " normal.      Pupils: Pupils are equal, round, and reactive to light.   Cardiovascular:      Rate and Rhythm: Normal rate.   Pulmonary:      Effort: Pulmonary effort is normal. No respiratory distress.   Abdominal:      Palpations: Abdomen is soft.      Tenderness: There is no guarding.   Musculoskeletal:      Cervical back: Normal range of motion and neck supple. Tenderness present. No rigidity.      Thoracic back: Tenderness present.      Lumbar back: Tenderness present. Decreased range of motion.   Skin:     General: Skin is warm and dry.      Coloration: Skin is not jaundiced or pale.   Neurological:      General: No focal deficit present.      Mental Status: She is alert and oriented to person, place, and time. Mental status is at baseline.      Cranial Nerves: No cranial nerve deficit (II-XII).      Deep Tendon Reflexes:      Reflex Scores:       Brachioradialis reflexes are 2+ on the right side.  Psychiatric:         Mood and Affect: Mood normal.         Behavior: Behavior normal. Behavior is cooperative.         Thought Content: Thought content normal.         X-Ray Thoracic Spine AP Lateral  Narrative: EXAMINATION:  XR THORACIC SPINE AP LATERAL    CLINICAL HISTORY:  Pain in thoracic spine    FINDINGS:  Alignment in the lateral plane is normal throughout the thoracic spine with minimal osteophytes present.  No focal vertebral body height loss seen    Mild scoliosis present    Epidural catheters present in the lower thoracic spine  Impression: 1. Mild scoliosis  2. Minimal degenerative changes    Electronically signed by: Lupis Ascencio  Date:    02/01/2022  Time:    15:31  X-Ray Hips Bilateral 2 View Inc AP Pelvis  Narrative: EXAMINATION:  XR HIPS BILATERAL 2 VIEW INCL AP PELVIS    CLINICAL HISTORY:  Radiculopathy, lumbar region    TECHNIQUE:  AP view of the pelvis and frogleg lateral views of both hips were performed.    COMPARISON:  None.    FINDINGS:  There is no fracture or dislocation.  Mild degenerative  changes seen of both hips.  Impression: As above    Electronically signed by: Rocky Alas  Date:    02/01/2022  Time:    15:30  X-Ray Lumbar Spine AP And Lateral  Narrative: EXAMINATION:  XR LUMBAR SPINE AP AND LATERAL    CLINICAL HISTORY:  Low back pain, symptoms persist with > 6wks conservative treatment;Dorsalgia, unspecified    TECHNIQUE:  AP, lateral images were performed of the lumbar spine.    COMPARISON:  01/04/2018    FINDINGS:  Since the prior exam there is now a spinal stimulator partially assessed.  There is retrolisthesis of L1 on L2 that is similar.  Degenerative endplate changes most notably at L1-L2 are similar.  Additional multilevel degenerative endplate and facet changes throughout the lumbar spine.  Impression: Multilevel degenerative changes.    Electronically signed by: Rocky Alas  Date:    02/01/2022  Time:    15:29       Office Visit on 08/19/2022   Component Date Value Ref Range Status    SARS Coronavirus 2 Antigen 08/19/2022 Negative  Negative Final    Rapid Influenza A Ag 08/19/2022 Negative  Negative Final    Rapid Influenza B Ag 08/19/2022 Negative  Negative Final     Acceptable 08/19/2022 Yes   Final   Office Visit on 06/28/2022   Component Date Value Ref Range Status    POC Amphetamines 06/28/2022 Negative  Negative, Inconclusive Final    POC Barbiturates 06/28/2022 Negative  Negative, Inconclusive Final    POC Benzodiazepines 06/28/2022 Negative  Negative, Inconclusive Final    POC Cocaine 06/28/2022 Negative  Negative, Inconclusive Final    POC THC 06/28/2022 Negative  Negative, Inconclusive Final    POC Methadone 06/28/2022 Negative  Negative, Inconclusive Final    POC Methamphetamine 06/28/2022 Negative  Negative, Inconclusive Final    POC Opiates 06/28/2022 Presumptive Positive (A)  Negative, Inconclusive Final    POC Oxycodone 06/28/2022 Negative  Negative, Inconclusive Final    POC Phencyclidine 06/28/2022 Negative  Negative, Inconclusive Final    POC  Methylenedioxymethamphetamine * 2022 Negative  Negative, Inconclusive Final    POC Tricyclic Antidepressants 2022 Negative  Negative, Inconclusive Final    POC Buprenorphine 2022 Negative   Final     Acceptable 2022 Yes   Final    POC Temperature (Urine) 2022 92   Final         No orders of the defined types were placed in this encounter.      Requested Prescriptions     Pending Prescriptions Disp Refills    LYRICA 100 mg capsule 180 capsule 0     Si capsules po TID    methocarbamoL (ROBAXIN) 750 MG Tab 90 tablet 2     Sig: Take 1 tablet (750 mg total) by mouth every 8 (eight) hours.    acetaminophen-codeine 300-30mg (TYLENOL #3) 300-30 mg Tab 120 tablet 2     Sig: Take 1 tablet by mouth every 6 (six) hours.       Assessment:     1. Diabetic polyneuropathy associated with type 2 diabetes mellitus    2. Lumbar radiculopathy    3. Radiculopathy, cervical region         A's of Opioid Risk Assessment  Activity:Patient can perform ADL.   Analgesia:Patients pain is partially controlled by current medication. Patient has tried OTC medications such as Tylenol and Ibuprofen with out relief.   Adverse Effects: Patient denies constipation or sedation.  Aberrant Behavior:  reviewed with no aberrant drug seeking/taking behavior.  Overdose reversal drug naloxone discussed    Drug screen reviewed      Plan:    Spinal cord stimulator not functioning     Complaint of back pain worse with flexion extension rotation lumbar spine ongoing for more than 3 months tenderness along the facet joint area her pain is facet joint in nature     She had lumbar RF TC  she states she had more than 80% relief after procedure, she states procedure did help increase her level function, patient has maintain greater than 3 months pain relief with 20 procedures     She is requesting repeat procedure for her low back pain    MRI lumbar spine [facility] 2018 multiple level degenerative  changes please see images/report    Indications for this procedure for this specific patient include the following   - Pt has had symptoms for three months with moderate to severe pain with functional impairment rated of 7/10 pain.   - Pain non-responsive to conservative care.    - Pain predominately axial and not associated with radiculopathy or claudication.    - No non-facet pathology as source of pain.    - Clinical assessment implicates facet joint as putative pain source.    - Pain is exacerbated by extension or prolonged sitting/standing and relieved by rest.    - No unexplained neurologic deficit.    - No history of coagulopathy, infection or unstable medical conditions.    - Pain is causing significant functional limitation resulting in diminished quality of life and impaired age appropriate ADL's.   - Clinical assessment implicates facet joint as putative source of pain  - Repeat injections not done prior to 7 days   - no more than 2 levels will be done    The planned medically necessary  surgical procedure is performed in a hospital outpatient department and not in an ambulatory surgical center due to:     -there is no geographically assessable ambulatory surgery center that has the  necessary equipment and fluoroscopy needed for the procedure     -there is no geographically assessable ambulatory surgical center available at which the physician has privileges     -an ASC's  specific  guideline regarding the individuals weight or health conditions that prevent the use of an ASC    Monitor anesthesia request is medically indicated for the scheduled nerve block procedure due to:  1- needle phobia and anxiety, placing  the patient at risk during the provided service.  2-patient has an ASA class greater than 3 and requires constant presence of an anesthesiologist during the procedure,   3-patient has severe problems hard to lie still  4-patient suffers from chronic pain and is unable to function due to   diminished ADLs    Schedule right lumbar RF TC L4 through S1, lumbosacral spondylosis     Schedule left lumbar RF TC L4 through S1, lumbosacral spondylosis after completing right side    Dr. Barclay    Bring original prescription medication bottles/container/box with labels to each visit    Pill count    Physical therapy    Massage therapy declines

## 2022-09-22 NOTE — H&P (VIEW-ONLY)
Subjective:         Patient ID: Magali Cheung is a 62 y.o. female.    Chief Complaint: Low-back Pain, Foot Pain, and Leg Pain      Pain  This is a chronic problem. The current episode started more than 1 year ago. The problem occurs daily. The problem has been gradually improving. Associated symptoms include arthralgias and neck pain. Pertinent negatives include no abdominal pain, anorexia, change in bowel habit, coughing, diaphoresis, fever, rash, sore throat, vertigo or vomiting.   Review of Systems   Constitutional:  Negative for activity change, appetite change, diaphoresis and fever.   HENT:  Negative for drooling, ear discharge, ear pain, facial swelling, nosebleeds, sore throat, voice change and goiter.    Eyes:  Negative for pain, discharge, redness and visual disturbance.   Respiratory:  Negative for apnea, cough, choking, chest tightness, shortness of breath, wheezing and stridor.    Cardiovascular:  Negative for palpitations and leg swelling.   Gastrointestinal:  Negative for abdominal distention, abdominal pain, anorexia, change in bowel habit, diarrhea, rectal pain, vomiting, fecal incontinence and change in bowel habit.   Endocrine: Negative for cold intolerance, heat intolerance, polydipsia, polyphagia and polyuria.   Genitourinary:  Negative for flank pain, frequency and hot flashes.   Musculoskeletal:  Positive for arthralgias, back pain, leg pain, neck pain and neck stiffness.   Integumentary:  Negative for color change, pallor and rash.   Allergic/Immunologic: Negative for immunocompromised state.   Neurological:  Negative for dizziness, vertigo, seizures, syncope, facial asymmetry, speech difficulty, light-headedness, coordination difficulties, memory loss and coordination difficulties.   Hematological:  Negative for adenopathy. Does not bruise/bleed easily.   Psychiatric/Behavioral:  Negative for agitation, behavioral problems, confusion, decreased concentration, dysphoric mood,  "hallucinations, self-injury and suicidal ideas. The patient is not nervous/anxious and is not hyperactive.          Past Medical History:   Diagnosis Date    Anxiety     Chronic pain syndrome     Depression     Diabetes mellitus     GERD (gastroesophageal reflux disease)     Hypertension     Hypothyroidism     Low back pain     Low vitamin B12 level     Lumbar radiculopathy     Neuropathy      Past Surgical History:   Procedure Laterality Date    Bilateral L3-S1 MBB Bilateral 2019, 2019    Dr Barclay     SECTION      COLON SURGERY      HERNIA REPAIR      Left L3-S1 RFTC Left 10/23/2019    Dr Barclay    Right L3-S1 RFTC Right 2019    Dr Barclay    SPINAL CORD STIMULATOR IMPLANT       Social History     Socioeconomic History    Marital status:    Tobacco Use    Smoking status: Every Day     Types: Vaping with nicotine    Smokeless tobacco: Never   Substance and Sexual Activity    Alcohol use: Yes    Drug use: Never    Sexual activity: Yes     Partners: Male     Family History   Problem Relation Age of Onset    Hypertension Father     Heart disease Father     Stroke Maternal Grandfather     Bipolar disorder Paternal Grandmother     Heart disease Paternal Grandfather      Review of patient's allergies indicates:   Allergen Reactions    Opioids - morphine analogues         Objective:  Vitals:    22 0904 22 0905   BP:  (!) 181/88   Pulse:  77   Weight: 66.4 kg (146 lb 6.4 oz)    Height: 5' 2.4" (1.585 m)    PainSc:   6   6         Physical Exam  Vitals and nursing note reviewed. Exam conducted with a chaperone present.   Constitutional:       General: She is awake. She is not in acute distress.     Appearance: Normal appearance. She is not ill-appearing or toxic-appearing.   HENT:      Head: Normocephalic and atraumatic.      Nose: Nose normal.      Mouth/Throat:      Mouth: Mucous membranes are moist.      Pharynx: Oropharynx is clear.   Eyes:      Conjunctiva/sclera: Conjunctivae " normal.      Pupils: Pupils are equal, round, and reactive to light.   Cardiovascular:      Rate and Rhythm: Normal rate.   Pulmonary:      Effort: Pulmonary effort is normal. No respiratory distress.   Abdominal:      Palpations: Abdomen is soft.      Tenderness: There is no guarding.   Musculoskeletal:      Cervical back: Normal range of motion and neck supple. Tenderness present. No rigidity.      Thoracic back: Tenderness present.      Lumbar back: Tenderness present. Decreased range of motion.   Skin:     General: Skin is warm and dry.      Coloration: Skin is not jaundiced or pale.   Neurological:      General: No focal deficit present.      Mental Status: She is alert and oriented to person, place, and time. Mental status is at baseline.      Cranial Nerves: No cranial nerve deficit (II-XII).      Deep Tendon Reflexes:      Reflex Scores:       Brachioradialis reflexes are 2+ on the right side.  Psychiatric:         Mood and Affect: Mood normal.         Behavior: Behavior normal. Behavior is cooperative.         Thought Content: Thought content normal.         X-Ray Thoracic Spine AP Lateral  Narrative: EXAMINATION:  XR THORACIC SPINE AP LATERAL    CLINICAL HISTORY:  Pain in thoracic spine    FINDINGS:  Alignment in the lateral plane is normal throughout the thoracic spine with minimal osteophytes present.  No focal vertebral body height loss seen    Mild scoliosis present    Epidural catheters present in the lower thoracic spine  Impression: 1. Mild scoliosis  2. Minimal degenerative changes    Electronically signed by: Lupis Ascencio  Date:    02/01/2022  Time:    15:31  X-Ray Hips Bilateral 2 View Inc AP Pelvis  Narrative: EXAMINATION:  XR HIPS BILATERAL 2 VIEW INCL AP PELVIS    CLINICAL HISTORY:  Radiculopathy, lumbar region    TECHNIQUE:  AP view of the pelvis and frogleg lateral views of both hips were performed.    COMPARISON:  None.    FINDINGS:  There is no fracture or dislocation.  Mild degenerative  changes seen of both hips.  Impression: As above    Electronically signed by: Rocky Alas  Date:    02/01/2022  Time:    15:30  X-Ray Lumbar Spine AP And Lateral  Narrative: EXAMINATION:  XR LUMBAR SPINE AP AND LATERAL    CLINICAL HISTORY:  Low back pain, symptoms persist with > 6wks conservative treatment;Dorsalgia, unspecified    TECHNIQUE:  AP, lateral images were performed of the lumbar spine.    COMPARISON:  01/04/2018    FINDINGS:  Since the prior exam there is now a spinal stimulator partially assessed.  There is retrolisthesis of L1 on L2 that is similar.  Degenerative endplate changes most notably at L1-L2 are similar.  Additional multilevel degenerative endplate and facet changes throughout the lumbar spine.  Impression: Multilevel degenerative changes.    Electronically signed by: Rocky Alas  Date:    02/01/2022  Time:    15:29       Office Visit on 08/19/2022   Component Date Value Ref Range Status    SARS Coronavirus 2 Antigen 08/19/2022 Negative  Negative Final    Rapid Influenza A Ag 08/19/2022 Negative  Negative Final    Rapid Influenza B Ag 08/19/2022 Negative  Negative Final     Acceptable 08/19/2022 Yes   Final   Office Visit on 06/28/2022   Component Date Value Ref Range Status    POC Amphetamines 06/28/2022 Negative  Negative, Inconclusive Final    POC Barbiturates 06/28/2022 Negative  Negative, Inconclusive Final    POC Benzodiazepines 06/28/2022 Negative  Negative, Inconclusive Final    POC Cocaine 06/28/2022 Negative  Negative, Inconclusive Final    POC THC 06/28/2022 Negative  Negative, Inconclusive Final    POC Methadone 06/28/2022 Negative  Negative, Inconclusive Final    POC Methamphetamine 06/28/2022 Negative  Negative, Inconclusive Final    POC Opiates 06/28/2022 Presumptive Positive (A)  Negative, Inconclusive Final    POC Oxycodone 06/28/2022 Negative  Negative, Inconclusive Final    POC Phencyclidine 06/28/2022 Negative  Negative, Inconclusive Final    POC  Methylenedioxymethamphetamine * 2022 Negative  Negative, Inconclusive Final    POC Tricyclic Antidepressants 2022 Negative  Negative, Inconclusive Final    POC Buprenorphine 2022 Negative   Final     Acceptable 2022 Yes   Final    POC Temperature (Urine) 2022 92   Final         No orders of the defined types were placed in this encounter.      Requested Prescriptions     Pending Prescriptions Disp Refills    LYRICA 100 mg capsule 180 capsule 0     Si capsules po TID    methocarbamoL (ROBAXIN) 750 MG Tab 90 tablet 2     Sig: Take 1 tablet (750 mg total) by mouth every 8 (eight) hours.    acetaminophen-codeine 300-30mg (TYLENOL #3) 300-30 mg Tab 120 tablet 2     Sig: Take 1 tablet by mouth every 6 (six) hours.       Assessment:     1. Diabetic polyneuropathy associated with type 2 diabetes mellitus    2. Lumbar radiculopathy    3. Radiculopathy, cervical region         A's of Opioid Risk Assessment  Activity:Patient can perform ADL.   Analgesia:Patients pain is partially controlled by current medication. Patient has tried OTC medications such as Tylenol and Ibuprofen with out relief.   Adverse Effects: Patient denies constipation or sedation.  Aberrant Behavior:  reviewed with no aberrant drug seeking/taking behavior.  Overdose reversal drug naloxone discussed    Drug screen reviewed      Plan:    Spinal cord stimulator not functioning     Complaint of back pain worse with flexion extension rotation lumbar spine ongoing for more than 3 months tenderness along the facet joint area her pain is facet joint in nature     She had lumbar RF TC  she states she had more than 80% relief after procedure, she states procedure did help increase her level function, patient has maintain greater than 3 months pain relief with 20 procedures     She is requesting repeat procedure for her low back pain    MRI lumbar spine [facility] 2018 multiple level degenerative  changes please see images/report    Indications for this procedure for this specific patient include the following   - Pt has had symptoms for three months with moderate to severe pain with functional impairment rated of 7/10 pain.   - Pain non-responsive to conservative care.    - Pain predominately axial and not associated with radiculopathy or claudication.    - No non-facet pathology as source of pain.    - Clinical assessment implicates facet joint as putative pain source.    - Pain is exacerbated by extension or prolonged sitting/standing and relieved by rest.    - No unexplained neurologic deficit.    - No history of coagulopathy, infection or unstable medical conditions.    - Pain is causing significant functional limitation resulting in diminished quality of life and impaired age appropriate ADL's.   - Clinical assessment implicates facet joint as putative source of pain  - Repeat injections not done prior to 7 days   - no more than 2 levels will be done    The planned medically necessary  surgical procedure is performed in a hospital outpatient department and not in an ambulatory surgical center due to:     -there is no geographically assessable ambulatory surgery center that has the  necessary equipment and fluoroscopy needed for the procedure     -there is no geographically assessable ambulatory surgical center available at which the physician has privileges     -an ASC's  specific  guideline regarding the individuals weight or health conditions that prevent the use of an ASC    Monitor anesthesia request is medically indicated for the scheduled nerve block procedure due to:  1- needle phobia and anxiety, placing  the patient at risk during the provided service.  2-patient has an ASA class greater than 3 and requires constant presence of an anesthesiologist during the procedure,   3-patient has severe problems hard to lie still  4-patient suffers from chronic pain and is unable to function due to   diminished ADLs    Schedule right lumbar RF TC L4 through S1, lumbosacral spondylosis     Schedule left lumbar RF TC L4 through S1, lumbosacral spondylosis after completing right side    Dr. Barclay    Bring original prescription medication bottles/container/box with labels to each visit    Pill count    Physical therapy    Massage therapy declines

## 2022-09-26 ENCOUNTER — OFFICE VISIT (OUTPATIENT)
Dept: PAIN MEDICINE | Facility: CLINIC | Age: 62
End: 2022-09-26
Payer: MEDICAID

## 2022-09-26 VITALS
BODY MASS INDEX: 26.94 KG/M2 | HEIGHT: 62 IN | WEIGHT: 146.38 LBS | HEART RATE: 77 BPM | SYSTOLIC BLOOD PRESSURE: 181 MMHG | DIASTOLIC BLOOD PRESSURE: 88 MMHG

## 2022-09-26 DIAGNOSIS — E11.42 DIABETIC POLYNEUROPATHY ASSOCIATED WITH TYPE 2 DIABETES MELLITUS: Chronic | ICD-10-CM

## 2022-09-26 DIAGNOSIS — M54.12 RADICULOPATHY, CERVICAL REGION: Chronic | ICD-10-CM

## 2022-09-26 DIAGNOSIS — M47.817 LUMBOSACRAL SPONDYLOSIS WITHOUT MYELOPATHY: Primary | Chronic | ICD-10-CM

## 2022-09-26 DIAGNOSIS — Z79.899 ENCOUNTER FOR LONG-TERM (CURRENT) USE OF OTHER MEDICATIONS: ICD-10-CM

## 2022-09-26 LAB
CTP QC/QA: YES
POC (AMP) AMPHETAMINE: NEGATIVE
POC (BAR) BARBITURATES: NEGATIVE
POC (BUP) BUPRENORPHINE: NEGATIVE
POC (BZO) BENZODIAZEPINES: NEGATIVE
POC (COC) COCAINE: NEGATIVE
POC (MDMA) METHYLENEDIOXYMETHAMPHETAMINE 3,4: NEGATIVE
POC (MET) METHAMPHETAMINE: NEGATIVE
POC (MOP) OPIATES: ABNORMAL
POC (MTD) METHADONE: NEGATIVE
POC (OXY) OXYCODONE: NEGATIVE
POC (PCP) PHENCYCLIDINE: NEGATIVE
POC (TCA) TRICYCLIC ANTIDEPRESSANTS: NEGATIVE
POC TEMPERATURE (URINE): 90
POC THC: NEGATIVE

## 2022-09-26 PROCEDURE — 99214 PR OFFICE/OUTPT VISIT, EST, LEVL IV, 30-39 MIN: ICD-10-PCS | Mod: S$PBB,,, | Performed by: PHYSICIAN ASSISTANT

## 2022-09-26 PROCEDURE — 99214 OFFICE O/P EST MOD 30 MIN: CPT | Mod: S$PBB,,, | Performed by: PHYSICIAN ASSISTANT

## 2022-09-26 PROCEDURE — 99215 OFFICE O/P EST HI 40 MIN: CPT | Mod: PBBFAC | Performed by: PHYSICIAN ASSISTANT

## 2022-09-26 PROCEDURE — 80305 DRUG TEST PRSMV DIR OPT OBS: CPT | Mod: PBBFAC | Performed by: PHYSICIAN ASSISTANT

## 2022-09-26 PROCEDURE — 3077F PR MOST RECENT SYSTOLIC BLOOD PRESSURE >= 140 MM HG: ICD-10-PCS | Mod: CPTII,,, | Performed by: PHYSICIAN ASSISTANT

## 2022-09-26 PROCEDURE — 3008F PR BODY MASS INDEX (BMI) DOCUMENTED: ICD-10-PCS | Mod: CPTII,,, | Performed by: PHYSICIAN ASSISTANT

## 2022-09-26 PROCEDURE — 4010F ACE/ARB THERAPY RXD/TAKEN: CPT | Mod: CPTII,,, | Performed by: PHYSICIAN ASSISTANT

## 2022-09-26 PROCEDURE — 3079F DIAST BP 80-89 MM HG: CPT | Mod: CPTII,,, | Performed by: PHYSICIAN ASSISTANT

## 2022-09-26 PROCEDURE — 3077F SYST BP >= 140 MM HG: CPT | Mod: CPTII,,, | Performed by: PHYSICIAN ASSISTANT

## 2022-09-26 PROCEDURE — 1159F MED LIST DOCD IN RCRD: CPT | Mod: CPTII,,, | Performed by: PHYSICIAN ASSISTANT

## 2022-09-26 PROCEDURE — 4010F PR ACE/ARB THEARPY RXD/TAKEN: ICD-10-PCS | Mod: CPTII,,, | Performed by: PHYSICIAN ASSISTANT

## 2022-09-26 PROCEDURE — 3008F BODY MASS INDEX DOCD: CPT | Mod: CPTII,,, | Performed by: PHYSICIAN ASSISTANT

## 2022-09-26 PROCEDURE — 1159F PR MEDICATION LIST DOCUMENTED IN MEDICAL RECORD: ICD-10-PCS | Mod: CPTII,,, | Performed by: PHYSICIAN ASSISTANT

## 2022-09-26 PROCEDURE — 3079F PR MOST RECENT DIASTOLIC BLOOD PRESSURE 80-89 MM HG: ICD-10-PCS | Mod: CPTII,,, | Performed by: PHYSICIAN ASSISTANT

## 2022-09-26 RX ORDER — METHOCARBAMOL 750 MG/1
750 TABLET, FILM COATED ORAL EVERY 8 HOURS
Qty: 90 TABLET | Refills: 2 | Status: SHIPPED | OUTPATIENT
Start: 2022-09-26 | End: 2022-10-31 | Stop reason: SDUPTHER

## 2022-09-26 RX ORDER — PREGABALIN 100 MG/1
CAPSULE ORAL
Qty: 180 CAPSULE | Refills: 0 | Status: SHIPPED | OUTPATIENT
Start: 2022-09-26 | End: 2022-10-31 | Stop reason: SDUPTHER

## 2022-09-26 RX ORDER — ACETAMINOPHEN AND CODEINE PHOSPHATE 300; 30 MG/1; MG/1
1 TABLET ORAL EVERY 6 HOURS
Qty: 120 TABLET | Refills: 0 | Status: SHIPPED | OUTPATIENT
Start: 2022-10-01 | End: 2022-10-31 | Stop reason: SDUPTHER

## 2022-09-26 NOTE — PATIENT INSTRUCTIONS
COVID SCREENING TO BE DONE 48-72 HOURS PRIOR TO PROCEDURE IF PT HAS NOT RECEIVED BOTH COVID VACCINATIONS OR HAS BEEN VACCINATED WITHIN THE LAST 2 WEEKS. VACCINATION CARD MUST BE PROVIDED IF PT HAS BEEN VACCINATED OR PT MUST HAVE COVID SCREENING IN ORDER TO HAVE PROCEDURE.  IF YOUR PROCEDURE IS ON A Tuesday, GET COVID TESTING ON THE Friday PRIOR TO PROCEDURE.  IF YOUR PROCEDURE IS ON A Thursday GET COVID TESTING ON THE Monday OR Tuesday PRIOR TO PROCEDURE.    IF YOUR PRIMARY CARE DOCTOR ORDERS YOUR COVID TESTING YOU MUST BRING YOUR RESULTS WITH YOU TO YOUR PROCEDURE.    Procedure Instructions:    Nothing to eat or drink for 8 hours or after midnight including gum, candy, mints, or tobacco products.  If you are scheduled for 1:30 or later nothing to eat or drink after 5 a.m. the morning of the procedure, including gum, candy, mints, or tobacco products.  Must have a  at least 18 yrs of age to stay present at all times  No Diabetic medications the morning of procedure, check blood sugar the morning of procedure, if it is greater than 200 call the office at [phone]  If you are started on antibiotics or have been prescribed antibiotics, have a fever, or have any other type of infection call to reschedule procedure.  If you take blood pressure medications you can take it at your regular scheduled time.        HOLD ASPIRIN AND ASPIRIN PRODUCTS  (ASPIRIN, BC POWDER ETC. ) FOR 7 DAYS  PRIOR TO PROCEDURE  HOLD NSAIDS( ibuprofen, mobic, meloxicam, advil, diclofenac, methotrexate, aleve etc....)  FOR 3 DAYS   PRIOR TO PROCEDURE

## 2022-10-20 DIAGNOSIS — I10 PRIMARY HYPERTENSION: Primary | ICD-10-CM

## 2022-10-25 ENCOUNTER — ANESTHESIA EVENT (OUTPATIENT)
Dept: PAIN MEDICINE | Facility: HOSPITAL | Age: 62
End: 2022-10-25
Payer: MEDICAID

## 2022-10-25 ENCOUNTER — ANESTHESIA (OUTPATIENT)
Dept: PAIN MEDICINE | Facility: HOSPITAL | Age: 62
End: 2022-10-25
Payer: MEDICAID

## 2022-10-25 ENCOUNTER — HOSPITAL ENCOUNTER (OUTPATIENT)
Facility: HOSPITAL | Age: 62
Discharge: HOME | End: 2022-10-25
Attending: PAIN MEDICINE | Admitting: PAIN MEDICINE
Payer: MEDICAID

## 2022-10-25 VITALS
DIASTOLIC BLOOD PRESSURE: 75 MMHG | BODY MASS INDEX: 25.52 KG/M2 | HEIGHT: 63 IN | SYSTOLIC BLOOD PRESSURE: 174 MMHG | OXYGEN SATURATION: 95 % | RESPIRATION RATE: 11 BRPM | TEMPERATURE: 97 F | WEIGHT: 144 LBS | HEART RATE: 64 BPM

## 2022-10-25 DIAGNOSIS — M47.817 SPONDYLOSIS OF LUMBOSACRAL REGION WITHOUT MYELOPATHY OR RADICULOPATHY: ICD-10-CM

## 2022-10-25 LAB — GLUCOSE SERPL-MCNC: 104 MG/DL (ref 70–105)

## 2022-10-25 PROCEDURE — D9220A PRA ANESTHESIA: Mod: ,,, | Performed by: NURSE ANESTHETIST, CERTIFIED REGISTERED

## 2022-10-25 PROCEDURE — 64635 DESTROY LUMB/SAC FACET JNT: CPT | Mod: RT | Performed by: PAIN MEDICINE

## 2022-10-25 PROCEDURE — 25000003 PHARM REV CODE 250: Performed by: PAIN MEDICINE

## 2022-10-25 PROCEDURE — 64636 DESTROY L/S FACET JNT ADDL: CPT | Mod: RT | Performed by: PAIN MEDICINE

## 2022-10-25 PROCEDURE — 82962 GLUCOSE BLOOD TEST: CPT

## 2022-10-25 PROCEDURE — 27201423 OPTIME MED/SURG SUP & DEVICES STERILE SUPPLY: Performed by: PAIN MEDICINE

## 2022-10-25 PROCEDURE — 64636 DESTROY L/S FACET JNT ADDL: CPT | Mod: RT,,, | Performed by: PAIN MEDICINE

## 2022-10-25 PROCEDURE — 64635 PR DESTROY LUMB/SAC FACET JNT: ICD-10-PCS | Mod: RT,,, | Performed by: PAIN MEDICINE

## 2022-10-25 PROCEDURE — 64635 DESTROY LUMB/SAC FACET JNT: CPT | Mod: RT,,, | Performed by: PAIN MEDICINE

## 2022-10-25 PROCEDURE — D9220A PRA ANESTHESIA: ICD-10-PCS | Mod: ,,, | Performed by: NURSE ANESTHETIST, CERTIFIED REGISTERED

## 2022-10-25 PROCEDURE — 27000284 HC CANNULA NASAL: Performed by: NURSE ANESTHETIST, CERTIFIED REGISTERED

## 2022-10-25 PROCEDURE — 37000009 HC ANESTHESIA EA ADD 15 MINS: Performed by: PAIN MEDICINE

## 2022-10-25 PROCEDURE — 37000008 HC ANESTHESIA 1ST 15 MINUTES: Performed by: PAIN MEDICINE

## 2022-10-25 PROCEDURE — 63600175 PHARM REV CODE 636 W HCPCS: Performed by: NURSE ANESTHETIST, CERTIFIED REGISTERED

## 2022-10-25 PROCEDURE — 64636 PR DESTROY L/S FACET JNT ADDL: ICD-10-PCS | Mod: RT,,, | Performed by: PAIN MEDICINE

## 2022-10-25 PROCEDURE — 25000003 PHARM REV CODE 250: Performed by: NURSE ANESTHETIST, CERTIFIED REGISTERED

## 2022-10-25 PROCEDURE — 01940 ANES NULYT AGT LMBR/SAC: CPT | Performed by: PAIN MEDICINE

## 2022-10-25 PROCEDURE — 63600175 PHARM REV CODE 636 W HCPCS: Performed by: PAIN MEDICINE

## 2022-10-25 RX ORDER — SODIUM CHLORIDE 9 MG/ML
INJECTION, SOLUTION INTRAVENOUS CONTINUOUS
Status: DISCONTINUED | OUTPATIENT
Start: 2022-10-25 | End: 2022-10-25 | Stop reason: HOSPADM

## 2022-10-25 RX ORDER — TRIAMCINOLONE ACETONIDE 40 MG/ML
INJECTION, SUSPENSION INTRA-ARTICULAR; INTRAMUSCULAR CODE/TRAUMA/SEDATION MEDICATION
Status: DISCONTINUED | OUTPATIENT
Start: 2022-10-25 | End: 2022-10-25 | Stop reason: HOSPADM

## 2022-10-25 RX ORDER — BUPIVACAINE HYDROCHLORIDE 2.5 MG/ML
INJECTION, SOLUTION INFILTRATION; PERINEURAL CODE/TRAUMA/SEDATION MEDICATION
Status: DISCONTINUED | OUTPATIENT
Start: 2022-10-25 | End: 2022-10-25 | Stop reason: HOSPADM

## 2022-10-25 RX ORDER — PROPOFOL 10 MG/ML
VIAL (ML) INTRAVENOUS
Status: DISCONTINUED | OUTPATIENT
Start: 2022-10-25 | End: 2022-10-25

## 2022-10-25 RX ORDER — LIDOCAINE HYDROCHLORIDE 20 MG/ML
INJECTION, SOLUTION EPIDURAL; INFILTRATION; INTRACAUDAL; PERINEURAL
Status: DISCONTINUED | OUTPATIENT
Start: 2022-10-25 | End: 2022-10-25

## 2022-10-25 RX ORDER — ORPHENADRINE CITRATE 30 MG/ML
INJECTION INTRAMUSCULAR; INTRAVENOUS
Status: DISCONTINUED | OUTPATIENT
Start: 2022-10-25 | End: 2022-10-25

## 2022-10-25 RX ADMIN — SODIUM CHLORIDE: 9 INJECTION, SOLUTION INTRAVENOUS at 11:10

## 2022-10-25 RX ADMIN — PROPOFOL 50 MG: 10 INJECTION, EMULSION INTRAVENOUS at 12:10

## 2022-10-25 RX ADMIN — LIDOCAINE HYDROCHLORIDE 100 MG: 20 INJECTION, SOLUTION INTRAVENOUS at 11:10

## 2022-10-25 RX ADMIN — ORPHENADRINE CITRATE 60 MG: 30 INJECTION INTRAMUSCULAR; INTRAVENOUS at 11:10

## 2022-10-25 RX ADMIN — PROPOFOL 100 MG: 10 INJECTION, EMULSION INTRAVENOUS at 11:10

## 2022-10-25 NOTE — BRIEF OP NOTE
Discharge Note  Short Stay    Admit Date: 10/25/2022    Discharge Date: 10/25/2022    Attending Physician: Roselia Barclay     Discharge Provider: Roselia Barclay    Diagnoses:  Lumbosacral spondylosis    Discharged Condition: Good    Final Diagnoses: Lumbosacral spondylosis without myelopathy [M47.817]    Disposition: Home or Self Care    Hospital Course: No complications, uneventful    Outcome of Hospitalization, Treatment, Procedure, or Surgery:  Patient was admitted for outpatient interventional pain management procedure. The patient tolerated the procedure well with no complications.    Follow up/Patient Instructions:  Follow up as scheduled in Pain Management office in 3-4 weeks.  Patient has received instructions and follow up date and time.    Medications:  Continue previous medications

## 2022-10-25 NOTE — DISCHARGE INSTRUCTIONS
No driving, operating machinery, making legal decisions, or signing legal documents until tomorrow.   Continue diet as tolerated. Drink plenty of fluids and rest.   If unable to void in 8 hours proceed to nearest ER.   Notify MD of redness or drainage from incision site as well as any fever over the next 3-4 days.  No lifting over 5 lbs for the next 24 hrs.   Continue medications as prescribed. May take pain medication as prescribed.   May shower tomorrow. No tub baths for 48 hours following procedure.   May remove bandaids tomorrow, if they fall off before tomorrow they do not have to be replaced.      ALL PATIENTS TO HAVE COVID TESTING TO BE DONE 48-72 HOURS PRIOR TO PROCEDURE IF PT HAS NOT RECEIVED BOTH COVID VACCINATIONS OR HAS BEEN VACCINATED WITHIN THE LAST 2 WEEKS.     IF PATIENT HAD BOTH VACCINES AT GREATER THAN  TWO WEEKS PRIOR TO PROCEDURE , PT DOES NOT HAVE TO HAVE COVID TESTING, VACCINATION CARD MUST BE PROVIDED  OR   PT MUST HAVE COVID TESTING IN ORDER TO HAVE PROCEDURE.     If you have not had the covid vaccine and have tested positive in a clinical setting 60 days prior to procedure, you do not have to be tested for covid.    IF YOUR  PROCEDURE IS ON A Tuesday, GET COVID TESTING ON THE  Friday BEFORE PROCEDURE.    IF YOUR PROCEDURE IS ON Thursday, HAVE COVID TESTING ON THE Monday OR Tuesday PRIOR TO PROCEDURE  IF YOUR PROCEDURE IS ON A Friday, GET COVID TESTING ON THE Tuesday  OR Wednesday PRIOR TO PROCEDURE      COVID TESTING TO BE DONE AT [facility] IN THE LAB DEPARTMENT OR YOUR PRIMARY CARE DOCTOR MAY ORDER IT FOR YOU.IF YOUR PRIMARY  CARE DOCTOR ORDERS YOUR COVID TESTING YOU MUST BRING YOUR RESULTS WITH YOU TO YOUR PROCEDURE.     HOME COVID TESTING ARE NOT ALLOWED TO BE USED FOR TESTING FOR PROCEDURE.      Procedure Instructions:    Nothing to eat or drink for 8 hours or after midnight including gum, candy, mints, or tobacco products.  If you are scheduled for 1:30 or later nothing to eat  or drink after 5 a.m. the morning of the procedure, including gum, candy, mints, or tobacco products.  Must have a  at least 18 yrs of age to stay present at all times  No Diabetic medications the morning of procedure, check blood sugar the morning of procedure, if it is greater than 200 call the office at [phone]  If you are started on antibiotics or have been prescribed antibiotics, have a fever, or have any other type of infection call to reschedule procedure.  If you take blood pressure medications you can take it at your regular scheduled time with a small sip of WATER!    HOLD ASPIRIN AND ASPIRIN PRODUCTS  (ASPIRIN, BC POWDER ETC. ) FOR 7 DAYS  PRIOR TO PROCEDURE  HOLD NSAIDS( ibuprofen, mobic, meloxicam, advil, diclofenac, naproxen, relafen, celebrex,  methotrexate, aleve etc....)  FOR 3 DAYS   PRIOR TO PROCEDURE    Left L4-S1 RFTC scheduled for 11/17/22 @ 1020am

## 2022-10-25 NOTE — OP NOTE
Procedure Note    Procedure Date: 10/25/2022    Procedure Performed:  Radiofrequency ablation of the right  L3-4,4-5,5-S1 medial branch nerves utilizing fluoroscopy    Indications: Patient has failed conservative therapy.      Pre-op diagnosis: Lumbosacral Spondylosis    Post-op diagnosis: same    Physician: PRATIBHA Barclay MD    Anesthesia:MAC    Medications injected:  Pre-lesion, 2ml of 1% lidocaine at each level.  From a 1:1 mixture of  (0.25% bupivacaine,  1% Lidocaine 40mg of kenalog)  1ml of this solution was injected at each level post-lesion.    Local anesthetic used: 1% Lidocaine, 10 ml     Estimated Blood Loss:Less than 1cc    IVF:Per Anesthesia    Complications: None    Technique:  The patient was interviewed in the holding area and Risks/Benefits were discussed, including, but not limited to  the possibility of new or different pain, bleeding or infection.   All questions were answered.  The patient understood and accepted risks.  The area of treatment was marked. Consent was reviewed and signed.  A time out was taken to identify the patient, procedure and side of the procedure. The patient was placed in a prone position, then prepped and draped in the usual sterile fashion using ChloraPrep and sterile towels.  The levels were determined under fluoroscopic guidance.   AP and oblique fluoroscopy were used to identify and kenneth the junctions between the superior articular processes and transverse processes at  Right  L3-4,4-5,5-S1.  The Right sacral ala was also identified and marked.  The skin and subcutaneous tissues in these identified areas were anesthetized with 1% lidocaine. A 20-gauge 100 mm cdream network RF needle was advanced towards each of these points under fluoroscopic guidance such that the tip of the needle was positioned posterior to the Neuro-foramen on lateral fluoroscopic view. Each needle was positioned such that, on stimulation, the patient had an appropriate pain response between 0.3-0.5 V, with  no motor response, other than Lumbar Paraspinal Muscles up to 2.0V.  One mL of 2% lidocaine was then injected at each level prior to lesioning, which was performed for 90 seconds at 80°C.  Once the lesion was complete, 1 mL of a solution consisting of  a 1:1 mixture  (0.25% bupivacaine 2cc and 1% Lidocaine 2cc and 40mg kenalog)  was injected through each probe. The probes were removed and a sterile Band-Aid dressing was applied to the puncture site.  The patient tolerated the procedure well and was monitored after the procedure.  Patient was given post procedure and discharge instructions to follow at home.  The patient was discharged in a stable condition and accompanied by an adult.

## 2022-10-25 NOTE — ANESTHESIA PREPROCEDURE EVALUATION
10/25/2022  Magali Cheung is a 62 y.o., female.      Pre-op Assessment    I have reviewed the Patient Summary Reports.    I have reviewed the NPO Status.   I have reviewed the Medications.     Review of Systems  Social:  Smoker    Cardiovascular:   Hypertension    Hepatic/GI:   GERD    Musculoskeletal:   Arthritis   Spine Disorders: lumbar Degenerative disease and Chronic Pain    Neurological:   Neuromuscular Disease,   Chronic Pain Syndrome  Peripheral Neuropathy    Endocrine:   Diabetes, type 2 Hypothyroidism    Psych:   Psychiatric History depression          Physical Exam  General: Well nourished, Cooperative and Alert    Airway:  Mallampati: II   Mouth Opening: Normal  TM Distance: Normal  Tongue: Normal  Neck ROM: Normal ROM    Dental:  Intact        Anesthesia Plan  Type of Anesthesia, risks & benefits discussed:    Anesthesia Type: Gen Natural Airway  Intra-op Monitoring Plan: Standard ASA Monitors  Post Op Pain Control Plan: multimodal analgesia  Induction:  IV  Informed Consent: Informed consent signed with the Patient and all parties understand the risks and agree with anesthesia plan.  All questions answered.   ASA Score: 3  Day of Surgery Review of History & Physical: H&P Update referred to the surgeon/provider.I have interviewed and examined the patient. I have reviewed the patient's H&P dated: There are no significant changes.     Ready For Surgery From Anesthesia Perspective.     .

## 2022-10-25 NOTE — ANESTHESIA POSTPROCEDURE EVALUATION
Anesthesia Post Evaluation    Patient: Magali Cheung    Procedure(s) Performed: Procedure(s) (LRB):  Radiofrequency Ablation, Nerve, Spinal, Lumbar, Medial Branch, Level L4-S1 (Right)    Final Anesthesia Type: general      Patient location during evaluation: PACU  Patient participation: Yes- Able to Participate  Level of consciousness: awake and alert  Post-procedure vital signs: reviewed and stable  Pain management: adequate  Airway patency: patent    PONV status at discharge: No PONV  Anesthetic complications: no      Cardiovascular status: blood pressure returned to baseline  Respiratory status: unassisted  Hydration status: euvolemic  Follow-up not needed.          Vitals Value Taken Time   /58 10/25/22 1230   Temp 36.1 °C (97 °F) 10/25/22 1222   Pulse 60 10/25/22 1230   Resp 13 10/25/22 1230   SpO2 99 % 10/25/22 1230   Vitals shown include unvalidated device data.      No case tracking events are documented in the log.      Pain/Karolyn Score: No data recorded

## 2022-10-25 NOTE — TRANSFER OF CARE
"Anesthesia Transfer of Care Note    Patient: Magali Cheung    Procedure(s) Performed: Procedure(s) (LRB):  Radiofrequency Ablation, Nerve, Spinal, Lumbar, Medial Branch, Level L4-S1 (Right)    Patient location: PACU    Anesthesia Type: general    Transport from OR: Transported from OR on room air with adequate spontaneous ventilation    Post pain: adequate analgesia    Post assessment: no apparent anesthetic complications    Post vital signs: stable    Level of consciousness: sedated    Nausea/Vomiting: no nausea/vomiting    Complications: none    Transfer of care protocol was followed      Last vitals:   Visit Vitals  BP (!) 96/53   Pulse 63   Temp 36.1 °C (97 °F) (Skin)   Resp 12   Ht 5' 2.5" (1.588 m)   Wt 65.3 kg (144 lb)   SpO2 98%   BMI 25.92 kg/m²     "

## 2022-10-25 NOTE — PLAN OF CARE
REFER TO WRITTEN DOCUMENT AND RECOVERY INFORMATION.    D/CD PATIENT VIAA WHEELCHAIR AT 1330    INFORMED PATIENT IF UNABLE TO VOID IN 8 HOURS, GO TO ER. NOTIFY MD OF REDNESS OR DRAINAGE FROM INJECTION SITE OR FEVER OVER 3-4 DAY. REST AND DRINK PLENTY OF FLUIDS FOR THE REMAINDER OF THE DAY. NO LIFTING OVER 5 LBS FOR THE REMAINDER OF THE DAY. CONTINUE REGULAR MEDICATIONS AS PRESCRIBED. MAY TAKE PAIN MEDICATION AS PRESCRIBED.     PAIN IMPROVED  100%

## 2022-10-25 NOTE — DISCHARGE SUMMARY
[facility] - Pain Management  Discharge Note  Short Stay    Procedure(s) (LRB):  Radiofrequency Ablation, Nerve, Spinal, Lumbar, Medial Branch, Level L4-S1 (Right)      OUTCOME: Patient tolerated treatment/procedure well without complication and is now ready for discharge.    DISPOSITION: Home or Self Care    FINAL DIAGNOSIS:  Lumbosacral spondylosis    FOLLOWUP: In clinic    DISCHARGE INSTRUCTIONS:  See nurse's notes     TIME SPENT ON DISCHARGE: 5 minutes

## 2022-10-31 DIAGNOSIS — M54.12 RADICULOPATHY, CERVICAL REGION: Chronic | ICD-10-CM

## 2022-10-31 DIAGNOSIS — E11.42 DIABETIC POLYNEUROPATHY ASSOCIATED WITH TYPE 2 DIABETES MELLITUS: Chronic | ICD-10-CM

## 2022-10-31 RX ORDER — METHOCARBAMOL 750 MG/1
750 TABLET, FILM COATED ORAL EVERY 8 HOURS
Qty: 90 TABLET | Refills: 2 | Status: SHIPPED | OUTPATIENT
Start: 2022-10-31 | End: 2022-12-07 | Stop reason: SDUPTHER

## 2022-10-31 RX ORDER — ACETAMINOPHEN AND CODEINE PHOSPHATE 300; 30 MG/1; MG/1
1 TABLET ORAL EVERY 6 HOURS
Qty: 120 TABLET | Refills: 0 | Status: ON HOLD | OUTPATIENT
Start: 2022-10-31 | End: 2022-11-17 | Stop reason: SDUPTHER

## 2022-10-31 RX ORDER — PREGABALIN 100 MG/1
CAPSULE ORAL
Qty: 180 CAPSULE | Refills: 0 | Status: ON HOLD | OUTPATIENT
Start: 2022-10-31 | End: 2022-11-17 | Stop reason: SDUPTHER

## 2022-10-31 NOTE — TELEPHONE ENCOUNTER
----- Message from Ja Guzman LPN sent at 10/31/2022 11:04 AM CDT -----  Regarding: MEDICATION  Patient is requesting refill on Tylenol, Muscle relaxer, Lyrica to [facility] in [place], [place].  Call back# [phone].

## 2022-11-17 ENCOUNTER — ANESTHESIA (OUTPATIENT)
Dept: PAIN MEDICINE | Facility: HOSPITAL | Age: 62
End: 2022-11-17
Payer: MEDICAID

## 2022-11-17 ENCOUNTER — ANESTHESIA EVENT (OUTPATIENT)
Dept: PAIN MEDICINE | Facility: HOSPITAL | Age: 62
End: 2022-11-17
Payer: MEDICAID

## 2022-11-17 ENCOUNTER — HOSPITAL ENCOUNTER (OUTPATIENT)
Facility: HOSPITAL | Age: 62
Discharge: HOME | End: 2022-11-17
Attending: PAIN MEDICINE | Admitting: PAIN MEDICINE
Payer: MEDICAID

## 2022-11-17 VITALS
WEIGHT: 144 LBS | SYSTOLIC BLOOD PRESSURE: 132 MMHG | HEIGHT: 63 IN | BODY MASS INDEX: 25.52 KG/M2 | DIASTOLIC BLOOD PRESSURE: 64 MMHG | RESPIRATION RATE: 9 BRPM | HEART RATE: 60 BPM | TEMPERATURE: 98 F | OXYGEN SATURATION: 98 %

## 2022-11-17 DIAGNOSIS — M47.817 SPONDYLOSIS OF LUMBOSACRAL REGION WITHOUT MYELOPATHY OR RADICULOPATHY: ICD-10-CM

## 2022-11-17 DIAGNOSIS — E11.42 DIABETIC POLYNEUROPATHY ASSOCIATED WITH TYPE 2 DIABETES MELLITUS: Chronic | ICD-10-CM

## 2022-11-17 DIAGNOSIS — M47.817 LUMBOSACRAL SPONDYLOSIS WITHOUT MYELOPATHY: Primary | Chronic | ICD-10-CM

## 2022-11-17 PROCEDURE — 64635 DESTROY LUMB/SAC FACET JNT: CPT | Mod: LT,,, | Performed by: PAIN MEDICINE

## 2022-11-17 PROCEDURE — 63600175 PHARM REV CODE 636 W HCPCS: Performed by: PAIN MEDICINE

## 2022-11-17 PROCEDURE — 64635 PR DESTROY LUMB/SAC FACET JNT: ICD-10-PCS | Mod: LT,,, | Performed by: PAIN MEDICINE

## 2022-11-17 PROCEDURE — 64636 DESTROY L/S FACET JNT ADDL: CPT | Mod: LT | Performed by: PAIN MEDICINE

## 2022-11-17 PROCEDURE — 64636 PR DESTROY L/S FACET JNT ADDL: ICD-10-PCS | Mod: LT,,, | Performed by: PAIN MEDICINE

## 2022-11-17 PROCEDURE — 27201423 OPTIME MED/SURG SUP & DEVICES STERILE SUPPLY: Performed by: PAIN MEDICINE

## 2022-11-17 PROCEDURE — 25000003 PHARM REV CODE 250: Performed by: NURSE ANESTHETIST, CERTIFIED REGISTERED

## 2022-11-17 PROCEDURE — 25000003 PHARM REV CODE 250: Performed by: PAIN MEDICINE

## 2022-11-17 PROCEDURE — 64636 DESTROY L/S FACET JNT ADDL: CPT | Mod: LT,,, | Performed by: PAIN MEDICINE

## 2022-11-17 PROCEDURE — 37000008 HC ANESTHESIA 1ST 15 MINUTES: Performed by: PAIN MEDICINE

## 2022-11-17 PROCEDURE — 64635 DESTROY LUMB/SAC FACET JNT: CPT | Mod: LT | Performed by: PAIN MEDICINE

## 2022-11-17 PROCEDURE — D9220A PRA ANESTHESIA: ICD-10-PCS | Mod: ,,, | Performed by: NURSE ANESTHETIST, CERTIFIED REGISTERED

## 2022-11-17 PROCEDURE — 37000009 HC ANESTHESIA EA ADD 15 MINS: Performed by: PAIN MEDICINE

## 2022-11-17 PROCEDURE — 63600175 PHARM REV CODE 636 W HCPCS: Performed by: NURSE ANESTHETIST, CERTIFIED REGISTERED

## 2022-11-17 PROCEDURE — D9220A PRA ANESTHESIA: Mod: ,,, | Performed by: NURSE ANESTHETIST, CERTIFIED REGISTERED

## 2022-11-17 PROCEDURE — 01940 ANES NULYT AGT LMBR/SAC: CPT | Performed by: PAIN MEDICINE

## 2022-11-17 RX ORDER — BUPIVACAINE HYDROCHLORIDE 2.5 MG/ML
INJECTION, SOLUTION INFILTRATION; PERINEURAL CODE/TRAUMA/SEDATION MEDICATION
Status: DISCONTINUED | OUTPATIENT
Start: 2022-11-17 | End: 2022-11-17 | Stop reason: HOSPADM

## 2022-11-17 RX ORDER — PROPOFOL 10 MG/ML
INJECTION, EMULSION INTRAVENOUS
Status: DISCONTINUED | OUTPATIENT
Start: 2022-11-17 | End: 2022-11-17

## 2022-11-17 RX ORDER — SODIUM CHLORIDE 9 MG/ML
INJECTION, SOLUTION INTRAVENOUS CONTINUOUS
Status: DISCONTINUED | OUTPATIENT
Start: 2022-11-17 | End: 2022-11-17 | Stop reason: HOSPADM

## 2022-11-17 RX ORDER — ORPHENADRINE CITRATE 30 MG/ML
INJECTION INTRAMUSCULAR; INTRAVENOUS
Status: DISCONTINUED | OUTPATIENT
Start: 2022-11-17 | End: 2022-11-17

## 2022-11-17 RX ORDER — LIDOCAINE HYDROCHLORIDE 20 MG/ML
INJECTION, SOLUTION EPIDURAL; INFILTRATION; INTRACAUDAL; PERINEURAL
Status: DISCONTINUED | OUTPATIENT
Start: 2022-11-17 | End: 2022-11-17

## 2022-11-17 RX ORDER — TRIAMCINOLONE ACETONIDE 40 MG/ML
INJECTION, SUSPENSION INTRA-ARTICULAR; INTRAMUSCULAR CODE/TRAUMA/SEDATION MEDICATION
Status: DISCONTINUED | OUTPATIENT
Start: 2022-11-17 | End: 2022-11-17 | Stop reason: HOSPADM

## 2022-11-17 RX ORDER — PREGABALIN 100 MG/1
CAPSULE ORAL
Qty: 180 CAPSULE | Refills: 0 | Status: SHIPPED | OUTPATIENT
Start: 2022-12-01 | End: 2022-12-07 | Stop reason: SDUPTHER

## 2022-11-17 RX ORDER — ACETAMINOPHEN AND CODEINE PHOSPHATE 300; 30 MG/1; MG/1
1 TABLET ORAL EVERY 6 HOURS
Qty: 120 TABLET | Refills: 0 | Status: SHIPPED | OUTPATIENT
Start: 2022-12-01 | End: 2022-12-07 | Stop reason: SDUPTHER

## 2022-11-17 RX ORDER — FENTANYL CITRATE 50 UG/ML
INJECTION, SOLUTION INTRAMUSCULAR; INTRAVENOUS
Status: DISCONTINUED | OUTPATIENT
Start: 2022-11-17 | End: 2022-11-17

## 2022-11-17 RX ADMIN — LIDOCAINE HYDROCHLORIDE 50 MG: 20 INJECTION, SOLUTION INTRAVENOUS at 12:11

## 2022-11-17 RX ADMIN — PROPOFOL 50 MG: 10 INJECTION, EMULSION INTRAVENOUS at 12:11

## 2022-11-17 RX ADMIN — ORPHENADRINE CITRATE 60 MG: 30 INJECTION INTRAMUSCULAR; INTRAVENOUS at 12:11

## 2022-11-17 RX ADMIN — PROPOFOL 20 MG: 10 INJECTION, EMULSION INTRAVENOUS at 12:11

## 2022-11-17 RX ADMIN — PROPOFOL 30 MG: 10 INJECTION, EMULSION INTRAVENOUS at 12:11

## 2022-11-17 RX ADMIN — SODIUM CHLORIDE: 9 INJECTION, SOLUTION INTRAVENOUS at 12:11

## 2022-11-17 RX ADMIN — FENTANYL CITRATE 50 MCG: 50 INJECTION INTRAMUSCULAR; INTRAVENOUS at 12:11

## 2022-11-17 NOTE — ANESTHESIA POSTPROCEDURE EVALUATION
Anesthesia Post Evaluation    Patient: Magali Cheung    Procedure(s) Performed: Procedure(s) (LRB):  LEFT L4-S1 RFTC  (HAD RIGHT ON 10-25) (Left)    Final Anesthesia Type: general      Patient location during evaluation: PACU  Patient participation: Yes- Able to Participate  Level of consciousness: awake and alert  Post-procedure vital signs: reviewed and stable  Pain management: adequate  Airway patency: patent    PONV status at discharge: No PONV  Anesthetic complications: no      Cardiovascular status: blood pressure returned to baseline and hemodynamically stable  Respiratory status: unassisted  Hydration status: euvolemic  Follow-up not needed.          Vitals Value Taken Time   /79 11/17/22 1236   Temp  11/17/22 1237   Pulse 59 11/17/22 1236   Resp 8 11/17/22 1236   SpO2 97 % 11/17/22 1236   Vitals shown include unvalidated device data.      No case tracking events are documented in the log.      Pain/Karolyn Score: Karolyn Score: 9 (11/17/2022 12:30 PM)

## 2022-11-17 NOTE — TRANSFER OF CARE
"Anesthesia Transfer of Care Note    Patient: Magali Cheung    Procedure(s) Performed: Procedure(s) (LRB):  LEFT L4-S1 RFTC  (HAD RIGHT ON 10-25) (Left)    Patient location: PACU    Anesthesia Type: general    Transport from OR: Transported from OR on room air with adequate spontaneous ventilation    Post pain: adequate analgesia    Post assessment: no apparent anesthetic complications    Post vital signs: stable    Level of consciousness: alert and responds to stimulation    Nausea/Vomiting: no nausea/vomiting    Complications: none    Transfer of care protocol was followed      Last vitals:   Visit Vitals  BP (!) 97/50   Pulse 63   Temp 36.7 °C (98 °F) (Oral)   Resp (!) 8   Ht 5' 2.5" (1.588 m)   Wt 65.3 kg (144 lb)   SpO2 98%   BMI 25.92 kg/m²     "

## 2022-11-17 NOTE — H&P
Subjective:         Patient ID: Magali Cheung is a 62 y.o. female.    Chief Complaint: Low-back Pain, Foot Pain, and Leg Pain    Pain  This is a chronic problem. The current episode started more than 1 year ago. The problem occurs daily. The problem has been gradually improving. Associated symptoms include arthralgias and neck pain. Pertinent negatives include no abdominal pain, anorexia, change in bowel habit, coughing, diaphoresis, fever, rash, sore throat, vertigo or vomiting.   Review of Systems   Constitutional:  Negative for activity change, appetite change, diaphoresis and fever.   HENT:  Negative for drooling, ear discharge, ear pain, facial swelling, nosebleeds, sore throat, voice change and goiter.    Eyes:  Negative for pain, discharge, redness and visual disturbance.   Respiratory:  Negative for apnea, cough, choking, chest tightness, shortness of breath, wheezing and stridor.    Cardiovascular:  Negative for palpitations and leg swelling.   Gastrointestinal:  Negative for abdominal distention, abdominal pain, anorexia, change in bowel habit, diarrhea, rectal pain, vomiting, fecal incontinence and change in bowel habit.   Endocrine: Negative for cold intolerance, heat intolerance, polydipsia, polyphagia and polyuria.   Genitourinary:  Negative for flank pain, frequency and hot flashes.   Musculoskeletal:  Positive for arthralgias, back pain, leg pain, neck pain and neck stiffness.   Integumentary:  Negative for color change, pallor and rash.   Allergic/Immunologic: Negative for immunocompromised state.   Neurological:  Negative for dizziness, vertigo, seizures, syncope, facial asymmetry, speech difficulty, light-headedness, coordination difficulties, memory loss and coordination difficulties.   Hematological:  Negative for adenopathy. Does not bruise/bleed easily.   Psychiatric/Behavioral:  Negative for agitation, behavioral problems, confusion, decreased concentration, dysphoric mood, hallucinations,  "self-injury and suicidal ideas. The patient is not nervous/anxious and is not hyperactive.          Past Medical History:   Diagnosis Date    Anxiety     Chronic pain syndrome     Depression     Diabetes mellitus     GERD (gastroesophageal reflux disease)     Hypertension     Hypothyroidism     Low back pain     Low vitamin B12 level     Lumbar radiculopathy     Neuropathy      Past Surgical History:   Procedure Laterality Date    Bilateral L3-S1 MBB Bilateral 2019, 2019    Dr Barclay     SECTION      COLON SURGERY      HERNIA REPAIR      Left L3-S1 RFTC Left 10/23/2019    Dr Barclay    Right L3-S1 RFTC Right 2019    Dr Barclay    SPINAL CORD STIMULATOR IMPLANT       Social History     Socioeconomic History    Marital status:    Tobacco Use    Smoking status: Every Day     Types: Vaping with nicotine    Smokeless tobacco: Never   Substance and Sexual Activity    Alcohol use: Yes    Drug use: Never    Sexual activity: Yes     Partners: Male     Family History   Problem Relation Age of Onset    Hypertension Father     Heart disease Father     Stroke Maternal Grandfather     Bipolar disorder Paternal Grandmother     Heart disease Paternal Grandfather      Review of patient's allergies indicates:   Allergen Reactions    Opioids - morphine analogues         Objective:  Vitals:    22 0904 22 0905   BP:  (!) 181/88   Pulse:  77   Weight: 66.4 kg (146 lb 6.4 oz)    Height: 5' 2.4" (1.585 m)    PainSc:   6   6         Physical Exam  Vitals and nursing note reviewed. Exam conducted with a chaperone present.   Constitutional:       General: She is awake. She is not in acute distress.     Appearance: Normal appearance. She is not ill-appearing or toxic-appearing.   HENT:      Head: Normocephalic and atraumatic.      Nose: Nose normal.      Mouth/Throat:      Mouth: Mucous membranes are moist.      Pharynx: Oropharynx is clear.   Eyes:      Conjunctiva/sclera: Conjunctivae normal.      " Pupils: Pupils are equal, round, and reactive to light.   Cardiovascular:      Rate and Rhythm: Normal rate.   Pulmonary:      Effort: Pulmonary effort is normal. No respiratory distress.   Abdominal:      Palpations: Abdomen is soft.      Tenderness: There is no guarding.   Musculoskeletal:      Cervical back: Normal range of motion and neck supple. Tenderness present. No rigidity.      Thoracic back: Tenderness present.      Lumbar back: Tenderness present. Decreased range of motion.   Skin:     General: Skin is warm and dry.      Coloration: Skin is not jaundiced or pale.   Neurological:      General: No focal deficit present.      Mental Status: She is alert and oriented to person, place, and time. Mental status is at baseline.      Cranial Nerves: No cranial nerve deficit (II-XII).      Deep Tendon Reflexes:      Reflex Scores:       Brachioradialis reflexes are 2+ on the right side.  Psychiatric:         Mood and Affect: Mood normal.         Behavior: Behavior normal. Behavior is cooperative.         Thought Content: Thought content normal.         X-Ray Thoracic Spine AP Lateral  Narrative: EXAMINATION:  XR THORACIC SPINE AP LATERAL    CLINICAL HISTORY:  Pain in thoracic spine    FINDINGS:  Alignment in the lateral plane is normal throughout the thoracic spine with minimal osteophytes present.  No focal vertebral body height loss seen    Mild scoliosis present    Epidural catheters present in the lower thoracic spine  Impression: 1. Mild scoliosis  2. Minimal degenerative changes    Electronically signed by: Lupis Ascencio  Date:    02/01/2022  Time:    15:31  X-Ray Hips Bilateral 2 View Inc AP Pelvis  Narrative: EXAMINATION:  XR HIPS BILATERAL 2 VIEW INCL AP PELVIS    CLINICAL HISTORY:  Radiculopathy, lumbar region    TECHNIQUE:  AP view of the pelvis and frogleg lateral views of both hips were performed.    COMPARISON:  None.    FINDINGS:  There is no fracture or dislocation.  Mild degenerative changes seen of  both hips.  Impression: As above    Electronically signed by: Rocky Alas  Date:    02/01/2022  Time:    15:30  X-Ray Lumbar Spine AP And Lateral  Narrative: EXAMINATION:  XR LUMBAR SPINE AP AND LATERAL    CLINICAL HISTORY:  Low back pain, symptoms persist with > 6wks conservative treatment;Dorsalgia, unspecified    TECHNIQUE:  AP, lateral images were performed of the lumbar spine.    COMPARISON:  01/04/2018    FINDINGS:  Since the prior exam there is now a spinal stimulator partially assessed.  There is retrolisthesis of L1 on L2 that is similar.  Degenerative endplate changes most notably at L1-L2 are similar.  Additional multilevel degenerative endplate and facet changes throughout the lumbar spine.  Impression: Multilevel degenerative changes.    Electronically signed by: Rocky Alas  Date:    02/01/2022  Time:    15:29       Office Visit on 08/19/2022   Component Date Value Ref Range Status    SARS Coronavirus 2 Antigen 08/19/2022 Negative  Negative Final    Rapid Influenza A Ag 08/19/2022 Negative  Negative Final    Rapid Influenza B Ag 08/19/2022 Negative  Negative Final     Acceptable 08/19/2022 Yes   Final   Office Visit on 06/28/2022   Component Date Value Ref Range Status    POC Amphetamines 06/28/2022 Negative  Negative, Inconclusive Final    POC Barbiturates 06/28/2022 Negative  Negative, Inconclusive Final    POC Benzodiazepines 06/28/2022 Negative  Negative, Inconclusive Final    POC Cocaine 06/28/2022 Negative  Negative, Inconclusive Final    POC THC 06/28/2022 Negative  Negative, Inconclusive Final    POC Methadone 06/28/2022 Negative  Negative, Inconclusive Final    POC Methamphetamine 06/28/2022 Negative  Negative, Inconclusive Final    POC Opiates 06/28/2022 Presumptive Positive (A)  Negative, Inconclusive Final    POC Oxycodone 06/28/2022 Negative  Negative, Inconclusive Final    POC Phencyclidine 06/28/2022 Negative  Negative, Inconclusive Final    POC Methylenedioxymethamphetamine *  2022 Negative  Negative, Inconclusive Final    POC Tricyclic Antidepressants 2022 Negative  Negative, Inconclusive Final    POC Buprenorphine 2022 Negative   Final     Acceptable 2022 Yes   Final    POC Temperature (Urine) 2022 92   Final         No orders of the defined types were placed in this encounter.      Requested Prescriptions     Pending Prescriptions Disp Refills    LYRICA 100 mg capsule 180 capsule 0     Si capsules po TID    methocarbamoL (ROBAXIN) 750 MG Tab 90 tablet 2     Sig: Take 1 tablet (750 mg total) by mouth every 8 (eight) hours.    acetaminophen-codeine 300-30mg (TYLENOL #3) 300-30 mg Tab 120 tablet 2     Sig: Take 1 tablet by mouth every 6 (six) hours.       Assessment:     1. Diabetic polyneuropathy associated with type 2 diabetes mellitus    2. Lumbar radiculopathy    3. Radiculopathy, cervical region         A's of Opioid Risk Assessment  Activity:Patient can perform ADL.   Analgesia:Patients pain is partially controlled by current medication. Patient has tried OTC medications such as Tylenol and Ibuprofen with out relief.   Adverse Effects: Patient denies constipation or sedation.  Aberrant Behavior:  reviewed with no aberrant drug seeking/taking behavior.  Overdose reversal drug naloxone discussed    Drug screen reviewed      Plan:    Spinal cord stimulator not functioning     Complaint of back pain worse with flexion extension rotation lumbar spine ongoing for more than 3 months tenderness along the facet joint area her pain is facet joint in nature     She had lumbar RF TC  she states she had more than 80% relief after procedure, she states procedure did help increase her level function, patient has maintain greater than 3 months pain relief with 20 procedures     She is requesting repeat procedure for her low back pain    MRI lumbar spine [facility] 2018 multiple level degenerative changes please see  images/report    Indications for this procedure for this specific patient include the following   - Pt has had symptoms for three months with moderate to severe pain with functional impairment rated of 7/10 pain.   - Pain non-responsive to conservative care.    - Pain predominately axial and not associated with radiculopathy or claudication.    - No non-facet pathology as source of pain.    - Clinical assessment implicates facet joint as putative pain source.    - Pain is exacerbated by extension or prolonged sitting/standing and relieved by rest.    - No unexplained neurologic deficit.    - No history of coagulopathy, infection or unstable medical conditions.    - Pain is causing significant functional limitation resulting in diminished quality of life and impaired age appropriate ADL's.   - Clinical assessment implicates facet joint as putative source of pain  - Repeat injections not done prior to 7 days   - no more than 2 levels will be done    The planned medically necessary  surgical procedure is performed in a hospital outpatient department and not in an ambulatory surgical center due to:     -there is no geographically assessable ambulatory surgery center that has the  necessary equipment and fluoroscopy needed for the procedure     -there is no geographically assessable ambulatory surgical center available at which the physician has privileges     -an ASC's  specific  guideline regarding the individuals weight or health conditions that prevent the use of an ASC    Monitor anesthesia request is medically indicated for the scheduled nerve block procedure due to:  1- needle phobia and anxiety, placing  the patient at risk during the provided service.  2-patient has an ASA class greater than 3 and requires constant presence of an anesthesiologist during the procedure,   3-patient has severe problems hard to lie still  4-patient suffers from chronic pain and is unable to function due to  diminished  ADLs    Schedule left lumbar RF TC L4 through S1 for  lumbosacral spondylosis       Bring original prescription medication bottles/container/box with labels to each visit    Pill count    Physical therapy    Massage therapy declines     Review of Systems   Constitutional:  Negative for activity change, appetite change, diaphoresis and fever.   HENT:  Negative for drooling, ear discharge, ear pain, facial swelling, nosebleeds, sore throat and voice change.    Eyes:  Negative for pain, discharge, redness and visual disturbance.   Respiratory:  Negative for apnea, cough, choking, chest tightness, shortness of breath, wheezing and stridor.    Cardiovascular:  Negative for palpitations and leg swelling.   Gastrointestinal:  Negative for abdominal distention, abdominal pain, anorexia, change in bowel habit, diarrhea, rectal pain and vomiting.   Genitourinary:  Negative for flank pain and frequency.   Musculoskeletal:  Positive for arthralgias, back pain, neck pain and neck stiffness.   Skin:  Negative for color change, pallor and rash.   Neurological:  Negative for dizziness, vertigo, seizures, syncope, facial asymmetry, speech difficulty, light-headedness and disturbances in coordination.   Endo/Heme/Allergies:  Negative for cold intolerance, heat intolerance, polydipsia, polyphagia and adenopathy. Does not bruise/bleed easily.   Psychiatric/Behavioral:  Negative for agitation, behavioral problems, confusion, decreased concentration, dysphoric mood, hallucinations, self-injury and suicidal ideas. The patient is not nervous/anxious and is not hyperactive.

## 2022-11-17 NOTE — PLAN OF CARE
Plan:  D/c pt via wheelchair at 1305  Informed pt if does not void in 8 hours to go to ER. Notify if redness, drainage, from injection site or fever over next 3-4 days. Rest and drink plenty of fluids for the remainder of the day. No lifting over 5 lbs. For the remainder of the day. Continue regular medications as prescribed. May take pain medications as prescribed.     Pain improved 100%

## 2022-11-17 NOTE — BRIEF OP NOTE
Discharge Note  Short Stay    Admit Date: 11/17/2022    Discharge Date: 11/17/2022    Attending Physician: Roselia Barclay     Discharge Provider: Roselia Barclay    Diagnoses:  Lumbosacral spondylosis    Discharged Condition: Good    Final Diagnoses: Spondylosis of lumbar region without myelopathy or radiculopathy [M47.816]    Disposition: Home or Self Care    Hospital Course: No complications, uneventful    Outcome of Hospitalization, Treatment, Procedure, or Surgery:  Patient was admitted for outpatient interventional pain management procedure. The patient tolerated the procedure well with no complications.    Follow up/Patient Instructions:  Follow up as scheduled in Pain Management office in 3-4 weeks.  Patient has received instructions and follow up date and time.    Medications:  Continue previous medications

## 2022-11-17 NOTE — OP NOTE
Procedure Note    Procedure Date: 11/17/2022    Procedure Performed:  Radiofrequency ablation of the left  L3-4,4-5,5-S1 medial branch nerves utilizing fluoroscopy    Indications: Patient has failed conservative therapy.      Pre-op diagnosis: Lumbosacral Spondylosis    Post-op diagnosis: same    Physician: PRATIBHA Barclay MD    Anesthesia:MAC    Medications injected:  Pre-lesion, 2ml of 1% lidocaine at each level.  From a 1:1 mixture of  (0.25% bupivacaine,  1% Lidocaine 40mg of kenalog)  1ml of this solution was injected at each level post-lesion.    Local anesthetic used: 1% Lidocaine, 3 ml     Estimated Blood Loss:Less than 1cc    IVF:Per Anesthesia    Complications: None    Technique:  The patient was interviewed in the holding area and Risks/Benefits were discussed, including, but not limited to  the possibility of new or different pain, bleeding or infection.   All questions were answered.  The patient understood and accepted risks.  The area of treatment was marked. Consent was reviewed and signed.  A time out was taken to identify the patient, procedure and side of the procedure. The patient was placed in a prone position, then prepped and draped in the usual sterile fashion using ChloraPrep and sterile towels.  The levels were determined under fluoroscopic guidance.   AP and oblique fluoroscopy were used to identify and kenneth the junctions between the superior articular processes and transverse processes at  Left  L3-4,4-5,5-S1.  The Left sacral ala was also identified and marked.  The skin and subcutaneous tissues in these identified areas were anesthetized with 1% lidocaine. A 20-gauge 100 mm [employer] RF needle was advanced towards each of these points under fluoroscopic guidance such that the tip of the needle was positioned posterior to the Neuro-foramen on lateral fluoroscopic view. Each needle was positioned such that, on stimulation, the patient had an appropriate pain response between 0.3-0.5 V, with no  motor response, other than Lumbar Paraspinal Muscles up to 2.0V.  One mL of 2% lidocaine was then injected at each level prior to lesioning, which was performed for 90 seconds at 80°C.  Once the lesion was complete, 1 mL of a solution consisting of  a 1:1 mixture  (0.25% bupivacaine 5 and 40mg kenalog)  was injected through each probe. The probes were removed and a sterile Band-Aid dressing was applied to the puncture site.  The patient tolerated the procedure well and was monitored after the procedure.  Patient was given post procedure and discharge instructions to follow at home.  The patient was discharged in a stable condition and accompanied by an adult.

## 2022-11-17 NOTE — ANESTHESIA PREPROCEDURE EVALUATION
11/17/2022  Magali Cheung is a 62 y.o., female.      Pre-op Assessment    I have reviewed the Patient Summary Reports.     I have reviewed the Nursing Notes. I have reviewed the NPO Status.   I have reviewed the Medications.     Review of Systems  Anesthesia Hx:  No problems with previous Anesthesia    Social:  Smoker    Cardiovascular:   Hypertension    Hepatic/GI:   GERD    Musculoskeletal:   Arthritis   Spine Disorders: lumbar Degenerative disease and Chronic Pain    Neurological:   Neuromuscular Disease,   Chronic Pain Syndrome  Peripheral Neuropathy    Endocrine:   Diabetes, type 2 Hypothyroidism    Psych:   Psychiatric History depression          Physical Exam  General: Well nourished, Cooperative and Alert    Airway:  Mallampati: II   Mouth Opening: Normal  TM Distance: Normal  Tongue: Normal  Neck ROM: Normal ROM    Dental:  Intact        Anesthesia Plan  Type of Anesthesia, risks & benefits discussed:    Anesthesia Type: Gen Natural Airway  Intra-op Monitoring Plan: Standard ASA Monitors  Post Op Pain Control Plan: multimodal analgesia  Induction:  IV  Informed Consent: Informed consent signed with the Patient and all parties understand the risks and agree with anesthesia plan.  All questions answered. Patient consented to blood products? Yes  ASA Score: 3  Day of Surgery Review of History & Physical: H&P Update referred to the surgeon/provider.    Ready For Surgery From Anesthesia Perspective.     .

## 2022-11-17 NOTE — DISCHARGE SUMMARY
[facility] - Pain Management  Discharge Note  Short Stay    Procedure(s) (LRB):  LEFT L4-S1 RFTC  (HAD RIGHT ON 10-25) (Left)      OUTCOME: Patient tolerated treatment/procedure well without complication and is now ready for discharge.    DISPOSITION: Home or Self Care    FINAL DIAGNOSIS:  Lumbosacral spondylosis    FOLLOWUP: In clinic    DISCHARGE INSTRUCTIONS:  See nurse's notes     TIME SPENT ON DISCHARGE: 5 minutes

## 2022-12-06 RX ORDER — CARVEDILOL 12.5 MG/1
12.5 TABLET ORAL 2 TIMES DAILY WITH MEALS
Qty: 180 TABLET | Refills: 0 | Status: SHIPPED | OUTPATIENT
Start: 2022-12-06 | End: 2023-07-10

## 2022-12-07 ENCOUNTER — OFFICE VISIT (OUTPATIENT)
Dept: PAIN MEDICINE | Facility: CLINIC | Age: 62
End: 2022-12-07
Payer: MEDICAID

## 2022-12-07 VITALS
WEIGHT: 144 LBS | HEART RATE: 71 BPM | DIASTOLIC BLOOD PRESSURE: 78 MMHG | SYSTOLIC BLOOD PRESSURE: 122 MMHG | HEIGHT: 62 IN | BODY MASS INDEX: 26.5 KG/M2

## 2022-12-07 DIAGNOSIS — Z79.899 ENCOUNTER FOR LONG-TERM (CURRENT) USE OF OTHER MEDICATIONS: Primary | ICD-10-CM

## 2022-12-07 DIAGNOSIS — M54.12 RADICULOPATHY, CERVICAL REGION: Chronic | ICD-10-CM

## 2022-12-07 DIAGNOSIS — E11.42 DIABETIC POLYNEUROPATHY ASSOCIATED WITH TYPE 2 DIABETES MELLITUS: Chronic | ICD-10-CM

## 2022-12-07 PROCEDURE — 1159F PR MEDICATION LIST DOCUMENTED IN MEDICAL RECORD: ICD-10-PCS | Mod: CPTII,,, | Performed by: PAIN MEDICINE

## 2022-12-07 PROCEDURE — 80305 DRUG TEST PRSMV DIR OPT OBS: CPT | Mod: PBBFAC | Performed by: PAIN MEDICINE

## 2022-12-07 PROCEDURE — 3008F BODY MASS INDEX DOCD: CPT | Mod: CPTII,,, | Performed by: PAIN MEDICINE

## 2022-12-07 PROCEDURE — 3074F PR MOST RECENT SYSTOLIC BLOOD PRESSURE < 130 MM HG: ICD-10-PCS | Mod: CPTII,,, | Performed by: PAIN MEDICINE

## 2022-12-07 PROCEDURE — 1159F MED LIST DOCD IN RCRD: CPT | Mod: CPTII,,, | Performed by: PAIN MEDICINE

## 2022-12-07 PROCEDURE — 99215 OFFICE O/P EST HI 40 MIN: CPT | Mod: PBBFAC | Performed by: PAIN MEDICINE

## 2022-12-07 PROCEDURE — 4010F ACE/ARB THERAPY RXD/TAKEN: CPT | Mod: CPTII,,, | Performed by: PAIN MEDICINE

## 2022-12-07 PROCEDURE — 99214 OFFICE O/P EST MOD 30 MIN: CPT | Mod: S$PBB,,, | Performed by: PAIN MEDICINE

## 2022-12-07 PROCEDURE — 4010F PR ACE/ARB THEARPY RXD/TAKEN: ICD-10-PCS | Mod: CPTII,,, | Performed by: PAIN MEDICINE

## 2022-12-07 PROCEDURE — 3078F PR MOST RECENT DIASTOLIC BLOOD PRESSURE < 80 MM HG: ICD-10-PCS | Mod: CPTII,,, | Performed by: PAIN MEDICINE

## 2022-12-07 PROCEDURE — 3008F PR BODY MASS INDEX (BMI) DOCUMENTED: ICD-10-PCS | Mod: CPTII,,, | Performed by: PAIN MEDICINE

## 2022-12-07 PROCEDURE — 99214 PR OFFICE/OUTPT VISIT, EST, LEVL IV, 30-39 MIN: ICD-10-PCS | Mod: S$PBB,,, | Performed by: PAIN MEDICINE

## 2022-12-07 PROCEDURE — 3078F DIAST BP <80 MM HG: CPT | Mod: CPTII,,, | Performed by: PAIN MEDICINE

## 2022-12-07 PROCEDURE — 3074F SYST BP LT 130 MM HG: CPT | Mod: CPTII,,, | Performed by: PAIN MEDICINE

## 2022-12-07 RX ORDER — ACETAMINOPHEN AND CODEINE PHOSPHATE 300; 30 MG/1; MG/1
1 TABLET ORAL EVERY 6 HOURS
Qty: 120 TABLET | Refills: 1 | Status: SHIPPED | OUTPATIENT
Start: 2022-12-30 | End: 2023-02-06 | Stop reason: SDUPTHER

## 2022-12-07 RX ORDER — PREGABALIN 100 MG/1
CAPSULE ORAL
Qty: 180 CAPSULE | Refills: 1 | Status: SHIPPED | OUTPATIENT
Start: 2022-12-07 | End: 2023-02-06 | Stop reason: SDUPTHER

## 2022-12-07 RX ORDER — METHOCARBAMOL 750 MG/1
750 TABLET, FILM COATED ORAL EVERY 8 HOURS
Qty: 90 TABLET | Refills: 2 | Status: SHIPPED | OUTPATIENT
Start: 2022-12-07 | End: 2023-02-06 | Stop reason: SDUPTHER

## 2022-12-07 NOTE — PROGRESS NOTES
She Disclaimer: This note has been generated using voice-recognition software. There may be typographical errors that have been missed during proof-reading        Patient ID: Magali Cheung is a 62 y.o. female.      Chief Complaint: Low-back Pain      62-year-old female returns  for re-evaluation of lower back pain following bilateral lumbar medial branch radiofrequency procedure 10/25/2022.  She reports 80% pain relief.  She still requires Lyrica Robaxin Tylenol No. 3 for treatment of diabetic neuropathy.  She returns today for medication refill.  She denies any new complaints.          Pain Assessment  Pain Assessment: 0-10  Pain Score:   5  Pain Location: Other (Comment) (lower back)  Pain Descriptors: Aching, Sharp, Constant  Pain Frequency: Continuous  Pain Onset: Awakened from sleep  Clinical Progression: Gradually improving  Aggravating Factors: Standing, Other (Comment) (sitting)  Pain Intervention(s): Medication (See eMAR), Heat applied      A's of Opioid Risk Assessment  Activity:Patient can perform ADL.   Analgesia:Patients pain is  controlled by current medication.   Adverse Effects: Patient denies constipation or sedation.  Aberrant Behavior:  reviewed with no aberrant drug seeking/taking behavior.      Patient denies any suicidal or homicidal ideations    Physical Therapy/Home Exercise: yes      [place] [facility] for Pain Management  See OP Notes for results.     IMPRESSION: See OP Notes for results.     This procedure was auto-finalized by: Virtual Radiologist      Review of Systems   Constitutional: Negative.    HENT: Negative.     Eyes: Negative.    Respiratory: Negative.     Cardiovascular: Negative.    Gastrointestinal: Negative.    Endocrine: Negative.    Genitourinary: Negative.    Musculoskeletal:  Positive for arthralgias, back pain and leg pain (Bilateral lower extremities).   Integumentary:  Negative.   Neurological:  Positive for numbness (Bilateral lower extremity).   Hematological: Negative.     Psychiatric/Behavioral:  Positive for sleep disturbance.            Past Medical History:   Diagnosis Date    Anxiety     Chronic pain syndrome     Depression     Diabetes mellitus     GERD (gastroesophageal reflux disease)     Hypertension     Hypothyroidism     Low back pain     Low vitamin B12 level     Lumbar radiculopathy     Neuropathy      Past Surgical History:   Procedure Laterality Date    Bilateral L3-S1 MBB Bilateral 2019, 2019    Dr Barclay     SECTION      COLON SURGERY      HERNIA REPAIR      Left L3-S1 RFTC Left 10/23/2019    Dr Barclay    RADIOFREQUENCY ABLATION OF LUMBAR MEDIAL BRANCH NERVE AT SINGLE LEVEL Right 10/25/2022    Procedure: Radiofrequency Ablation, Nerve, Spinal, Lumbar, Medial Branch, Level L4-S1;  Surgeon: Roselia Barclay MD;  Location: [facility];  Service: Pain Management;  Laterality: Right;  vaccinated.schedule LEFT after right is done.    RADIOFREQUENCY ABLATION OF LUMBAR MEDIAL BRANCH NERVE AT SINGLE LEVEL Left 2022    Procedure: LEFT L4-S1 RFTC  (HAD RIGHT ON 10-25);  Surgeon: Roselia Barclay MD;  Location: [facility];  Service: Pain Management;  Laterality: Left;  VAC SELENE IN EPIC    Right L3-S1 RFTC Right 2019    Dr Barclay    SPINAL CORD STIMULATOR IMPLANT       Social History     Socioeconomic History    Marital status:    Tobacco Use    Smoking status: Every Day     Types: Vaping with nicotine    Smokeless tobacco: Never   Substance and Sexual Activity    Alcohol use: Yes    Drug use: Never    Sexual activity: Yes     Partners: Male     Family History   Problem Relation Age of Onset    Hypertension Father     Heart disease Father     Stroke Maternal Grandfather     Bipolar disorder Paternal Grandmother     Heart disease Paternal Grandfather      Review of patient's allergies indicates:   Allergen Reactions    Opioids - morphine analogues      has a current medication list which includes the following prescription(s):  adapalene, albuterol, atorvastatin, buspirone, carbidopa-levodopa  mg, carvedilol, clotrimazole-betamethasone 1-0.05%, cyanocobalamin, duloxetine, esomeprazole, fluticasone propionate, hydrochlorothiazide, hydroxyzine hcl, januvia, levothyroxine, levothyroxine, rosuvastatin, trazodone, vitamin d, [START ON 12/30/2022] acetaminophen-codeine 300-30mg, doxycycline, lyrica, methocarbamol, prazosin, tretinoin, and tretinoin.      Objective:  Vitals:    12/07/22 1013   BP: 122/78   Pulse: 71        Physical Exam  Vitals and nursing note reviewed.   Constitutional:       General: She is not in acute distress.     Appearance: Normal appearance. She is not ill-appearing, toxic-appearing or diaphoretic.   HENT:      Head: Normocephalic and atraumatic.      Nose: Nose normal.      Mouth/Throat:      Mouth: Mucous membranes are moist.   Eyes:      Extraocular Movements: Extraocular movements intact.      Pupils: Pupils are equal, round, and reactive to light.   Cardiovascular:      Rate and Rhythm: Normal rate and regular rhythm.      Heart sounds: Normal heart sounds.   Pulmonary:      Effort: Pulmonary effort is normal. No respiratory distress.      Breath sounds: Normal breath sounds. No stridor. No wheezing or rhonchi.   Abdominal:      General: Bowel sounds are normal.      Palpations: Abdomen is soft.   Musculoskeletal:         General: No swelling or deformity.      Cervical back: Normal and normal range of motion. No spasms or tenderness. No pain with movement. Normal range of motion.      Thoracic back: Normal.      Lumbar back: Tenderness present. No spasms or bony tenderness. Decreased range of motion. Negative right straight leg raise test and negative left straight leg raise test. No scoliosis.      Right lower leg: No edema.      Left lower leg: No edema.   Skin:     General: Skin is warm.   Neurological:      General: No focal deficit present.      Mental Status: She is alert and oriented to person, place,  and time. Mental status is at baseline.      Cranial Nerves: No cranial nerve deficit.      Sensory: Sensation is intact. No sensory deficit.      Motor: No weakness.      Coordination: Coordination normal.      Gait: Gait normal.      Deep Tendon Reflexes: Reflexes are normal and symmetric.   Psychiatric:         Mood and Affect: Mood normal.         Behavior: Behavior normal.         Assessment:      1. Encounter for long-term (current) use of other medications    2. Diabetic polyneuropathy associated with type 2 diabetes mellitus    3. Radiculopathy, cervical region            Plan:  1. reviewed  2.Addiction, Dependency, Tolerance, Opioid abuse-misuse, Death, Diversion Discussed. Overdose reversal drug Naloxone discussed.  3.Refill/Continue medications for pain control and function       Requested Prescriptions     Signed Prescriptions Disp Refills    acetaminophen-codeine 300-30mg (TYLENOL #3) 300-30 mg Tab 120 tablet 1     Sig: Take 1 tablet by mouth every 6 (six) hours.    LYRICA 100 mg capsule 180 capsule 1     Si capsules po TID    methocarbamoL (ROBAXIN) 750 MG Tab 90 tablet 2     Sig: Take 1 tablet (750 mg total) by mouth every 8 (eight) hours.     4.Urine drug screen point of care obtained and consistent with prescribed medications and medication refill date    Orders Placed This Encounter   Procedures    POCT Urine Drug Screen Presump     Interpretive Information:     Negative:  No drug detected at the cut off level.   Positive:  This result represents presumptive positive for the   tested drug, other substances may yield a positive response other   than the analyte of interest. This result should be utilized for   diagnostic purpose only. Confirmation testing will be performed upon physician request only.         5.Follow with GUSTABO Lucas in 2 months for re-evaluation and medication refill       report:  Reviewed and consistent with medication use as prescribed.      The total time spent for  evaluation and management on 12/07/2022 including reviewing separately obtained history, performing a medically appropriate exam and evaluation, documenting clinical information in the health record, independently interpreting results and communicating them to the patient/family/caregiver, and ordering medications/tests/procedures was between 15-29 minutes.    The above plan and management options were discussed at length with patient. Patient is in agreement with the above and verbalized understanding. It will be communicated with the referring physician via electronic record, fax, or mail.

## 2023-01-04 ENCOUNTER — OFFICE VISIT (OUTPATIENT)
Dept: GASTROENTEROLOGY | Facility: CLINIC | Age: 63
End: 2023-01-04
Payer: MEDICAID

## 2023-01-04 ENCOUNTER — HOSPITAL ENCOUNTER (OUTPATIENT)
Dept: RADIOLOGY | Facility: HOSPITAL | Age: 63
Discharge: HOME | End: 2023-01-04
Attending: NURSE PRACTITIONER
Payer: MEDICAID

## 2023-01-04 VITALS
DIASTOLIC BLOOD PRESSURE: 80 MMHG | BODY MASS INDEX: 26.72 KG/M2 | SYSTOLIC BLOOD PRESSURE: 122 MMHG | HEIGHT: 63 IN | WEIGHT: 150.81 LBS | OXYGEN SATURATION: 95 % | HEART RATE: 71 BPM

## 2023-01-04 DIAGNOSIS — R10.32 LEFT LOWER QUADRANT ABDOMINAL PAIN: Primary | ICD-10-CM

## 2023-01-04 DIAGNOSIS — R10.32 LEFT LOWER QUADRANT ABDOMINAL PAIN: ICD-10-CM

## 2023-01-04 PROCEDURE — 3008F BODY MASS INDEX DOCD: CPT | Mod: CPTII,,, | Performed by: NURSE PRACTITIONER

## 2023-01-04 PROCEDURE — 99204 PR OFFICE/OUTPT VISIT, NEW, LEVL IV, 45-59 MIN: ICD-10-PCS | Mod: S$PBB,,, | Performed by: NURSE PRACTITIONER

## 2023-01-04 PROCEDURE — 74018 XR KUB: ICD-10-PCS | Mod: 26,,, | Performed by: RADIOLOGY

## 2023-01-04 PROCEDURE — 3079F PR MOST RECENT DIASTOLIC BLOOD PRESSURE 80-89 MM HG: ICD-10-PCS | Mod: CPTII,,, | Performed by: NURSE PRACTITIONER

## 2023-01-04 PROCEDURE — 99215 OFFICE O/P EST HI 40 MIN: CPT | Mod: PBBFAC | Performed by: NURSE PRACTITIONER

## 2023-01-04 PROCEDURE — 1159F MED LIST DOCD IN RCRD: CPT | Mod: CPTII,,, | Performed by: NURSE PRACTITIONER

## 2023-01-04 PROCEDURE — 74018 RADEX ABDOMEN 1 VIEW: CPT | Mod: TC

## 2023-01-04 PROCEDURE — 3074F PR MOST RECENT SYSTOLIC BLOOD PRESSURE < 130 MM HG: ICD-10-PCS | Mod: CPTII,,, | Performed by: NURSE PRACTITIONER

## 2023-01-04 PROCEDURE — 3008F PR BODY MASS INDEX (BMI) DOCUMENTED: ICD-10-PCS | Mod: CPTII,,, | Performed by: NURSE PRACTITIONER

## 2023-01-04 PROCEDURE — 1159F PR MEDICATION LIST DOCUMENTED IN MEDICAL RECORD: ICD-10-PCS | Mod: CPTII,,, | Performed by: NURSE PRACTITIONER

## 2023-01-04 PROCEDURE — 74018 RADEX ABDOMEN 1 VIEW: CPT | Mod: 26,,, | Performed by: RADIOLOGY

## 2023-01-04 PROCEDURE — 3074F SYST BP LT 130 MM HG: CPT | Mod: CPTII,,, | Performed by: NURSE PRACTITIONER

## 2023-01-04 PROCEDURE — 99204 OFFICE O/P NEW MOD 45 MIN: CPT | Mod: S$PBB,,, | Performed by: NURSE PRACTITIONER

## 2023-01-04 PROCEDURE — 3079F DIAST BP 80-89 MM HG: CPT | Mod: CPTII,,, | Performed by: NURSE PRACTITIONER

## 2023-01-04 NOTE — PROGRESS NOTES
Magali Cheung is a 62 y.o. female here for Diverticulitis        PCP: Octavia Khan  Referring Provider: No referring provider defined for this encounter.     HPI:  Presents due to abdominal pain and constipation. She has a history of recent diverticulitis that was treated with antibiotics approximately 2 months ago. States that the pain improved but has returned intermittently. Reports LLQ abdominal discomfort. No fever. Does report frequent nausea. Chronic constipation due to opioid use. Intermittent anal bleeding which according to the patient is due to internal and external hemorrhoids. Last colonoscopy , per Dr. Anne. Tracy diverticulosis with evidence of a right hemicolectomy. Tubular adenoma removed. States that she has also been evaluated by Dr. Stevie [facility] at [facility]. No weight loss.         ROS:  Review of Systems   Constitutional:  Negative for appetite change, fatigue, fever and unexpected weight change.   HENT:  Negative for trouble swallowing.    Respiratory:  Negative for shortness of breath.    Cardiovascular:  Negative for chest pain.   Gastrointestinal:  Positive for abdominal pain, anal bleeding, constipation and nausea. Negative for blood in stool, change in bowel habit, diarrhea, vomiting, reflux and change in bowel habit.   Musculoskeletal:  Positive for arthralgias and back pain. Negative for gait problem and joint swelling.   Integumentary:  Negative for pallor.   Neurological:  Negative for light-headedness.   Psychiatric/Behavioral:  The patient is not nervous/anxious.         PMHX:  has a past medical history of Anxiety, Chronic pain syndrome, Depression, Diabetes mellitus, GERD (gastroesophageal reflux disease), Hypertension, Hypothyroidism, Low back pain, Low vitamin B12 level, Lumbar radiculopathy, and Neuropathy.    PSHX:  has a past surgical history that includes Spinal cord stimulator implant;  section; Bilateral L3-S1 MBB (Bilateral, 2019, 2019);  Left L3-S1 RFTC (Left, 10/23/2019); Right L3-S1 RFTC (Right, 12/16/2019); Hernia repair; Colon surgery; Radiofrequency ablation of lumbar medial branch nerve at single level (Right, 10/25/2022); and Radiofrequency ablation of lumbar medial branch nerve at single level (Left, 11/17/2022).    PFHX: family history includes Bipolar disorder in her paternal grandmother; Heart disease in her father and paternal grandfather; Hypertension in her father; Stroke in her maternal grandfather.    PSlHX:  reports that she has been smoking vaping with nicotine. She has never used smokeless tobacco. She reports current alcohol use. She reports that she does not use drugs.        Review of patient's allergies indicates:   Allergen Reactions    Opioids - morphine analogues        Medication List with Changes/Refills   Current Medications    ACETAMINOPHEN-CODEINE 300-30MG (TYLENOL #3) 300-30 MG TAB    Take 1 tablet by mouth every 6 (six) hours.    ALBUTEROL (PROVENTIL/VENTOLIN HFA) 90 MCG/ACTUATION INHALER    INHALE ONE TO TWO PUFFS BY MOUTH EVERY 4 HOURS AS NEEDED FOR SHORTNESS OF BREATH OR WHEEZING    ATORVASTATIN (LIPITOR) 40 MG TABLET    Take 80 mg by mouth once daily.    BUSPIRONE (BUSPAR) 15 MG TABLET    Take 15 mg by mouth 2 (two) times daily.    CARBIDOPA-LEVODOPA  MG (SINEMET)  MG PER TABLET    Take two tablets three times a day    CARVEDILOL (COREG) 12.5 MG TABLET    Take 1 tablet (12.5 mg total) by mouth 2 (two) times daily with meals.    CLOTRIMAZOLE-BETAMETHASONE 1-0.05% (LOTRISONE) CREAM    Apply 1 g topically as needed.    CYANOCOBALAMIN 1,000 MCG/ML INJECTION    inject ONE ML INTRAMUSCULARLY EACH MONTH    DULOXETINE 40 MG CPDR    TAKE ONE CAPSULE BY MOUTH TWICE DAILY FOR 30 DAYS    ESOMEPRAZOLE (NEXIUM) 40 MG CAPSULE    Take 40 mg by mouth once daily.    FLUTICASONE PROPIONATE (FLONASE) 50 MCG/ACTUATION NASAL SPRAY    2 sprays by Each Nostril route once daily.    HYDROCHLOROTHIAZIDE (HYDRODIURIL) 12.5 MG  "TAB    Take 12.5-25 mg by mouth once daily.    HYDROXYZINE HCL (ATARAX) 25 MG TABLET    Take 1 tablet by mouth every 8 (eight) hours as needed.    JANUVIA 100 MG TAB    Take 100 mg by mouth once daily.    LEVOTHYROXINE (SYNTHROID) 75 MCG TABLET    TAKE ONE TABLET BY MOUTH EVERY MORNING FOR THYROID ON AN EMPTY STOMACH    LYRICA 100 MG CAPSULE    2 capsules po TID    METHOCARBAMOL (ROBAXIN) 750 MG TAB    Take 1 tablet (750 mg total) by mouth every 8 (eight) hours.    ROSUVASTATIN (CRESTOR) 20 MG TABLET    TAKE ONE TABLET BY MOUTH AT BEDTIME for cholestrol    TRAZODONE (DESYREL) 50 MG TABLET    Take 1 mg by mouth nightly as needed.    VITAMIN D (VITAMIN D3) 1000 UNITS TAB    Take 1,000 Units by mouth once daily.   Discontinued Medications    ADAPALENE (DIFFERIN) 0.1 % GEL    Apply topically once daily.    DOXYCYCLINE (VIBRAMYCIN) 100 MG CAP    Take 1 capsule (100 mg total) by mouth 2 (two) times daily.    LEVOTHYROXINE (SYNTHROID) 50 MCG TABLET    Take 50 mcg by mouth every morning.    PRAZOSIN (MINIPRESS) 5 MG CAPSULE    Take 5 mg by mouth nightly.    TRETINOIN (RETIN-A) 0.025 % CREAM    Apply pea-sized amount to face nightly    TRETINOIN (RETIN-A) 0.05 % CREAM    Apply pea-sized amount to face every other night advancing to nightly when tolerated        Objective Findings:  Vital Signs:  /80   Pulse 71   Ht 5' 2.5" (1.588 m)   Wt 68.4 kg (150 lb 12.8 oz)   SpO2 95%   BMI 27.14 kg/m²  Body mass index is 27.14 kg/m².    Physical Exam:  Physical Exam  Vitals and nursing note reviewed.   Constitutional:       General: She is not in acute distress.     Appearance: Normal appearance.   HENT:      Mouth/Throat:      Mouth: Mucous membranes are moist.   Cardiovascular:      Rate and Rhythm: Normal rate.   Pulmonary:      Effort: Pulmonary effort is normal.      Breath sounds: No wheezing, rhonchi or rales.   Abdominal:      General: Bowel sounds are normal. There is no distension.      Palpations: Abdomen is soft. " There is no mass.      Tenderness: There is no abdominal tenderness.   Skin:     General: Skin is warm and dry.      Coloration: Skin is not jaundiced or pale.   Neurological:      Mental Status: She is alert and oriented to person, place, and time.   Psychiatric:         Mood and Affect: Mood normal.        Labs:  Lab Results   Component Value Date    WBC 8.83 01/04/2023    HGB 12.5 01/04/2023    HCT 39.9 01/04/2023    MCV 86.4 01/04/2023    RDW 14.7 (H) 01/04/2023     01/04/2023    LYMPH 34.0 01/04/2023    LYMPH 3.00 01/04/2023    MONO 6.9 (H) 01/04/2023    EOS 0.20 01/04/2023    BASO 0.11 01/04/2023     Lab Results   Component Value Date     01/04/2023    K 4.9 01/04/2023     01/04/2023    CO2 36 (H) 01/04/2023     (H) 01/04/2023    BUN 16 01/04/2023    CREATININE 0.88 01/04/2023    CALCIUM 9.8 01/04/2023    PROT 7.6 01/04/2023    ALBUMIN 4.0 01/04/2023    BILITOT 0.4 01/04/2023    ALKPHOS 79 01/04/2023    AST 12 (L) 01/04/2023    ALT 10 (L) 01/04/2023         Imaging: No results found.      Assessment:  Magali Cheung is a 62 y.o. female here with:  1. Left lower quadrant abdominal pain          Recommendations:  1. KUB today  2. CT abdomen and pelvis LLQ abdominal pain with a history of recurrent diverticulitis  3. CBC and CMP today  4. Increase Miralax powder 17 grams twice daily in 8 ounces of liquid. MOM on the 3rd day  5. Low fiber until after CT scan to rule out diverticulitis  6. Request records from [facility]    Follow up in about 4 weeks (around 2/1/2023).      Order summary:  Orders Placed This Encounter    X-Ray KUB    CT Abdomen Pelvis With Contrast    CBC Auto Differential    Comprehensive Metabolic Panel       Thank you for allowing me to participate in the care of Maagli Cheung.      JU Browne

## 2023-01-05 ENCOUNTER — TELEPHONE (OUTPATIENT)
Dept: GASTROENTEROLOGY | Facility: CLINIC | Age: 63
End: 2023-01-05
Payer: MEDICAID

## 2023-01-05 NOTE — TELEPHONE ENCOUNTER
Results of kub and labs called to patient. Verbalized understanding.          ----- Message from CARROLL Burks sent at 1/5/2023  8:06 AM CST -----  KUB is read as normal

## 2023-02-06 NOTE — PROGRESS NOTES
Subjective:         Patient ID: Magali Cheung is a 62 y.o. female.    Chief Complaint: Low-back Pain and Leg Pain        Pain  This is a chronic problem. The current episode started more than 1 year ago. The problem occurs daily. The problem has been waxing and waning. Associated symptoms include arthralgias and neck pain. Pertinent negatives include no anorexia, change in bowel habit, chills, coughing, diaphoresis, fever, sore throat, vertigo or vomiting.   Review of Systems   Constitutional:  Negative for activity change, appetite change, chills, diaphoresis and fever.   HENT:  Negative for drooling, ear discharge, ear pain, facial swelling, nosebleeds, sore throat, voice change and goiter.    Eyes:  Negative for pain, discharge, redness and visual disturbance.   Respiratory:  Negative for apnea, cough, choking, chest tightness, shortness of breath, wheezing and stridor.    Cardiovascular:  Negative for palpitations and leg swelling.   Gastrointestinal:  Negative for abdominal distention, anorexia, change in bowel habit, diarrhea, rectal pain, vomiting, fecal incontinence and change in bowel habit.   Endocrine: Negative for cold intolerance, heat intolerance, polydipsia, polyphagia and polyuria.   Genitourinary:  Negative for flank pain, frequency and hot flashes.   Musculoskeletal:  Positive for arthralgias, back pain, leg pain, neck pain and neck stiffness.   Integumentary:  Negative for color change and pallor.   Allergic/Immunologic: Negative for immunocompromised state.   Neurological:  Negative for dizziness, vertigo, seizures, syncope, facial asymmetry, speech difficulty, light-headedness, coordination difficulties, memory loss and coordination difficulties.   Hematological:  Negative for adenopathy. Does not bruise/bleed easily.   Psychiatric/Behavioral:  Negative for agitation, behavioral problems, confusion, decreased concentration, dysphoric mood, hallucinations, self-injury and suicidal ideas. The  patient is not nervous/anxious and is not hyperactive.          Past Medical History:   Diagnosis Date    Anxiety     Chronic pain syndrome     Depression     Diabetes mellitus     GERD (gastroesophageal reflux disease)     Hypertension     Hypothyroidism     Low back pain     Low vitamin B12 level     Lumbar radiculopathy     Neuropathy      Past Surgical History:   Procedure Laterality Date    Bilateral L3-S1 MBB Bilateral 2019, 2019    Dr Barclay     SECTION      COLON SURGERY      HERNIA REPAIR      Left L3-S1 RFTC Left 10/23/2019    Dr Barclay    RADIOFREQUENCY ABLATION OF LUMBAR MEDIAL BRANCH NERVE AT SINGLE LEVEL Right 10/25/2022    Procedure: Radiofrequency Ablation, Nerve, Spinal, Lumbar, Medial Branch, Level L4-S1;  Surgeon: Roselia Barclay MD;  Location: [facility];  Service: Pain Management;  Laterality: Right;  vaccinated.schedule LEFT after right is done.    RADIOFREQUENCY ABLATION OF LUMBAR MEDIAL BRANCH NERVE AT SINGLE LEVEL Left 2022    Procedure: LEFT L4-S1 RFTC  (HAD RIGHT ON 10-25);  Surgeon: Roselia Barclay MD;  Location: [facility];  Service: Pain Management;  Laterality: Left;  VAC SELENE IN EPIC    Right L3-S1 RFTC Right 2019    Dr Barclay    SPINAL CORD STIMULATOR IMPLANT       Social History     Socioeconomic History    Marital status:    Tobacco Use    Smoking status: Every Day     Types: Vaping with nicotine    Smokeless tobacco: Never   Substance and Sexual Activity    Alcohol use: Yes    Drug use: Never    Sexual activity: Yes     Partners: Male     Family History   Problem Relation Age of Onset    Hypertension Father     Heart disease Father     Stroke Maternal Grandfather     Bipolar disorder Paternal Grandmother     Heart disease Paternal Grandfather      Review of patient's allergies indicates:   Allergen Reactions    Opioids - morphine analogues         Objective:  Vitals:    23 1009   BP: (!) 193/95   Pulse: 64   Resp: 18  "  Weight: 68.5 kg (151 lb)   Height: 5' 2" (1.575 m)   PainSc:   6         Physical Exam  Vitals and nursing note reviewed. Exam conducted with a chaperone present.   Constitutional:       General: She is awake. She is not in acute distress.     Appearance: Normal appearance. She is not ill-appearing, toxic-appearing or diaphoretic.   HENT:      Head: Normocephalic and atraumatic.      Nose: Nose normal.      Mouth/Throat:      Mouth: Mucous membranes are moist.      Pharynx: Oropharynx is clear.   Eyes:      Conjunctiva/sclera: Conjunctivae normal.      Pupils: Pupils are equal, round, and reactive to light.   Cardiovascular:      Rate and Rhythm: Normal rate.   Pulmonary:      Effort: Pulmonary effort is normal. No respiratory distress.   Abdominal:      Palpations: Abdomen is soft.      Tenderness: There is no guarding.   Musculoskeletal:      Cervical back: Normal range of motion and neck supple. Tenderness present. No rigidity.      Thoracic back: Tenderness present.      Lumbar back: Tenderness present. Decreased range of motion.   Skin:     General: Skin is warm and dry.      Coloration: Skin is not jaundiced or pale.   Neurological:      General: No focal deficit present.      Mental Status: She is alert and oriented to person, place, and time. Mental status is at baseline.      Cranial Nerves: No cranial nerve deficit (II-XII).      Deep Tendon Reflexes:      Reflex Scores:       Brachioradialis reflexes are 2+ on the right side.  Psychiatric:         Mood and Affect: Mood normal.         Behavior: Behavior normal. Behavior is cooperative.         Thought Content: Thought content normal.         X-Ray KUB  Narrative: EXAMINATION:  XR KUB    CLINICAL HISTORY:  Abdominal pain    COMPARISON:  27 August 2020    FINDINGS:  No free fluid or free air seen.  The bowel gas pattern appears within normal limits.  No abnormal calcifications are present.  No other abnormality is identified.  Impression: No evidence of " abnormality demonstrated    Electronically signed by: Kenji Conrad  Date:    01/04/2023  Time:    09:42         Lab Visit on 01/04/2023   Component Date Value Ref Range Status    Sodium 01/04/2023 137  136 - 145 mmol/L Final    Potassium 01/04/2023 4.9  3.5 - 5.1 mmol/L Final    Chloride 01/04/2023 100  98 - 107 mmol/L Final    CO2 01/04/2023 36 (H)  21 - 32 mmol/L Final    Anion Gap 01/04/2023 6 (L)  7 - 16 mmol/L Final    Glucose 01/04/2023 116 (H)  74 - 106 mg/dL Final    BUN 01/04/2023 16  7 - 18 mg/dL Final    Creatinine 01/04/2023 0.88  0.55 - 1.02 mg/dL Final    BUN/Creatinine Ratio 01/04/2023 18  6 - 20 Final    Calcium 01/04/2023 9.8  8.5 - 10.1 mg/dL Final    Total Protein 01/04/2023 7.6  6.4 - 8.2 g/dL Final    Albumin 01/04/2023 4.0  3.5 - 5.0 g/dL Final    Globulin 01/04/2023 3.6  2.0 - 4.0 g/dL Final    A/G Ratio 01/04/2023 1.1   Final    Bilirubin, Total 01/04/2023 0.4  >0.0 - 1.2 mg/dL Final    Alk Phos 01/04/2023 79  50 - 130 U/L Final    ALT 01/04/2023 10 (L)  13 - 56 U/L Final    AST 01/04/2023 12 (L)  15 - 37 U/L Final    eGFR 01/04/2023 75  >=60 mL/min/1.73m² Final    WBC 01/04/2023 8.83  4.50 - 11.00 K/uL Final    RBC 01/04/2023 4.62  4.20 - 5.40 M/uL Final    Hemoglobin 01/04/2023 12.5  12.0 - 16.0 g/dL Final    Hematocrit 01/04/2023 39.9  38.0 - 47.0 % Final    MCV 01/04/2023 86.4  80.0 - 96.0 fL Final    MCH 01/04/2023 27.1  27.0 - 31.0 pg Final    MCHC 01/04/2023 31.3 (L)  32.0 - 36.0 g/dL Final    RDW 01/04/2023 14.7 (H)  11.5 - 14.5 % Final    Platelet Count 01/04/2023 305  150 - 400 K/uL Final    MPV 01/04/2023 10.8  9.4 - 12.4 fL Final    Neutrophils % 01/04/2023 54.9  53.0 - 65.0 % Final    Lymphocytes % 01/04/2023 34.0  27.0 - 41.0 % Final    Monocytes % 01/04/2023 6.9 (H)  2.0 - 6.0 % Final    Eosinophils % 01/04/2023 2.3  1.0 - 4.0 % Final    Basophils % 01/04/2023 1.2 (H)  0.0 - 1.0 % Final    Immature Granulocytes % 01/04/2023 0.7 (H)  0.0 - 0.4 % Final    nRBC, Auto 01/04/2023  0.0  <=0.0 % Final    Neutrophils, Abs 01/04/2023 4.85  1.80 - 7.70 K/uL Final    Lymphocytes, Absolute 01/04/2023 3.00  1.00 - 4.80 K/uL Final    Monocytes, Absolute 01/04/2023 0.61  0.00 - 0.80 K/uL Final    Eosinophils, Absolute 01/04/2023 0.20  0.00 - 0.50 K/uL Final    Basophils, Absolute 01/04/2023 0.11  0.00 - 0.20 K/uL Final    Immature Granulocytes, Absolute 01/04/2023 0.06 (H)  0.00 - 0.04 K/uL Final    nRBC, Absolute 01/04/2023 0.00  <=0.00 x10e3/uL Final    Diff Type 01/04/2023 Auto   Final   Office Visit on 12/07/2022   Component Date Value Ref Range Status    POC Amphetamines 12/07/2022 Negative  Negative, Inconclusive Final    POC Barbiturates 12/07/2022 Negative  Negative, Inconclusive Final    POC Benzodiazepines 12/07/2022 Negative  Negative, Inconclusive Final    POC Cocaine 12/07/2022 Negative  Negative, Inconclusive Final    POC THC 12/07/2022 Negative  Negative, Inconclusive Final    POC Methadone 12/07/2022 Negative  Negative, Inconclusive Final    POC Methamphetamine 12/07/2022 Negative  Negative, Inconclusive Final    POC Opiates 12/07/2022 Presumptive Positive (A)  Negative, Inconclusive Final    POC Oxycodone 12/07/2022 Negative  Negative, Inconclusive Final    POC Phencyclidine 12/07/2022 Negative  Negative, Inconclusive Final    POC Methylenedioxymethamphetamine * 12/07/2022 Negative  Negative, Inconclusive Final    POC Tricyclic Antidepressants 12/07/2022 Negative  Negative, Inconclusive Final    POC Buprenorphine 12/07/2022 Negative   Final     Acceptable 12/07/2022 Yes   Final    POC Temperature (Urine) 12/07/2022 92   Final   Admission on 10/25/2022, Discharged on 10/25/2022   Component Date Value Ref Range Status    POC Glucose 10/25/2022 104  70 - 105 mg/dL Final   Office Visit on 09/26/2022   Component Date Value Ref Range Status    POC Amphetamines 09/26/2022 Negative  Negative, Inconclusive Final    POC Barbiturates 09/26/2022 Negative  Negative, Inconclusive  Final    POC Benzodiazepines 2022 Negative  Negative, Inconclusive Final    POC Cocaine 2022 Negative  Negative, Inconclusive Final    POC THC 2022 Negative  Negative, Inconclusive Final    POC Methadone 2022 Negative  Negative, Inconclusive Final    POC Methamphetamine 2022 Negative  Negative, Inconclusive Final    POC Opiates 2022 Presumptive Positive (A)  Negative, Inconclusive Final    POC Oxycodone 2022 Negative  Negative, Inconclusive Final    POC Phencyclidine 2022 Negative  Negative, Inconclusive Final    POC Methylenedioxymethamphetamine * 2022 Negative  Negative, Inconclusive Final    POC Tricyclic Antidepressants 2022 Negative  Negative, Inconclusive Final    POC Buprenorphine 2022 Negative   Final     Acceptable 2022 Yes   Final    POC Temperature (Urine) 2022 90   Final   Office Visit on 2022   Component Date Value Ref Range Status    SARS Coronavirus 2 Antigen 2022 Negative  Negative Final    Rapid Influenza A Ag 2022 Negative  Negative Final    Rapid Influenza B Ag 2022 Negative  Negative Final     Acceptable 2022 Yes   Final         No orders of the defined types were placed in this encounter.        Requested Prescriptions     Pending Prescriptions Disp Refills    acetaminophen-codeine 300-30mg (TYLENOL #3) 300-30 mg Tab 120 tablet 1     Sig: Take 1 tablet by mouth every 6 (six) hours.    LYRICA 100 mg capsule 180 capsule 1     Si capsules po TID    methocarbamoL (ROBAXIN) 750 MG Tab 90 tablet 2     Sig: Take 1 tablet (750 mg total) by mouth every 8 (eight) hours.    naloxone (NARCAN) 4 mg/actuation Spry 1 each 0     Si spray (4 mg total) by Nasal route once. Call 911, One sprays alternate nostril, may repeat 2-3 minutes as needed for 1 dose       Assessment:     1. Radiculopathy, cervical region    2. Diabetic polyneuropathy associated with type 2  diabetes mellitus         A's of Opioid Risk Assessment  Activity:Patient can perform ADL.   Analgesia:Patients pain is partially controlled by current medication. Patient has tried OTC medications such as Tylenol and Ibuprofen with out relief.   Adverse Effects: Patient denies constipation or sedation.  Aberrant Behavior:  reviewed with no aberrant drug seeking/taking behavior.  Overdose reversal drug naloxone discussed    Drug screen reviewed    MRI lumbar spine [facility] June 4, 2018 multiple level degenerative changes please see images/report      Plan:    Spinal cord stimulator not functioning     Complaint of neck pain right arm pain numbness and tingling median and ulnar nerve distribution left arm worse with increased use better with short periods resting worse with driving the car talking on the phone with her left hand    She is considering repeating physical therapy     Considering further investigation for her cervical radicular symptoms left arm    Last time she attempted physical therapy her insurance would not cover physical therapy     She will continue current medication     Continue home exercise program as directed    Follow-up 3 months    Dr. Barclay December 2023    Bring original prescription medication bottles/container/box with labels to each visit    Pill count    Physical therapy    Massage therapy declines

## 2023-02-07 ENCOUNTER — OFFICE VISIT (OUTPATIENT)
Dept: PAIN MEDICINE | Facility: CLINIC | Age: 63
End: 2023-02-07
Payer: MEDICAID

## 2023-02-07 VITALS
WEIGHT: 151 LBS | RESPIRATION RATE: 18 BRPM | DIASTOLIC BLOOD PRESSURE: 95 MMHG | HEIGHT: 62 IN | BODY MASS INDEX: 27.79 KG/M2 | HEART RATE: 64 BPM | SYSTOLIC BLOOD PRESSURE: 193 MMHG

## 2023-02-07 DIAGNOSIS — E11.42 DIABETIC POLYNEUROPATHY ASSOCIATED WITH TYPE 2 DIABETES MELLITUS: Chronic | ICD-10-CM

## 2023-02-07 DIAGNOSIS — M54.12 RADICULOPATHY, CERVICAL REGION: Primary | Chronic | ICD-10-CM

## 2023-02-07 DIAGNOSIS — G25.81 RLS (RESTLESS LEGS SYNDROME): Chronic | ICD-10-CM

## 2023-02-07 PROCEDURE — 99214 PR OFFICE/OUTPT VISIT, EST, LEVL IV, 30-39 MIN: ICD-10-PCS | Mod: S$PBB,,, | Performed by: PHYSICIAN ASSISTANT

## 2023-02-07 PROCEDURE — 3080F PR MOST RECENT DIASTOLIC BLOOD PRESSURE >= 90 MM HG: ICD-10-PCS | Mod: CPTII,,, | Performed by: PHYSICIAN ASSISTANT

## 2023-02-07 PROCEDURE — 3080F DIAST BP >= 90 MM HG: CPT | Mod: CPTII,,, | Performed by: PHYSICIAN ASSISTANT

## 2023-02-07 PROCEDURE — 99214 OFFICE O/P EST MOD 30 MIN: CPT | Mod: S$PBB,,, | Performed by: PHYSICIAN ASSISTANT

## 2023-02-07 PROCEDURE — 99215 OFFICE O/P EST HI 40 MIN: CPT | Mod: PBBFAC | Performed by: PHYSICIAN ASSISTANT

## 2023-02-07 PROCEDURE — 1159F PR MEDICATION LIST DOCUMENTED IN MEDICAL RECORD: ICD-10-PCS | Mod: CPTII,,, | Performed by: PHYSICIAN ASSISTANT

## 2023-02-07 PROCEDURE — 3077F SYST BP >= 140 MM HG: CPT | Mod: CPTII,,, | Performed by: PHYSICIAN ASSISTANT

## 2023-02-07 PROCEDURE — 3008F BODY MASS INDEX DOCD: CPT | Mod: CPTII,,, | Performed by: PHYSICIAN ASSISTANT

## 2023-02-07 PROCEDURE — 1159F MED LIST DOCD IN RCRD: CPT | Mod: CPTII,,, | Performed by: PHYSICIAN ASSISTANT

## 2023-02-07 PROCEDURE — 3077F PR MOST RECENT SYSTOLIC BLOOD PRESSURE >= 140 MM HG: ICD-10-PCS | Mod: CPTII,,, | Performed by: PHYSICIAN ASSISTANT

## 2023-02-07 PROCEDURE — 3008F PR BODY MASS INDEX (BMI) DOCUMENTED: ICD-10-PCS | Mod: CPTII,,, | Performed by: PHYSICIAN ASSISTANT

## 2023-02-07 RX ORDER — NALOXONE HYDROCHLORIDE 4 MG/.1ML
1 SPRAY NASAL ONCE
Qty: 1 EACH | Refills: 0 | Status: SHIPPED | OUTPATIENT
Start: 2023-02-07 | End: 2023-02-07

## 2023-02-07 RX ORDER — CARBIDOPA AND LEVODOPA 25; 100 MG/1; MG/1
TABLET ORAL
Qty: 180 TABLET | Refills: 0 | Status: SHIPPED | OUTPATIENT
Start: 2023-02-07 | End: 2023-03-17 | Stop reason: SDUPTHER

## 2023-02-07 RX ORDER — PREGABALIN 100 MG/1
CAPSULE ORAL
Qty: 180 CAPSULE | Refills: 1 | Status: SHIPPED | OUTPATIENT
Start: 2023-02-07 | End: 2023-03-30 | Stop reason: SDUPTHER

## 2023-02-07 RX ORDER — METHOCARBAMOL 750 MG/1
750 TABLET, FILM COATED ORAL EVERY 8 HOURS
Qty: 90 TABLET | Refills: 2 | Status: SHIPPED | OUTPATIENT
Start: 2023-02-07 | End: 2023-04-04

## 2023-02-07 RX ORDER — ACETAMINOPHEN AND CODEINE PHOSPHATE 300; 30 MG/1; MG/1
1 TABLET ORAL EVERY 6 HOURS
Qty: 120 TABLET | Refills: 1 | Status: SHIPPED | OUTPATIENT
Start: 2023-03-03 | End: 2023-05-23 | Stop reason: SDUPTHER

## 2023-02-07 RX ORDER — POTASSIUM CHLORIDE 750 MG/1
10 CAPSULE, EXTENDED RELEASE ORAL ONCE
COMMUNITY
End: 2023-03-24

## 2023-03-17 ENCOUNTER — TELEPHONE (OUTPATIENT)
Dept: NEUROLOGY | Facility: CLINIC | Age: 63
End: 2023-03-17
Payer: MEDICAID

## 2023-03-17 DIAGNOSIS — G25.81 RLS (RESTLESS LEGS SYNDROME): Chronic | ICD-10-CM

## 2023-03-17 RX ORDER — CARVEDILOL 12.5 MG/1
12.5 TABLET ORAL 2 TIMES DAILY WITH MEALS
Qty: 180 TABLET | Refills: 0 | Status: CANCELLED | OUTPATIENT
Start: 2023-03-17

## 2023-03-17 RX ORDER — CARBIDOPA AND LEVODOPA 25; 100 MG/1; MG/1
TABLET ORAL
Qty: 180 TABLET | Refills: 0 | Status: SHIPPED | OUTPATIENT
Start: 2023-03-17 | End: 2023-03-30

## 2023-03-24 ENCOUNTER — OFFICE VISIT (OUTPATIENT)
Dept: FAMILY MEDICINE | Facility: CLINIC | Age: 63
End: 2023-03-24
Payer: MEDICAID

## 2023-03-24 VITALS
BODY MASS INDEX: 27.2 KG/M2 | DIASTOLIC BLOOD PRESSURE: 73 MMHG | HEIGHT: 62 IN | RESPIRATION RATE: 18 BRPM | WEIGHT: 147.81 LBS | SYSTOLIC BLOOD PRESSURE: 116 MMHG | TEMPERATURE: 98 F | OXYGEN SATURATION: 97 % | HEART RATE: 85 BPM

## 2023-03-24 DIAGNOSIS — E87.6 HYPOKALEMIA: ICD-10-CM

## 2023-03-24 DIAGNOSIS — L30.9 DERMATITIS: Primary | ICD-10-CM

## 2023-03-24 DIAGNOSIS — J30.1 SEASONAL ALLERGIC RHINITIS DUE TO POLLEN: ICD-10-CM

## 2023-03-24 LAB — POTASSIUM SERPL-SCNC: 3.7 MMOL/L (ref 3.5–5.1)

## 2023-03-24 PROCEDURE — 3008F BODY MASS INDEX DOCD: CPT | Mod: CPTII,,, | Performed by: NURSE PRACTITIONER

## 2023-03-24 PROCEDURE — 3074F SYST BP LT 130 MM HG: CPT | Mod: CPTII,,, | Performed by: NURSE PRACTITIONER

## 2023-03-24 PROCEDURE — 1159F MED LIST DOCD IN RCRD: CPT | Mod: CPTII,,, | Performed by: NURSE PRACTITIONER

## 2023-03-24 PROCEDURE — 1159F PR MEDICATION LIST DOCUMENTED IN MEDICAL RECORD: ICD-10-PCS | Mod: CPTII,,, | Performed by: NURSE PRACTITIONER

## 2023-03-24 PROCEDURE — 3078F DIAST BP <80 MM HG: CPT | Mod: CPTII,,, | Performed by: NURSE PRACTITIONER

## 2023-03-24 PROCEDURE — 84132 POTASSIUM: ICD-10-PCS | Mod: ,,, | Performed by: CLINICAL MEDICAL LABORATORY

## 2023-03-24 PROCEDURE — 99214 PR OFFICE/OUTPT VISIT, EST, LEVL IV, 30-39 MIN: ICD-10-PCS | Mod: ,,, | Performed by: NURSE PRACTITIONER

## 2023-03-24 PROCEDURE — 3078F PR MOST RECENT DIASTOLIC BLOOD PRESSURE < 80 MM HG: ICD-10-PCS | Mod: CPTII,,, | Performed by: NURSE PRACTITIONER

## 2023-03-24 PROCEDURE — 3074F PR MOST RECENT SYSTOLIC BLOOD PRESSURE < 130 MM HG: ICD-10-PCS | Mod: CPTII,,, | Performed by: NURSE PRACTITIONER

## 2023-03-24 PROCEDURE — 3008F PR BODY MASS INDEX (BMI) DOCUMENTED: ICD-10-PCS | Mod: CPTII,,, | Performed by: NURSE PRACTITIONER

## 2023-03-24 PROCEDURE — 84132 ASSAY OF SERUM POTASSIUM: CPT | Mod: ,,, | Performed by: CLINICAL MEDICAL LABORATORY

## 2023-03-24 PROCEDURE — 99214 OFFICE O/P EST MOD 30 MIN: CPT | Mod: ,,, | Performed by: NURSE PRACTITIONER

## 2023-03-24 RX ORDER — TRIAMCINOLONE ACETONIDE 1 MG/G
CREAM TOPICAL 2 TIMES DAILY
Qty: 45 G | Refills: 0 | Status: SHIPPED | OUTPATIENT
Start: 2023-03-24 | End: 2023-07-27

## 2023-03-24 RX ORDER — FLUTICASONE PROPIONATE 50 MCG
2 SPRAY, SUSPENSION (ML) NASAL DAILY
Qty: 16 G | Refills: 3 | Status: SHIPPED | OUTPATIENT
Start: 2023-03-24

## 2023-03-24 RX ORDER — POLYETHYLENE GLYCOL 3350 17 G/17G
17 POWDER, FOR SOLUTION ORAL
COMMUNITY
Start: 2023-03-15 | End: 2023-04-14

## 2023-03-24 RX ORDER — SULFAMETHOXAZOLE AND TRIMETHOPRIM 800; 160 MG/1; MG/1
1 TABLET ORAL 2 TIMES DAILY
Qty: 14 TABLET | Refills: 0 | Status: SHIPPED | OUTPATIENT
Start: 2023-03-24 | End: 2023-04-04

## 2023-03-24 NOTE — PROGRESS NOTES
CARROLL Bhandari   [facility]  [address]   [place] [address]     PATIENT NAME: Magali Cheung  : 1960  DATE: 3/24/23  MRN: [id number]      Billing Provider: CARROLL Bhandari  Level of Service: NE OFFICE/OUTPT VISIT, CANDIDO GUALLPA IV, 45-59 MIN  Patient PCP Information       Provider PCP Type    CARROLL Bhandari General            Reason for Visit / Chief Complaint: Rash (Red raised itchy rash x 2 weeks. Has been using Dial and Dove soap but nothing has helped and the rash is now spreading.) and Fatigue (Pt has had the rash and the fatigue x 2 weeks)         History of Present Illness / Problem Focused Workflow     Magali Cheung is a 63 y.o. female presents to the clinic  with history of rash on her back x 2 weeks with no change in any products but had  been eating mix nuts (red pistachio's).  She has been trying different types of lotion and soaps and not improving.   She has used some type topical antifungal cream and not improving.  No other new foods.   She has useds different lotion recently.  She notes she has been fatigued about the same time.    She saw Octavia Valladares about a month ago and her A1c  was 7.0 and is watching her diet. She has neuropathy in her feet.   She states she was given  4 tabs of potassium to take due to low potassium. She has eye exam November with [facility] [place].   [facility] is following with Wili CHAN and is doing well with her depression--remains on cymbalta.    I have seen patient in past but not in several years.       Review of Systems     Review of Systems   Constitutional:  Negative for fatigue.   HENT:  Negative for nasal congestion and sore throat.    Respiratory:  Negative for cough, chest tightness, shortness of breath and wheezing.    Cardiovascular:  Negative for chest pain, palpitations and leg swelling.   Gastrointestinal:  Negative for nausea, vomiting and reflux.   Integumentary:  Positive for rash.   Neurological:  Negative for weakness and memory  loss.   Psychiatric/Behavioral:  Negative for confusion and sleep disturbance.       Medical / Social / Family History     Past Medical History:   Diagnosis Date    Anxiety     Chronic pain syndrome     Depression     Diabetes mellitus     GERD (gastroesophageal reflux disease)     Hypertension     Hypothyroidism     Low back pain     Low vitamin B12 level     Lumbar radiculopathy     Neuropathy        Past Surgical History:   Procedure Laterality Date    Bilateral L3-S1 MBB Bilateral 2019, 2019    Dr Barclay     SECTION      COLON SURGERY      HERNIA REPAIR      Left L3-S1 RFTC Left 10/23/2019    Dr Barclay    RADIOFREQUENCY ABLATION OF LUMBAR MEDIAL BRANCH NERVE AT SINGLE LEVEL Right 10/25/2022    Procedure: Radiofrequency Ablation, Nerve, Spinal, Lumbar, Medial Branch, Level L4-S1;  Surgeon: Roselia Barclay MD;  Location: [facility];  Service: Pain Management;  Laterality: Right;  vaccinated.schedule LEFT after right is done.    RADIOFREQUENCY ABLATION OF LUMBAR MEDIAL BRANCH NERVE AT SINGLE LEVEL Left 2022    Procedure: LEFT L4-S1 RFTC  (HAD RIGHT ON 10-25);  Surgeon: Roselia Barclay MD;  Location: [facility];  Service: Pain Management;  Laterality: Left;  VAC SELENE IN EPIC    Right L3-S1 RFTC Right 2019    Dr Barclay    SPINAL CORD STIMULATOR IMPLANT         Social History  Ms.  reports that she has been smoking vaping with nicotine. She has never used smokeless tobacco. She reports current alcohol use. She reports that she does not use drugs.    Family History  Ms.'s family history includes Bipolar disorder in her paternal grandmother; Heart disease in her father and paternal grandfather; Hypertension in her father; Stroke in her maternal grandfather.    Medications and Allergies     Medications  Outpatient Medications Marked as Taking for the 3/24/23 encounter (Office Visit) with CARROLL Bhandari   Medication Sig Dispense Refill    acetaminophen-codeine 300-30mg  (TYLENOL #3) 300-30 mg Tab Take 1 tablet by mouth every 6 (six) hours. 120 tablet 1    albuterol (PROVENTIL/VENTOLIN HFA) 90 mcg/actuation inhaler INHALE ONE TO TWO PUFFS BY MOUTH EVERY 4 HOURS AS NEEDED FOR SHORTNESS OF BREATH OR WHEEZING      atorvastatin (LIPITOR) 40 MG tablet Take 80 mg by mouth once daily. Pt takes two 40 mg tablets daily to = 80 mg      busPIRone (BUSPAR) 15 MG tablet Take 15 mg by mouth 2 (two) times daily.      carbidopa-levodopa  mg (SINEMET)  mg per tablet Take two tablets three times a day 180 tablet 0    carvediloL (COREG) 12.5 MG tablet Take 1 tablet (12.5 mg total) by mouth 2 (two) times daily with meals. 180 tablet 0    cyanocobalamin 1,000 mcg/mL injection inject ONE ML INTRAMUSCULARLY EACH MONTH      DULoxetine 40 mg CpDR TAKE ONE CAPSULE BY MOUTH TWICE DAILY FOR 30 DAYS      esomeprazole (NEXIUM) 40 MG capsule Take 40 mg by mouth once daily.      hydroCHLOROthiazide (HYDRODIURIL) 12.5 MG Tab Take 12.5-25 mg by mouth once daily.      JANUVIA 100 mg Tab Take 100 mg by mouth once daily.      levothyroxine (SYNTHROID) 75 MCG tablet TAKE ONE TABLET BY MOUTH EVERY MORNING FOR THYROID ON AN EMPTY STOMACH      LYRICA 100 mg capsule 2 capsules po  capsule 1    methocarbamoL (ROBAXIN) 750 MG Tab Take 1 tablet (750 mg total) by mouth every 8 (eight) hours. 90 tablet 2    polyethylene glycol (GLYCOLAX) 17 gram/dose powder Take 17 g by mouth.      rosuvastatin (CRESTOR) 20 MG tablet TAKE ONE TABLET BY MOUTH AT BEDTIME for cholestrol      traZODone (DESYREL) 50 MG tablet Take 1 mg by mouth nightly as needed.      vitamin D (VITAMIN D3) 1000 units Tab Take 1,000 Units by mouth once daily.      [DISCONTINUED] fluticasone propionate (FLONASE) 50 mcg/actuation nasal spray 2 sprays by Each Nostril route once daily.         Allergies  Review of patient's allergies indicates:   Allergen Reactions    Opioids - morphine analogues        Physical Examination     Vitals:    03/24/23 0847  "  BP: 116/73   Pulse: 85   Resp: 18   Temp: 98 °F (36.7 °C)   TempSrc: Oral   SpO2: 97%   Weight: 67 kg (147 lb 12.8 oz)   Height: 5' 2" (1.575 m)      Physical Exam  Constitutional:       Appearance: Normal appearance.   Cardiovascular:      Rate and Rhythm: Normal rate and regular rhythm.      Pulses: Normal pulses.      Heart sounds: Normal heart sounds.   Pulmonary:      Effort: Pulmonary effort is normal.      Breath sounds: Normal breath sounds.   Skin:     General: Skin is warm and dry.      Comments: Pt with multiple  1-3mm reddened areas scattered across her  mid back from upper back down to lower back.   Some excoriation noted  from scratching.     Neurological:      Mental Status: She is alert and oriented to person, place, and time.        Assessment and Plan (including Health Maintenance)     :    Plan:  will treat  with zyrtec 10mg daily, triamcinolone 0.1% cream to rash bid til clear and bactrim DS one bid #14 to cover for any infection. Avoid scratching.  Avoid dyes and scents. Follow up prn.  Reminded she needs to get her care gaps met with Octavia Pinedo FNP  will check potassium due to recetn history of lowe K.        Health Maintenance Due   Topic Date Due    Hepatitis C Screening  Never done    Cervical Cancer Screening  Never done    Lipid Panel  Never done    Hemoglobin A1c  Never done    Diabetes Urine Screening  Never done    Foot Exam  Never done    Eye Exam  Never done    Sign Pain Contract  Never done    Mammogram  Never done    Influenza Vaccine (1) Never done       Problem List Items Addressed This Visit    None  Visit Diagnoses       Seasonal allergic rhinitis due to pollen    -  Primary    Dermatitis        Hypokalemia            .  Seasonal allergic rhinitis due to pollen  -     fluticasone propionate (FLONASE) 50 mcg/actuation nasal spray; 2 sprays (100 mcg total) by Each Nostril route once daily.  Dispense: 16 g; Refill: 3    Dermatitis  -     sulfamethoxazole-trimethoprim 800-160mg " (BACTRIM DS) 800-160 mg Tab; Take 1 tablet by mouth 2 (two) times daily.  Dispense: 14 tablet; Refill: 0  -     triamcinolone acetonide 0.1% (KENALOG) 0.1 % cream; Apply topically 2 (two) times daily. To back rash  Dispense: 45 g; Refill: 0    Hypokalemia  -     Potassium; Future; Expected date: 03/24/2023       The patient has no Health Maintenance topics of status Not Due    Procedures     Future Appointments   Date Time Provider Department Center   3/30/2023 11:15 AM Lonnie Partida MD [facility] NEURO [facility]   4/4/2023  1:15 PM Ulises Zhu MD [facility] CARD [facility]   5/1/2023  9:30 AM G[facility]O [facility]        No follow-ups on file.     Signature:  CARROLL Bhandari    Date of encounter: 3/24/23

## 2023-03-27 ENCOUNTER — TELEPHONE (OUTPATIENT)
Dept: FAMILY MEDICINE | Facility: CLINIC | Age: 63
End: 2023-03-27
Payer: MEDICAID

## 2023-03-27 DIAGNOSIS — L30.9 DERMATITIS: Primary | ICD-10-CM

## 2023-03-27 RX ORDER — CETIRIZINE HYDROCHLORIDE 10 MG/1
10 TABLET ORAL DAILY
Qty: 30 TABLET | Refills: 0 | Status: SHIPPED | OUTPATIENT
Start: 2023-03-27 | End: 2024-03-26

## 2023-03-27 NOTE — PROGRESS NOTES
Please notify  Magali that her potassium  is 3.7 and does not need meds at this time but should eat foods rich in potassium/tm

## 2023-03-27 NOTE — TELEPHONE ENCOUNTER
Pt in clinic last week with rash on back along with other  problems. Triamcinolone cream prescrived.  She now states that the rash is on her arms and would like an antihistamine.  Will send in Zyrtec/

## 2023-03-27 NOTE — PROGRESS NOTES
Spoke with pt about labs. She VU. She also asked if there is any kind of antihistamine or something you could write her. She has now broke out all over her arms and is itching horribly.

## 2023-03-30 ENCOUNTER — OFFICE VISIT (OUTPATIENT)
Dept: NEUROLOGY | Facility: CLINIC | Age: 63
End: 2023-03-30
Payer: MEDICAID

## 2023-03-30 VITALS
DIASTOLIC BLOOD PRESSURE: 76 MMHG | OXYGEN SATURATION: 97 % | WEIGHT: 147 LBS | HEART RATE: 68 BPM | HEIGHT: 62 IN | BODY MASS INDEX: 27.05 KG/M2 | SYSTOLIC BLOOD PRESSURE: 120 MMHG

## 2023-03-30 DIAGNOSIS — G25.81 RLS (RESTLESS LEGS SYNDROME): Primary | Chronic | ICD-10-CM

## 2023-03-30 DIAGNOSIS — E11.42 DIABETIC POLYNEUROPATHY ASSOCIATED WITH TYPE 2 DIABETES MELLITUS: Chronic | ICD-10-CM

## 2023-03-30 PROCEDURE — 3074F PR MOST RECENT SYSTOLIC BLOOD PRESSURE < 130 MM HG: ICD-10-PCS | Mod: CPTII,,, | Performed by: PSYCHIATRY & NEUROLOGY

## 2023-03-30 PROCEDURE — 99214 PR OFFICE/OUTPT VISIT, EST, LEVL IV, 30-39 MIN: ICD-10-PCS | Mod: S$PBB,,, | Performed by: PSYCHIATRY & NEUROLOGY

## 2023-03-30 PROCEDURE — 3008F PR BODY MASS INDEX (BMI) DOCUMENTED: ICD-10-PCS | Mod: CPTII,,, | Performed by: PSYCHIATRY & NEUROLOGY

## 2023-03-30 PROCEDURE — 3074F SYST BP LT 130 MM HG: CPT | Mod: CPTII,,, | Performed by: PSYCHIATRY & NEUROLOGY

## 2023-03-30 PROCEDURE — 1159F MED LIST DOCD IN RCRD: CPT | Mod: CPTII,,, | Performed by: PSYCHIATRY & NEUROLOGY

## 2023-03-30 PROCEDURE — 3078F PR MOST RECENT DIASTOLIC BLOOD PRESSURE < 80 MM HG: ICD-10-PCS | Mod: CPTII,,, | Performed by: PSYCHIATRY & NEUROLOGY

## 2023-03-30 PROCEDURE — 99214 OFFICE O/P EST MOD 30 MIN: CPT | Mod: S$PBB,,, | Performed by: PSYCHIATRY & NEUROLOGY

## 2023-03-30 PROCEDURE — 99215 OFFICE O/P EST HI 40 MIN: CPT | Mod: PBBFAC | Performed by: PSYCHIATRY & NEUROLOGY

## 2023-03-30 PROCEDURE — 3078F DIAST BP <80 MM HG: CPT | Mod: CPTII,,, | Performed by: PSYCHIATRY & NEUROLOGY

## 2023-03-30 PROCEDURE — 3008F BODY MASS INDEX DOCD: CPT | Mod: CPTII,,, | Performed by: PSYCHIATRY & NEUROLOGY

## 2023-03-30 PROCEDURE — 1159F PR MEDICATION LIST DOCUMENTED IN MEDICAL RECORD: ICD-10-PCS | Mod: CPTII,,, | Performed by: PSYCHIATRY & NEUROLOGY

## 2023-03-30 RX ORDER — PREGABALIN 100 MG/1
CAPSULE ORAL
Qty: 540 CAPSULE | Refills: 3 | Status: SHIPPED | OUTPATIENT
Start: 2023-03-30 | End: 2023-10-05

## 2023-03-30 RX ORDER — CARBIDOPA AND LEVODOPA 25; 100 MG/1; MG/1
2 TABLET ORAL 3 TIMES DAILY
Qty: 540 TABLET | Refills: 3 | Status: SHIPPED | OUTPATIENT
Start: 2023-03-30 | End: 2023-07-27

## 2023-03-30 NOTE — PROGRESS NOTES
Subjective:       Patient ID: Magali Cheung is a 63 y.o. female     Chief Complaint:    Chief Complaint   Patient presents with    Follow-up    Establish Care        Allergies:  Opioids - morphine analogues    Current Medications:    Outpatient Encounter Medications as of 3/30/2023   Medication Sig Dispense Refill    acetaminophen-codeine 300-30mg (TYLENOL #3) 300-30 mg Tab Take 1 tablet by mouth every 6 (six) hours. 120 tablet 1    albuterol (PROVENTIL/VENTOLIN HFA) 90 mcg/actuation inhaler INHALE ONE TO TWO PUFFS BY MOUTH EVERY 4 HOURS AS NEEDED FOR SHORTNESS OF BREATH OR WHEEZING      atorvastatin (LIPITOR) 40 MG tablet Take 80 mg by mouth once daily. Pt takes two 40 mg tablets daily to = 80 mg      busPIRone (BUSPAR) 15 MG tablet Take 15 mg by mouth 2 (two) times daily.      carvediloL (COREG) 12.5 MG tablet Take 1 tablet (12.5 mg total) by mouth 2 (two) times daily with meals. 180 tablet 0    cetirizine (ZYRTEC) 10 MG tablet Take 1 tablet (10 mg total) by mouth once daily. 30 tablet 0    cyanocobalamin 1,000 mcg/mL injection inject ONE ML INTRAMUSCULARLY EACH MONTH      DULoxetine 40 mg CpDR TAKE ONE CAPSULE BY MOUTH TWICE DAILY FOR 30 DAYS      esomeprazole (NEXIUM) 40 MG capsule Take 40 mg by mouth once daily.      fluticasone propionate (FLONASE) 50 mcg/actuation nasal spray 2 sprays (100 mcg total) by Each Nostril route once daily. 16 g 3    hydroCHLOROthiazide (HYDRODIURIL) 12.5 MG Tab Take 12.5-25 mg by mouth once daily.      JANUVIA 100 mg Tab Take 100 mg by mouth once daily.      levothyroxine (SYNTHROID) 75 MCG tablet TAKE ONE TABLET BY MOUTH EVERY MORNING FOR THYROID ON AN EMPTY STOMACH      methocarbamoL (ROBAXIN) 750 MG Tab Take 1 tablet (750 mg total) by mouth every 8 (eight) hours. 90 tablet 2    polyethylene glycol (GLYCOLAX) 17 gram/dose powder Take 17 g by mouth.      rosuvastatin (CRESTOR) 20 MG tablet TAKE ONE TABLET BY MOUTH AT BEDTIME for cholestrol      sulfamethoxazole-trimethoprim  800-160mg (BACTRIM DS) 800-160 mg Tab Take 1 tablet by mouth 2 (two) times daily. 14 tablet 0    traZODone (DESYREL) 50 MG tablet Take 1 mg by mouth nightly as needed.      triamcinolone acetonide 0.1% (KENALOG) 0.1 % cream Apply topically 2 (two) times daily. To back rash 45 g 0    vitamin D (VITAMIN D3) 1000 units Tab Take 1,000 Units by mouth once daily.      [DISCONTINUED] carbidopa-levodopa  mg (SINEMET)  mg per tablet Take two tablets three times a day 180 tablet 0    [DISCONTINUED] LYRICA 100 mg capsule 2 capsules po  capsule 1    carbidopa-levodopa  mg (SINEMET)  mg per tablet Take 2 tablets by mouth 3 (three) times daily. Please give brand name Sinemet- Thanks 540 tablet 3    LYRICA 100 mg capsule 2 capsules po  capsule 3    [DISCONTINUED] clotrimazole-betamethasone 1-0.05% (LOTRISONE) cream Apply 1 g topically as needed.      [DISCONTINUED] fluticasone propionate (FLONASE) 50 mcg/actuation nasal spray 2 sprays by Each Nostril route once daily.      [DISCONTINUED] hydrOXYzine HCL (ATARAX) 25 MG tablet Take 1 tablet by mouth every 8 (eight) hours as needed.      [DISCONTINUED] potassium chloride (MICRO-K) 10 MEQ CpSR Take 10 mEq by mouth once.       No facility-administered encounter medications on file as of 3/30/2023.       History of Present Illness  64 yo WF w/ several yrs hx DM periph neuropathy and paresthesias in hands and feet  DM has been poorly controlled and recent Hgb A1c = 7  Fairly well controlled on Lyrica 200mg q8 hours   Also w/ RLS well controlled on Sinemet 25/100 mg two tabs q8 hours - she prev failed Requip and Mirapex          Past Medical History:   Diagnosis Date    Anxiety     Chronic pain syndrome     Depression     Diabetes mellitus     GERD (gastroesophageal reflux disease)     Hypertension     Hypothyroidism     Low back pain     Low vitamin B12 level     Lumbar radiculopathy     Neuropathy        Past Surgical History:   Procedure Laterality  "Date    Bilateral L3-S1 MBB Bilateral 2019, 2019    Dr Barclay     SECTION      COLON SURGERY      HERNIA REPAIR      Left L3-S1 RFTC Left 10/23/2019    Dr Barclay    RADIOFREQUENCY ABLATION OF LUMBAR MEDIAL BRANCH NERVE AT SINGLE LEVEL Right 10/25/2022    Procedure: Radiofrequency Ablation, Nerve, Spinal, Lumbar, Medial Branch, Level L4-S1;  Surgeon: Roselia Barclay MD;  Location: [facility];  Service: Pain Management;  Laterality: Right;  vaccinated.schedule LEFT after right is done.    RADIOFREQUENCY ABLATION OF LUMBAR MEDIAL BRANCH NERVE AT SINGLE LEVEL Left 2022    Procedure: LEFT L4-S1 RFTC  (HAD RIGHT ON 10-25);  Surgeon: Roselia Barclay MD;  Location: [facility];  Service: Pain Management;  Laterality: Left;  VAC SELENE IN EPIC    Right L3-S1 RFTC Right 2019    Dr Barclay    SPINAL CORD STIMULATOR IMPLANT         Social History  Ms. Cheung  reports that she has been smoking vaping with nicotine. She has never used smokeless tobacco. She reports current alcohol use. She reports that she does not use drugs.    Family History  Ms.'s Cheung family history includes Bipolar disorder in her paternal grandmother; Heart disease in her father and paternal grandfather; Hypertension in her father; Stroke in her maternal grandfather.    Review of Systems  Review of Systems   Neurological:  Positive for tingling and sensory change.   Psychiatric/Behavioral:  The patient is nervous/anxious and has insomnia.    All other systems reviewed and are negative.   Objective:   /76 (BP Location: Left arm, Patient Position: Sitting, BP Method: Large (Manual))   Pulse 68   Ht 5' 2" (1.575 m)   Wt 66.7 kg (147 lb)   SpO2 97%   BMI 26.89 kg/m²    NEUROLOGICAL EXAMINATION:     MENTAL STATUS   Oriented to person, place, and time.   Level of consciousness: alert  Knowledge: good.     CRANIAL NERVES   Cranial nerves II through XII intact.     MOTOR EXAM     Strength   Strength 5/5 " throughout.     SENSORY EXAM        Paresthesias in both feet/hands      GAIT AND COORDINATION     Gait  Gait: normal     Physical Exam  Vitals reviewed.   Constitutional:       Appearance: She is normal weight.   Neurological:      General: No focal deficit present.      Mental Status: She is alert and oriented to person, place, and time. Mental status is at baseline.      Cranial Nerves: Cranial nerves 2-12 are intact.      Motor: Motor strength is normal.      Gait: Gait is intact.        Assessment:     RLS (restless legs syndrome)    Diabetic polyneuropathy associated with type 2 diabetes mellitus  -     LYRICA 100 mg capsule; 2 capsules po TID  Dispense: 540 capsule; Refill: 3    Other orders  -     carbidopa-levodopa  mg (SINEMET)  mg per tablet; Take 2 tablets by mouth 3 (three) times daily. Please give brand name SinemetShamar Quinones  Dispense: 540 tablet; Refill: 3         Primary Diagnosis and ICD10  RLS (restless legs syndrome) [G25.81]    Plan:     Patient Instructions   Cont Sinemet and Lyrica at current dose and freq  Cont Painmgmt  Trial melatonin for insomnia   F/u 6 months       Medications Discontinued During This Encounter   Medication Reason    carbidopa-levodopa  mg (SINEMET)  mg per tablet     LYRICA 100 mg capsule Reorder       Requested Prescriptions     Signed Prescriptions Disp Refills    LYRICA 100 mg capsule 540 capsule 3     Si capsules po TID    carbidopa-levodopa  mg (SINEMET)  mg per tablet 540 tablet 3     Sig: Take 2 tablets by mouth 3 (three) times daily. Please give brand name Sinemet- Thanks

## 2023-03-30 NOTE — PATIENT INSTRUCTIONS
Cont Sinemet and Lyrica at current dose and freq  Cont Painmgmt  Trial melatonin for insomnia   F/u 6 months

## 2023-04-04 ENCOUNTER — OFFICE VISIT (OUTPATIENT)
Dept: CARDIOLOGY | Facility: CLINIC | Age: 63
End: 2023-04-04
Payer: MEDICAID

## 2023-04-04 VITALS
RESPIRATION RATE: 18 BRPM | HEART RATE: 69 BPM | HEIGHT: 62 IN | WEIGHT: 150 LBS | SYSTOLIC BLOOD PRESSURE: 90 MMHG | BODY MASS INDEX: 27.6 KG/M2 | DIASTOLIC BLOOD PRESSURE: 60 MMHG

## 2023-04-04 DIAGNOSIS — I10 PRIMARY HYPERTENSION: ICD-10-CM

## 2023-04-04 DIAGNOSIS — I10 PRIMARY HYPERTENSION: Primary | ICD-10-CM

## 2023-04-04 DIAGNOSIS — R00.2 PALPITATIONS: ICD-10-CM

## 2023-04-04 DIAGNOSIS — E78.49 OTHER HYPERLIPIDEMIA: ICD-10-CM

## 2023-04-04 PROCEDURE — 3074F SYST BP LT 130 MM HG: CPT | Mod: CPTII,,, | Performed by: STUDENT IN AN ORGANIZED HEALTH CARE EDUCATION/TRAINING PROGRAM

## 2023-04-04 PROCEDURE — 1159F MED LIST DOCD IN RCRD: CPT | Mod: CPTII,,, | Performed by: STUDENT IN AN ORGANIZED HEALTH CARE EDUCATION/TRAINING PROGRAM

## 2023-04-04 PROCEDURE — 3074F PR MOST RECENT SYSTOLIC BLOOD PRESSURE < 130 MM HG: ICD-10-PCS | Mod: CPTII,,, | Performed by: STUDENT IN AN ORGANIZED HEALTH CARE EDUCATION/TRAINING PROGRAM

## 2023-04-04 PROCEDURE — 99214 OFFICE O/P EST MOD 30 MIN: CPT | Mod: PBBFAC | Performed by: STUDENT IN AN ORGANIZED HEALTH CARE EDUCATION/TRAINING PROGRAM

## 2023-04-04 PROCEDURE — 93005 ELECTROCARDIOGRAM TRACING: CPT | Mod: PBBFAC | Performed by: STUDENT IN AN ORGANIZED HEALTH CARE EDUCATION/TRAINING PROGRAM

## 2023-04-04 PROCEDURE — 3078F DIAST BP <80 MM HG: CPT | Mod: CPTII,,, | Performed by: STUDENT IN AN ORGANIZED HEALTH CARE EDUCATION/TRAINING PROGRAM

## 2023-04-04 PROCEDURE — 3008F PR BODY MASS INDEX (BMI) DOCUMENTED: ICD-10-PCS | Mod: CPTII,,, | Performed by: STUDENT IN AN ORGANIZED HEALTH CARE EDUCATION/TRAINING PROGRAM

## 2023-04-04 PROCEDURE — 3078F PR MOST RECENT DIASTOLIC BLOOD PRESSURE < 80 MM HG: ICD-10-PCS | Mod: CPTII,,, | Performed by: STUDENT IN AN ORGANIZED HEALTH CARE EDUCATION/TRAINING PROGRAM

## 2023-04-04 PROCEDURE — 99214 PR OFFICE/OUTPT VISIT, EST, LEVL IV, 30-39 MIN: ICD-10-PCS | Mod: S$PBB,,, | Performed by: STUDENT IN AN ORGANIZED HEALTH CARE EDUCATION/TRAINING PROGRAM

## 2023-04-04 PROCEDURE — 93010 EKG 12-LEAD: ICD-10-PCS | Mod: S$PBB,,, | Performed by: STUDENT IN AN ORGANIZED HEALTH CARE EDUCATION/TRAINING PROGRAM

## 2023-04-04 PROCEDURE — 99214 OFFICE O/P EST MOD 30 MIN: CPT | Mod: S$PBB,,, | Performed by: STUDENT IN AN ORGANIZED HEALTH CARE EDUCATION/TRAINING PROGRAM

## 2023-04-04 PROCEDURE — 3008F BODY MASS INDEX DOCD: CPT | Mod: CPTII,,, | Performed by: STUDENT IN AN ORGANIZED HEALTH CARE EDUCATION/TRAINING PROGRAM

## 2023-04-04 PROCEDURE — 1159F PR MEDICATION LIST DOCUMENTED IN MEDICAL RECORD: ICD-10-PCS | Mod: CPTII,,, | Performed by: STUDENT IN AN ORGANIZED HEALTH CARE EDUCATION/TRAINING PROGRAM

## 2023-04-04 PROCEDURE — 93010 ELECTROCARDIOGRAM REPORT: CPT | Mod: S$PBB,,, | Performed by: STUDENT IN AN ORGANIZED HEALTH CARE EDUCATION/TRAINING PROGRAM

## 2023-04-04 NOTE — PROGRESS NOTES
"  PCP: Ariana Pinedo NP    Referring Provider:     Subjective:   Magali Cheung is a 63 y.o. female with hx of  HTN, diabetes, diabetic neuropathy, hypothyroidism who presents for followup.     4/4/23 - Doing well. No palpitation. Reports some orthostatic symptoms of dizziness. BP in clinic 90/60    6/2022 - Underwent evaluation for palpitations.   Currently doing fine  No longer having palpitations.     Fhx:  Shx: smoker, + etoh, no drugs    EKG: Normal sinus rhythm.  Left anterior fascicular block,  Left ventricular hypertrophy - was having palpitations at the time of EKG.   EKG 10/4/21 - NSR, with 1 degree AVB, LAD,  IRBBB, LVH  ECHO 3/3/21 - Limited study; LVEF 55-60%, normal RV size an fx,  Mild AS (PV 2.6m/s, MG 12 and NAREN 1.4)  [facility] 2017, no blockage.        Lab Results   Component Value Date     01/04/2023    K 3.7 03/24/2023     01/04/2023    CO2 36 (H) 01/04/2023    BUN 16 01/04/2023    CREATININE 0.88 01/04/2023    CALCIUM 9.8 01/04/2023    ANIONGAP 6 (L) 01/04/2023    ESTGFRAFRICA 80 11/11/2020    EGFRNONAA 88 01/11/2022       No results found for: CHOL  No results found for: HDL  No results found for: LDLCALC  No results found for: TRIG  No results found for: CHOLHDL    Lab Results   Component Value Date    WBC 8.83 01/04/2023    HGB 12.5 01/04/2023    HCT 39.9 01/04/2023    MCV 86.4 01/04/2023     01/04/2023           Review of Systems   Respiratory:  Negative for cough and shortness of breath.    Cardiovascular:  Negative for chest pain, palpitations, orthopnea, claudication, leg swelling and PND.   Neurological:  Positive for dizziness.       Objective:   BP 90/60   Pulse 69   Resp 18   Ht 5' 2" (1.575 m)   Wt 68 kg (150 lb)   BMI 27.44 kg/m²     Physical Exam  Cardiovascular:      Rate and Rhythm: Normal rate and regular rhythm.      Pulses: Normal pulses.      Heart sounds: Normal heart sounds.   Pulmonary:      Effort: Pulmonary effort is normal.      Breath sounds: Normal " breath sounds.   Neurological:      Mental Status: She is alert.         Assessment:     1. Primary hypertension  EKG 12-lead      2. Palpitations        3. Other hyperlipidemia              Plan:   Palpitations  Workup negative  Improved on coreg    Hypertension  Orthostatic symptoms  BP 90/60 in clinic  Stop HCTZ; continue coreg    Other hyperlipidemia  On lipitor 40mg qd

## 2023-05-23 NOTE — PROGRESS NOTES
Subjective:         Patient ID: Magali Cheung is a 63 y.o. female.    Chief Complaint: Low-back Pain        Pain  This is a chronic problem. The current episode started more than 1 year ago. The problem occurs daily. The problem has been unchanged. Associated symptoms include arthralgias and neck pain. Pertinent negatives include no anorexia, change in bowel habit, chills, coughing, diaphoresis, fever, sore throat, vertigo or vomiting.   Review of Systems   Constitutional:  Negative for activity change, appetite change, chills, diaphoresis and fever.   HENT:  Negative for drooling, ear discharge, ear pain, facial swelling, nosebleeds, sore throat, voice change and goiter.    Eyes:  Negative for pain, discharge, redness and visual disturbance.   Respiratory:  Negative for apnea, cough, choking, chest tightness, shortness of breath, wheezing and stridor.    Cardiovascular:  Negative for palpitations and leg swelling.   Gastrointestinal:  Negative for abdominal distention, anorexia, change in bowel habit, diarrhea, rectal pain, vomiting, fecal incontinence and change in bowel habit.   Endocrine: Negative for cold intolerance, heat intolerance, polydipsia, polyphagia and polyuria.   Genitourinary:  Negative for flank pain, frequency and hot flashes.   Musculoskeletal:  Positive for arthralgias, back pain, leg pain, neck pain and neck stiffness.   Integumentary:  Negative for color change and pallor.   Allergic/Immunologic: Negative for immunocompromised state.   Neurological:  Negative for dizziness, vertigo, seizures, syncope, facial asymmetry, speech difficulty, light-headedness, coordination difficulties, memory loss and coordination difficulties.   Hematological:  Negative for adenopathy. Does not bruise/bleed easily.   Psychiatric/Behavioral:  Negative for agitation, behavioral problems, confusion, decreased concentration, dysphoric mood, hallucinations, self-injury and suicidal ideas. The patient is not  nervous/anxious and is not hyperactive.          Past Medical History:   Diagnosis Date    Anxiety     Chronic pain syndrome     Depression     Diabetes mellitus     GERD (gastroesophageal reflux disease)     Hypertension     Hypothyroidism     Low back pain     Low vitamin B12 level     Lumbar radiculopathy     Neuropathy      Past Surgical History:   Procedure Laterality Date    Bilateral L3-S1 MBB Bilateral 2019, 2019    Dr Barclay     SECTION      COLON SURGERY      HERNIA REPAIR      Left L3-S1 RFTC Left 10/23/2019    Dr Barclay    RADIOFREQUENCY ABLATION OF LUMBAR MEDIAL BRANCH NERVE AT SINGLE LEVEL Right 10/25/2022    Procedure: Radiofrequency Ablation, Nerve, Spinal, Lumbar, Medial Branch, Level L4-S1;  Surgeon: Roselia Barclay MD;  Location: [facility];  Service: Pain Management;  Laterality: Right;  vaccinated.schedule LEFT after right is done.    RADIOFREQUENCY ABLATION OF LUMBAR MEDIAL BRANCH NERVE AT SINGLE LEVEL Left 2022    Procedure: LEFT L4-S1 RFTC  (HAD RIGHT ON 10-25);  Surgeon: Roselia Barclay MD;  Location: [facility];  Service: Pain Management;  Laterality: Left;  VAC SELENE IN EPIC    Right L3-S1 RFTC Right 2019    Dr Barclay    SPINAL CORD STIMULATOR IMPLANT       Social History     Socioeconomic History    Marital status:    Tobacco Use    Smoking status: Every Day     Types: Vaping with nicotine    Smokeless tobacco: Never   Substance and Sexual Activity    Alcohol use: Yes    Drug use: Never    Sexual activity: Yes     Partners: Male     Family History   Problem Relation Age of Onset    Hypertension Father     Heart disease Father     Stroke Maternal Grandfather     Bipolar disorder Paternal Grandmother     Heart disease Paternal Grandfather      Review of patient's allergies indicates:   Allergen Reactions    Opioids - morphine analogues         Objective:  Vitals:    23 1337   BP: (!) 177/88   Pulse: 79   Resp: 20   Weight: 63.5 kg  "(140 lb)   Height: 5' 2" (1.575 m)   PainSc:   6         Physical Exam  Vitals and nursing note reviewed. Exam conducted with a chaperone present.   Constitutional:       General: She is awake. She is not in acute distress.     Appearance: Normal appearance. She is not ill-appearing, toxic-appearing or diaphoretic.   HENT:      Head: Normocephalic and atraumatic.      Nose: Nose normal.      Mouth/Throat:      Mouth: Mucous membranes are moist.      Pharynx: Oropharynx is clear.   Eyes:      Conjunctiva/sclera: Conjunctivae normal.      Pupils: Pupils are equal, round, and reactive to light.   Cardiovascular:      Rate and Rhythm: Normal rate.   Pulmonary:      Effort: Pulmonary effort is normal. No respiratory distress.   Abdominal:      Palpations: Abdomen is soft.      Tenderness: There is no guarding.   Musculoskeletal:      Cervical back: Normal range of motion and neck supple. Tenderness present. No rigidity.      Thoracic back: Tenderness present.      Lumbar back: Tenderness present. Decreased range of motion.   Skin:     General: Skin is warm and dry.      Coloration: Skin is not jaundiced or pale.   Neurological:      General: No focal deficit present.      Mental Status: She is alert and oriented to person, place, and time. Mental status is at baseline.      Cranial Nerves: No cranial nerve deficit (II-XII).      Deep Tendon Reflexes:      Reflex Scores:       Brachioradialis reflexes are 2+ on the right side.  Psychiatric:         Mood and Affect: Mood normal.         Behavior: Behavior normal. Behavior is cooperative.         Thought Content: Thought content normal.         X-Ray KUB  Narrative: EXAMINATION:  XR KUB    CLINICAL HISTORY:  Abdominal pain    COMPARISON:  27 August 2020    FINDINGS:  No free fluid or free air seen.  The bowel gas pattern appears within normal limits.  No abnormal calcifications are present.  No other abnormality is identified.  Impression: No evidence of abnormality " demonstrated    Electronically signed by: Kenji Conrad  Date:    01/04/2023  Time:    09:42         Office Visit on 03/24/2023   Component Date Value Ref Range Status    Potassium 03/24/2023 3.7  3.5 - 5.1 mmol/L Final   Lab Visit on 01/04/2023   Component Date Value Ref Range Status    Sodium 01/04/2023 137  136 - 145 mmol/L Final    Potassium 01/04/2023 4.9  3.5 - 5.1 mmol/L Final    Chloride 01/04/2023 100  98 - 107 mmol/L Final    CO2 01/04/2023 36 (H)  21 - 32 mmol/L Final    Anion Gap 01/04/2023 6 (L)  7 - 16 mmol/L Final    Glucose 01/04/2023 116 (H)  74 - 106 mg/dL Final    BUN 01/04/2023 16  7 - 18 mg/dL Final    Creatinine 01/04/2023 0.88  0.55 - 1.02 mg/dL Final    BUN/Creatinine Ratio 01/04/2023 18  6 - 20 Final    Calcium 01/04/2023 9.8  8.5 - 10.1 mg/dL Final    Total Protein 01/04/2023 7.6  6.4 - 8.2 g/dL Final    Albumin 01/04/2023 4.0  3.5 - 5.0 g/dL Final    Globulin 01/04/2023 3.6  2.0 - 4.0 g/dL Final    A/G Ratio 01/04/2023 1.1   Final    Bilirubin, Total 01/04/2023 0.4  >0.0 - 1.2 mg/dL Final    Alk Phos 01/04/2023 79  50 - 130 U/L Final    ALT 01/04/2023 10 (L)  13 - 56 U/L Final    AST 01/04/2023 12 (L)  15 - 37 U/L Final    eGFR 01/04/2023 75  >=60 mL/min/1.73m² Final    WBC 01/04/2023 8.83  4.50 - 11.00 K/uL Final    RBC 01/04/2023 4.62  4.20 - 5.40 M/uL Final    Hemoglobin 01/04/2023 12.5  12.0 - 16.0 g/dL Final    Hematocrit 01/04/2023 39.9  38.0 - 47.0 % Final    MCV 01/04/2023 86.4  80.0 - 96.0 fL Final    MCH 01/04/2023 27.1  27.0 - 31.0 pg Final    MCHC 01/04/2023 31.3 (L)  32.0 - 36.0 g/dL Final    RDW 01/04/2023 14.7 (H)  11.5 - 14.5 % Final    Platelet Count 01/04/2023 305  150 - 400 K/uL Final    MPV 01/04/2023 10.8  9.4 - 12.4 fL Final    Neutrophils % 01/04/2023 54.9  53.0 - 65.0 % Final    Lymphocytes % 01/04/2023 34.0  27.0 - 41.0 % Final    Monocytes % 01/04/2023 6.9 (H)  2.0 - 6.0 % Final    Eosinophils % 01/04/2023 2.3  1.0 - 4.0 % Final    Basophils % 01/04/2023 1.2 (H)   0.0 - 1.0 % Final    Immature Granulocytes % 01/04/2023 0.7 (H)  0.0 - 0.4 % Final    nRBC, Auto 01/04/2023 0.0  <=0.0 % Final    Neutrophils, Abs 01/04/2023 4.85  1.80 - 7.70 K/uL Final    Lymphocytes, Absolute 01/04/2023 3.00  1.00 - 4.80 K/uL Final    Monocytes, Absolute 01/04/2023 0.61  0.00 - 0.80 K/uL Final    Eosinophils, Absolute 01/04/2023 0.20  0.00 - 0.50 K/uL Final    Basophils, Absolute 01/04/2023 0.11  0.00 - 0.20 K/uL Final    Immature Granulocytes, Absolute 01/04/2023 0.06 (H)  0.00 - 0.04 K/uL Final    nRBC, Absolute 01/04/2023 0.00  <=0.00 x10e3/uL Final    Diff Type 01/04/2023 Auto   Final   Office Visit on 12/07/2022   Component Date Value Ref Range Status    POC Amphetamines 12/07/2022 Negative  Negative, Inconclusive Final    POC Barbiturates 12/07/2022 Negative  Negative, Inconclusive Final    POC Benzodiazepines 12/07/2022 Negative  Negative, Inconclusive Final    POC Cocaine 12/07/2022 Negative  Negative, Inconclusive Final    POC THC 12/07/2022 Negative  Negative, Inconclusive Final    POC Methadone 12/07/2022 Negative  Negative, Inconclusive Final    POC Methamphetamine 12/07/2022 Negative  Negative, Inconclusive Final    POC Opiates 12/07/2022 Presumptive Positive (A)  Negative, Inconclusive Final    POC Oxycodone 12/07/2022 Negative  Negative, Inconclusive Final    POC Phencyclidine 12/07/2022 Negative  Negative, Inconclusive Final    POC Methylenedioxymethamphetamine * 12/07/2022 Negative  Negative, Inconclusive Final    POC Tricyclic Antidepressants 12/07/2022 Negative  Negative, Inconclusive Final    POC Buprenorphine 12/07/2022 Negative   Final     Acceptable 12/07/2022 Yes   Final    POC Temperature (Urine) 12/07/2022 92   Final         Orders Placed This Encounter   Procedures    POCT Urine Drug Screen Presump     Interpretive Information:     Negative:  No drug detected at the cut off level.   Positive:  This result represents presumptive positive for the   tested  drug, other substances may yield a positive response other   than the analyte of interest. This result should be utilized for   diagnostic purpose only. Confirmation testing will be performed upon physician request only.            Requested Prescriptions     Pending Prescriptions Disp Refills    acetaminophen-codeine 300-30mg (TYLENOL #3) 300-30 mg Tab 120 tablet 1     Sig: Take 1 tablet by mouth every 6 (six) hours.       Assessment:     1. Lumbosacral spondylosis without myelopathy    2. Diabetic polyneuropathy associated with type 2 diabetes mellitus    3. Encounter for long-term (current) use of other medications         A's of Opioid Risk Assessment  Activity:Patient can perform ADL.   Analgesia:Patients pain is partially controlled by current medication. Patient has tried OTC medications such as Tylenol and Ibuprofen with out relief.   Adverse Effects: Patient denies constipation or sedation.  Aberrant Behavior:  reviewed with no aberrant drug seeking/taking behavior.  Overdose reversal drug naloxone discussed    Drug screen reviewed    MRI lumbar spine [facility] June 4, 2018 multiple level degenerative changes please see images/report      Plan:    Spinal cord stimulator not functioning     Chronic neck and back pain    She states current medications helping     She would like to continue conservative management    Continue home exercise program as directed    Follow-up 2 months    Dr. Barclay December 2023    Bring original prescription medication bottles/container/box with labels to each visit

## 2023-05-24 ENCOUNTER — OFFICE VISIT (OUTPATIENT)
Dept: PAIN MEDICINE | Facility: CLINIC | Age: 63
End: 2023-05-24
Payer: MEDICAID

## 2023-05-24 VITALS
RESPIRATION RATE: 20 BRPM | WEIGHT: 140 LBS | HEART RATE: 79 BPM | HEIGHT: 62 IN | SYSTOLIC BLOOD PRESSURE: 177 MMHG | DIASTOLIC BLOOD PRESSURE: 88 MMHG | BODY MASS INDEX: 25.76 KG/M2

## 2023-05-24 DIAGNOSIS — E11.42 DIABETIC POLYNEUROPATHY ASSOCIATED WITH TYPE 2 DIABETES MELLITUS: Chronic | ICD-10-CM

## 2023-05-24 DIAGNOSIS — M47.817 LUMBOSACRAL SPONDYLOSIS WITHOUT MYELOPATHY: Primary | Chronic | ICD-10-CM

## 2023-05-24 DIAGNOSIS — Z79.899 ENCOUNTER FOR LONG-TERM (CURRENT) USE OF OTHER MEDICATIONS: ICD-10-CM

## 2023-05-24 DIAGNOSIS — M54.12 RADICULOPATHY, CERVICAL REGION: Chronic | ICD-10-CM

## 2023-05-24 PROCEDURE — 99214 PR OFFICE/OUTPT VISIT, EST, LEVL IV, 30-39 MIN: ICD-10-PCS | Mod: S$PBB,,, | Performed by: PHYSICIAN ASSISTANT

## 2023-05-24 PROCEDURE — 99214 OFFICE O/P EST MOD 30 MIN: CPT | Mod: S$PBB,,, | Performed by: PHYSICIAN ASSISTANT

## 2023-05-24 PROCEDURE — 3079F DIAST BP 80-89 MM HG: CPT | Mod: CPTII,,, | Performed by: PHYSICIAN ASSISTANT

## 2023-05-24 PROCEDURE — 3079F PR MOST RECENT DIASTOLIC BLOOD PRESSURE 80-89 MM HG: ICD-10-PCS | Mod: CPTII,,, | Performed by: PHYSICIAN ASSISTANT

## 2023-05-24 PROCEDURE — 99214 OFFICE O/P EST MOD 30 MIN: CPT | Mod: PBBFAC | Performed by: PHYSICIAN ASSISTANT

## 2023-05-24 PROCEDURE — 3077F SYST BP >= 140 MM HG: CPT | Mod: CPTII,,, | Performed by: PHYSICIAN ASSISTANT

## 2023-05-24 PROCEDURE — 3008F BODY MASS INDEX DOCD: CPT | Mod: CPTII,,, | Performed by: PHYSICIAN ASSISTANT

## 2023-05-24 PROCEDURE — 1159F MED LIST DOCD IN RCRD: CPT | Mod: CPTII,,, | Performed by: PHYSICIAN ASSISTANT

## 2023-05-24 PROCEDURE — 3077F PR MOST RECENT SYSTOLIC BLOOD PRESSURE >= 140 MM HG: ICD-10-PCS | Mod: CPTII,,, | Performed by: PHYSICIAN ASSISTANT

## 2023-05-24 PROCEDURE — 3008F PR BODY MASS INDEX (BMI) DOCUMENTED: ICD-10-PCS | Mod: CPTII,,, | Performed by: PHYSICIAN ASSISTANT

## 2023-05-24 PROCEDURE — 1159F PR MEDICATION LIST DOCUMENTED IN MEDICAL RECORD: ICD-10-PCS | Mod: CPTII,,, | Performed by: PHYSICIAN ASSISTANT

## 2023-05-24 PROCEDURE — 80305 DRUG TEST PRSMV DIR OPT OBS: CPT | Mod: PBBFAC | Performed by: PHYSICIAN ASSISTANT

## 2023-05-24 RX ORDER — ACETAMINOPHEN AND CODEINE PHOSPHATE 300; 30 MG/1; MG/1
1 TABLET ORAL EVERY 6 HOURS
Qty: 120 TABLET | Refills: 1 | Status: SHIPPED | OUTPATIENT
Start: 2023-05-24 | End: 2023-07-23

## 2023-05-24 RX ORDER — METHOCARBAMOL 750 MG/1
750 TABLET, FILM COATED ORAL EVERY 8 HOURS
Qty: 90 TABLET | Refills: 1 | Status: SHIPPED | OUTPATIENT
Start: 2023-05-24

## 2023-06-05 ENCOUNTER — TELEPHONE (OUTPATIENT)
Dept: PAIN MEDICINE | Facility: CLINIC | Age: 63
End: 2023-06-05
Payer: MEDICAID

## 2023-06-05 NOTE — TELEPHONE ENCOUNTER
----- Message from Gayle Puente sent at 6/5/2023  9:43 AM CDT -----  [phone] patient needs to speak to someone about her meds  RUBENS BROUSSARD   06/05/2023   3:29 PM   Attempted to call no answer, left message.   RUBENS BROUSSARD   06/06/2023   9:27 AM   Attempted to call patient no answer, left message.

## 2023-07-10 RX ORDER — CARVEDILOL 12.5 MG/1
12.5 TABLET ORAL 2 TIMES DAILY WITH MEALS
Qty: 180 TABLET | Refills: 0 | Status: SHIPPED | OUTPATIENT
Start: 2023-07-10

## 2023-07-17 NOTE — PROGRESS NOTES
Subjective:         Patient ID: Magali Cheung is a 63 y.o. female.    Chief Complaint: Low-back Pain and Leg Pain (Bilateral leg pain)        Pain  This is a chronic problem. The current episode started more than 1 year ago. The problem occurs daily. The problem has been waxing and waning. Associated symptoms include arthralgias and neck pain. Pertinent negatives include no anorexia, change in bowel habit, chills, coughing, diaphoresis, fever, sore throat, vertigo or vomiting.   Review of Systems   Constitutional:  Negative for activity change, appetite change, chills, diaphoresis and fever.   HENT:  Negative for drooling, ear discharge, ear pain, facial swelling, nosebleeds, sore throat, voice change and goiter.    Eyes:  Negative for pain, discharge, redness and visual disturbance.   Respiratory:  Negative for apnea, cough, choking, chest tightness, shortness of breath, wheezing and stridor.    Cardiovascular:  Negative for palpitations and leg swelling.   Gastrointestinal:  Negative for abdominal distention, anorexia, change in bowel habit, diarrhea, rectal pain, vomiting, fecal incontinence and change in bowel habit.   Endocrine: Negative for cold intolerance, heat intolerance, polydipsia, polyphagia and polyuria.   Genitourinary:  Negative for flank pain, frequency and hot flashes.   Musculoskeletal:  Positive for arthralgias, back pain, leg pain, neck pain and neck stiffness.   Integumentary:  Negative for color change and pallor.   Allergic/Immunologic: Negative for immunocompromised state.   Neurological:  Negative for dizziness, vertigo, seizures, syncope, facial asymmetry, speech difficulty, light-headedness, coordination difficulties, memory loss and coordination difficulties.   Hematological:  Negative for adenopathy. Does not bruise/bleed easily.   Psychiatric/Behavioral:  Negative for agitation, behavioral problems, confusion, decreased concentration, dysphoric mood, hallucinations, self-injury and  suicidal ideas. The patient is not nervous/anxious and is not hyperactive.          Past Medical History:   Diagnosis Date    Anxiety     Chronic pain syndrome     Depression     Diabetes mellitus     GERD (gastroesophageal reflux disease)     Hypertension     Hypothyroidism     Low back pain     Low vitamin B12 level     Lumbar radiculopathy     Neuropathy      Past Surgical History:   Procedure Laterality Date    Bilateral L3-S1 MBB Bilateral 2019, 2019    Dr Barclay     SECTION      COLON SURGERY      HERNIA REPAIR      Left L3-S1 RFTC Left 10/23/2019    Dr Barclay    RADIOFREQUENCY ABLATION OF LUMBAR MEDIAL BRANCH NERVE AT SINGLE LEVEL Right 10/25/2022    Procedure: Radiofrequency Ablation, Nerve, Spinal, Lumbar, Medial Branch, Level L4-S1;  Surgeon: Roselia Barclay MD;  Location: [facility];  Service: Pain Management;  Laterality: Right;  vaccinated.schedule LEFT after right is done.    RADIOFREQUENCY ABLATION OF LUMBAR MEDIAL BRANCH NERVE AT SINGLE LEVEL Left 2022    Procedure: LEFT L4-S1 RFTC  (HAD RIGHT ON 10-25);  Surgeon: Roselia Barclay MD;  Location: [facility];  Service: Pain Management;  Laterality: Left;  VAC SELENE IN EPIC    Right L3-S1 RFTC Right 2019    Dr Barclay    SPINAL CORD STIMULATOR IMPLANT       Social History     Socioeconomic History    Marital status:    Tobacco Use    Smoking status: Every Day     Types: Vaping with nicotine    Smokeless tobacco: Never   Substance and Sexual Activity    Alcohol use: Yes    Drug use: Never    Sexual activity: Yes     Partners: Male     Family History   Problem Relation Age of Onset    Hypertension Father     Heart disease Father     Stroke Maternal Grandfather     Bipolar disorder Paternal Grandmother     Heart disease Paternal Grandfather      Review of patient's allergies indicates:   Allergen Reactions    Opioids - morphine analogues         Objective:  Vitals:    23 1342   BP: (!) 143/75  "  Pulse: 79   Resp: 18   Weight: 64 kg (141 lb)   Height: 5' 2" (1.575 m)   PainSc:   6         Physical Exam  Vitals and nursing note reviewed. Exam conducted with a chaperone present.   Constitutional:       General: She is awake. She is not in acute distress.     Appearance: Normal appearance. She is not ill-appearing, toxic-appearing or diaphoretic.   HENT:      Head: Normocephalic and atraumatic.      Nose: Nose normal.      Mouth/Throat:      Mouth: Mucous membranes are moist.      Pharynx: Oropharynx is clear.   Eyes:      Conjunctiva/sclera: Conjunctivae normal.      Pupils: Pupils are equal, round, and reactive to light.   Cardiovascular:      Rate and Rhythm: Normal rate.   Pulmonary:      Effort: Pulmonary effort is normal. No respiratory distress.   Abdominal:      Palpations: Abdomen is soft.      Tenderness: There is no guarding.   Musculoskeletal:      Cervical back: Normal range of motion and neck supple. Tenderness present. No rigidity.      Thoracic back: Tenderness present.      Lumbar back: Tenderness present. Decreased range of motion.   Skin:     General: Skin is warm and dry.      Coloration: Skin is not jaundiced or pale.   Neurological:      General: No focal deficit present.      Mental Status: She is alert and oriented to person, place, and time. Mental status is at baseline.      Cranial Nerves: No cranial nerve deficit (II-XII).      Deep Tendon Reflexes:      Reflex Scores:       Brachioradialis reflexes are 2+ on the right side.  Psychiatric:         Mood and Affect: Mood normal.         Behavior: Behavior normal. Behavior is cooperative.         Thought Content: Thought content normal.         X-Ray KUB  Narrative: EXAMINATION:  XR KUB    CLINICAL HISTORY:  Abdominal pain    COMPARISON:  27 August 2020    FINDINGS:  No free fluid or free air seen.  The bowel gas pattern appears within normal limits.  No abnormal calcifications are present.  No other abnormality is " identified.  Impression: No evidence of abnormality demonstrated    Electronically signed by: Kenji Conrad  Date:    01/04/2023  Time:    09:42         Office Visit on 05/24/2023   Component Date Value Ref Range Status    POC Amphetamines 05/24/2023 Negative  Negative, Inconclusive Final    POC Barbiturates 05/24/2023 Negative  Negative, Inconclusive Final    POC Benzodiazepines 05/24/2023 Negative  Negative, Inconclusive Final    POC Cocaine 05/24/2023 Negative  Negative, Inconclusive Final    POC THC 05/24/2023 Negative  Negative, Inconclusive Final    POC Methadone 05/24/2023 Negative  Negative, Inconclusive Final    POC Methamphetamine 05/24/2023 Negative  Negative, Inconclusive Final    POC Opiates 05/24/2023 Presumptive Positive (A)  Negative, Inconclusive Final    POC Oxycodone 05/24/2023 Negative  Negative, Inconclusive Final    POC Phencyclidine 05/24/2023 Negative  Negative, Inconclusive Final    POC Methylenedioxymethamphetamine * 05/24/2023 Negative  Negative, Inconclusive Final    POC Tricyclic Antidepressants 05/24/2023 Negative  Negative, Inconclusive Final    POC Buprenorphine 05/24/2023 Negative   Final     Acceptable 05/24/2023 Yes   Final    POC Temperature (Urine) 05/24/2023 92   Final   Office Visit on 03/24/2023   Component Date Value Ref Range Status    Potassium 03/24/2023 3.7  3.5 - 5.1 mmol/L Final         No orders of the defined types were placed in this encounter.        Requested Prescriptions      No prescriptions requested or ordered in this encounter       Assessment:     1. Lumbosacral spondylosis without myelopathy    2. Diabetic polyneuropathy associated with type 2 diabetes mellitus           A's of Opioid Risk Assessment  Activity:Patient can perform ADL.   Analgesia:Patients pain is partially controlled by current medication. Patient has tried OTC medications such as Tylenol and Ibuprofen with out relief.   Adverse Effects: Patient denies constipation or  sedation.  Aberrant Behavior:  reviewed with no aberrant drug seeking/taking behavior.  Overdose reversal drug naloxone discussed    Drug screen reviewed    MRI lumbar spine [facility] June 4, 2018 multiple level degenerative changes please see images/report      Plan:    Spinal cord stimulator not functioning     Chronic neck and back pain    Recovering after the death of her father following mental health    Short-term Ativan 1 mg 30 tablets July 6, 2023    She does not need refill Tylenol 3 today     She states she will continue with conservative management follow-up after discontinuing benzodiazepine    She would like to continue conservative management    Continue home exercise program as directed    Follow-up as needed, patient request    Dr. Barclay December 2023    Bring original prescription medication bottles/container/box with labels to each visit

## 2023-07-18 ENCOUNTER — OFFICE VISIT (OUTPATIENT)
Dept: PAIN MEDICINE | Facility: CLINIC | Age: 63
End: 2023-07-18
Payer: MEDICAID

## 2023-07-18 VITALS
HEIGHT: 62 IN | SYSTOLIC BLOOD PRESSURE: 143 MMHG | HEART RATE: 79 BPM | RESPIRATION RATE: 18 BRPM | BODY MASS INDEX: 25.95 KG/M2 | DIASTOLIC BLOOD PRESSURE: 75 MMHG | WEIGHT: 141 LBS

## 2023-07-18 DIAGNOSIS — M47.817 LUMBOSACRAL SPONDYLOSIS WITHOUT MYELOPATHY: Primary | Chronic | ICD-10-CM

## 2023-07-18 DIAGNOSIS — E11.42 DIABETIC POLYNEUROPATHY ASSOCIATED WITH TYPE 2 DIABETES MELLITUS: Chronic | ICD-10-CM

## 2023-07-18 PROCEDURE — 3078F DIAST BP <80 MM HG: CPT | Mod: CPTII,,, | Performed by: PHYSICIAN ASSISTANT

## 2023-07-18 PROCEDURE — 99215 OFFICE O/P EST HI 40 MIN: CPT | Mod: PBBFAC | Performed by: PHYSICIAN ASSISTANT

## 2023-07-18 PROCEDURE — 99213 PR OFFICE/OUTPT VISIT, EST, LEVL III, 20-29 MIN: ICD-10-PCS | Mod: S$PBB,,, | Performed by: PHYSICIAN ASSISTANT

## 2023-07-18 PROCEDURE — 99213 OFFICE O/P EST LOW 20 MIN: CPT | Mod: S$PBB,,, | Performed by: PHYSICIAN ASSISTANT

## 2023-07-18 PROCEDURE — 1159F MED LIST DOCD IN RCRD: CPT | Mod: CPTII,,, | Performed by: PHYSICIAN ASSISTANT

## 2023-07-18 PROCEDURE — 3008F BODY MASS INDEX DOCD: CPT | Mod: CPTII,,, | Performed by: PHYSICIAN ASSISTANT

## 2023-07-18 PROCEDURE — 3008F PR BODY MASS INDEX (BMI) DOCUMENTED: ICD-10-PCS | Mod: CPTII,,, | Performed by: PHYSICIAN ASSISTANT

## 2023-07-18 PROCEDURE — 1159F PR MEDICATION LIST DOCUMENTED IN MEDICAL RECORD: ICD-10-PCS | Mod: CPTII,,, | Performed by: PHYSICIAN ASSISTANT

## 2023-07-18 PROCEDURE — 3078F PR MOST RECENT DIASTOLIC BLOOD PRESSURE < 80 MM HG: ICD-10-PCS | Mod: CPTII,,, | Performed by: PHYSICIAN ASSISTANT

## 2023-07-18 PROCEDURE — 3077F PR MOST RECENT SYSTOLIC BLOOD PRESSURE >= 140 MM HG: ICD-10-PCS | Mod: CPTII,,, | Performed by: PHYSICIAN ASSISTANT

## 2023-07-18 PROCEDURE — 3077F SYST BP >= 140 MM HG: CPT | Mod: CPTII,,, | Performed by: PHYSICIAN ASSISTANT

## 2023-07-18 RX ORDER — LORAZEPAM 1 MG/1
TABLET ORAL
COMMUNITY
Start: 2023-07-06

## 2023-07-26 ENCOUNTER — OFFICE VISIT (OUTPATIENT)
Dept: FAMILY MEDICINE | Facility: CLINIC | Age: 63
End: 2023-07-26
Payer: MEDICAID

## 2023-07-26 VITALS
WEIGHT: 141.19 LBS | HEART RATE: 70 BPM | DIASTOLIC BLOOD PRESSURE: 74 MMHG | HEIGHT: 62 IN | TEMPERATURE: 98 F | SYSTOLIC BLOOD PRESSURE: 132 MMHG | BODY MASS INDEX: 25.98 KG/M2 | OXYGEN SATURATION: 95 % | RESPIRATION RATE: 18 BRPM

## 2023-07-26 DIAGNOSIS — J01.00 ACUTE NON-RECURRENT MAXILLARY SINUSITIS: Primary | ICD-10-CM

## 2023-07-26 PROCEDURE — 1159F PR MEDICATION LIST DOCUMENTED IN MEDICAL RECORD: ICD-10-PCS | Mod: CPTII,,, | Performed by: NURSE PRACTITIONER

## 2023-07-26 PROCEDURE — 3075F PR MOST RECENT SYSTOLIC BLOOD PRESS GE 130-139MM HG: ICD-10-PCS | Mod: CPTII,,, | Performed by: NURSE PRACTITIONER

## 2023-07-26 PROCEDURE — 1160F RVW MEDS BY RX/DR IN RCRD: CPT | Mod: CPTII,,, | Performed by: NURSE PRACTITIONER

## 2023-07-26 PROCEDURE — 3078F DIAST BP <80 MM HG: CPT | Mod: CPTII,,, | Performed by: NURSE PRACTITIONER

## 2023-07-26 PROCEDURE — 96372 THER/PROPH/DIAG INJ SC/IM: CPT | Mod: ,,, | Performed by: NURSE PRACTITIONER

## 2023-07-26 PROCEDURE — 3078F PR MOST RECENT DIASTOLIC BLOOD PRESSURE < 80 MM HG: ICD-10-PCS | Mod: CPTII,,, | Performed by: NURSE PRACTITIONER

## 2023-07-26 PROCEDURE — 3075F SYST BP GE 130 - 139MM HG: CPT | Mod: CPTII,,, | Performed by: NURSE PRACTITIONER

## 2023-07-26 PROCEDURE — 3008F BODY MASS INDEX DOCD: CPT | Mod: CPTII,,, | Performed by: NURSE PRACTITIONER

## 2023-07-26 PROCEDURE — 1160F PR REVIEW ALL MEDS BY PRESCRIBER/CLIN PHARMACIST DOCUMENTED: ICD-10-PCS | Mod: CPTII,,, | Performed by: NURSE PRACTITIONER

## 2023-07-26 PROCEDURE — 99212 PR OFFICE/OUTPT VISIT, EST, LEVL II, 10-19 MIN: ICD-10-PCS | Mod: 25,,, | Performed by: NURSE PRACTITIONER

## 2023-07-26 PROCEDURE — 99212 OFFICE O/P EST SF 10 MIN: CPT | Mod: 25,,, | Performed by: NURSE PRACTITIONER

## 2023-07-26 PROCEDURE — 3008F PR BODY MASS INDEX (BMI) DOCUMENTED: ICD-10-PCS | Mod: CPTII,,, | Performed by: NURSE PRACTITIONER

## 2023-07-26 PROCEDURE — 96372 PR INJECTION,THERAP/PROPH/DIAG2ST, IM OR SUBCUT: ICD-10-PCS | Mod: ,,, | Performed by: NURSE PRACTITIONER

## 2023-07-26 PROCEDURE — 1159F MED LIST DOCD IN RCRD: CPT | Mod: CPTII,,, | Performed by: NURSE PRACTITIONER

## 2023-07-26 RX ORDER — BENZONATATE 200 MG/1
200 CAPSULE ORAL 3 TIMES DAILY PRN
COMMUNITY

## 2023-07-26 RX ORDER — LEVOTHYROXINE SODIUM 50 UG/1
50 TABLET ORAL
COMMUNITY

## 2023-07-26 RX ORDER — AZITHROMYCIN 250 MG/1
TABLET, FILM COATED ORAL
Qty: 6 TABLET | Refills: 0 | Status: SHIPPED | OUTPATIENT
Start: 2023-07-26 | End: 2023-07-31

## 2023-07-26 RX ORDER — LINACLOTIDE 290 UG/1
CAPSULE, GELATIN COATED ORAL
COMMUNITY
Start: 2023-06-21

## 2023-07-26 RX ORDER — ACETAMINOPHEN AND CODEINE PHOSPHATE 300; 30 MG/1; MG/1
1 TABLET ORAL EVERY 6 HOURS PRN
COMMUNITY

## 2023-07-26 RX ORDER — PREDNISONE 10 MG/1
TABLET ORAL
COMMUNITY
Start: 2023-04-05 | End: 2023-07-27

## 2023-07-26 RX ORDER — PREGABALIN 100 MG/1
100 CAPSULE ORAL 3 TIMES DAILY
COMMUNITY
End: 2023-10-05 | Stop reason: SDUPTHER

## 2023-07-26 RX ORDER — LINACLOTIDE 145 UG/1
CAPSULE, GELATIN COATED ORAL
COMMUNITY
Start: 2023-05-05 | End: 2023-07-27 | Stop reason: DRUGHIGH

## 2023-07-26 RX ORDER — HYDROCHLOROTHIAZIDE 12.5 MG/1
TABLET ORAL
COMMUNITY
Start: 2023-05-18

## 2023-07-26 RX ORDER — HYDROXYZINE HYDROCHLORIDE 25 MG/1
TABLET, FILM COATED ORAL
COMMUNITY
Start: 2023-06-03

## 2023-07-26 RX ORDER — CEFUROXIME AXETIL 250 MG/1
TABLET ORAL
COMMUNITY
Start: 2023-05-04 | End: 2023-07-27 | Stop reason: ALTCHOICE

## 2023-07-26 RX ORDER — BREXPIPRAZOLE 1 MG/1
1 TABLET ORAL DAILY
COMMUNITY

## 2023-07-26 RX ORDER — BETAMETHASONE VALERATE 0.1 %
LOTION (ML) TOPICAL
COMMUNITY
Start: 2023-07-12

## 2023-07-26 RX ORDER — BUSPIRONE HYDROCHLORIDE 30 MG/1
30 TABLET ORAL 3 TIMES DAILY
COMMUNITY

## 2023-07-26 RX ORDER — TRIAMCINOLONE ACETONIDE 40 MG/ML
40 INJECTION, SUSPENSION INTRA-ARTICULAR; INTRAMUSCULAR ONCE
Status: COMPLETED | OUTPATIENT
Start: 2023-07-26 | End: 2023-07-26

## 2023-07-26 RX ORDER — DULOXETIN HYDROCHLORIDE 60 MG/1
60 CAPSULE, DELAYED RELEASE ORAL 2 TIMES DAILY
COMMUNITY

## 2023-07-26 RX ORDER — CEFTRIAXONE 1 G/1
1 INJECTION, POWDER, FOR SOLUTION INTRAMUSCULAR; INTRAVENOUS ONCE
Status: COMPLETED | OUTPATIENT
Start: 2023-07-26 | End: 2023-07-26

## 2023-07-26 RX ORDER — ATORVASTATIN CALCIUM 80 MG/1
80 TABLET, FILM COATED ORAL NIGHTLY
COMMUNITY

## 2023-07-26 RX ORDER — ALBUTEROL SULFATE 90 UG/1
AEROSOL, METERED RESPIRATORY (INHALATION)
Qty: 18 G | Refills: 3 | Status: SHIPPED | OUTPATIENT
Start: 2023-07-26

## 2023-07-26 RX ADMIN — TRIAMCINOLONE ACETONIDE 40 MG: 40 INJECTION, SUSPENSION INTRA-ARTICULAR; INTRAMUSCULAR at 11:07

## 2023-07-26 RX ADMIN — CEFTRIAXONE 1 G: 1 INJECTION, POWDER, FOR SOLUTION INTRAMUSCULAR; INTRAVENOUS at 11:07

## 2023-07-26 NOTE — PROGRESS NOTES
"   CARROLL Sanchez   [facility]  [address]  [place]  [address]      PATIENT NAME: Magali Cheung  : 1960  DATE: 23  MRN: [id number]      Billing Provider: CARROLL Sanchez  Level of Service: [facility] OFFICE/OUTPT VISIT, EST, LEVL II, 10-19 MIN  Patient PCP Information       Provider PCP Type    Ariana Pinedo NP General            Reason for Visit / Chief Complaint: Shortness of Breath and Cough (Has been having a lot of nose bleeds, coughing and doesn't feel like she is getting enough "air". Pt reports having a lot of sinus infections. Has been sick for about 1 1/2 weeks)       Update PCP  Update Chief Complaint         History of Present Illness / Problem Focused Workflow     Magali Cheung presents to the clinic with Shortness of Breath and Cough (Has been having a lot of nose bleeds, coughing and doesn't feel like she is getting enough "air". Pt reports having a lot of sinus infections. Has been sick for about 1 1/2 weeks)     Patient presents to clinic with cough, occasionally productive, sob, sores in nares with bleeding x 2 weeks. Patient denies otc medication. Is currently taking nasal spray and inhaler with minimal relief.     Review of Systems     @Review of Systems   Constitutional:  Negative for fatigue and fever.   HENT:  Positive for nasal congestion, nosebleeds, postnasal drip, rhinorrhea, sinus pressure/congestion, sneezing and sore throat. Negative for ear pain.    Eyes:  Negative for discharge and itching.   Respiratory:  Positive for cough. Negative for chest tightness, shortness of breath and wheezing.    Cardiovascular:  Negative for chest pain and palpitations.   Gastrointestinal:  Negative for abdominal pain, blood in stool, change in bowel habit and change in bowel habit.   Genitourinary:  Negative for dysuria.   Musculoskeletal:  Negative for joint swelling.   Neurological:  Negative for dizziness and headaches.     Medical / Social / Family History     Past " Medical History:   Diagnosis Date    Anxiety     Chronic pain syndrome     Depression     Diabetes mellitus     GERD (gastroesophageal reflux disease)     Hypertension     Hypothyroidism     Low back pain     Low vitamin B12 level     Lumbar radiculopathy     Neuropathy        Past Surgical History:   Procedure Laterality Date    Bilateral L3-S1 MBB Bilateral 2019, 2019    Dr Barclay     SECTION      COLON SURGERY      HERNIA REPAIR      Left L3-S1 RFTC Left 10/23/2019    Dr Barclay    RADIOFREQUENCY ABLATION OF LUMBAR MEDIAL BRANCH NERVE AT SINGLE LEVEL Right 10/25/2022    Procedure: Radiofrequency Ablation, Nerve, Spinal, Lumbar, Medial Branch, Level L4-S1;  Surgeon: Roselia Barclay MD;  Location: [facility];  Service: Pain Management;  Laterality: Right;  vaccinated.schedule LEFT after right is done.    RADIOFREQUENCY ABLATION OF LUMBAR MEDIAL BRANCH NERVE AT SINGLE LEVEL Left 2022    Procedure: LEFT L4-S1 RFTC  (HAD RIGHT ON 10-25);  Surgeon: Roselia Barclay MD;  Location: [facility];  Service: Pain Management;  Laterality: Left;  VAC SELENE IN EPIC    Right L3-S1 RFTC Right 2019    Dr Barclay    SPINAL CORD STIMULATOR IMPLANT         Social History  Ms.  reports that she has been smoking vaping with nicotine. She has been exposed to tobacco smoke. She has never used smokeless tobacco. She reports current alcohol use. She reports that she does not use drugs.    Family History  Ms.'s family history includes Bipolar disorder in her paternal grandmother; Heart disease in her father and paternal grandfather; Hypertension in her father; Stroke in her maternal grandfather.    Medications and Allergies     Medications  Outpatient Medications Marked as Taking for the 23 encounter (Office Visit) with CARROLL Sanchez   Medication Sig Dispense Refill    acetaminophen-codeine 300-30mg (TYLENOL-CODEINE #3) 300-30 mg Tab Take 1 tablet by mouth every 6 (six) hours as needed.       atorvastatin (LIPITOR) 80 MG tablet Take 80 mg by mouth every evening.      benzonatate (TESSALON) 200 MG capsule Take 200 mg by mouth 3 (three) times daily as needed for Cough.      betamethasone valerate 0.1% (VALISONE) 0.1 % Lotn APPLY TO THE AFFECTED AREA(S) of chest and back TWICE DAILY      brexpiprazole (REXULTI) 1 mg Tab Take 1 mg by mouth Daily.      busPIRone (BUSPAR) 30 MG Tab Take 30 mg by mouth 3 (three) times daily.      carvediloL (COREG) 12.5 MG tablet Take 1 tablet (12.5 mg total) by mouth 2 (two) times daily with meals. 180 tablet 0    cetirizine (ZYRTEC) 10 MG tablet Take 1 tablet (10 mg total) by mouth once daily. 30 tablet 0    cyanocobalamin 1,000 mcg/mL injection inject ONE ML INTRAMUSCULARLY EACH MONTH      DULoxetine (CYMBALTA) 60 MG capsule Take 60 mg by mouth 2 (two) times daily.      esomeprazole (NEXIUM) 40 MG capsule Take 40 mg by mouth once daily.      fluticasone propionate (FLONASE) 50 mcg/actuation nasal spray 2 sprays (100 mcg total) by Each Nostril route once daily. 16 g 3    hydroCHLOROthiazide (HYDRODIURIL) 12.5 MG Tab TAKE 1 TO 2 TABLETS BY MOUTH once daily for swelling      hydrOXYzine HCL (ATARAX) 25 MG tablet Take 1-2 tablets every 4-6 hrs as needed for itching      JANUVIA 100 mg Tab Take 100 mg by mouth once daily.      levothyroxine (SYNTHROID) 50 MCG tablet Take 50 mcg by mouth before breakfast.      LINZESS 290 mcg Cap capsule Take 1 capsule every day by oral route for 30 days.      LORazepam (ATIVAN) 1 MG tablet Take 1 tab by mouth daily only as needed for severe anxiety/panic attacks.      methocarbamoL (ROBAXIN) 750 MG Tab Take 1 tablet (750 mg total) by mouth every 8 (eight) hours. 90 tablet 1    pregabalin (LYRICA) 100 MG capsule Take 100 mg by mouth 3 (three) times daily. Take two capsules by mouth three times a day      [DISCONTINUED] albuterol (PROVENTIL/VENTOLIN HFA) 90 mcg/actuation inhaler INHALE ONE TO TWO PUFFS BY MOUTH EVERY 4 HOURS AS NEEDED FOR  SHORTNESS OF BREATH OR WHEEZING         Allergies  Review of patient's allergies indicates:   Allergen Reactions    Opioids - morphine analogues        Physical Examination     Vitals:    07/26/23 1019   BP: 132/74   Pulse:    Resp:    Temp:      Physical Exam  Constitutional:       General: She is not in acute distress.     Appearance: Normal appearance.   Cardiovascular:      Rate and Rhythm: Normal rate and regular rhythm.   Pulmonary:      Effort: Pulmonary effort is normal. No respiratory distress.      Breath sounds: Normal breath sounds. No wheezing, rhonchi or rales.   Skin:     General: Skin is warm and dry.   Neurological:      Mental Status: She is alert.   Psychiatric:         Mood and Affect: Mood normal.         Behavior: Behavior normal.             Lab Results   Component Value Date    WBC 8.83 01/04/2023    HGB 12.5 01/04/2023    HCT 39.9 01/04/2023    MCV 86.4 01/04/2023     01/04/2023          Sodium   Date Value Ref Range Status   01/04/2023 137 136 - 145 mmol/L Final     Potassium   Date Value Ref Range Status   03/24/2023 3.7 3.5 - 5.1 mmol/L Final     Chloride   Date Value Ref Range Status   01/04/2023 100 98 - 107 mmol/L Final     CO2   Date Value Ref Range Status   01/04/2023 36 (H) 21 - 32 mmol/L Final     Glucose   Date Value Ref Range Status   01/04/2023 116 (H) 74 - 106 mg/dL Final     BUN   Date Value Ref Range Status   01/04/2023 16 7 - 18 mg/dL Final     Creatinine   Date Value Ref Range Status   01/04/2023 0.88 0.55 - 1.02 mg/dL Final     Calcium   Date Value Ref Range Status   01/04/2023 9.8 8.5 - 10.1 mg/dL Final     Total Protein   Date Value Ref Range Status   01/04/2023 7.6 6.4 - 8.2 g/dL Final     Albumin   Date Value Ref Range Status   01/04/2023 4.0 3.5 - 5.0 g/dL Final     Bilirubin, Total   Date Value Ref Range Status   01/04/2023 0.4 >0.0 - 1.2 mg/dL Final     Alk Phos   Date Value Ref Range Status   01/04/2023 79 50 - 130 U/L Final     AST   Date Value Ref Range  Status   01/04/2023 12 (L) 15 - 37 U/L Final     ALT   Date Value Ref Range Status   01/04/2023 10 (L) 13 - 56 U/L Final     Anion Gap   Date Value Ref Range Status   01/04/2023 6 (L) 7 - 16 mmol/L Final     eGFR    Date Value Ref Range Status   11/11/2020 80       eGFR   Date Value Ref Range Status   01/11/2022 88 >=60 mL/min/1.73m² Final      X-Ray KUB  Narrative: EXAMINATION:  XR KUB    CLINICAL HISTORY:  Abdominal pain    COMPARISON:  27 August 2020    FINDINGS:  No free fluid or free air seen.  The bowel gas pattern appears within normal limits.  No abnormal calcifications are present.  No other abnormality is identified.  Impression: No evidence of abnormality demonstrated    Electronically signed by: Kenji Conrad  Date:    01/04/2023  Time:    09:42     Procedures   Assessment and Plan (including Health Maintenance)      Problem List  Smart Sets  Document Outside HM   :    Plan:           Problem List Items Addressed This Visit          ENT    Acute non-recurrent maxillary sinusitis - Primary    Current Assessment & Plan     Injection given in clinic  Started patient on azithromycin- discussed use and side effects  Increase fluids, wash hands often and pop ears as able   otc antihistamine as needed          Relevant Medications    albuterol (PROVENTIL/VENTOLIN HFA) 90 mcg/actuation inhaler    azithromycin (Z-DK) 250 MG tablet       Health Maintenance Topics with due status: Not Due       Topic Last Completion Date    Influenza Vaccine 11/04/2022    Pneumococcal Vaccines (Age 0-64) 11/04/2022    Lipid Panel 02/03/2023    Hemoglobin A1c 04/17/2023       Future Appointments   Date Time Provider Department Center   10/3/2023  9:30 AM Lonnie Partida MD OB [facility]UR[facility]        Health Maintenance Due   Topic Date Due    Hepatitis C Screening  Never done    Cervical Cancer Screening  Never done    Diabetes Urine Screening  Never done    Foot Exam  Never done    Eye Exam  Never done     Mammogram  Never done        Follow up if symptoms worsen or fail to improve.     Signature:  CARROLL Sanchez  [address] [place]  [address]    Date of encounter: 7/26/23

## 2023-07-27 NOTE — ASSESSMENT & PLAN NOTE
Injection given in clinic  Started patient on azithromycin- discussed use and side effects  Increase fluids, wash hands often and pop ears as able   otc antihistamine as needed

## 2023-10-05 ENCOUNTER — OFFICE VISIT (OUTPATIENT)
Dept: NEUROLOGY | Facility: CLINIC | Age: 63
End: 2023-10-05
Payer: MEDICAID

## 2023-10-05 VITALS
WEIGHT: 141.31 LBS | TEMPERATURE: 96 F | SYSTOLIC BLOOD PRESSURE: 142 MMHG | HEART RATE: 74 BPM | RESPIRATION RATE: 18 BRPM | HEIGHT: 62 IN | OXYGEN SATURATION: 100 % | DIASTOLIC BLOOD PRESSURE: 80 MMHG | BODY MASS INDEX: 26.01 KG/M2

## 2023-10-05 DIAGNOSIS — E11.42 DIABETIC POLYNEUROPATHY ASSOCIATED WITH TYPE 2 DIABETES MELLITUS: Primary | Chronic | ICD-10-CM

## 2023-10-05 PROCEDURE — 3077F SYST BP >= 140 MM HG: CPT | Mod: CPTII,,, | Performed by: PSYCHIATRY & NEUROLOGY

## 2023-10-05 PROCEDURE — 1160F PR REVIEW ALL MEDS BY PRESCRIBER/CLIN PHARMACIST DOCUMENTED: ICD-10-PCS | Mod: CPTII,,, | Performed by: PSYCHIATRY & NEUROLOGY

## 2023-10-05 PROCEDURE — 3079F DIAST BP 80-89 MM HG: CPT | Mod: CPTII,,, | Performed by: PSYCHIATRY & NEUROLOGY

## 2023-10-05 PROCEDURE — 99214 OFFICE O/P EST MOD 30 MIN: CPT | Mod: S$PBB,,, | Performed by: PSYCHIATRY & NEUROLOGY

## 2023-10-05 PROCEDURE — 1159F PR MEDICATION LIST DOCUMENTED IN MEDICAL RECORD: ICD-10-PCS | Mod: CPTII,,, | Performed by: PSYCHIATRY & NEUROLOGY

## 2023-10-05 PROCEDURE — 99214 PR OFFICE/OUTPT VISIT, EST, LEVL IV, 30-39 MIN: ICD-10-PCS | Mod: S$PBB,,, | Performed by: PSYCHIATRY & NEUROLOGY

## 2023-10-05 PROCEDURE — 1160F RVW MEDS BY RX/DR IN RCRD: CPT | Mod: CPTII,,, | Performed by: PSYCHIATRY & NEUROLOGY

## 2023-10-05 PROCEDURE — 3008F PR BODY MASS INDEX (BMI) DOCUMENTED: ICD-10-PCS | Mod: CPTII,,, | Performed by: PSYCHIATRY & NEUROLOGY

## 2023-10-05 PROCEDURE — 3008F BODY MASS INDEX DOCD: CPT | Mod: CPTII,,, | Performed by: PSYCHIATRY & NEUROLOGY

## 2023-10-05 PROCEDURE — 3077F PR MOST RECENT SYSTOLIC BLOOD PRESSURE >= 140 MM HG: ICD-10-PCS | Mod: CPTII,,, | Performed by: PSYCHIATRY & NEUROLOGY

## 2023-10-05 PROCEDURE — 3079F PR MOST RECENT DIASTOLIC BLOOD PRESSURE 80-89 MM HG: ICD-10-PCS | Mod: CPTII,,, | Performed by: PSYCHIATRY & NEUROLOGY

## 2023-10-05 PROCEDURE — 1159F MED LIST DOCD IN RCRD: CPT | Mod: CPTII,,, | Performed by: PSYCHIATRY & NEUROLOGY

## 2023-10-05 PROCEDURE — 99215 OFFICE O/P EST HI 40 MIN: CPT | Mod: PBBFAC | Performed by: PSYCHIATRY & NEUROLOGY

## 2023-10-05 RX ORDER — PREGABALIN 150 MG/1
150 CAPSULE ORAL 2 TIMES DAILY
Qty: 180 CAPSULE | Refills: 3 | Status: SHIPPED | OUTPATIENT
Start: 2023-10-05

## 2023-10-05 NOTE — PATIENT INSTRUCTIONS
Cont Lyrica 150 mg twice daily  Stress reduction   Cont Psych evals   Healthy lifstyle habits   F/u 6 months

## 2023-10-05 NOTE — PROGRESS NOTES
Subjective:       Patient ID: Magali Cheung is a 63 y.o. female     Chief Complaint:    Chief Complaint   Patient presents with    Follow-up     RLS        Allergies:  Opioids - morphine analogues    Current Medications:    Outpatient Encounter Medications as of 10/5/2023   Medication Sig Dispense Refill    acetaminophen-codeine 300-30mg (TYLENOL-CODEINE #3) 300-30 mg Tab Take 1 tablet by mouth every 6 (six) hours as needed.      albuterol (PROVENTIL/VENTOLIN HFA) 90 mcg/actuation inhaler INHALE ONE TO TWO PUFFS BY MOUTH EVERY 4 HOURS AS NEEDED FOR SHORTNESS OF BREATH OR WHEEZING 18 g 3    atorvastatin (LIPITOR) 80 MG tablet Take 80 mg by mouth every evening.      benzonatate (TESSALON) 200 MG capsule Take 200 mg by mouth 3 (three) times daily as needed for Cough.      betamethasone valerate 0.1% (VALISONE) 0.1 % Lotn APPLY TO THE AFFECTED AREA(S) of chest and back TWICE DAILY      brexpiprazole (REXULTI) 1 mg Tab Take 1 mg by mouth Daily.      busPIRone (BUSPAR) 30 MG Tab Take 30 mg by mouth 3 (three) times daily.      carvediloL (COREG) 12.5 MG tablet Take 1 tablet (12.5 mg total) by mouth 2 (two) times daily with meals. 180 tablet 0    cetirizine (ZYRTEC) 10 MG tablet Take 1 tablet (10 mg total) by mouth once daily. 30 tablet 0    cyanocobalamin 1,000 mcg/mL injection inject ONE ML INTRAMUSCULARLY EACH MONTH      DULoxetine (CYMBALTA) 60 MG capsule Take 60 mg by mouth 2 (two) times daily.      esomeprazole (NEXIUM) 40 MG capsule Take 40 mg by mouth once daily.      fluticasone propionate (FLONASE) 50 mcg/actuation nasal spray 2 sprays (100 mcg total) by Each Nostril route once daily. 16 g 3    hydroCHLOROthiazide (HYDRODIURIL) 12.5 MG Tab TAKE 1 TO 2 TABLETS BY MOUTH once daily for swelling      hydrOXYzine HCL (ATARAX) 25 MG tablet Take 1-2 tablets every 4-6 hrs as needed for itching      JANUVIA 100 mg Tab Take 100 mg by mouth once daily.      levothyroxine (SYNTHROID) 50 MCG tablet Take 50 mcg by mouth  before breakfast.      LINZESS 290 mcg Cap capsule Take 1 capsule every day by oral route for 30 days.      LORazepam (ATIVAN) 1 MG tablet Take 1 tab by mouth daily only as needed for severe anxiety/panic attacks.      LYRICA 100 mg capsule 2 capsules po  capsule 3    methocarbamoL (ROBAXIN) 750 MG Tab Take 1 tablet (750 mg total) by mouth every 8 (eight) hours. 90 tablet 1    pregabalin (LYRICA) 100 MG capsule Take 100 mg by mouth 3 (three) times daily. Take two capsules by mouth three times a day       No facility-administered encounter medications on file as of 10/5/2023.       History of Present Illness  64 yo WF in clinic for follow-up evaluation of several yrs hx DM periph neuropathy and paresthesias in hands and feet  DM has been poorly controlled  Neuropathy fairly well controlled on Lyrica 200mg q8 hours   Also w/ RLS she prev failed Requip and Mirapex and Sinemet and not taking now   She is only desiring Lyrica now for neurpathic pain          Past Medical History:   Diagnosis Date    Anxiety     Chronic pain syndrome     Depression     Diabetes mellitus     GERD (gastroesophageal reflux disease)     Hypertension     Hypothyroidism     Low back pain     Low vitamin B12 level     Lumbar radiculopathy     Neuropathy        Past Surgical History:   Procedure Laterality Date    Bilateral L3-S1 MBB Bilateral 2019, 2019    Dr Barclay     SECTION      COLON SURGERY      HERNIA REPAIR      Left L3-S1 RFTC Left 10/23/2019    Dr Barclay    RADIOFREQUENCY ABLATION OF LUMBAR MEDIAL BRANCH NERVE AT SINGLE LEVEL Right 10/25/2022    Procedure: Radiofrequency Ablation, Nerve, Spinal, Lumbar, Medial Branch, Level L4-S1;  Surgeon: Roselia Barclay MD;  Location: [facility];  Service: Pain Management;  Laterality: Right;  vaccinated.schedule LEFT after right is done.    RADIOFREQUENCY ABLATION OF LUMBAR MEDIAL BRANCH NERVE AT SINGLE LEVEL Left 2022    Procedure: LEFT L4-S1 RFTC  (HAD  "RIGHT ON 10-25);  Surgeon: Roselia Barclay MD;  Location: [facility];  Service: Pain Management;  Laterality: Left;  VAC SELENE IN EPIC    Right L3-S1 RFTC Right 12/16/2019    Dr Barclay    SPINAL CORD STIMULATOR IMPLANT         Social History  Ms. Cheung  reports that she has been smoking vaping with nicotine. She has been exposed to tobacco smoke. She has never used smokeless tobacco. She reports current alcohol use. She reports that she does not use drugs.    Family History  Ms.'s Cheung family history includes Bipolar disorder in her paternal grandmother; Heart disease in her father and paternal grandfather; Hypertension in her father; Stroke in her maternal grandfather.    Review of Systems  Review of Systems   Musculoskeletal:  Positive for back pain and joint pain.   Neurological:  Positive for sensory change.   Psychiatric/Behavioral:  Positive for depression. The patient is nervous/anxious.    All other systems reviewed and are negative.     Objective:   BP (!) 142/80 (BP Location: Left arm, Patient Position: Sitting, BP Method: Large (Manual))   Pulse 74   Temp 96.2 °F (35.7 °C) (Temporal)   Resp 18   Ht 5' 2" (1.575 m)   Wt 64.1 kg (141 lb 4.8 oz)   SpO2 100%   BMI 25.84 kg/m²    NEUROLOGICAL EXAMINATION:     MENTAL STATUS   Oriented to person, place, and time.   Level of consciousness: alert  Knowledge: good.     CRANIAL NERVES   Cranial nerves II through XII intact.     MOTOR EXAM     Strength   Strength 5/5 throughout.     SENSORY EXAM        Paresthesias throughout     GAIT AND COORDINATION     Gait  Gait: normal       Physical Exam  Vitals reviewed.   Constitutional:       Appearance: She is normal weight.   Neurological:      General: No focal deficit present.      Mental Status: She is alert and oriented to person, place, and time. Mental status is at baseline.      Cranial Nerves: Cranial nerves 2-12 are intact.      Motor: Motor strength is normal.     Gait: Gait is intact.      "     Assessment:     Diabetic polyneuropathy associated with type 2 diabetes mellitus         Primary Diagnosis and ICD10  Diabetic polyneuropathy associated with type 2 diabetes mellitus [E11.42]    Plan:     Patient Instructions   Cont Lyrica 150 mg twice daily  Stress reduction   Cont Psych evals   Healthy lifstyle habits   F/u 6 months     There are no discontinued medications.    Requested Prescriptions      No prescriptions requested or ordered in this encounter

## 2023-10-30 PROBLEM — J01.00 ACUTE NON-RECURRENT MAXILLARY SINUSITIS: Status: RESOLVED | Noted: 2023-07-26 | Resolved: 2023-10-30

## 2024-04-05 ENCOUNTER — HOSPITAL ENCOUNTER (EMERGENCY)
Facility: HOSPITAL | Age: 64
Discharge: HOME | End: 2024-04-05
Payer: MEDICAID

## 2024-04-05 VITALS
SYSTOLIC BLOOD PRESSURE: 148 MMHG | RESPIRATION RATE: 16 BRPM | HEIGHT: 62 IN | WEIGHT: 134 LBS | OXYGEN SATURATION: 96 % | HEART RATE: 86 BPM | BODY MASS INDEX: 24.66 KG/M2 | TEMPERATURE: 98 F | DIASTOLIC BLOOD PRESSURE: 84 MMHG

## 2024-04-05 DIAGNOSIS — M79.672 LEFT FOOT PAIN: ICD-10-CM

## 2024-04-05 LAB
ALBUMIN SERPL BCP-MCNC: 3.5 G/DL (ref 3.5–5)
ALBUMIN/GLOB SERPL: 1 {RATIO}
ALP SERPL-CCNC: 72 U/L (ref 50–130)
ALT SERPL W P-5'-P-CCNC: 18 U/L (ref 13–56)
ANION GAP SERPL CALCULATED.3IONS-SCNC: 9 MMOL/L (ref 7–16)
AST SERPL W P-5'-P-CCNC: 13 U/L (ref 15–37)
BASOPHILS # BLD AUTO: 0.09 K/UL (ref 0–0.2)
BASOPHILS NFR BLD AUTO: 1 % (ref 0–1)
BILIRUB SERPL-MCNC: 0.3 MG/DL (ref ?–1.2)
BUN SERPL-MCNC: 9 MG/DL (ref 7–18)
BUN/CREAT SERPL: 13 (ref 6–20)
CALCIUM SERPL-MCNC: 9.4 MG/DL (ref 8.5–10.1)
CHLORIDE SERPL-SCNC: 100 MMOL/L (ref 98–107)
CO2 SERPL-SCNC: 34 MMOL/L (ref 21–32)
CREAT SERPL-MCNC: 0.67 MG/DL (ref 0.55–1.02)
DIFFERENTIAL METHOD BLD: ABNORMAL
EGFR (NO RACE VARIABLE) (RUSH/TITUS): 98 ML/MIN/1.73M2
EOSINOPHIL # BLD AUTO: 0.25 K/UL (ref 0–0.5)
EOSINOPHIL NFR BLD AUTO: 2.7 % (ref 1–4)
ERYTHROCYTE [DISTWIDTH] IN BLOOD BY AUTOMATED COUNT: 15.1 % (ref 11.5–14.5)
GLOBULIN SER-MCNC: 3.4 G/DL (ref 2–4)
GLUCOSE SERPL-MCNC: 123 MG/DL (ref 74–106)
HCT VFR BLD AUTO: 36.9 % (ref 38–47)
HGB BLD-MCNC: 11.6 G/DL (ref 12–16)
LYMPHOCYTES # BLD AUTO: 1.47 K/UL (ref 1–4.8)
LYMPHOCYTES NFR BLD AUTO: 15.6 % (ref 27–41)
MCH RBC QN AUTO: 26.9 PG (ref 27–31)
MCHC RBC AUTO-ENTMCNC: 31.4 G/DL (ref 32–36)
MCV RBC AUTO: 85.4 FL (ref 80–96)
MONOCYTES # BLD AUTO: 1.03 K/UL (ref 0–0.8)
MONOCYTES NFR BLD AUTO: 10.9 % (ref 2–6)
MPC BLD CALC-MCNC: 10.6 FL (ref 9.4–12.4)
NEUTROPHILS # BLD AUTO: 6.57 K/UL (ref 1.8–7.7)
NEUTROPHILS NFR BLD AUTO: 69.8 % (ref 53–65)
PLATELET # BLD AUTO: 278 K/UL (ref 150–400)
POTASSIUM SERPL-SCNC: 3.4 MMOL/L (ref 3.5–5.1)
PROT SERPL-MCNC: 6.9 G/DL (ref 6.4–8.2)
RBC # BLD AUTO: 4.32 M/UL (ref 4.2–5.4)
SODIUM SERPL-SCNC: 140 MMOL/L (ref 136–145)
URATE SERPL-MCNC: 6 MG/DL (ref 2.6–6)
WBC # BLD AUTO: 9.41 K/UL (ref 4.5–11)

## 2024-04-05 PROCEDURE — 99284 EMERGENCY DEPT VISIT MOD MDM: CPT | Mod: ,,, | Performed by: REGISTERED NURSE

## 2024-04-05 PROCEDURE — 84550 ASSAY OF BLOOD/URIC ACID: CPT | Performed by: NURSE PRACTITIONER

## 2024-04-05 PROCEDURE — 99284 EMERGENCY DEPT VISIT MOD MDM: CPT | Mod: 25

## 2024-04-05 PROCEDURE — 96372 THER/PROPH/DIAG INJ SC/IM: CPT | Performed by: REGISTERED NURSE

## 2024-04-05 PROCEDURE — 63600175 PHARM REV CODE 636 W HCPCS: Performed by: REGISTERED NURSE

## 2024-04-05 PROCEDURE — 80053 COMPREHEN METABOLIC PANEL: CPT | Performed by: NURSE PRACTITIONER

## 2024-04-05 PROCEDURE — 85025 COMPLETE CBC W/AUTO DIFF WBC: CPT | Performed by: NURSE PRACTITIONER

## 2024-04-05 RX ORDER — COLCHICINE 0.6 MG/1
0.6 TABLET ORAL DAILY
Qty: 6 TABLET | Refills: 0 | Status: SHIPPED | OUTPATIENT
Start: 2024-04-05 | End: 2024-04-14

## 2024-04-05 RX ORDER — KETOROLAC TROMETHAMINE 30 MG/ML
30 INJECTION, SOLUTION INTRAMUSCULAR; INTRAVENOUS
Status: COMPLETED | OUTPATIENT
Start: 2024-04-05 | End: 2024-04-05

## 2024-04-05 RX ADMIN — KETOROLAC TROMETHAMINE 30 MG: 30 INJECTION, SOLUTION INTRAMUSCULAR at 06:04

## 2024-04-05 NOTE — ED PROVIDER NOTES
Encounter Date: 2024       History     Chief Complaint   Patient presents with    Foot Injury     65 y/o WF arrived to the ED with complaint of left mid foot and great toe pain with redness and swelling that started this morning. Pain has progressively become worse throughout th day. Rates pain 4-5/10. Took 400 mg of ibuprofen this am with little relief of pain. Unaware of any injury to her foot, but has neuropathy.     The history is provided by the patient.     Review of patient's allergies indicates:   Allergen Reactions    Opioids - morphine analogues      Past Medical History:   Diagnosis Date    Anxiety     Chronic pain syndrome     Depression     Diabetes mellitus     GERD (gastroesophageal reflux disease)     Hypertension     Hypothyroidism     Low back pain     Low vitamin B12 level     Lumbar radiculopathy     Neuropathy      Past Surgical History:   Procedure Laterality Date    Bilateral L3-S1 MBB Bilateral 2019, 2019    Dr Barclay     SECTION      COLON SURGERY      HERNIA REPAIR      Left L3-S1 RFTC Left 10/23/2019    Dr Barclay    RADIOFREQUENCY ABLATION OF LUMBAR MEDIAL BRANCH NERVE AT SINGLE LEVEL Right 10/25/2022    Procedure: Radiofrequency Ablation, Nerve, Spinal, Lumbar, Medial Branch, Level L4-S1;  Surgeon: Roselia Barclay MD;  Location: [facility];  Service: Pain Management;  Laterality: Right;  vaccinated.schedule LEFT after right is done.    RADIOFREQUENCY ABLATION OF LUMBAR MEDIAL BRANCH NERVE AT SINGLE LEVEL Left 2022    Procedure: LEFT L4-S1 RFTC  (HAD RIGHT ON 10-25);  Surgeon: Roselia Barclay MD;  Location: [facility];  Service: Pain Management;  Laterality: Left;  VAC SELENE IN EPIC    Right L3-S1 RFTC Right 2019    Dr Barclay    SPINAL CORD STIMULATOR IMPLANT       Family History   Problem Relation Age of Onset    Hypertension Father     Heart disease Father     Stroke Maternal Grandfather     Bipolar disorder Paternal Grandmother     Heart  disease Paternal Grandfather      Social History     Tobacco Use    Smoking status: Every Day     Types: Vaping with nicotine     Passive exposure: Past    Smokeless tobacco: Never    Tobacco comments:     Smoked cigarettes 46 years. Stopped smoking cigarettes and started vaping with nicotine x 2 years   Substance Use Topics    Alcohol use: Yes    Drug use: Never     Review of Systems   Constitutional: Negative.    Musculoskeletal:  Positive for arthralgias (left foot/great toe), gait problem and joint swelling.   Skin:  Positive for color change (first metatarsolphalangeal joint red). Negative for rash and wound.   All other systems reviewed and are negative.      Physical Exam     Initial Vitals   BP Pulse Resp Temp SpO2   04/05/24 1637 04/05/24 1637 04/05/24 1648 04/05/24 1637 04/05/24 1637   (!) 148/84 86 16 97.8 °F (36.6 °C) 96 %      MAP       --                Physical Exam    Nursing note and vitals reviewed.  Constitutional: She appears well-developed and well-nourished. She is cooperative. She does not have a sickly appearance. She does not appear ill. No distress.   HENT:   Head: Normocephalic and atraumatic.   Mouth/Throat: Mucous membranes are normal.   Cardiovascular:  Normal rate, regular rhythm and intact distal pulses.           No murmur heard.  Pulses:       Dorsalis pedis pulses are 2+ on the right side and 2+ on the left side.        Posterior tibial pulses are 2+ on the right side and 2+ on the left side.   Pulmonary/Chest: Effort normal and breath sounds normal.   Musculoskeletal:      Right foot: Normal range of motion and normal capillary refill. Bunion present. No swelling, Charcot foot, prominent metatarsal heads, tenderness or bony tenderness. Normal pulse.      Left foot: Decreased range of motion. Normal capillary refill. Swelling, bunion, tenderness and bony tenderness present. No crepitus. Normal pulse.     Lymphadenopathy:     She has no cervical adenopathy.   Neurological: She is  alert and oriented to person, place, and time. She has normal strength. No sensory deficit.   Skin: Skin is warm, dry and intact. Capillary refill takes less than 2 seconds. There is erythema (1st metatarsophalangeal joint).         Medical Screening Exam   See Full Note    ED Course   Procedures  Labs Reviewed   COMPREHENSIVE METABOLIC PANEL - Abnormal; Notable for the following components:       Result Value    Potassium 3.4 (*)     CO2 34 (*)     Glucose 123 (*)     AST 13 (*)     All other components within normal limits   CBC WITH DIFFERENTIAL - Abnormal; Notable for the following components:    Hemoglobin 11.6 (*)     Hematocrit 36.9 (*)     MCH 26.9 (*)     MCHC 31.4 (*)     RDW 15.1 (*)     Neutrophils % 69.8 (*)     Lymphocytes % 15.6 (*)     Monocytes % 10.9 (*)     Monocytes, Absolute 1.03 (*)     All other components within normal limits   URIC ACID - Normal   CBC W/ AUTO DIFFERENTIAL    Narrative:     The following orders were created for panel order CBC auto differential.  Procedure                               Abnormality         Status                     ---------                               -----------         ------                     CBC with Differential[[id number]]       Abnormal            Final result                 Please view results for these tests on the individual orders.          Imaging Results              X-Ray Foot Complete Left (Final result)  Result time 04/05/24 18:06:17      Final result by Lonnie Sheppard MD (04/05/24 18:06:17)                   Impression:      No acute fracture.  Hallux valgus.  Osteoarthritis      Electronically signed by: Lonnie Sheppard  Date:    04/05/2024  Time:    18:06               Narrative:    EXAMINATION:  XR FOOT COMPLETE 3 VIEW LEFT    CLINICAL HISTORY:  .  Pain in left foot    COMPARISON:  No previous similar currently available    TECHNIQUE:  AP, lateral, and oblique views right foot    FINDINGS:  There is no acute fracture or  dislocation.  There is some hallux valgus deformity.  There is mild osteophyte formation of the 1st MTP joint.                                       Medications   ketorolac injection 30 mg (has no administration in time range)     Medical Decision Making  63 y/o WF arrived to the ED with complaint of left mid foot and great toe pain with redness and swelling that started this morning. Pain has progressively become worse throughout th day. Rates pain 4-5/10. Took 400 mg of ibuprofen this am with little relief of pain. Unaware of any injury to her foot, but has neuropathy.     Amount and/or Complexity of Data Reviewed  Labs: ordered.     Details: 9.41 WBC, K+ 3.4, CO2 34, 123 Glucose  Radiology: ordered.     Details: No acute fracture.  Hallux valgus.  Osteoarthritis    Risk  Prescription drug management.  Risk Details: No acute injury to foot. Suspect gout flare. This was discussed with patient at length including my recommendation to f/u for any new or worsening problems or otherwise as needed.                                       Clinical Impression:   Final diagnoses:  [M79.672] Left foot pain        ED Disposition Condition    Discharge Stable          ED Prescriptions       Medication Sig Dispense Start Date End Date Auth. Provider    colchicine (COLCRYS) 0.6 mg tablet Take 1 tablet (0.6 mg total) by mouth once daily. for 6 days 6 tablet 4/5/2024 4/11/2024 Abran Piper NP-C          Follow-up Information    None          Abran Piper NP-C  04/05/24 5996

## 2024-04-05 NOTE — ED TRIAGE NOTES
Pt to ED with c/o pain in the L foot starting mid foot to the end of toes that started this morning and has progressed throughout the day with redness and swelling. Pt states she has a hx of neuropathy but does not recall any injury to the foot.

## 2024-04-05 NOTE — DISCHARGE INSTRUCTIONS
Increase fluids. Take medications as directed. Follow up for any new or worsening problems or otherwise as needed.

## 2024-04-09 ENCOUNTER — TELEPHONE (OUTPATIENT)
Dept: EMERGENCY MEDICINE | Facility: HOSPITAL | Age: 64
End: 2024-04-09
Payer: MEDICAID

## 2024-04-13 ENCOUNTER — TELEPHONE (OUTPATIENT)
Dept: EMERGENCY MEDICINE | Facility: HOSPITAL | Age: 64
End: 2024-04-13
Payer: MEDICAID

## 2024-04-13 NOTE — TELEPHONE ENCOUNTER
Pt called stating the pharmacy her medications were sent to is not open today. Rxs called in to [facility] in [place].

## 2024-04-14 ENCOUNTER — HOSPITAL ENCOUNTER (INPATIENT)
Facility: HOSPITAL | Age: 64
LOS: 3 days | Discharge: ANOTHER HOSPITAL | End: 2024-04-17
Attending: FAMILY MEDICINE | Admitting: FAMILY MEDICINE
Payer: MEDICAID

## 2024-04-14 DIAGNOSIS — J15.212 PNEUMONIA OF LEFT LOWER LOBE DUE TO METHICILLIN-RESISTANT STAPHYLOCOCCUS AUREUS (MRSA): ICD-10-CM

## 2024-04-14 DIAGNOSIS — J18.9 PNEUMONIA: ICD-10-CM

## 2024-04-14 DIAGNOSIS — L02.619 ABSCESS OF FOOT: Primary | ICD-10-CM

## 2024-04-14 PROBLEM — E03.9 HYPOTHYROID: Status: ACTIVE | Noted: 2024-04-14

## 2024-04-14 LAB
ALBUMIN SERPL BCP-MCNC: 3.1 G/DL (ref 3.5–5)
ALBUMIN/GLOB SERPL: 0.6 {RATIO}
ALP SERPL-CCNC: 113 U/L (ref 50–130)
ALT SERPL W P-5'-P-CCNC: 17 U/L (ref 13–56)
ANION GAP SERPL CALCULATED.3IONS-SCNC: 15 MMOL/L (ref 7–16)
AST SERPL W P-5'-P-CCNC: 10 U/L (ref 15–37)
BACTERIA #/AREA URNS HPF: ABNORMAL /HPF
BASOPHILS # BLD AUTO: 0.07 K/UL (ref 0–0.2)
BASOPHILS NFR BLD AUTO: 0.4 % (ref 0–1)
BILIRUB SERPL-MCNC: 0.5 MG/DL (ref ?–1.2)
BILIRUB UR QL STRIP: NEGATIVE
BUN SERPL-MCNC: 16 MG/DL (ref 7–18)
BUN/CREAT SERPL: 18 (ref 6–20)
CALCIUM SERPL-MCNC: 9.2 MG/DL (ref 8.5–10.1)
CHLORIDE SERPL-SCNC: 95 MMOL/L (ref 98–107)
CLARITY UR: CLEAR
CO2 SERPL-SCNC: 27 MMOL/L (ref 21–32)
COLOR UR: YELLOW
CREAT SERPL-MCNC: 0.89 MG/DL (ref 0.55–1.02)
DIFFERENTIAL METHOD BLD: ABNORMAL
EGFR (NO RACE VARIABLE) (RUSH/TITUS): 73 ML/MIN/1.73M2
EOSINOPHIL # BLD AUTO: 0.04 K/UL (ref 0–0.5)
EOSINOPHIL NFR BLD AUTO: 0.2 % (ref 1–4)
EOSINOPHIL NFR BLD MANUAL: 1 % (ref 1–4)
ERYTHROCYTE [DISTWIDTH] IN BLOOD BY AUTOMATED COUNT: 14.9 % (ref 11.5–14.5)
GLOBULIN SER-MCNC: 5.1 G/DL (ref 2–4)
GLUCOSE SERPL-MCNC: 177 MG/DL (ref 74–106)
GLUCOSE UR STRIP-MCNC: NEGATIVE MG/DL
GROUP A STREP MOLECULAR (OHS): NEGATIVE
HCT VFR BLD AUTO: 34.4 % (ref 38–47)
HGB BLD-MCNC: 11.1 G/DL (ref 12–16)
INFLUENZA A MOLECULAR (OHS): NEGATIVE
INFLUENZA B MOLECULAR (OHS): NEGATIVE
KETONES UR STRIP-SCNC: NEGATIVE MG/DL
LACTATE SERPL-SCNC: 1 MMOL/L (ref 0.4–2)
LEUKOCYTE ESTERASE UR QL STRIP: NEGATIVE
LYMPHOCYTES # BLD AUTO: 0.71 K/UL (ref 1–4.8)
LYMPHOCYTES NFR BLD AUTO: 3.7 % (ref 27–41)
LYMPHOCYTES NFR BLD MANUAL: 7 % (ref 27–41)
MCH RBC QN AUTO: 26.6 PG (ref 27–31)
MCHC RBC AUTO-ENTMCNC: 32.3 G/DL (ref 32–36)
MCV RBC AUTO: 82.3 FL (ref 80–96)
MONOCYTES # BLD AUTO: 0.87 K/UL (ref 0–0.8)
MONOCYTES NFR BLD AUTO: 4.5 % (ref 2–6)
MONOCYTES NFR BLD MANUAL: 2 % (ref 2–6)
MPC BLD CALC-MCNC: 10.7 FL (ref 9.4–12.4)
NEUTROPHILS # BLD AUTO: 17.53 K/UL (ref 1.8–7.7)
NEUTROPHILS NFR BLD AUTO: 91.2 % (ref 53–65)
NEUTS SEG NFR BLD MANUAL: 90 % (ref 50–62)
NITRITE UR QL STRIP: NEGATIVE
NRBC BLD MANUAL-RTO: ABNORMAL %
PH UR STRIP: 6 PH UNITS
PLATELET # BLD AUTO: 356 K/UL (ref 150–400)
POTASSIUM SERPL-SCNC: 2.8 MMOL/L (ref 3.5–5.1)
PROT SERPL-MCNC: 8.2 G/DL (ref 6.4–8.2)
PROT UR QL STRIP: 30
RBC # BLD AUTO: 4.18 M/UL (ref 4.2–5.4)
RBC # UR STRIP: ABNORMAL /UL
RBC #/AREA URNS HPF: ABNORMAL /HPF
SARS-COV-2 RDRP RESP QL NAA+PROBE: NEGATIVE
SODIUM SERPL-SCNC: 134 MMOL/L (ref 136–145)
SP GR UR STRIP: 1.01
SQUAMOUS #/AREA URNS LPF: ABNORMAL /LPF
TSH SERPL DL<=0.005 MIU/L-ACNC: 0.4 UIU/ML (ref 0.36–3.74)
UROBILINOGEN UR STRIP-ACNC: 0.2 MG/DL
WBC # BLD AUTO: 19.22 K/UL (ref 4.5–11)
WBC #/AREA URNS HPF: ABNORMAL /HPF

## 2024-04-14 PROCEDURE — 25000003 PHARM REV CODE 250: Performed by: REGISTERED NURSE

## 2024-04-14 PROCEDURE — 80053 COMPREHEN METABOLIC PANEL: CPT | Performed by: REGISTERED NURSE

## 2024-04-14 PROCEDURE — 94761 N-INVAS EAR/PLS OXIMETRY MLT: CPT

## 2024-04-14 PROCEDURE — 27000221 HC OXYGEN, UP TO 24 HOURS

## 2024-04-14 PROCEDURE — 81001 URINALYSIS AUTO W/SCOPE: CPT | Performed by: REGISTERED NURSE

## 2024-04-14 PROCEDURE — 99900035 HC TECH TIME PER 15 MIN (STAT)

## 2024-04-14 PROCEDURE — 96365 THER/PROPH/DIAG IV INF INIT: CPT

## 2024-04-14 PROCEDURE — 83036 HEMOGLOBIN GLYCOSYLATED A1C: CPT | Performed by: REGISTERED NURSE

## 2024-04-14 PROCEDURE — 87651 STREP A DNA AMP PROBE: CPT | Performed by: REGISTERED NURSE

## 2024-04-14 PROCEDURE — 87502 INFLUENZA DNA AMP PROBE: CPT | Performed by: REGISTERED NURSE

## 2024-04-14 PROCEDURE — 87635 SARS-COV-2 COVID-19 AMP PRB: CPT | Performed by: REGISTERED NURSE

## 2024-04-14 PROCEDURE — 94640 AIRWAY INHALATION TREATMENT: CPT

## 2024-04-14 PROCEDURE — 85025 COMPLETE CBC W/AUTO DIFF WBC: CPT | Performed by: REGISTERED NURSE

## 2024-04-14 PROCEDURE — 11000001 HC ACUTE MED/SURG PRIVATE ROOM

## 2024-04-14 PROCEDURE — 83605 ASSAY OF LACTIC ACID: CPT | Performed by: REGISTERED NURSE

## 2024-04-14 PROCEDURE — 84443 ASSAY THYROID STIM HORMONE: CPT | Performed by: REGISTERED NURSE

## 2024-04-14 PROCEDURE — 25000242 PHARM REV CODE 250 ALT 637 W/ HCPCS: Performed by: REGISTERED NURSE

## 2024-04-14 PROCEDURE — 99284 EMERGENCY DEPT VISIT MOD MDM: CPT | Mod: ,,, | Performed by: REGISTERED NURSE

## 2024-04-14 PROCEDURE — 63600175 PHARM REV CODE 636 W HCPCS: Performed by: REGISTERED NURSE

## 2024-04-14 PROCEDURE — 87040 BLOOD CULTURE FOR BACTERIA: CPT | Performed by: REGISTERED NURSE

## 2024-04-14 PROCEDURE — 99285 EMERGENCY DEPT VISIT HI MDM: CPT | Mod: 25

## 2024-04-14 RX ORDER — CLONAZEPAM 1 MG/1
1 TABLET ORAL DAILY PRN
Status: ON HOLD | COMMUNITY
Start: 2024-04-01 | End: 2024-04-16 | Stop reason: HOSPADM

## 2024-04-14 RX ORDER — POTASSIUM CHLORIDE 20 MEQ/1
40 TABLET, EXTENDED RELEASE ORAL
Status: COMPLETED | OUTPATIENT
Start: 2024-04-14 | End: 2024-04-14

## 2024-04-14 RX ORDER — PANTOPRAZOLE SODIUM 40 MG/1
40 TABLET, DELAYED RELEASE ORAL DAILY
Status: DISCONTINUED | OUTPATIENT
Start: 2024-04-15 | End: 2024-04-17 | Stop reason: HOSPADM

## 2024-04-14 RX ORDER — DULOXETIN HYDROCHLORIDE 30 MG/1
60 CAPSULE, DELAYED RELEASE ORAL 2 TIMES DAILY
Status: DISCONTINUED | OUTPATIENT
Start: 2024-04-14 | End: 2024-04-17 | Stop reason: HOSPADM

## 2024-04-14 RX ORDER — POTASSIUM CHLORIDE 7.45 MG/ML
10 INJECTION INTRAVENOUS ONCE
Status: DISCONTINUED | OUTPATIENT
Start: 2024-04-14 | End: 2024-04-14

## 2024-04-14 RX ORDER — MUPIROCIN 20 MG/G
OINTMENT TOPICAL 2 TIMES DAILY
Status: DISCONTINUED | OUTPATIENT
Start: 2024-04-15 | End: 2024-04-17 | Stop reason: HOSPADM

## 2024-04-14 RX ORDER — POTASSIUM CHLORIDE 20 MEQ/1
20 TABLET, EXTENDED RELEASE ORAL 2 TIMES DAILY
Status: DISCONTINUED | OUTPATIENT
Start: 2024-04-14 | End: 2024-04-17 | Stop reason: HOSPADM

## 2024-04-14 RX ORDER — ARIPIPRAZOLE 5 MG/1
5 TABLET ORAL DAILY
Status: DISCONTINUED | OUTPATIENT
Start: 2024-04-15 | End: 2024-04-15

## 2024-04-14 RX ORDER — TALC
6 POWDER (GRAM) TOPICAL NIGHTLY PRN
Status: DISCONTINUED | OUTPATIENT
Start: 2024-04-14 | End: 2024-04-17 | Stop reason: HOSPADM

## 2024-04-14 RX ORDER — BREXPIPRAZOLE 2 MG/1
1 TABLET ORAL DAILY
COMMUNITY
Start: 2024-04-02

## 2024-04-14 RX ORDER — ONDANSETRON HYDROCHLORIDE 2 MG/ML
4 INJECTION, SOLUTION INTRAVENOUS EVERY 8 HOURS PRN
Status: DISCONTINUED | OUTPATIENT
Start: 2024-04-14 | End: 2024-04-17 | Stop reason: HOSPADM

## 2024-04-14 RX ORDER — IBUPROFEN 400 MG/1
400 TABLET ORAL EVERY 6 HOURS PRN
Status: DISCONTINUED | OUTPATIENT
Start: 2024-04-14 | End: 2024-04-17 | Stop reason: HOSPADM

## 2024-04-14 RX ORDER — PROMETHAZINE HYDROCHLORIDE 25 MG/1
25 TABLET ORAL EVERY 6 HOURS PRN
Status: DISCONTINUED | OUTPATIENT
Start: 2024-04-14 | End: 2024-04-17 | Stop reason: HOSPADM

## 2024-04-14 RX ORDER — POTASSIUM CHLORIDE 7.45 MG/ML
10 INJECTION INTRAVENOUS ONCE
Status: COMPLETED | OUTPATIENT
Start: 2024-04-14 | End: 2024-04-14

## 2024-04-14 RX ORDER — IPRATROPIUM BROMIDE AND ALBUTEROL SULFATE 2.5; .5 MG/3ML; MG/3ML
3 SOLUTION RESPIRATORY (INHALATION) EVERY 12 HOURS
Status: DISCONTINUED | OUTPATIENT
Start: 2024-04-15 | End: 2024-04-15

## 2024-04-14 RX ORDER — LEVOTHYROXINE SODIUM 50 UG/1
50 TABLET ORAL
Status: DISCONTINUED | OUTPATIENT
Start: 2024-04-15 | End: 2024-04-17 | Stop reason: HOSPADM

## 2024-04-14 RX ORDER — PREGABALIN 50 MG/1
150 CAPSULE ORAL 2 TIMES DAILY
Status: DISCONTINUED | OUTPATIENT
Start: 2024-04-14 | End: 2024-04-17 | Stop reason: HOSPADM

## 2024-04-14 RX ORDER — ALBUTEROL SULFATE 0.83 MG/ML
2.5 SOLUTION RESPIRATORY (INHALATION) EVERY 4 HOURS PRN
Status: DISCONTINUED | OUTPATIENT
Start: 2024-04-14 | End: 2024-04-17 | Stop reason: HOSPADM

## 2024-04-14 RX ORDER — FAMOTIDINE 20 MG/1
20 TABLET, FILM COATED ORAL 2 TIMES DAILY
Status: DISCONTINUED | OUTPATIENT
Start: 2024-04-14 | End: 2024-04-14

## 2024-04-14 RX ORDER — FLUTICASONE PROPIONATE 50 MCG
2 SPRAY, SUSPENSION (ML) NASAL DAILY
Status: DISCONTINUED | OUTPATIENT
Start: 2024-04-15 | End: 2024-04-17 | Stop reason: HOSPADM

## 2024-04-14 RX ORDER — CARVEDILOL 6.25 MG/1
12.5 TABLET ORAL EVERY MORNING
Status: DISCONTINUED | OUTPATIENT
Start: 2024-04-15 | End: 2024-04-15

## 2024-04-14 RX ORDER — POLYETHYLENE GLYCOL 3350 17 G/17G
17 POWDER, FOR SOLUTION ORAL DAILY
Status: DISCONTINUED | OUTPATIENT
Start: 2024-04-15 | End: 2024-04-15

## 2024-04-14 RX ORDER — IPRATROPIUM BROMIDE AND ALBUTEROL SULFATE 2.5; .5 MG/3ML; MG/3ML
3 SOLUTION RESPIRATORY (INHALATION) ONCE
Status: COMPLETED | OUTPATIENT
Start: 2024-04-14 | End: 2024-04-14

## 2024-04-14 RX ORDER — SODIUM CHLORIDE 0.9 % (FLUSH) 0.9 %
10 SYRINGE (ML) INJECTION
Status: DISCONTINUED | OUTPATIENT
Start: 2024-04-14 | End: 2024-04-17 | Stop reason: HOSPADM

## 2024-04-14 RX ADMIN — POTASSIUM CHLORIDE 40 MEQ: 1500 TABLET, EXTENDED RELEASE ORAL at 07:04

## 2024-04-14 RX ADMIN — POTASSIUM CHLORIDE 10 MEQ: 7.46 INJECTION, SOLUTION INTRAVENOUS at 08:04

## 2024-04-14 RX ADMIN — POTASSIUM CHLORIDE 20 MEQ: 1500 TABLET, EXTENDED RELEASE ORAL at 09:04

## 2024-04-14 RX ADMIN — DULOXETINE 60 MG: 30 CAPSULE, DELAYED RELEASE ORAL at 09:04

## 2024-04-14 RX ADMIN — VANCOMYCIN HYDROCHLORIDE 1250 MG: 1 INJECTION, POWDER, LYOPHILIZED, FOR SOLUTION INTRAVENOUS at 07:04

## 2024-04-14 RX ADMIN — IPRATROPIUM BROMIDE AND ALBUTEROL SULFATE 3 ML: 2.5; .5 SOLUTION RESPIRATORY (INHALATION) at 09:04

## 2024-04-14 RX ADMIN — PREGABALIN 150 MG: 50 CAPSULE ORAL at 09:04

## 2024-04-14 NOTE — ED PROVIDER NOTES
Encounter Date: 2024       History     Chief Complaint   Patient presents with    Cough    Chills    Shortness of Breath    Chest Pain     Pt is currently being TX for a URI with PO antibiotics. She states she feels worse today and has new onset of MID STERNAL cp LEVEL 3. SHE STATES SHE STARTED COUGHING UP BLOOD TODAY AND FEELS WORSE THAN PRIOR TO BEING TX FOR THE URI.     64 y-old  female with PMH of HTN, DM with neuropathy, and hyperlipidemia received to ED with complaint of cough/chills/chest pain that is exacerbated with cough. Patient was seen in this ED on 24 where she was diagnosed with pneumonia and was given IV rocephin and RX for Augmentin and azithromycin that she did not get filled until after lunch yesterday. Has had 2 doses of her ABX. She did have blood culture X2 that were positive for MRSA.       Review of patient's allergies indicates:   Allergen Reactions    Opioids - morphine analogues      Past Medical History:   Diagnosis Date    Anxiety     Chronic pain syndrome     Depression     Diabetes mellitus     GERD (gastroesophageal reflux disease)     Hypertension     Hypothyroidism     Low back pain     Low vitamin B12 level     Lumbar radiculopathy     Neuropathy      Past Surgical History:   Procedure Laterality Date    Bilateral L3-S1 MBB Bilateral 2019, 2019    Dr Barclay     SECTION      COLON SURGERY      HERNIA REPAIR      Left L3-S1 RFTC Left 10/23/2019    Dr Barclay    RADIOFREQUENCY ABLATION OF LUMBAR MEDIAL BRANCH NERVE AT SINGLE LEVEL Right 10/25/2022    Procedure: Radiofrequency Ablation, Nerve, Spinal, Lumbar, Medial Branch, Level L4-S1;  Surgeon: Roselia Barclay MD;  Location: [facility];  Service: Pain Management;  Laterality: Right;  vaccinated.schedule LEFT after right is done.    RADIOFREQUENCY ABLATION OF LUMBAR MEDIAL BRANCH NERVE AT SINGLE LEVEL Left 2022    Procedure: LEFT L4-S1 RFTC  (HAD RIGHT ON 10-25);  Surgeon: Roselia SULTANA  MD Deny;  Location: [facility];  Service: Pain Management;  Laterality: Left;  VAC SELENE IN EPIC    Right L3-S1 RFTC Right 12/16/2019    Dr Barclay    SPINAL CORD STIMULATOR IMPLANT       Family History   Problem Relation Name Age of Onset    Hypertension Father      Heart disease Father      Stroke Maternal Grandfather      Bipolar disorder Paternal Grandmother      Heart disease Paternal Grandfather       Social History     Tobacco Use    Smoking status: Every Day     Types: Vaping with nicotine     Passive exposure: Past    Smokeless tobacco: Never    Tobacco comments:     Smoked cigarettes 46 years. Stopped smoking cigarettes and started vaping with nicotine x 2 years   Substance Use Topics    Alcohol use: Yes    Drug use: Never     Review of Systems   Constitutional:  Positive for activity change (Decreased), fatigue and fever.   HENT:  Positive for congestion, postnasal drip and rhinorrhea.    Eyes: Negative.    Respiratory:  Positive for cough and shortness of breath.    Cardiovascular: Negative.    Gastrointestinal: Negative.    Endocrine: Negative.    Genitourinary: Negative.    Musculoskeletal: Negative.    Skin: Negative.    Allergic/Immunologic: Negative.    Neurological: Negative.    Hematological: Negative.    Psychiatric/Behavioral: Negative.         Physical Exam     Initial Vitals [04/14/24 1742]   BP Pulse Resp Temp SpO2   (!) 165/89 77 20 97.8 °F (36.6 °C) 95 %      MAP       --         Physical Exam    Nursing note and vitals reviewed.  Constitutional: She appears well-developed and well-nourished.   HENT:   Head: Normocephalic and atraumatic.   Right Ear: External ear normal.   Left Ear: External ear normal.   Nose: Nose normal.   Mouth/Throat: Oropharynx is clear and moist.   Eyes: Conjunctivae are normal.   Neck: Neck supple.   Normal range of motion.  Cardiovascular:  Normal rate, regular rhythm, normal heart sounds and intact distal pulses.           Pulmonary/Chest: Breath sounds  normal.   Abdominal: Abdomen is soft. Bowel sounds are normal.   Musculoskeletal:         General: Normal range of motion.      Cervical back: Normal range of motion and neck supple.     Neurological: She is alert and oriented to person, place, and time. She has normal strength. GCS score is 15. GCS eye subscore is 4. GCS verbal subscore is 5. GCS motor subscore is 6.   Skin: Skin is warm and dry. Capillary refill takes less than 2 seconds.   Psychiatric: She has a normal mood and affect. Her behavior is normal. Judgment and thought content normal.         Medical Screening Exam   See Full Note    ED Course   Procedures  Labs Reviewed   COMPREHENSIVE METABOLIC PANEL - Abnormal; Notable for the following components:       Result Value    Sodium 134 (*)     Potassium 2.8 (*)     Chloride 95 (*)     Glucose 177 (*)     Albumin 3.1 (*)     Globulin 5.1 (*)     AST 10 (*)     All other components within normal limits   CBC WITH DIFFERENTIAL - Abnormal; Notable for the following components:    WBC 19.22 (*)     RBC 4.18 (*)     Hemoglobin 11.1 (*)     Hematocrit 34.4 (*)     MCH 26.6 (*)     RDW 14.9 (*)     Neutrophils % 91.2 (*)     Lymphocytes % 3.7 (*)     Neutrophils, Abs 17.53 (*)     Lymphocytes, Absolute 0.71 (*)     Eosinophils % 0.2 (*)     Monocytes, Absolute 0.87 (*)     All other components within normal limits   MANUAL DIFFERENTIAL - Abnormal; Notable for the following components:    Segmented Neutrophils, Man % 90 (*)     Lymphocytes, Man % 7 (*)     All other components within normal limits   URINALYSIS, REFLEX TO URINE CULTURE - Abnormal; Notable for the following components:    Protein, UA 30 (*)     Blood, UA Small (*)     All other components within normal limits   URINALYSIS, MICROSCOPIC - Abnormal; Notable for the following components:    Bacteria, UA Few (*)     Squamous Epithelial Cells, UA Few (*)     All other components within normal limits   INFLUENZA A & B BY MOLECULAR - Normal   STREP A BY  MOLECULAR METHOD - Normal   SARS-COV-2 RNA AMPLIFICATION, QUAL - Normal    Narrative:     Negative SARS-CoV results should not be used as the sole basis for treatment or patient management decisions; negative results should be considered in the context of a patient's recent exposures, history and the presene of clinical signs and symptoms consistent with COVID-19.  Negative results should be treated as presumptive and confirmed by molecular assay, if necessary for patient management.   LACTIC ACID, PLASMA - Normal   CULTURE, BLOOD   CULTURE, BLOOD   CBC W/ AUTO DIFFERENTIAL    Narrative:     The following orders were created for panel order CBC auto differential.  Procedure                               Abnormality         Status                     ---------                               -----------         ------                     CBC with Differential[[id number]]       Abnormal            Final result               Manual Differential[[id number]]         Abnormal            Final result                 Please view results for these tests on the individual orders.          Imaging Results              X-Ray Chest PA And Lateral (Final result)  Result time 04/14/24 18:25:33      Final result by Rocky Alas MD (04/14/24 18:25:33)                   Impression:      Similar appearance of the chest.      Electronically signed by: Rocky Alas  Date:    04/14/2024  Time:    18:25               Narrative:    EXAMINATION:  XR CHEST PA AND LATERAL    CLINICAL HISTORY:  dyspnea;    TECHNIQUE:  PA and lateral views of the chest were performed.    COMPARISON:  04/13/2024    FINDINGS:  The left upper lobe opacity is again seen and similar.  Right lung appears clear.  No pneumothorax or new abnormality.                                       Medications   vancomycin - pharmacy to dose (has no administration in time range)   vancomycin (VANCOCIN) 1,250 mg in dextrose 5 % (D5W) 250 mL IVPB (1,250 mg Intravenous New Bag 4/14/24  1928)   potassium chloride 10 mEq in 100 mL IVPB (10 mEq Intravenous New Bag 4/14/24 2000)   potassium chloride SA CR tablet 40 mEq (40 mEq Oral Given 4/14/24 1930)     Medical Decision Making  64 y-old  female with PMH of HTN, DM with neuropathy, and hyperlipidemia received to ED with complaint of cough/chills/chest pain that is exacerbated with cough. Patient was seen in this ED on 04/12/24 where she was diagnosed with pneumonia and was given IV rocephin and RX for Augmentin and azithromycin that she did not get filled until after lunch yesterday. Has had 2 doses of her ABX. She did have blood culture X2 that were positive for MRSA.       Amount and/or Complexity of Data Reviewed  Labs: ordered.  Radiology: ordered.  Discussion of management or test interpretation with external provider(s): 1913 Spoke with Dr. Russell who is the hospitalist on-call for [facility]. Plan is to admit patient to [facility] for treatment of her LML pneumonia, MRSA bacteremia, and hypokalemia. Will cover with vancomycin IVPB with pharmacy to dose.                                       Clinical Impression:   Final diagnoses:  [J18.9] Pneumonia        ED Disposition Condition    Admit Stable                Abran Piper, NP-C  04/14/24 1952

## 2024-04-15 LAB
ALBUMIN SERPL BCP-MCNC: 2.4 G/DL (ref 3.5–5)
ALBUMIN/GLOB SERPL: 0.5 {RATIO}
ALP SERPL-CCNC: 91 U/L (ref 50–130)
ALT SERPL W P-5'-P-CCNC: 14 U/L (ref 13–56)
ANION GAP SERPL CALCULATED.3IONS-SCNC: 12 MMOL/L (ref 7–16)
AST SERPL W P-5'-P-CCNC: 11 U/L (ref 15–37)
BASOPHILS # BLD AUTO: 0.05 K/UL (ref 0–0.2)
BASOPHILS NFR BLD AUTO: 0.3 % (ref 0–1)
BILIRUB SERPL-MCNC: 0.4 MG/DL (ref ?–1.2)
BUN SERPL-MCNC: 12 MG/DL (ref 7–18)
BUN/CREAT SERPL: 16 (ref 6–20)
CALCIUM SERPL-MCNC: 8.8 MG/DL (ref 8.5–10.1)
CHLORIDE SERPL-SCNC: 98 MMOL/L (ref 98–107)
CO2 SERPL-SCNC: 30 MMOL/L (ref 21–32)
CREAT SERPL-MCNC: 0.73 MG/DL (ref 0.55–1.02)
DIFFERENTIAL METHOD BLD: ABNORMAL
EGFR (NO RACE VARIABLE) (RUSH/TITUS): 93 ML/MIN/1.73M2
EOSINOPHIL # BLD AUTO: 0.32 K/UL (ref 0–0.5)
EOSINOPHIL NFR BLD AUTO: 1.9 % (ref 1–4)
EOSINOPHIL NFR BLD MANUAL: 4 % (ref 1–4)
ERYTHROCYTE [DISTWIDTH] IN BLOOD BY AUTOMATED COUNT: 14.9 % (ref 11.5–14.5)
EST. AVERAGE GLUCOSE BLD GHB EST-MCNC: 148 MG/DL
GLOBULIN SER-MCNC: 4.4 G/DL (ref 2–4)
GLUCOSE SERPL-MCNC: 125 MG/DL (ref 70–105)
GLUCOSE SERPL-MCNC: 76 MG/DL (ref 74–106)
HBA1C MFR BLD HPLC: 6.8 % (ref 4.5–6.6)
HCT VFR BLD AUTO: 30.3 % (ref 38–47)
HGB BLD-MCNC: 9.6 G/DL (ref 12–16)
LYMPHOCYTES # BLD AUTO: 1.97 K/UL (ref 1–4.8)
LYMPHOCYTES NFR BLD AUTO: 11.7 % (ref 27–41)
LYMPHOCYTES NFR BLD MANUAL: 17 % (ref 27–41)
MAGNESIUM SERPL-MCNC: 1.9 MG/DL (ref 1.7–2.3)
MCH RBC QN AUTO: 26.2 PG (ref 27–31)
MCHC RBC AUTO-ENTMCNC: 31.7 G/DL (ref 32–36)
MCV RBC AUTO: 82.8 FL (ref 80–96)
MONOCYTES # BLD AUTO: 0.87 K/UL (ref 0–0.8)
MONOCYTES NFR BLD AUTO: 5.2 % (ref 2–6)
MPC BLD CALC-MCNC: 10.3 FL (ref 9.4–12.4)
NEUTROPHILS # BLD AUTO: 13.67 K/UL (ref 1.8–7.7)
NEUTROPHILS NFR BLD AUTO: 80.9 % (ref 53–65)
NEUTS SEG NFR BLD MANUAL: 79 % (ref 50–62)
NRBC BLD MANUAL-RTO: ABNORMAL %
PLATELET # BLD AUTO: 342 K/UL (ref 150–400)
POTASSIUM SERPL-SCNC: 3.1 MMOL/L (ref 3.5–5.1)
PROT SERPL-MCNC: 6.8 G/DL (ref 6.4–8.2)
RBC # BLD AUTO: 3.66 M/UL (ref 4.2–5.4)
SODIUM SERPL-SCNC: 137 MMOL/L (ref 136–145)
WBC # BLD AUTO: 16.88 K/UL (ref 4.5–11)

## 2024-04-15 PROCEDURE — 25000242 PHARM REV CODE 250 ALT 637 W/ HCPCS: Performed by: REGISTERED NURSE

## 2024-04-15 PROCEDURE — 99900035 HC TECH TIME PER 15 MIN (STAT)

## 2024-04-15 PROCEDURE — 94761 N-INVAS EAR/PLS OXIMETRY MLT: CPT

## 2024-04-15 PROCEDURE — 63600175 PHARM REV CODE 636 W HCPCS: Performed by: NURSE PRACTITIONER

## 2024-04-15 PROCEDURE — 94640 AIRWAY INHALATION TREATMENT: CPT

## 2024-04-15 PROCEDURE — 27000221 HC OXYGEN, UP TO 24 HOURS

## 2024-04-15 PROCEDURE — 83735 ASSAY OF MAGNESIUM: CPT | Performed by: NURSE PRACTITIONER

## 2024-04-15 PROCEDURE — 25000003 PHARM REV CODE 250: Performed by: REGISTERED NURSE

## 2024-04-15 PROCEDURE — 11000001 HC ACUTE MED/SURG PRIVATE ROOM

## 2024-04-15 PROCEDURE — 63600175 PHARM REV CODE 636 W HCPCS: Performed by: REGISTERED NURSE

## 2024-04-15 PROCEDURE — 25000003 PHARM REV CODE 250: Performed by: NURSE PRACTITIONER

## 2024-04-15 PROCEDURE — 25000003 PHARM REV CODE 250: Performed by: FAMILY MEDICINE

## 2024-04-15 PROCEDURE — 25000242 PHARM REV CODE 250 ALT 637 W/ HCPCS: Performed by: NURSE PRACTITIONER

## 2024-04-15 PROCEDURE — 82962 GLUCOSE BLOOD TEST: CPT

## 2024-04-15 PROCEDURE — 80053 COMPREHEN METABOLIC PANEL: CPT | Performed by: REGISTERED NURSE

## 2024-04-15 PROCEDURE — 85025 COMPLETE CBC W/AUTO DIFF WBC: CPT | Performed by: REGISTERED NURSE

## 2024-04-15 RX ORDER — AMOXICILLIN 250 MG
1 CAPSULE ORAL DAILY
Status: DISCONTINUED | OUTPATIENT
Start: 2024-04-16 | End: 2024-04-17 | Stop reason: HOSPADM

## 2024-04-15 RX ORDER — IPRATROPIUM BROMIDE AND ALBUTEROL SULFATE 2.5; .5 MG/3ML; MG/3ML
3 SOLUTION RESPIRATORY (INHALATION) EVERY 6 HOURS
Status: DISCONTINUED | OUTPATIENT
Start: 2024-04-15 | End: 2024-04-17 | Stop reason: HOSPADM

## 2024-04-15 RX ORDER — ATORVASTATIN CALCIUM 40 MG/1
80 TABLET, FILM COATED ORAL NIGHTLY
Status: DISCONTINUED | OUTPATIENT
Start: 2024-04-15 | End: 2024-04-17 | Stop reason: HOSPADM

## 2024-04-15 RX ORDER — CARVEDILOL 6.25 MG/1
12.5 TABLET ORAL 2 TIMES DAILY
Status: DISCONTINUED | OUTPATIENT
Start: 2024-04-15 | End: 2024-04-17 | Stop reason: HOSPADM

## 2024-04-15 RX ORDER — KETOROLAC TROMETHAMINE 30 MG/ML
15 INJECTION, SOLUTION INTRAMUSCULAR; INTRAVENOUS ONCE
Status: COMPLETED | OUTPATIENT
Start: 2024-04-15 | End: 2024-04-15

## 2024-04-15 RX ADMIN — ATORVASTATIN CALCIUM 80 MG: 40 TABLET, FILM COATED ORAL at 09:04

## 2024-04-15 RX ADMIN — DULOXETINE 60 MG: 30 CAPSULE, DELAYED RELEASE ORAL at 09:04

## 2024-04-15 RX ADMIN — PREGABALIN 150 MG: 50 CAPSULE ORAL at 09:04

## 2024-04-15 RX ADMIN — IPRATROPIUM BROMIDE AND ALBUTEROL SULFATE 3 ML: 2.5; .5 SOLUTION RESPIRATORY (INHALATION) at 06:04

## 2024-04-15 RX ADMIN — FLUTICASONE PROPIONATE 100 MCG: 50 SPRAY, METERED NASAL at 09:04

## 2024-04-15 RX ADMIN — LINACLOTIDE 290 MCG: 145 CAPSULE, GELATIN COATED ORAL at 05:04

## 2024-04-15 RX ADMIN — POTASSIUM CHLORIDE 20 MEQ: 1500 TABLET, EXTENDED RELEASE ORAL at 09:04

## 2024-04-15 RX ADMIN — VANCOMYCIN HYDROCHLORIDE 1250 MG: 1 INJECTION, POWDER, LYOPHILIZED, FOR SOLUTION INTRAVENOUS at 09:04

## 2024-04-15 RX ADMIN — SODIUM CHLORIDE 250 ML: 9 INJECTION, SOLUTION INTRAVENOUS at 09:04

## 2024-04-15 RX ADMIN — KETOROLAC TROMETHAMINE 15 MG: 30 INJECTION, SOLUTION INTRAMUSCULAR at 06:04

## 2024-04-15 RX ADMIN — LEVOTHYROXINE SODIUM 50 MCG: 50 TABLET ORAL at 05:04

## 2024-04-15 RX ADMIN — PANTOPRAZOLE SODIUM 40 MG: 40 TABLET, DELAYED RELEASE ORAL at 09:04

## 2024-04-15 RX ADMIN — CARVEDILOL 12.5 MG: 6.25 TABLET, FILM COATED ORAL at 06:04

## 2024-04-15 RX ADMIN — ARIPIPRAZOLE 5 MG: 5 TABLET ORAL at 09:04

## 2024-04-15 RX ADMIN — MUPIROCIN: 20 OINTMENT TOPICAL at 09:04

## 2024-04-15 RX ADMIN — IPRATROPIUM BROMIDE AND ALBUTEROL SULFATE 3 ML: 2.5; .5 SOLUTION RESPIRATORY (INHALATION) at 12:04

## 2024-04-15 RX ADMIN — SITAGLIPTIN 100 MG: 50 TABLET, FILM COATED ORAL at 09:04

## 2024-04-15 RX ADMIN — CARVEDILOL 12.5 MG: 6.25 TABLET, FILM COATED ORAL at 09:04

## 2024-04-15 RX ADMIN — IPRATROPIUM BROMIDE AND ALBUTEROL SULFATE 3 ML: 2.5; .5 SOLUTION RESPIRATORY (INHALATION) at 07:04

## 2024-04-15 NOTE — HOSPITAL COURSE
04/15/2024: Hospital day #2 for IV Vancomycin and duo neb treatments for pneumonia. Verigene gram + blood culture grew out MRSA. VS wnl. Continues to have diminished breath sounds to left lower lobe. O2 sat 94% on 2L/NC. Reports feeling better today. No new complaints.     04/16/2024: Hospital Day #3. Patient had increase in pain of right foot at 1st metatarsophalangeal joint which was recently treated for gout. Reports noticed a pustule on bottom of right foot at 1st metatarsophalangeal joint last night. Denies drainage. No recent injury or infection with foot per patient. WBC today is down to 12.83, H&H stable at 10.2/31.5, K+ 3.8, BUN/Cr 11/0.74. Has been afebrile. O2 sat 92-96% on 2L/NC. De-roofed pustule on right foot, purulent drainage noted and obtained culture. Patient will need MRI to rule out osteomyelitis and surgical evaluation of foot. Spoke with Dr. Everett regarding patient's case and recommended to add Cefepime for gram negative coverage. She accepted patient to [facility].    04/17/2024: Hospital Day #4. Patient receiving Vancomycin and Cefepime to cover right foot possible abscess, pneumonia and blood cultures positive for MRSA.  Wound culture preliminary results positive for heavy growth staph aureus. WBC is up to 13.59 today. Patient remains afebrile. Redness and swelling of right foot has improved, but continues to have scant amount of purulent drainage. Lungs CTA. O2 sat 97% on room air. MRI of right foot was ordered today, but was unable to be done due to MRI machine went down.   16:10 PFC bed placement called. Patient to room 569 at [facility].

## 2024-04-15 NOTE — NURSING
"Responsed to call bell and pt is c/o throbbing pain in the right foot "where I got a staph infection". Also c/o difficultly breathing and ribs hurting RR 28, encouraged to slow her breathing down. Sat was 94  but pt also requesting a breathing tx. Notified Resp therapy of need for a breathing tx. Also notified provider of pt's c/o severe pain and treated with one time dose of toradol.  "

## 2024-04-15 NOTE — PROGRESS NOTES
[facility] - Medical Surgical Unit  Hospital Medicine  Progress Note    Patient Name: Magali Cheung  MRN: [id number]  Patient Class: IP- Inpatient   Admission Date: 4/14/2024  Length of Stay: 1 days  Attending Physician: Zenon Dominguez DO  Primary Care Provider: Ariana Pinedo NP        Subjective:     Principal Problem:Pneumonia of left lung due to methicillin resistant Staphylococcus aureus (MRSA)        HPI:  64 y-old  female with PMH of DM, HTN, Hypothyroid, and hyperlipidemia who presents to the ED with a 4-5 day history of fever/cough/dyspnea/blood tinged sputum and chest pain that is exacerbated with cough. Was seen at [facility] ED on 04/12/24 where work up was suggestive of LML pneumonia. Patient was given RX for Augmentin and Azithromycin which she filled sometime on 04/13/24. Had taken 2 doses of each upon arrival tonight. She now has a WBC of 19.22, Absolute neutrophil count 17.53, Na+134, K+2.8 (chronically hypokalemic), glucose 177 mg/dl, and a lactic acid of 1.0. Patient did have positive blood cultures from her visit on 04/12/24 that were verigene gram + blood cx MRSA. This was reported in both anaerobic and aerobic bottles of both sets of BC. Patient is to be admitted to [facility] for IV abx as well as serial labs.     Overview/Hospital Course:  04/15/2024: Hospital day #2 for IV Vancomycin and duo neb treatments for pneumonia. Verigene gram + blood culture grew out MRSA. VS wnl. Continues to have diminished breath sounds to left lower lobe. O2 sat 94% on 2L/NC. Reports feeling better today. No new complaints.     Interval History: WBC down  o 16.88, K+ up to 3.1    Review of Systems   Constitutional:  Positive for fatigue. Negative for activity change, appetite change, chills and fever.   HENT: Negative.     Eyes: Negative.    Respiratory:  Positive for cough and shortness of breath. Negative for chest tightness and wheezing.    Cardiovascular: Negative.    Gastrointestinal:  Negative.    Endocrine: Negative.    Genitourinary: Negative.    Musculoskeletal: Negative.    Skin: Negative.    Allergic/Immunologic: Negative.    Neurological:  Positive for weakness. Negative for dizziness, light-headedness and headaches.   Hematological: Negative.    Psychiatric/Behavioral: Negative.       Objective:     Vital Signs (Most Recent):  Temp: 98.3 °F (36.8 °C) (04/15/24 0736)  Pulse: 77 (04/15/24 0736)  Resp: 20 (04/15/24 0736)  BP: 138/84 (04/15/24 0736)  SpO2: (!) 94 % (04/15/24 0736) Vital Signs (24h Range):  Temp:  [97.6 °F (36.4 °C)-98.3 °F (36.8 °C)] 98.3 °F (36.8 °C)  Pulse:  [68-84] 77  Resp:  [16-20] 20  SpO2:  [94 %-98 %] 94 %  BP: (126-190)/(74-98) 138/84     Weight: 60.2 kg (132 lb 12.8 oz)  Body mass index is 23.52 kg/m².    Intake/Output Summary (Last 24 hours) at 4/15/2024 1229  Last data filed at 4/14/2024 2058  Gross per 24 hour   Intake 250 ml   Output --   Net 250 ml         Physical Exam  Vitals and nursing note reviewed.   Constitutional:       General: She is not in acute distress.     Appearance: Normal appearance. She is normal weight. She is not ill-appearing, toxic-appearing or diaphoretic.   HENT:      Head: Normocephalic and atraumatic.      Right Ear: External ear normal.      Left Ear: External ear normal.      Nose: Nose normal.      Mouth/Throat:      Mouth: Mucous membranes are moist.      Pharynx: Oropharynx is clear.   Eyes:      Conjunctiva/sclera: Conjunctivae normal.      Pupils: Pupils are equal, round, and reactive to light.   Cardiovascular:      Rate and Rhythm: Normal rate and regular rhythm.      Heart sounds: Normal heart sounds.   Pulmonary:      Effort: Pulmonary effort is normal.      Breath sounds: Normal breath sounds.   Abdominal:      General: Bowel sounds are normal.      Palpations: Abdomen is soft.      Tenderness: There is no abdominal tenderness.   Genitourinary:     General: Normal vulva.   Musculoskeletal:         General: Normal range of  motion.      Cervical back: Normal range of motion.      Right lower leg: No edema.      Left lower leg: No edema.   Skin:     General: Skin is warm and dry.      Capillary Refill: Capillary refill takes less than 2 seconds.   Neurological:      General: No focal deficit present.      Mental Status: She is alert and oriented to person, place, and time. Mental status is at baseline.      Motor: Weakness (Generalized) present.   Psychiatric:         Attention and Perception: Attention normal.         Mood and Affect: Mood normal.         Speech: Speech normal.         Behavior: Behavior normal. Behavior is cooperative.         Judgment: Judgment normal.             Significant Labs: All pertinent labs within the past 24 hours have been reviewed.  Recent Lab Results  (Last 5 results in the past 24 hours)        04/15/24  0447   04/14/24  1939   04/14/24  1830   04/14/24  1805   04/14/24  1800        Influenza A, Molecular     Negative           Influenza B, Molecular     Negative           Group A Strep, Molecular     Negative           Albumin/Globulin Ratio 0.5         0.6       Albumin 2.4         3.1       ALP 91         113       ALT 14         17       Anion Gap 12         15       Appearance, UA   Clear             AST 11         10       Bacteria, UA   Few             Baso # 0.05         0.07       Basophil % 0.3         0.4       Bilirubin (UA)   Negative             BILIRUBIN TOTAL 0.4         0.5       BUN 12         16       BUN/CREAT RATIO 16         18       Calcium 8.8         9.2       Chloride 98         95       CO2 30         27       Color, UA   Yellow             ID NOW COVID-19, (MARYELLEN)     Negative           Creatinine 0.73         0.89       Differential Method Manual         Manual       eGFR 93         73       Eos # 0.32         0.04       Eos % 1.9         0.2        4         1       Globulin, Total 4.4         5.1       Glucose 76         177       Glucose, UA   Negative              Hematocrit 30.3         34.4       Hemoglobin 9.6         11.1       Ketones, UA   Negative             Lactic Acid Level       1.0         Leukocyte Esterase, UA   Negative             Lymph # 1.97         0.71       Lymph % 11.7         3.7        17         7       MCH 26.2         26.6       MCHC 31.7         32.3       MCV 82.8         82.3       Mono # 0.87         0.87       Mono % 5.2         4.5                2       MPV 10.3         10.7       Neutrophils, Abs 13.67         17.53       Neutrophils Relative 80.9         91.2       NITRITE UA   Negative             nRBC               Blood, UA   Small             pH, UA   6.0             Platelet Count 342         356       Potassium 3.1         2.8       PROTEIN TOTAL 6.8         8.2       Protein, UA   30             RBC 3.66         4.18       RBC, UA   0-3             RDW 14.9         14.9       Segmented Neutrophils, Man % 79         90       Sodium 137         134       Specific [place], UA   1.010             Squam Epithel, UA   Few             TSH         0.397       UROBILINOGEN UA   0.2             WBC, UA   0-5             WBC 16.88         19.22                              Significant Imaging: I have reviewed all pertinent imaging results/findings within the past 24 hours.    Assessment/Plan:      * Pneumonia of left lung due to methicillin resistant Staphylococcus aureus (MRSA)  04/15/2024: WBC down to 16.88 today. Diminished BS in LLL. O2 sat 94% on 2L/NC. Patient reports feeling better today.   -Vancomycin 1,2500 mg IV every 24 hours (Day #2)  -Duo Nebs every 6 hours  -Albuterol nebs every 4 hours prn      Hypothyroid  TSH pending  -Levothyroxine 50 mcg PO daily and will adjust based on TSH    04/15/2024: TSH level is 0.397. Will continue Levothyroxine at 50 mcg daily.      Hypokalemia  04/15/2024: Patient has hypokalemia which is Acute on Chronic and currently uncontrolled. Most recent potassium levels reviewed-   Lab Results   Component  "Value Date    K 3.1 (L) 04/15/2024   Patient is currently taking Potassium Chl 20 meq po twice a day. Will continue to monitor BMP daily.    Hypertension  04/15/2024: Chronic, controlled. Latest blood pressure and vitals reviewed-     Temp:  [97.6 °F (36.4 °C)-98.3 °F (36.8 °C)]   Pulse:  [68-84]   Resp:  [16-20]   BP: (126-190)/(74-98)   SpO2:  [92 %-98 %] .   Home meds for hypertension were reviewed and noted below.   Hypertension Medications               carvediloL (COREG) 12.5 MG tablet Take 1 tablet (12.5 mg total) by mouth 2 (two) times daily with meals.            While in the hospital, will manage blood pressure as follows; Continue home antihypertensive regimen    Will utilize p.r.n. blood pressure medication only if patient's blood pressure greater than 180/110 and she develops symptoms such as worsening chest pain or shortness of breath.    Diabetes mellitus without complication  04/15/2024: Patient's FSGs are controlled on current medication regimen.  Last A1c reviewed-   Lab Results   Component Value Date    HGBA1C 7.7 (H) 04/17/2023     Most recent fingerstick glucose reviewed- No results for input(s): "POCTGLUCOSE" in the last 24 hours.  Current correctional scale    Maintain anti-hyperglycemic dose as follows-   Antihyperglycemics (From admission, onward)      Start     Stop Route Frequency Ordered    04/15/24 0900  SITagliptin phosphate tablet 100 mg         -- Oral Daily 04/14/24 2129                Depression  Patient has persistent depression which is moderate and is currently controlled. Will Continue anti-depressant medications. We will not consult psychiatry at this time. Patient does not display psychosis at this time. Continue to monitor closely and adjust plan of care as needed.    -Apipiprazole 5 mg PO daily  -Duloxetine DR 60 mg PO daily    04/15/2024: Pharmacy has Brexpiprazole now which is what patient is on at home.   -Discontinue Aripiprazole 5 mg  -Restart Brexpiprazole 2 mg  one " tab by mouth daily          VTE Risk Mitigation (From admission, onward)           Ordered     IP VTE LOW RISK PATIENT  Once         04/14/24 2122     Place DEVORA hose  Until discontinued         04/14/24 2122                    Discharge Planning   ALANA:      Code Status: Full Code   Is the patient medically ready for discharge?:     Reason for patient still in hospital (select all that apply): Laboratory test and Treatment         Reviewed case with Dr. Dominguez.             Scarlett Lam, CARROLL  Department of Hospital Medicine   [facility] - Medical Surgical Unit

## 2024-04-15 NOTE — SUBJECTIVE & OBJECTIVE
Past Medical History:   Diagnosis Date    Anxiety     Chronic pain syndrome     Depression     Diabetes mellitus     GERD (gastroesophageal reflux disease)     Hypertension     Hypothyroidism     Low back pain     Low vitamin B12 level     Lumbar radiculopathy     Neuropathy        Past Surgical History:   Procedure Laterality Date    Bilateral L3-S1 MBB Bilateral 2019, 2019    Dr Barclay     SECTION      COLON SURGERY      HERNIA REPAIR      Left L3-S1 RFTC Left 10/23/2019    Dr Barclay    RADIOFREQUENCY ABLATION OF LUMBAR MEDIAL BRANCH NERVE AT SINGLE LEVEL Right 10/25/2022    Procedure: Radiofrequency Ablation, Nerve, Spinal, Lumbar, Medial Branch, Level L4-S1;  Surgeon: Roselia Barclay MD;  Location: [facility];  Service: Pain Management;  Laterality: Right;  vaccinated.schedule LEFT after right is done.    RADIOFREQUENCY ABLATION OF LUMBAR MEDIAL BRANCH NERVE AT SINGLE LEVEL Left 2022    Procedure: LEFT L4-S1 RFTC  (HAD RIGHT ON 10-25);  Surgeon: Roselia Barclay MD;  Location: [facility];  Service: Pain Management;  Laterality: Left;  VAC SELENE IN EPIC    Right L3-S1 RFTC Right 2019    Dr Barclay    SPINAL CORD STIMULATOR IMPLANT         Review of patient's allergies indicates:   Allergen Reactions    Opioids - morphine analogues        Current Facility-Administered Medications   Medication Dose Route Frequency Provider Last Rate Last Admin    albuterol nebulizer solution 2.5 mg  2.5 mg Nebulization Q4H PRN Abran Piper NP-C        [START ON 4/15/2024] albuterol-ipratropium 2.5 mg-0.5 mg/3 mL nebulizer solution 3 mL  3 mL Nebulization Q12H Abran Piper NP-C        albuterol-ipratropium 2.5 mg-0.5 mg/3 mL nebulizer solution 3 mL  3 mL Nebulization Once Abran Piper NP-C        [START ON 4/15/2024] ARIPiprazole tablet 5 mg  5 mg Oral Daily Abran Piper NP-C        [START ON 4/15/2024] carvediloL tablet 12.5 mg  12.5 mg Oral QAM Abran Piper NP-C        DULoxetine   capsule 60 mg  60 mg Oral BID Abran Piper NP-C        [START ON 4/15/2024] fluticasone propionate 50 mcg/actuation nasal spray 100 mcg  2 spray Each Nostril Daily Abran Piper NP-C        ibuprofen tablet 400 mg  400 mg Oral Q6H PRN Abran Piper NP-C        ibuprofen tablet 600 mg  600 mg Oral Q6H PRN Abran Piper NP-C        [START ON 4/15/2024] levothyroxine tablet 50 mcg  50 mcg Oral Before breakfast Abran Piper NP-C        [START ON 4/15/2024] linaCLOtide capsule 290 mcg  290 mcg Oral Before breakfast Abran Piper NP-C        melatonin tablet 6 mg  6 mg Oral Nightly PRN Abran Piper NP-C        [START ON 4/15/2024] mupirocin 2 % ointment   Nasal BID Zenon Dominguez DO        ondansetron injection 4 mg  4 mg Intravenous Q8H PRN Abran Piper NP-C        [START ON 4/15/2024] pantoprazole EC tablet 40 mg  40 mg Oral Daily Abran Piper NP-C        [START ON 4/15/2024] polyethylene glycol packet 17 g  17 g Oral Daily Abran Piper NP-C        potassium chloride SA CR tablet 20 mEq  20 mEq Oral BID Abran Piper NP-C        pregabalin capsule 150 mg  150 mg Oral BID Abran Piper NP-C        promethazine tablet 25 mg  25 mg Oral Q6H PRN Abran Piper NP-C        [START ON 4/15/2024] SITagliptin phosphate tablet 100 mg  100 mg Oral Daily Abran Piper NP-C        sodium chloride 0.9% flush 10 mL  10 mL Intravenous PRN Abran Piper NP-C        vancomycin (VANCOCIN) 1,250 mg in dextrose 5 % (D5W) 250 mL IVPB  1,250 mg Intravenous Q24H Abran Piper NP-C   Stopped at 04/14/24 2058    vancomycin - pharmacy to dose   Intravenous pharmacy to manage frequency Abran Piper NP-C         Family History       Problem Relation (Age of Onset)    Bipolar disorder Paternal Grandmother    Heart disease Father, Paternal Grandfather    Hypertension Father    Stroke Maternal Grandfather          Tobacco Use    Smoking status: Every Day     Types: Vaping with nicotine     Passive exposure: Past    Smokeless tobacco: Never     Tobacco comments:     Smoked cigarettes 46 years. Stopped smoking cigarettes and started vaping with nicotine x 2 years   Substance and Sexual Activity    Alcohol use: Yes    Drug use: Never    Sexual activity: Yes     Partners: Male     Review of Systems   Constitutional:  Positive for activity change (Decreased), chills, fatigue and fever.   HENT:  Positive for congestion.    Eyes: Negative.    Respiratory:  Positive for cough (Productive yellow with blood tinged at times) and shortness of breath.    Cardiovascular:  Positive for chest pain (Exacerbated with cough/deep breath).   Gastrointestinal: Negative.    Endocrine: Negative.    Genitourinary: Negative.    Musculoskeletal: Negative.    Skin: Negative.    Allergic/Immunologic: Negative.    Neurological: Negative.    Hematological: Negative.    Psychiatric/Behavioral: Negative.       Objective:     Vital Signs (Most Recent):  Temp: 97.6 °F (36.4 °C) (04/14/24 2031)  Pulse: 72 (04/14/24 2139)  Resp: 16 (04/14/24 2139)  BP: (!) 190/95 (04/14/24 2031)  SpO2: 95 % (04/14/24 2139) Vital Signs (24h Range):  Temp:  [97.6 °F (36.4 °C)-97.8 °F (36.6 °C)] 97.6 °F (36.4 °C)  Pulse:  [72-77] 72  Resp:  [16-20] 16  SpO2:  [95 %] 95 %  BP: (165-190)/(89-95) 190/95     Weight: 60.2 kg (132 lb 12.8 oz)  Body mass index is 23.52 kg/m².     Physical Exam  Vitals and nursing note reviewed. Exam conducted with a chaperone present.   Constitutional:       General: She is not in acute distress.     Appearance: Normal appearance. She is normal weight. She is not ill-appearing, toxic-appearing or diaphoretic.   HENT:      Head: Normocephalic.      Right Ear: Tympanic membrane and external ear normal.      Left Ear: Tympanic membrane and external ear normal.      Nose: Nose normal.      Mouth/Throat:      Mouth: Mucous membranes are moist.      Pharynx: Oropharynx is clear.   Eyes:      Conjunctiva/sclera: Conjunctivae normal.   Cardiovascular:      Rate and Rhythm: Normal rate and  regular rhythm.   Pulmonary:      Effort: Pulmonary effort is normal.      Breath sounds: Normal breath sounds.   Abdominal:      General: Abdomen is flat. Bowel sounds are normal.   Genitourinary:     General: Normal vulva.   Musculoskeletal:         General: Normal range of motion.      Cervical back: Normal range of motion.   Skin:     General: Skin is warm and dry.      Capillary Refill: Capillary refill takes less than 2 seconds.   Neurological:      General: No focal deficit present.      Mental Status: She is alert and oriented to person, place, and time. Mental status is at baseline.      Motor: Weakness (Generalized) present.   Psychiatric:         Mood and Affect: Mood normal.         Behavior: Behavior normal.         Thought Content: Thought content normal.         Judgment: Judgment normal.                Significant Labs: All pertinent labs within the past 24 hours have been reviewed.  CBC:   Recent Labs   Lab 04/12/24 2350 04/14/24  1800   WBC 12.12* 19.22*   HGB 11.0* 11.1*   HCT 34.2* 34.4*    356     CMP:   Recent Labs   Lab 04/12/24 2350 04/14/24  1800   * 134*   K 2.7* 2.8*   CL 93* 95*   CO2 29 27   * 177*   BUN 13 16   CREATININE 0.83 0.89   CALCIUM 8.8 9.2   PROT 7.2 8.2   ALBUMIN 2.9* 3.1*   BILITOT 0.5 0.5   ALKPHOS 80 113   AST 8* 10*   ALT 15 17   ANIONGAP 15 15     Urine Studies:   Recent Labs   Lab 04/14/24  1939   COLORU Yellow   APPEARANCEUA Clear   PHUR 6.0   SPECGRAV 1.010   PROTEINUA 30*   GLUCUA Negative   KETONESU Negative   BILIRUBINUA Negative   OCCULTUA Small*   NITRITE Negative   UROBILINOGEN 0.2   LEUKOCYTESUR Negative   RBCUA 0-3   WBCUA 0-5   BACTERIA Few*       Significant Imaging: I have reviewed all pertinent imaging results/findings within the past 24 hours.

## 2024-04-15 NOTE — ASSESSMENT & PLAN NOTE
Patient has hypokalemia which is Acute on Chronic and currently uncontrolled. Most recent potassium levels reviewed-   Lab Results   Component Value Date    K 2.8 (L) 04/14/2024   . Will continue potassium replacement per protocol and recheck repeat levels after replacement completed.

## 2024-04-15 NOTE — H&P
[facility] - Medical Surgical Unit  Hospital Medicine  History & Physical    Patient Name: Magali Cheung  MRN: [id number]  Patient Class: IP- Inpatient  Admission Date: 4/14/2024  Attending Physician: Zenon Dominguez DO   Primary Care Provider: Ariana Pinedo NP         Patient information was obtained from patient, spouse/SO, past medical records, and ER records.     Subjective:     Principal Problem:Pneumonia of left lung due to methicillin resistant Staphylococcus aureus (MRSA)    Chief Complaint:   Chief Complaint   Patient presents with    Cough    Chills    Shortness of Breath    Chest Pain     Pt is currently being TX for a URI with PO antibiotics. She states she feels worse today and has new onset of MID STERNAL cp LEVEL 3. SHE STATES SHE STARTED COUGHING UP BLOOD TODAY AND FEELS WORSE THAN PRIOR TO BEING TX FOR THE URI.        HPI: 64 y-old  female with PMH of DM, HTN, Hypothyroid, and hyperlipidemia who presents to the ED with a 4-5 day history of fever/cough/dyspnea/blood tinged sputum and chest pain that is exacerbated with cough. Was seen at [facility] on 04/12/24 where work up was suggestive of LML pneumonia. Patient was given RX for Augmentin and Azithromycin which she filled sometime on 04/13/24. Had taken 2 doses of each upon arrival tonight. She now has a WBC of 19.22, Absolute neutrophil count 17.53, Na+134, K+2.8 (chronically hypokalemic), glucose 177 mg/dl, and a lactic acid of 1.0. Patient did have positive blood cultures from her visit on 04/12/24 that were verigene gram + blood cx MRSA. This was reported in both anaerobic and aerobic bottles of both sets of BC. Patient is to be admitted to [facility] for IV abx as well as serial labs.     Past Medical History:   Diagnosis Date    Anxiety     Chronic pain syndrome     Depression     Diabetes mellitus     GERD (gastroesophageal reflux disease)     Hypertension     Hypothyroidism     Low back pain     Low vitamin B12  level     Lumbar radiculopathy     Neuropathy        Past Surgical History:   Procedure Laterality Date    Bilateral L3-S1 MBB Bilateral 2019, 2019    Dr Barclay     SECTION      COLON SURGERY      HERNIA REPAIR      Left L3-S1 RFTC Left 10/23/2019    Dr Barclay    RADIOFREQUENCY ABLATION OF LUMBAR MEDIAL BRANCH NERVE AT SINGLE LEVEL Right 10/25/2022    Procedure: Radiofrequency Ablation, Nerve, Spinal, Lumbar, Medial Branch, Level L4-S1;  Surgeon: Roselia Barclay MD;  Location: [facility];  Service: Pain Management;  Laterality: Right;  vaccinated.schedule LEFT after right is done.    RADIOFREQUENCY ABLATION OF LUMBAR MEDIAL BRANCH NERVE AT SINGLE LEVEL Left 2022    Procedure: LEFT L4-S1 RFTC  (HAD RIGHT ON 10-25);  Surgeon: Roselia Barclay MD;  Location: [facility];  Service: Pain Management;  Laterality: Left;  VAC SELENE IN EPIC    Right L3-S1 RFTC Right 2019    Dr Barclay    SPINAL CORD STIMULATOR IMPLANT         Review of patient's allergies indicates:   Allergen Reactions    Opioids - morphine analogues        Current Facility-Administered Medications   Medication Dose Route Frequency Provider Last Rate Last Admin    albuterol nebulizer solution 2.5 mg  2.5 mg Nebulization Q4H PRN Abran Piper NP-C        [START ON 4/15/2024] albuterol-ipratropium 2.5 mg-0.5 mg/3 mL nebulizer solution 3 mL  3 mL Nebulization Q12H Abran Piper NP-C        albuterol-ipratropium 2.5 mg-0.5 mg/3 mL nebulizer solution 3 mL  3 mL Nebulization Once Abran Piper NP-C        [START ON 4/15/2024] ARIPiprazole tablet 5 mg  5 mg Oral Daily Abran Piper NP-C        [START ON 4/15/2024] carvediloL tablet 12.5 mg  12.5 mg Oral QAM Abran Piper NP-C        DULoxetine DR capsule 60 mg  60 mg Oral BID Abran Piper NP-C        [START ON 4/15/2024] fluticasone propionate 50 mcg/actuation nasal spray 100 mcg  2 spray Each Nostril Daily Abran Piper NP-C        ibuprofen tablet 400 mg  400 mg Oral  Q6H PRN Abran Piper NP-C        ibuprofen tablet 600 mg  600 mg Oral Q6H PRN Abran Piper NP-C        [START ON 4/15/2024] levothyroxine tablet 50 mcg  50 mcg Oral Before breakfast Abran Piper NP-C        [START ON 4/15/2024] linaCLOtide capsule 290 mcg  290 mcg Oral Before breakfast Abran Piper NP-C        melatonin tablet 6 mg  6 mg Oral Nightly PRN Abran Piper NP-C        [START ON 4/15/2024] mupirocin 2 % ointment   Nasal BID Zenon Dominguez DO        ondansetron injection 4 mg  4 mg Intravenous Q8H PRN Abran Piper NP-C        [START ON 4/15/2024] pantoprazole EC tablet 40 mg  40 mg Oral Daily Abran Piper NP-C        [START ON 4/15/2024] polyethylene glycol packet 17 g  17 g Oral Daily Abran Piper NP-C        potassium chloride SA CR tablet 20 mEq  20 mEq Oral BID Abran Piper NP-C        pregabalin capsule 150 mg  150 mg Oral BID Abran Piper NP-C        promethazine tablet 25 mg  25 mg Oral Q6H PRN Abran Piper NP-C        [START ON 4/15/2024] SITagliptin phosphate tablet 100 mg  100 mg Oral Daily Abran Piper NP-C        sodium chloride 0.9% flush 10 mL  10 mL Intravenous PRN Abran Piper NP-C        vancomycin (VANCOCIN) 1,250 mg in dextrose 5 % (D5W) 250 mL IVPB  1,250 mg Intravenous Q24H Abran Piper NP-C   Stopped at 04/14/24 2058    vancomycin - pharmacy to dose   Intravenous pharmacy to manage frequency Abran Piper NP-C         Family History       Problem Relation (Age of Onset)    Bipolar disorder Paternal Grandmother    Heart disease Father, Paternal Grandfather    Hypertension Father    Stroke Maternal Grandfather          Tobacco Use    Smoking status: Every Day     Types: Vaping with nicotine     Passive exposure: Past    Smokeless tobacco: Never    Tobacco comments:     Smoked cigarettes 46 years. Stopped smoking cigarettes and started vaping with nicotine x 2 years   Substance and Sexual Activity    Alcohol use: Yes    Drug use: Never    Sexual activity: Yes      Partners: Male     Review of Systems   Constitutional:  Positive for activity change (Decreased), chills, fatigue and fever.   HENT:  Positive for congestion.    Eyes: Negative.    Respiratory:  Positive for cough (Productive yellow with blood tinged at times) and shortness of breath.    Cardiovascular:  Positive for chest pain (Exacerbated with cough/deep breath).   Gastrointestinal: Negative.    Endocrine: Negative.    Genitourinary: Negative.    Musculoskeletal: Negative.    Skin: Negative.    Allergic/Immunologic: Negative.    Neurological: Negative.    Hematological: Negative.    Psychiatric/Behavioral: Negative.       Objective:     Vital Signs (Most Recent):  Temp: 97.6 °F (36.4 °C) (04/14/24 2031)  Pulse: 72 (04/14/24 2139)  Resp: 16 (04/14/24 2139)  BP: (!) 190/95 (04/14/24 2031)  SpO2: 95 % (04/14/24 2139) Vital Signs (24h Range):  Temp:  [97.6 °F (36.4 °C)-97.8 °F (36.6 °C)] 97.6 °F (36.4 °C)  Pulse:  [72-77] 72  Resp:  [16-20] 16  SpO2:  [95 %] 95 %  BP: (165-190)/(89-95) 190/95     Weight: 60.2 kg (132 lb 12.8 oz)  Body mass index is 23.52 kg/m².     Physical Exam  Vitals and nursing note reviewed. Exam conducted with a chaperone present.   Constitutional:       General: She is not in acute distress.     Appearance: Normal appearance. She is normal weight. She is not ill-appearing, toxic-appearing or diaphoretic.   HENT:      Head: Normocephalic.      Right Ear: Tympanic membrane and external ear normal.      Left Ear: Tympanic membrane and external ear normal.      Nose: Nose normal.      Mouth/Throat:      Mouth: Mucous membranes are moist.      Pharynx: Oropharynx is clear.   Eyes:      Conjunctiva/sclera: Conjunctivae normal.   Cardiovascular:      Rate and Rhythm: Normal rate and regular rhythm.   Pulmonary:      Effort: Pulmonary effort is normal.      Breath sounds: Normal breath sounds.   Abdominal:      General: Abdomen is flat. Bowel sounds are normal.   Genitourinary:     General: Normal  vulva.   Musculoskeletal:         General: Normal range of motion.      Cervical back: Normal range of motion.   Skin:     General: Skin is warm and dry.      Capillary Refill: Capillary refill takes less than 2 seconds.   Neurological:      General: No focal deficit present.      Mental Status: She is alert and oriented to person, place, and time. Mental status is at baseline.      Motor: Weakness (Generalized) present.   Psychiatric:         Mood and Affect: Mood normal.         Behavior: Behavior normal.         Thought Content: Thought content normal.         Judgment: Judgment normal.                Significant Labs: All pertinent labs within the past 24 hours have been reviewed.  CBC:   Recent Labs   Lab 04/12/24 2350 04/14/24  1800   WBC 12.12* 19.22*   HGB 11.0* 11.1*   HCT 34.2* 34.4*    356     CMP:   Recent Labs   Lab 04/12/24 2350 04/14/24  1800   * 134*   K 2.7* 2.8*   CL 93* 95*   CO2 29 27   * 177*   BUN 13 16   CREATININE 0.83 0.89   CALCIUM 8.8 9.2   PROT 7.2 8.2   ALBUMIN 2.9* 3.1*   BILITOT 0.5 0.5   ALKPHOS 80 113   AST 8* 10*   ALT 15 17   ANIONGAP 15 15     Urine Studies:   Recent Labs   Lab 04/14/24  1939   COLORU Yellow   APPEARANCEUA Clear   PHUR 6.0   SPECGRAV 1.010   PROTEINUA 30*   GLUCUA Negative   KETONESU Negative   BILIRUBINUA Negative   OCCULTUA Small*   NITRITE Negative   UROBILINOGEN 0.2   LEUKOCYTESUR Negative   RBCUA 0-3   WBCUA 0-5   BACTERIA Few*       Significant Imaging: I have reviewed all pertinent imaging results/findings within the past 24 hours.  Assessment/Plan:     * Pneumonia of left lung due to methicillin resistant Staphylococcus aureus (MRSA)  Patient has LML pneumonia   -Blood cultures from 04/12/24 were Gram Postive Cocci (MRSA)  -Vanc with pharmacy to dose.   -Duoneb Q 12 hours  -Albuterol Q 4 hours PRN      Hypothyroid  TSH pending  -Levothyroxine 50 mcg PO daily and will adjust based on TSH      Hypokalemia  Patient has hypokalemia which  "is Acute on Chronic and currently uncontrolled. Most recent potassium levels reviewed-   Lab Results   Component Value Date    K 2.8 (L) 04/14/2024   . Will continue potassium replacement per protocol and recheck repeat levels after replacement completed.     Hypertension  Chronic, controlled. Latest blood pressure and vitals reviewed-     Temp:  [97.6 °F (36.4 °C)-97.8 °F (36.6 °C)]   Pulse:  [72-77]   Resp:  [16-20]   BP: (165-190)/(89-95)   SpO2:  [95 %] .   Home meds for hypertension were reviewed and noted below.   Hypertension Medications               carvediloL (COREG) 12.5 MG tablet Take 1 tablet (12.5 mg total) by mouth 2 (two) times daily with meals.            While in the hospital, will manage blood pressure as follows; Continue home antihypertensive regimen    Will utilize p.r.n. blood pressure medication only if patient's blood pressure greater than 180/110 and she develops symptoms such as worsening chest pain or shortness of breath.    Diabetes mellitus without complication  Patient's FSGs are controlled on current medication regimen.  Last A1c reviewed-   Lab Results   Component Value Date    HGBA1C 7.7 (H) 04/17/2023     Most recent fingerstick glucose reviewed- No results for input(s): "POCTGLUCOSE" in the last 24 hours.  Current correctional scale    Maintain anti-hyperglycemic dose as follows-   Antihyperglycemics (From admission, onward)      Start     Stop Route Frequency Ordered    04/15/24 0900  SITagliptin phosphate tablet 100 mg         -- Oral Daily 04/14/24 2129          Hold Oral hypoglycemics while patient is in the hospital.    -A1C ordered, will adjust therapy as needed.     Depression  Patient has persistent depression which is moderate and is currently controlled. Will Continue anti-depressant medications. We will not consult psychiatry at this time. Patient does not display psychosis at this time. Continue to monitor closely and adjust plan of care as needed.    -Apipiprazole 5 " mg PO daily  -Duloxetine DR 60 mg PO daily          VTE Risk Mitigation (From admission, onward)           Ordered     IP VTE LOW RISK PATIENT  Once         04/14/24 2122     Place DEVORA hose  Until discontinued         04/14/24 2122                               Pharmacokinetic Initial Assessment: IV Vancomycin    Assessment/Plan:    Initiate intravenous vancomycin as 1250 mg IV q24h  Desired empiric serum trough concentration is 15 to 20 mcg/mL  Draw vancomycin trough level 30 min prior to fourth dose on 4/17 at approximately 1930  Pharmacy will continue to follow and monitor vancomycin.      Please contact pharmacy at [phone] with any questions regarding this assessment.     Patient brief summary:  Magali Cheung is a 64 y.o. female initiated on antimicrobial therapy with IV Vancomycin for treatment of suspected bacteremia    Drug Allergies:   Review of patient's allergies indicates:   Allergen Reactions    Opioids - morphine analogues        Actual Body Weight:   59.4 kg    Renal Function:   Estimated Creatinine Clearance: 53.5 mL/min (based on SCr of 0.89 mg/dL).,     Dialysis Method (if applicable):  N/A    CBC (last 72 hours):  Recent Labs   Lab Result Units 04/12/24 2350 04/14/24  1800   WBC K/uL 12.12* 19.22*   Hemoglobin g/dL 11.0* 11.1*   Hematocrit % 34.2* 34.4*   Platelet Count K/uL 278 356   Lymphocytes % % 3.5* 3.7*   Lymphocytes, Man % % 4* 7*   Monocytes % % 4.5 4.5   Monocytes, Man % % 3 2   Eosinophils % % 0.3* 0.2*   Eosinophils, Man % %  --  1   Basophils % % 0.3 0.4   Diff Type  Manual Manual       Metabolic Panel (last 72 hours):  Recent Labs   Lab Result Units 04/12/24  2350 04/14/24  1800   Sodium mmol/L 134* 134*   Potassium mmol/L 2.7* 2.8*   Chloride mmol/L 93* 95*   CO2 mmol/L 29 27   Glucose mg/dL 172* 177*   BUN mg/dL 13 16   Creatinine mg/dL 0.83 0.89   Albumin g/dL 2.9* 3.1*   Bilirubin, Total mg/dL 0.5 0.5   Alk Phos U/L 80 113   AST U/L 8* 10*   ALT U/L 15 17       Drug levels  "(last 3 results):  No results for input(s): "VANCOMYCINRA", "VANCORANDOM", "VANCOMYCINPE", "VANCOPEAK", "VANCOMYCINTR", "VANCOTROUGH" in the last 72 hours.    Microbiologic Results:  Microbiology Results (last 7 days)       Procedure Component Value Units Date/Time    Influenza A & B by Molecular [[id number]]  (Normal) Collected: 04/14/24 1830    Order Status: Completed Specimen: Nasopharyngeal Swab Updated: 04/14/24 1853     INFLUENZA A MOLECULAR Negative     INFLUENZA B MOLECULAR  Negative    Blood Culture #2 [[id number]] Collected: 04/14/24 1805    Order Status: Sent Specimen: Blood Updated: 04/14/24 1807    Blood Culture #1 [[id number]] Collected: 04/14/24 1800    Order Status: Sent Specimen: Blood Updated: 04/14/24 1807          Patients presentation, history, work up, POC and medication reconciliation were discussed with Dr. Russell who is the hospitalist on-call for [facility] at the time of admission.     ZUHAIR Mccloud  Department of Hospital Medicine  [facility] - Medical Surgical Unit          "

## 2024-04-15 NOTE — ASSESSMENT & PLAN NOTE
"Patient's FSGs are controlled on current medication regimen.  Last A1c reviewed-   Lab Results   Component Value Date    HGBA1C 7.7 (H) 04/17/2023     Most recent fingerstick glucose reviewed- No results for input(s): "POCTGLUCOSE" in the last 24 hours.  Current correctional scale    Maintain anti-hyperglycemic dose as follows-   Antihyperglycemics (From admission, onward)      Start     Stop Route Frequency Ordered    04/15/24 0900  SITagliptin phosphate tablet 100 mg         -- Oral Daily 04/14/24 2129          Hold Oral hypoglycemics while patient is in the hospital.    -A1C ordered, will adjust therapy as needed.   "

## 2024-04-15 NOTE — SUBJECTIVE & OBJECTIVE
Interval History: WBC down  o 16.88, K+ up to 3.1    Review of Systems   Constitutional:  Positive for fatigue. Negative for activity change, appetite change, chills and fever.   HENT: Negative.     Eyes: Negative.    Respiratory:  Positive for cough and shortness of breath. Negative for chest tightness and wheezing.    Cardiovascular: Negative.    Gastrointestinal: Negative.    Endocrine: Negative.    Genitourinary: Negative.    Musculoskeletal: Negative.    Skin: Negative.    Allergic/Immunologic: Negative.    Neurological:  Positive for weakness. Negative for dizziness, light-headedness and headaches.   Hematological: Negative.    Psychiatric/Behavioral: Negative.       Objective:     Vital Signs (Most Recent):  Temp: 98.3 °F (36.8 °C) (04/15/24 0736)  Pulse: 77 (04/15/24 0736)  Resp: 20 (04/15/24 0736)  BP: 138/84 (04/15/24 0736)  SpO2: (!) 94 % (04/15/24 0736) Vital Signs (24h Range):  Temp:  [97.6 °F (36.4 °C)-98.3 °F (36.8 °C)] 98.3 °F (36.8 °C)  Pulse:  [68-84] 77  Resp:  [16-20] 20  SpO2:  [94 %-98 %] 94 %  BP: (126-190)/(74-98) 138/84     Weight: 60.2 kg (132 lb 12.8 oz)  Body mass index is 23.52 kg/m².    Intake/Output Summary (Last 24 hours) at 4/15/2024 1229  Last data filed at 4/14/2024 2058  Gross per 24 hour   Intake 250 ml   Output --   Net 250 ml         Physical Exam  Vitals and nursing note reviewed.   Constitutional:       General: She is not in acute distress.     Appearance: Normal appearance. She is normal weight. She is not ill-appearing, toxic-appearing or diaphoretic.   HENT:      Head: Normocephalic and atraumatic.      Right Ear: External ear normal.      Left Ear: External ear normal.      Nose: Nose normal.      Mouth/Throat:      Mouth: Mucous membranes are moist.      Pharynx: Oropharynx is clear.   Eyes:      Conjunctiva/sclera: Conjunctivae normal.      Pupils: Pupils are equal, round, and reactive to light.   Cardiovascular:      Rate and Rhythm: Normal rate and regular rhythm.       Heart sounds: Normal heart sounds.   Pulmonary:      Effort: Pulmonary effort is normal.      Breath sounds: Normal breath sounds.   Abdominal:      General: Bowel sounds are normal.      Palpations: Abdomen is soft.      Tenderness: There is no abdominal tenderness.   Genitourinary:     General: Normal vulva.   Musculoskeletal:         General: Normal range of motion.      Cervical back: Normal range of motion.      Right lower leg: No edema.      Left lower leg: No edema.   Skin:     General: Skin is warm and dry.      Capillary Refill: Capillary refill takes less than 2 seconds.   Neurological:      General: No focal deficit present.      Mental Status: She is alert and oriented to person, place, and time. Mental status is at baseline.      Motor: Weakness (Generalized) present.   Psychiatric:         Attention and Perception: Attention normal.         Mood and Affect: Mood normal.         Speech: Speech normal.         Behavior: Behavior normal. Behavior is cooperative.         Judgment: Judgment normal.             Significant Labs: All pertinent labs within the past 24 hours have been reviewed.  Recent Lab Results  (Last 5 results in the past 24 hours)        04/15/24  0447   04/14/24  1939   04/14/24  1830   04/14/24  1805   04/14/24  1800        Influenza A, Molecular     Negative           Influenza B, Molecular     Negative           Group A Strep, Molecular     Negative           Albumin/Globulin Ratio 0.5         0.6       Albumin 2.4         3.1       ALP 91         113       ALT 14         17       Anion Gap 12         15       Appearance, UA   Clear             AST 11         10       Bacteria, UA   Few             Baso # 0.05         0.07       Basophil % 0.3         0.4       Bilirubin (UA)   Negative             BILIRUBIN TOTAL 0.4         0.5       BUN 12         16       BUN/CREAT RATIO 16         18       Calcium 8.8         9.2       Chloride 98         95       CO2 30         27       Color,  UA   Yellow             ID NOW COVID-19, (MARYELLEN)     Negative           Creatinine 0.73         0.89       Differential Method Manual         Manual       eGFR 93         73       Eos # 0.32         0.04       Eos % 1.9         0.2        4         1       Globulin, Total 4.4         5.1       Glucose 76         177       Glucose, UA   Negative             Hematocrit 30.3         34.4       Hemoglobin 9.6         11.1       Ketones, UA   Negative             Lactic Acid Level       1.0         Leukocyte Esterase, UA   Negative             Lymph # 1.97         0.71       Lymph % 11.7         3.7        17         7       MCH 26.2         26.6       MCHC 31.7         32.3       MCV 82.8         82.3       Mono # 0.87         0.87       Mono % 5.2         4.5                2       MPV 10.3         10.7       Neutrophils, Abs 13.67         17.53       Neutrophils Relative 80.9         91.2       NITRITE UA   Negative             nRBC               Blood, UA   Small             pH, UA   6.0             Platelet Count 342         356       Potassium 3.1         2.8       PROTEIN TOTAL 6.8         8.2       Protein, UA   30             RBC 3.66         4.18       RBC, UA   0-3             RDW 14.9         14.9       Segmented Neutrophils, Man % 79         90       Sodium 137         134       Specific [place], UA   1.010             Squam Epithel, UA   Few             TSH         0.397       UROBILINOGEN UA   0.2             WBC, UA   0-5             WBC 16.88         19.22                              Significant Imaging: I have reviewed all pertinent imaging results/findings within the past 24 hours.

## 2024-04-15 NOTE — PLAN OF CARE
Problem: Adult Inpatient Plan of Care  Goal: Plan of Care Review  Outcome: Ongoing, Progressing  Goal: Patient-Specific Goal (Individualized)  Outcome: Ongoing, Progressing  Goal: Absence of Hospital-Acquired Illness or Injury  Outcome: Ongoing, Progressing  Goal: Optimal Comfort and Wellbeing  Outcome: Ongoing, Progressing  Goal: Readiness for Transition of Care  Outcome: Ongoing, Progressing     Problem: Diabetes Comorbidity  Goal: Blood Glucose Level Within Targeted Range  Outcome: Ongoing, Progressing      Drug interaction - Therapy discontinued/ changed Drug interaction - Therapy discontinued/ changed

## 2024-04-15 NOTE — ASSESSMENT & PLAN NOTE
TSH pending  -Levothyroxine 50 mcg PO daily and will adjust based on TSH    04/15/2024: TSH level is 0.397. Will continue Levothyroxine at 50 mcg daily.

## 2024-04-15 NOTE — PHARMACY MED REC
"Admission Medication History     The home medication history was taken by Shelley Avila.    You may go to "Admission" then "Reconcile Home Medications" tabs to review and/or act upon these items.     The home medication list has been updated by the Pharmacy department.   Please read ALL comments highlighted in yellow.   Please address this information as you see fit.    Feel free to contact us if you have any questions or require assistance.    The following medications were added:  Rexulti 2 mg  Clonazepam 1 mg      The medications listed below were removed from the home medication list. Please reorder if appropriate:  Tylenol #3 300-30 mg  Atorvastatin 80 mg  Benzonatate 200 mg  Betamethasone valerate 0.1% lotn  Rexulti 1 mg  Buspirone 30 mg  Cetirizine 10 mg  Colchicine 0.6 mg  Hydrochlorothiazide 12.5 mg  Hydroxyzine hcl 25 mg  Lorazepam 1 mg  Methocarbamol 750    Medications listed below were obtained from: Analytic software- [employer] and Medical records  Current Facility-Administered Medications   Medication Dose Route Frequency Provider Last Rate Last Admin    vancomycin (VANCOCIN) 1,250 mg in dextrose 5 % (D5W) 250 mL IVPB  1,250 mg Intravenous Q24H Abran Piper NP-C 166.7 mL/hr at 04/14/24 1928 1,250 mg at 04/14/24 1928    vancomycin - pharmacy to dose   Intravenous pharmacy to manage frequency Abran Piper NP-C             Current Outpatient Medications on File Prior to Encounter   Medication Sig Dispense Refill Last Dose    albuterol (PROVENTIL/VENTOLIN HFA) 90 mcg/actuation inhaler INHALE ONE TO TWO PUFFS BY MOUTH EVERY 4 HOURS AS NEEDED FOR SHORTNESS OF BREATH OR WHEEZING 18 g 3 4/14/2024    amoxicillin-clavulanate 875-125mg (AUGMENTIN) 875-125 mg per tablet Take 1 tablet by mouth 2 (two) times daily. for 10 days 20 tablet 0 4/14/2024    azithromycin (Z-DK) 250 MG tablet Take 2 tablets by mouth on day 1; Take 1 tablet by mouth on days 2-5 6 tablet 0 4/14/2024    carvediloL (COREG) 12.5 MG tablet Take " 1 tablet (12.5 mg total) by mouth 2 (two) times daily with meals. (Patient taking differently: Take 12.5 mg by mouth every morning.) 180 tablet 0 4/14/2024    clonazePAM (KLONOPIN) 1 MG tablet Take 1 mg by mouth daily as needed.   Past Week    cyanocobalamin 1,000 mcg/mL injection inject ONE ML INTRAMUSCULARLY EACH MONTH   4/1/2024    DULoxetine (CYMBALTA) 60 MG capsule Take 60 mg by mouth 2 (two) times daily.   4/14/2024    esomeprazole (NEXIUM) 40 MG capsule Take 40 mg by mouth once daily.   4/14/2024    fluticasone propionate (FLONASE) 50 mcg/actuation nasal spray 2 sprays (100 mcg total) by Each Nostril route once daily. 16 g 3 Past Month    JANUVIA 100 mg Tab Take 100 mg by mouth once daily.   4/14/2024    levothyroxine (SYNTHROID) 50 MCG tablet Take 50 mcg by mouth before breakfast.   4/14/2024    LINZESS 290 mcg Cap capsule Take 1 capsule every day by oral route for 30 days.   4/14/2024    potassium chloride SA (K-DUR,KLOR-CON) 20 MEQ tablet Take 1 tablet (20 mEq total) by mouth once daily. for 7 days 7 tablet 0 4/14/2024    predniSONE (DELTASONE) 10 MG tablet Take FOUR tabs BY MOUTH daily FOR 5 DAYS, THEN THREE tabs BY MOUTH daily FOR 5 DAYS and THEN TWO tabs BY MOUTH daily FOR 5 DAYS, THEN ONE tab BY MOUTH daily FOR 5 DAYS, THEN take one-half daily FOR SIX DAYS   4/14/2024    pregabalin (LYRICA) 150 MG capsule Take 1 capsule (150 mg total) by mouth 2 (two) times daily. Take two capsules by mouth three times a day 180 capsule 3 4/14/2024    REXULTI 2 mg Tab Take 1 tablet by mouth once daily.   4/14/2024    [DISCONTINUED] brexpiprazole (REXULTI) 1 mg Tab Take 1 mg by mouth Daily.   4/14/2024    [DISCONTINUED] hydroCHLOROthiazide (HYDRODIURIL) 12.5 MG Tab TAKE 1 TO 2 TABLETS BY MOUTH once daily for swelling   4/14/2024    [DISCONTINUED] acetaminophen-codeine 300-30mg (TYLENOL-CODEINE #3) 300-30 mg Tab Take 1 tablet by mouth every 6 (six) hours as needed. (Patient not taking: Reported on 4/14/2024)   Not Taking     [DISCONTINUED] atorvastatin (LIPITOR) 80 MG tablet Take 80 mg by mouth every evening. (Patient not taking: Reported on 4/14/2024)   Not Taking    [DISCONTINUED] benzonatate (TESSALON) 200 MG capsule Take 200 mg by mouth 3 (three) times daily as needed for Cough.   Unknown    [DISCONTINUED] betamethasone valerate 0.1% (VALISONE) 0.1 % Lotn APPLY TO THE AFFECTED AREA(S) of chest and back TWICE DAILY   Unknown    [DISCONTINUED] busPIRone (BUSPAR) 30 MG Tab Take 30 mg by mouth 3 (three) times daily. (Patient not taking: Reported on 4/14/2024)   Not Taking    [DISCONTINUED] cetirizine (ZYRTEC) 10 MG tablet Take 1 tablet (10 mg total) by mouth once daily. (Patient not taking: Reported on 4/14/2024) 30 tablet 0 Not Taking    [DISCONTINUED] colchicine (COLCRYS) 0.6 mg tablet Take 1 tablet (0.6 mg total) by mouth once daily. for 6 days 6 tablet 0     [DISCONTINUED] hydrOXYzine HCL (ATARAX) 25 MG tablet Take 1-2 tablets every 4-6 hrs as needed for itching (Patient not taking: Reported on 4/14/2024)   Not Taking    [DISCONTINUED] LORazepam (ATIVAN) 1 MG tablet Take 1 tab by mouth daily only as needed for severe anxiety/panic attacks. (Patient not taking: Reported on 4/14/2024)   Not Taking    [DISCONTINUED] methocarbamoL (ROBAXIN) 750 MG Tab Take 1 tablet (750 mg total) by mouth every 8 (eight) hours. (Patient not taking: Reported on 4/14/2024) 90 tablet 1 Not Taking       Potential issues to be addressed PRIOR TO DISCHARGE  N/A    Shelley Avila  EXT [id number]                 .

## 2024-04-15 NOTE — ASSESSMENT & PLAN NOTE
04/15/2024: Chronic, controlled. Latest blood pressure and vitals reviewed-     Temp:  [97.6 °F (36.4 °C)-98.3 °F (36.8 °C)]   Pulse:  [68-84]   Resp:  [16-20]   BP: (126-190)/(74-98)   SpO2:  [92 %-98 %] .   Home meds for hypertension were reviewed and noted below.   Hypertension Medications               carvediloL (COREG) 12.5 MG tablet Take 1 tablet (12.5 mg total) by mouth 2 (two) times daily with meals.            While in the hospital, will manage blood pressure as follows; Continue home antihypertensive regimen    Will utilize p.r.n. blood pressure medication only if patient's blood pressure greater than 180/110 and she develops symptoms such as worsening chest pain or shortness of breath.

## 2024-04-15 NOTE — ASSESSMENT & PLAN NOTE
Patient has persistent depression which is moderate and is currently controlled. Will Continue anti-depressant medications. We will not consult psychiatry at this time. Patient does not display psychosis at this time. Continue to monitor closely and adjust plan of care as needed.    -Apipiprazole 5 mg PO daily  -Duloxetine DR 60 mg PO daily    04/15/2024: Pharmacy has Brexpiprazole now which is what patient is on at home.   -Discontinue Aripiprazole 5 mg  -Restart Brexpiprazole 2 mg  one tab by mouth daily

## 2024-04-15 NOTE — ASSESSMENT & PLAN NOTE
04/15/2024: Patient has hypokalemia which is Acute on Chronic and currently uncontrolled. Most recent potassium levels reviewed-   Lab Results   Component Value Date    K 3.1 (L) 04/15/2024   Patient is currently taking Potassium Chl 20 meq po twice a day. Will continue to monitor BMP daily.

## 2024-04-15 NOTE — PROGRESS NOTES
"Pharmacokinetic Initial Assessment: IV Vancomycin    Assessment/Plan:    Initiate intravenous vancomycin as 1250 mg IV q24h  Desired empiric serum trough concentration is 15 to 20 mcg/mL  Draw vancomycin trough level 30 min prior to fourth dose on 4/17 at approximately 1930  Pharmacy will continue to follow and monitor vancomycin.      Please contact pharmacy at [phone] with any questions regarding this assessment.     Patient brief summary:  Magali Cheung is a 64 y.o. female initiated on antimicrobial therapy with IV Vancomycin for treatment of suspected bacteremia    Drug Allergies:   Review of patient's allergies indicates:   Allergen Reactions    Opioids - morphine analogues        Actual Body Weight:   59.4 kg    Renal Function:   Estimated Creatinine Clearance: 53.5 mL/min (based on SCr of 0.89 mg/dL).,     Dialysis Method (if applicable):  N/A    CBC (last 72 hours):  Recent Labs   Lab Result Units 04/12/24 2350 04/14/24  1800   WBC K/uL 12.12* 19.22*   Hemoglobin g/dL 11.0* 11.1*   Hematocrit % 34.2* 34.4*   Platelet Count K/uL 278 356   Lymphocytes % % 3.5* 3.7*   Lymphocytes, Man % % 4* 7*   Monocytes % % 4.5 4.5   Monocytes, Man % % 3 2   Eosinophils % % 0.3* 0.2*   Eosinophils, Man % %  --  1   Basophils % % 0.3 0.4   Diff Type  Manual Manual       Metabolic Panel (last 72 hours):  Recent Labs   Lab Result Units 04/12/24 2350 04/14/24  1800   Sodium mmol/L 134* 134*   Potassium mmol/L 2.7* 2.8*   Chloride mmol/L 93* 95*   CO2 mmol/L 29 27   Glucose mg/dL 172* 177*   BUN mg/dL 13 16   Creatinine mg/dL 0.83 0.89   Albumin g/dL 2.9* 3.1*   Bilirubin, Total mg/dL 0.5 0.5   Alk Phos U/L 80 113   AST U/L 8* 10*   ALT U/L 15 17       Drug levels (last 3 results):  No results for input(s): "VANCOMYCINRA", "VANCORANDOM", "VANCOMYCINPE", "VANCOPEAK", "VANCOMYCINTR", "VANCOTROUGH" in the last 72 hours.    Microbiologic Results:  Microbiology Results (last 7 days)       Procedure Component Value Units " Date/Time    Influenza A & B by Molecular [[id number]]  (Normal) Collected: 04/14/24 1830    Order Status: Completed Specimen: Nasopharyngeal Swab Updated: 04/14/24 1853     INFLUENZA A MOLECULAR Negative     INFLUENZA B MOLECULAR  Negative    Blood Culture #2 [[id number]] Collected: 04/14/24 1805    Order Status: Sent Specimen: Blood Updated: 04/14/24 1807    Blood Culture #1 [[id number]] Collected: 04/14/24 1800    Order Status: Sent Specimen: Blood Updated: 04/14/24 1807

## 2024-04-15 NOTE — PLAN OF CARE
Problem: Adult Inpatient Plan of Care  Goal: Plan of Care Review  4/15/2024 0517 by Nilda Gillespie RN  Outcome: Ongoing, Progressing  4/14/2024 2136 by Nilda Gillespie RN  Outcome: Ongoing, Progressing  Goal: Patient-Specific Goal (Individualized)  4/15/2024 0517 by Nilda Gillespie RN  Outcome: Ongoing, Progressing  4/14/2024 2136 by Nilda Gillespie RN  Outcome: Ongoing, Progressing  Goal: Absence of Hospital-Acquired Illness or Injury  4/15/2024 0517 by Nilda Gillespie RN  Outcome: Ongoing, Progressing  4/14/2024 2136 by Nilda Gillespie RN  Outcome: Ongoing, Progressing  Goal: Optimal Comfort and Wellbeing  4/15/2024 0517 by Nilda Gillespie RN  Outcome: Ongoing, Progressing  4/14/2024 2136 by Nilda Gillespie RN  Outcome: Ongoing, Progressing  Goal: Readiness for Transition of Care  4/15/2024 0517 by Nilda Gillespie RN  Outcome: Ongoing, Progressing  4/14/2024 2136 by Nilda Gillespie RN  Outcome: Ongoing, Progressing

## 2024-04-15 NOTE — HPI
64 y-old  female with PMH of DM, HTN, Hypothyroid, and hyperlipidemia who presents to the ED with a 4-5 day history of fever/cough/dyspnea/blood tinged sputum and chest pain that is exacerbated with cough. Was seen at [facility] ED on 04/12/24 where work up was suggestive of LML pneumonia. Patient was given RX for Augmentin and Azithromycin which she filled sometime on 04/13/24. Had taken 2 doses of each upon arrival tonight. She now has a WBC of 19.22, Absolute neutrophil count 17.53, Na+134, K+2.8 (chronically hypokalemic), glucose 177 mg/dl, and a lactic acid of 1.0. Patient did have positive blood cultures from her visit on 04/12/24 that were verigene gram + blood cx MRSA. This was reported in both anaerobic and aerobic bottles of both sets of BC. Patient is to be admitted to [facility] for IV abx as well as serial labs.

## 2024-04-15 NOTE — ASSESSMENT & PLAN NOTE
Chronic, controlled. Latest blood pressure and vitals reviewed-     Temp:  [97.6 °F (36.4 °C)-97.8 °F (36.6 °C)]   Pulse:  [72-77]   Resp:  [16-20]   BP: (165-190)/(89-95)   SpO2:  [95 %] .   Home meds for hypertension were reviewed and noted below.   Hypertension Medications               carvediloL (COREG) 12.5 MG tablet Take 1 tablet (12.5 mg total) by mouth 2 (two) times daily with meals.            While in the hospital, will manage blood pressure as follows; Continue home antihypertensive regimen    Will utilize p.r.n. blood pressure medication only if patient's blood pressure greater than 180/110 and she develops symptoms such as worsening chest pain or shortness of breath.

## 2024-04-15 NOTE — PLAN OF CARE
[facility] - Medical Surgical Unit  Initial Discharge Assessment       Primary Care Provider: Ariana Pinedo NP    Admission Diagnosis: Pneumonia [J18.9]    Admission Date: 4/14/2024  Expected Discharge Date:     Transition of Care Barriers: None    Payor: MEDICAID [place] / Plan: MEDICA[place] [employer] [employer] / Product Type: Managed Medicaid /     Extended Emergency Contact Information  Primary Emergency Contact: CECI CHEUNG  Mobile Phone: [phone]  Relation: Friend  Preferred language: English   needed? No  Secondary Emergency Contact: Mark Anthony Cheung  Mobile Phone: [phone]  Relation: Friend   needed? No    Discharge Plan A: Home with family, Home Health         [facility] [place] [address] [facility] [address] [facility] [address] [place] [address]  Phone: [phone] Fax: [phone]    [facility] [address] [place]  [address] [facility] [address]  Phone: [phone] Fax: [phone]      Initial Assessment (most recent)       Adult Discharge Assessment - 04/15/24 1454          Discharge Assessment    Assessment Type Discharge Planning Assessment     Confirmed/corrected address, phone number and insurance Yes     Confirmed Demographics Correct on Facesheet     Source of Information patient     Communicated ALANA with patient/caregiver Date not available/Unable to determine     Reason For Admission mrsa pneumonia     People in Home spouse     Facility Arrived From: home     Do you expect to return to your current living situation? Yes     Do you have help at home or someone to help you manage your care at home? Yes     Who are your caregiver(s) and their phone number(s)?      Prior to hospitilization cognitive status: Unable to Assess     Current cognitive status: Alert/Oriented     Walking or Climbing Stairs Difficulty no     Dressing/Bathing Difficulty no     Home Accessibility wheelchair accessible     Home Layout Able to  live on 1st floor     Equipment Currently Used at Home none     Readmission within 30 days? No     Patient currently being followed by outpatient case management? No     Do you currently have service(s) that help you manage your care at home? No     Do you take prescription medications? Yes     Do you have prescription coverage? Yes     Coverage Medicaid     Do you have any problems affording any of your prescribed medications? No     Is the patient taking medications as prescribed? yes     Who is going to help you get home at discharge?      How do you get to doctors appointments? car, drives self;family or friend will provide     Are you on dialysis? No     Do you take coumadin? No     Discharge Plan A Home with family;Home Health     DME Needed Upon Discharge  none     Discharge Plan discussed with: Patient     Transition of Care Barriers None        Physical Activity    On average, how many days per week do you engage in moderate to strenuous exercise (like a brisk walk)? 0 days     On average, how many minutes do you engage in exercise at this level? 0 min        Financial Resource Strain    How hard is it for you to pay for the very basics like food, housing, medical care, and heating? Not hard at all        Housing Stability    In the last 12 months, was there a time when you were not able to pay the mortgage or rent on time? No     In the past 12 months, how many times have you moved where you were living? 0     At any time in the past 12 months, were you homeless or living in a shelter (including now)? No        Transportation Needs    In the past 12 months, has lack of transportation kept you from medical appointments or from getting medications? No     In the past 12 months, has lack of transportation kept you from meetings, work, or from getting things needed for daily living? No        Food Insecurity    Within the past 12 months, you worried that your food would run out before you got the money  to buy more. Never true     Within the past 12 months, the food you bought just didn't last and you didn't have money to get more. Never true        Stress    Do you feel stress - tense, restless, nervous, or anxious, or unable to sleep at night because your mind is troubled all the time - these days? Only a little        Social Connections    In a typical week, how many times do you talk on the phone with family, friends, or neighbors? More than three times a week     How often do you get together with friends or relatives? Three times a week     How often do you attend Latter day or Taoist services? More than 4 times per year     Do you belong to any clubs or organizations such as Latter day groups, unions, fraternal or athletic groups, or school groups? No     How often do you attend meetings of the clubs or organizations you belong to? Never     Are you , , , , never , or living with a partner?         Alcohol Use    Q1: How often do you have a drink containing alcohol? Monthly or less     Q2: How many drinks containing alcohol do you have on a typical day when you are drinking? 1 or 2     Q3: How often do you have six or more drinks on one occasion? Less than monthly        OTHER    Name(s) of People in Home                       Pt lives at homewith her  and is independent discharge plan is hokendra with  and if needed home health . And follow up WITH her PCP

## 2024-04-15 NOTE — ASSESSMENT & PLAN NOTE
Patient has persistent depression which is moderate and is currently controlled. Will Continue anti-depressant medications. We will not consult psychiatry at this time. Patient does not display psychosis at this time. Continue to monitor closely and adjust plan of care as needed.    -Apipiprazole 5 mg PO daily  -Duloxetine DR 60 mg PO daily

## 2024-04-15 NOTE — ASSESSMENT & PLAN NOTE
"04/15/2024: Patient's FSGs are controlled on current medication regimen.  Last A1c reviewed-   Lab Results   Component Value Date    HGBA1C 7.7 (H) 04/17/2023     Most recent fingerstick glucose reviewed- No results for input(s): "POCTGLUCOSE" in the last 24 hours.  Current correctional scale    Maintain anti-hyperglycemic dose as follows-   Antihyperglycemics (From admission, onward)      Start     Stop Route Frequency Ordered    04/15/24 0900  SITagliptin phosphate tablet 100 mg         -- Oral Daily 04/14/24 2129              "

## 2024-04-15 NOTE — ASSESSMENT & PLAN NOTE
Patient has LML pneumonia   -Blood cultures from 04/12/24 were Gram Postive Cocci (MRSA)  -Vanc with pharmacy to dose.   -Duoneb Q 12 hours  -Albuterol Q 4 hours PRN

## 2024-04-15 NOTE — PLAN OF CARE
Problem: Adult Inpatient Plan of Care  Goal: Plan of Care Review  4/15/2024 1252 by Rebekah Campa RN  Outcome: Ongoing, Progressing  4/15/2024 1252 by Rebekah Campa RN  Outcome: Ongoing, Progressing  Goal: Patient-Specific Goal (Individualized)  4/15/2024 1252 by Rebekah Campa RN  Outcome: Ongoing, Progressing  4/15/2024 1252 by Rebekah Campa RN  Outcome: Ongoing, Progressing  Goal: Absence of Hospital-Acquired Illness or Injury  4/15/2024 1252 by Rebekah Campa RN  Outcome: Ongoing, Progressing  4/15/2024 1252 by Rebekah Campa RN  Outcome: Ongoing, Progressing  Goal: Optimal Comfort and Wellbeing  4/15/2024 1252 by Rebekah Campa RN  Outcome: Ongoing, Progressing  4/15/2024 1252 by Rebekah Campa RN  Outcome: Ongoing, Progressing  Goal: Readiness for Transition of Care  4/15/2024 1252 by Rebekah Campa RN  Outcome: Ongoing, Progressing  4/15/2024 1252 by Rebekah Campa RN  Outcome: Ongoing, Progressing

## 2024-04-15 NOTE — ASSESSMENT & PLAN NOTE
04/15/2024: WBC down to 16.88 today. Diminished BS in LLL. O2 sat 94% on 2L/NC. Patient reports feeling better today.   -Vancomycin 1,2500 mg IV every 24 hours (Day #2)  -Duo Nebs every 6 hours  -Albuterol nebs every 4 hours prn

## 2024-04-16 PROBLEM — L02.611 ABSCESS OF RIGHT FOOT: Status: ACTIVE | Noted: 2024-04-16

## 2024-04-16 PROBLEM — L02.619 ABSCESS OF FOOT: Status: ACTIVE | Noted: 2024-04-16

## 2024-04-16 LAB
ALBUMIN SERPL BCP-MCNC: 2.3 G/DL (ref 3.5–5)
ALBUMIN/GLOB SERPL: 0.5 {RATIO}
ALP SERPL-CCNC: 92 U/L (ref 50–130)
ALT SERPL W P-5'-P-CCNC: 15 U/L (ref 13–56)
ANION GAP SERPL CALCULATED.3IONS-SCNC: 11 MMOL/L (ref 7–16)
AST SERPL W P-5'-P-CCNC: 13 U/L (ref 15–37)
BASOPHILS # BLD AUTO: 0.05 K/UL (ref 0–0.2)
BASOPHILS NFR BLD AUTO: 0.4 % (ref 0–1)
BILIRUB SERPL-MCNC: 0.5 MG/DL (ref ?–1.2)
BUN SERPL-MCNC: 11 MG/DL (ref 7–18)
BUN/CREAT SERPL: 15 (ref 6–20)
CALCIUM SERPL-MCNC: 9.1 MG/DL (ref 8.5–10.1)
CHLORIDE SERPL-SCNC: 98 MMOL/L (ref 98–107)
CO2 SERPL-SCNC: 31 MMOL/L (ref 21–32)
CREAT SERPL-MCNC: 0.74 MG/DL (ref 0.55–1.02)
DIFFERENTIAL METHOD BLD: ABNORMAL
EGFR (NO RACE VARIABLE) (RUSH/TITUS): 91 ML/MIN/1.73M2
EOSINOPHIL # BLD AUTO: 0.24 K/UL (ref 0–0.5)
EOSINOPHIL NFR BLD AUTO: 1.9 % (ref 1–4)
ERYTHROCYTE [DISTWIDTH] IN BLOOD BY AUTOMATED COUNT: 15.2 % (ref 11.5–14.5)
GLOBULIN SER-MCNC: 4.4 G/DL (ref 2–4)
GLUCOSE SERPL-MCNC: 108 MG/DL (ref 74–106)
GLUCOSE SERPL-MCNC: 136 MG/DL (ref 70–105)
GLUCOSE SERPL-MCNC: 177 MG/DL (ref 70–105)
HCT VFR BLD AUTO: 31.5 % (ref 38–47)
HGB BLD-MCNC: 10.2 G/DL (ref 12–16)
LYMPHOCYTES # BLD AUTO: 1.46 K/UL (ref 1–4.8)
LYMPHOCYTES NFR BLD AUTO: 11.4 % (ref 27–41)
LYMPHOCYTES NFR BLD MANUAL: 19 % (ref 27–41)
MCH RBC QN AUTO: 26.6 PG (ref 27–31)
MCHC RBC AUTO-ENTMCNC: 32.4 G/DL (ref 32–36)
MCV RBC AUTO: 82.2 FL (ref 80–96)
MONOCYTES # BLD AUTO: 0.93 K/UL (ref 0–0.8)
MONOCYTES NFR BLD AUTO: 7.2 % (ref 2–6)
MONOCYTES NFR BLD MANUAL: 11 % (ref 2–6)
MPC BLD CALC-MCNC: 10.4 FL (ref 9.4–12.4)
NEUTROPHILS # BLD AUTO: 10.15 K/UL (ref 1.8–7.7)
NEUTROPHILS NFR BLD AUTO: 79.1 % (ref 53–65)
NEUTS BAND NFR BLD MANUAL: 5 % (ref 1–5)
NEUTS SEG NFR BLD MANUAL: 65 % (ref 50–62)
NRBC BLD MANUAL-RTO: ABNORMAL %
PLATELET # BLD AUTO: 317 K/UL (ref 150–400)
POTASSIUM SERPL-SCNC: 3.8 MMOL/L (ref 3.5–5.1)
PROT SERPL-MCNC: 6.7 G/DL (ref 6.4–8.2)
RBC # BLD AUTO: 3.83 M/UL (ref 4.2–5.4)
SODIUM SERPL-SCNC: 136 MMOL/L (ref 136–145)
WBC # BLD AUTO: 12.83 K/UL (ref 4.5–11)

## 2024-04-16 PROCEDURE — 94761 N-INVAS EAR/PLS OXIMETRY MLT: CPT

## 2024-04-16 PROCEDURE — 85025 COMPLETE CBC W/AUTO DIFF WBC: CPT | Performed by: REGISTERED NURSE

## 2024-04-16 PROCEDURE — 63600175 PHARM REV CODE 636 W HCPCS: Performed by: REGISTERED NURSE

## 2024-04-16 PROCEDURE — 99900035 HC TECH TIME PER 15 MIN (STAT)

## 2024-04-16 PROCEDURE — 25000003 PHARM REV CODE 250: Performed by: REGISTERED NURSE

## 2024-04-16 PROCEDURE — 25000003 PHARM REV CODE 250: Performed by: NURSE PRACTITIONER

## 2024-04-16 PROCEDURE — 25000003 PHARM REV CODE 250: Performed by: FAMILY MEDICINE

## 2024-04-16 PROCEDURE — 27000221 HC OXYGEN, UP TO 24 HOURS

## 2024-04-16 PROCEDURE — 94640 AIRWAY INHALATION TREATMENT: CPT

## 2024-04-16 PROCEDURE — 25000242 PHARM REV CODE 250 ALT 637 W/ HCPCS: Performed by: NURSE PRACTITIONER

## 2024-04-16 PROCEDURE — 11000001 HC ACUTE MED/SURG PRIVATE ROOM

## 2024-04-16 PROCEDURE — 82962 GLUCOSE BLOOD TEST: CPT

## 2024-04-16 PROCEDURE — 87070 CULTURE OTHR SPECIMN AEROBIC: CPT | Performed by: NURSE PRACTITIONER

## 2024-04-16 PROCEDURE — 63600175 PHARM REV CODE 636 W HCPCS: Performed by: NURSE PRACTITIONER

## 2024-04-16 PROCEDURE — 99222 1ST HOSP IP/OBS MODERATE 55: CPT | Mod: ,,, | Performed by: NURSE PRACTITIONER

## 2024-04-16 PROCEDURE — 80053 COMPREHEN METABOLIC PANEL: CPT | Performed by: REGISTERED NURSE

## 2024-04-16 RX ORDER — ATORVASTATIN CALCIUM 80 MG/1
80 TABLET, FILM COATED ORAL NIGHTLY
Start: 2024-04-16 | End: 2025-04-16

## 2024-04-16 RX ORDER — MUPIROCIN 20 MG/G
OINTMENT TOPICAL 2 TIMES DAILY
Start: 2024-04-16

## 2024-04-16 RX ADMIN — FLUTICASONE PROPIONATE 100 MCG: 50 SPRAY, METERED NASAL at 09:04

## 2024-04-16 RX ADMIN — CEFEPIME 1 G: 1 INJECTION, POWDER, FOR SOLUTION INTRAMUSCULAR; INTRAVENOUS at 05:04

## 2024-04-16 RX ADMIN — SITAGLIPTIN 100 MG: 50 TABLET, FILM COATED ORAL at 08:04

## 2024-04-16 RX ADMIN — DULOXETINE 60 MG: 30 CAPSULE, DELAYED RELEASE ORAL at 08:04

## 2024-04-16 RX ADMIN — VANCOMYCIN HYDROCHLORIDE 1250 MG: 1 INJECTION, POWDER, LYOPHILIZED, FOR SOLUTION INTRAVENOUS at 08:04

## 2024-04-16 RX ADMIN — CEFEPIME 1 G: 1 INJECTION, POWDER, FOR SOLUTION INTRAMUSCULAR; INTRAVENOUS at 11:04

## 2024-04-16 RX ADMIN — ATORVASTATIN CALCIUM 80 MG: 40 TABLET, FILM COATED ORAL at 08:04

## 2024-04-16 RX ADMIN — CARVEDILOL 12.5 MG: 6.25 TABLET, FILM COATED ORAL at 08:04

## 2024-04-16 RX ADMIN — SODIUM CHLORIDE 25 ML: 9 INJECTION, SOLUTION INTRAVENOUS at 10:04

## 2024-04-16 RX ADMIN — IPRATROPIUM BROMIDE AND ALBUTEROL SULFATE 3 ML: 2.5; .5 SOLUTION RESPIRATORY (INHALATION) at 12:04

## 2024-04-16 RX ADMIN — MUPIROCIN: 20 OINTMENT TOPICAL at 08:04

## 2024-04-16 RX ADMIN — MUPIROCIN: 20 OINTMENT TOPICAL at 09:04

## 2024-04-16 RX ADMIN — IPRATROPIUM BROMIDE AND ALBUTEROL SULFATE 3 ML: 2.5; .5 SOLUTION RESPIRATORY (INHALATION) at 07:04

## 2024-04-16 RX ADMIN — POTASSIUM CHLORIDE 20 MEQ: 1500 TABLET, EXTENDED RELEASE ORAL at 08:04

## 2024-04-16 RX ADMIN — LINACLOTIDE 290 MCG: 145 CAPSULE, GELATIN COATED ORAL at 06:04

## 2024-04-16 RX ADMIN — IBUPROFEN 600 MG: 400 TABLET ORAL at 05:04

## 2024-04-16 RX ADMIN — IBUPROFEN 600 MG: 400 TABLET ORAL at 07:04

## 2024-04-16 RX ADMIN — PREGABALIN 150 MG: 50 CAPSULE ORAL at 08:04

## 2024-04-16 RX ADMIN — PANTOPRAZOLE SODIUM 40 MG: 40 TABLET, DELAYED RELEASE ORAL at 08:04

## 2024-04-16 RX ADMIN — SENNOSIDES AND DOCUSATE SODIUM 1 TABLET: 8.6; 5 TABLET ORAL at 08:04

## 2024-04-16 RX ADMIN — LEVOTHYROXINE SODIUM 50 MCG: 50 TABLET ORAL at 06:04

## 2024-04-16 NOTE — ASSESSMENT & PLAN NOTE
04/16/2024: Chronic, controlled. Latest blood pressure and vitals reviewed-     Temp:  [97.7 °F (36.5 °C)-99.2 °F (37.3 °C)]   Pulse:  [61-87]   Resp:  [16-28]   BP: (112-158)/(62-84)   SpO2:  [92 %-98 %] .   Home meds for hypertension were reviewed and noted below.   Hypertension Medications               carvediloL (COREG) 12.5 MG tablet Take 1 tablet (12.5 mg total) by mouth 2 (two) times daily with meals.            While in the hospital, will manage blood pressure as follows; Continue home antihypertensive regimen    Will utilize p.r.n. blood pressure medication only if patient's blood pressure greater than 180/110 and she develops symptoms such as worsening chest pain or shortness of breath.

## 2024-04-16 NOTE — PROGRESS NOTES
[facility] - Medical Surgical Unit  Hospital Medicine  Progress Note    Patient Name: Magali Cheung  MRN: [id number]  Patient Class: IP- Inpatient   Admission Date: 4/14/2024  Length of Stay: 2 days  Attending Physician: Zenon Dominguez DO  Primary Care Provider: Ariana Pinedo NP        Subjective:     Principal Problem:Pneumonia of left lung due to methicillin resistant Staphylococcus aureus (MRSA)        HPI:  64 y-old  female with PMH of DM, HTN, Hypothyroid, and hyperlipidemia who presents to the ED with a 4-5 day history of fever/cough/dyspnea/blood tinged sputum and chest pain that is exacerbated with cough. Was seen at [facility] on 04/12/24 where work up was suggestive of LML pneumonia. Patient was given RX for Augmentin and Azithromycin which she filled sometime on 04/13/24. Had taken 2 doses of each upon arrival tonight. She now has a WBC of 19.22, Absolute neutrophil count 17.53, Na+134, K+2.8 (chronically hypokalemic), glucose 177 mg/dl, and a lactic acid of 1.0. Patient did have positive blood cultures from her visit on 04/12/24 that were verigene gram + blood cx MRSA. This was reported in both anaerobic and aerobic bottles of both sets of BC. Patient is to be admitted to [facility] for IV abx as well as serial labs.     Overview/Hospital Course:  04/15/2024: Hospital day #2 for IV Vancomycin and duo neb treatments for pneumonia. Verigene gram + blood culture grew out MRSA. VS wnl. Continues to have diminished breath sounds to left lower lobe. O2 sat 94% on 2L/NC. Reports feeling better today. No new complaints.     04/16/2024: Hospital Day #3. Patient had increase in pain of right foot at 1st metatarsophalangeal joint which was recently treated for gout. Reports noticed a pustule on bottom of right foot at 1st metatarsophalangeal joint last night. Denies drainage. No recent injury or infection with foot per patient. WBC today is down to 12.83, H&H stable at 10.2/31.5, K+ 3.8,  BUN/Cr 11/0.74. Has been afebrile. O2 sat 92-96% on 2L/NC. De-roofed pustule on right foot, purulent drainage noted and obtained culture. Patient will need MRI to rule out osteomyelitis and surgical evaluation of foot. Spoke with Dr. Everett regarding patient's case and recommended to add Cefepime for gram negative coverage. She accepted patient to [facility].    Interval History:  Has new pustule to bottom of right 1st metatarsophalangeal joint.     Review of Systems   Constitutional:  Positive for fatigue. Negative for activity change, appetite change, chills and fever.   HENT: Negative.     Eyes: Negative.    Respiratory:  Positive for cough and shortness of breath. Negative for chest tightness and wheezing.    Cardiovascular: Negative.    Gastrointestinal: Negative.    Endocrine: Negative.    Genitourinary: Negative.    Musculoskeletal:  Positive for arthralgias (rigth foot pain) and gait problem.   Skin:  Positive for color change (base of right great toe red and swelling).   Allergic/Immunologic: Negative.    Neurological:  Positive for weakness. Negative for dizziness, light-headedness and headaches.   Hematological: Negative.    Psychiatric/Behavioral: Negative.       Objective:     Vital Signs (Most Recent):  Temp: 97.8 °F (36.6 °C) (04/16/24 1547)  Pulse: 68 (04/16/24 1607)  Resp: 18 (04/16/24 1607)  BP: 136/64 (04/16/24 1547)  SpO2: 95 % (04/16/24 1607) Vital Signs (24h Range):  Temp:  [97.7 °F (36.5 °C)-99.2 °F (37.3 °C)] 97.8 °F (36.6 °C)  Pulse:  [61-87] 68  Resp:  [16-28] 18  SpO2:  [92 %-98 %] 95 %  BP: (112-158)/(62-84) 136/64     Weight: 60.2 kg (132 lb 12.8 oz)  Body mass index is 23.52 kg/m².    Intake/Output Summary (Last 24 hours) at 4/16/2024 1626  Last data filed at 4/16/2024 1231  Gross per 24 hour   Intake 1361.25 ml   Output 300 ml   Net 1061.25 ml         Physical Exam  Vitals and nursing note reviewed.   Constitutional:       General: She is not in acute distress.     Appearance: Normal  appearance. She is normal weight. She is ill-appearing. She is not toxic-appearing or diaphoretic.   HENT:      Head: Normocephalic and atraumatic.      Right Ear: External ear normal.      Left Ear: External ear normal.      Nose: Nose normal.      Mouth/Throat:      Mouth: Mucous membranes are moist.      Pharynx: Oropharynx is clear.   Eyes:      Conjunctiva/sclera: Conjunctivae normal.      Pupils: Pupils are equal, round, and reactive to light.   Cardiovascular:      Rate and Rhythm: Normal rate and regular rhythm.      Heart sounds: Normal heart sounds.   Pulmonary:      Effort: Pulmonary effort is normal.      Breath sounds: Examination of the left-lower field reveals decreased breath sounds. Decreased breath sounds present. No wheezing, rhonchi or rales.      Comments: Air moving better today  Abdominal:      General: Bowel sounds are normal.      Palpations: Abdomen is soft.      Tenderness: There is no abdominal tenderness.   Genitourinary:     General: Normal vulva.   Musculoskeletal:         General: Normal range of motion.      Cervical back: Normal range of motion.      Right lower leg: No edema.      Left lower leg: No edema.        Feet:    Feet:      Right foot:      Skin integrity: Erythema and callus present.      Left foot:      Skin integrity: Skin integrity normal.   Skin:     General: Skin is warm and dry.      Capillary Refill: Capillary refill takes less than 2 seconds.   Neurological:      General: No focal deficit present.      Mental Status: She is alert and oriented to person, place, and time. Mental status is at baseline.      Motor: No weakness (Generalized).   Psychiatric:         Attention and Perception: Attention normal.         Mood and Affect: Mood normal.         Speech: Speech normal.         Behavior: Behavior normal. Behavior is cooperative.         Judgment: Judgment normal.             Significant Labs: All pertinent labs within the past 24 hours have been reviewed.  Recent  Lab Results         04/16/24  0602   04/16/24  0454   04/15/24  1632        Albumin/Globulin Ratio   0.5         Albumin   2.3         ALP   92         ALT   15         Anion Gap   11         AST   13         Bands   5         Baso #   0.05         Basophil %   0.4         BILIRUBIN TOTAL   0.5         BUN   11         BUN/CREAT RATIO   15         Calcium   9.1         Chloride   98         CO2   31         Creatinine   0.74         Differential Method   Manual         eGFR   91         Eos #   0.24         Eos %   1.9         Globulin, Total   4.4         Glucose   108         Hematocrit   31.5         Hemoglobin   10.2         Lymph #   1.46         Lymph %   11.4            19         MCH   26.6         MCHC   32.4         MCV   82.2         Mono #   0.93         Mono %   7.2            11         MPV   10.4         Neutrophils, Abs   10.15         Neutrophils Relative   79.1         nRBC           Platelet Count   317         POC Glucose 136     125       Potassium   3.8         PROTEIN TOTAL   6.7         RBC   3.83         RDW   15.2         Segmented Neutrophils, Man %   65         Sodium   136         WBC   12.83                 Significant Imaging: I have reviewed all pertinent imaging results/findings within the past 24 hours.    Assessment/Plan:      * Pneumonia of left lung due to methicillin resistant Staphylococcus aureus (MRSA)  04/16/2024: WBC down to 12.83  today. Air moving better today, still has some diminished BS in LLL. O2 sat ranging 92- 96% on 2L/NC. BC from 4/14 pending. Will start incentive spirometry today to help encourage patient to take deep breaths. Discussed need for PICC line for antibiotic therapy.   -Vancomycin 1,250 mg every 24 hours (Day #3)  -Duo Nebs every 6 hours  -Albuterol nebs every 4 hours prn  -Incentive Spirometry q 2 hours per patient, but every 6 hours with respiratory therapist      Abscess of right foot  04/16/2024: De-roofed pustule on bottom of 1st  "metatarsophalangeal joint with purulent drainage. Concerned about possible osteomyelitis. Patient is currently on Vancomycin for MRSA. Patient accepted for transfer to [facility] for further imaging and surgical evaluation of right foot. Bed placement pending.  -Wound culture pending.  -Cefepime 1 gm IV every 6 hours for gram negative coverage        Hypothyroid  TSH pending  -Levothyroxine 50 mcg PO daily and will adjust based on TSH    04/15/2024: TSH level is 0.397. Will continue Levothyroxine at 50 mcg daily.      Hypokalemia  04/16/2024: Patient has hypokalemia which is Acute on Chronic and currently uncontrolled. Most recent potassium levels reviewed-   Lab Results   Component Value Date    K 3.8 04/16/2024   Patient is currently taking Potassium Chl 20 meq po twice a day. Will continue to monitor BMP daily.    Hypertension  04/16/2024: Chronic, controlled. Latest blood pressure and vitals reviewed-     Temp:  [97.7 °F (36.5 °C)-99.2 °F (37.3 °C)]   Pulse:  [61-87]   Resp:  [16-28]   BP: (112-158)/(62-84)   SpO2:  [92 %-98 %] .   Home meds for hypertension were reviewed and noted below.   Hypertension Medications               carvediloL (COREG) 12.5 MG tablet Take 1 tablet (12.5 mg total) by mouth 2 (two) times daily with meals.            While in the hospital, will manage blood pressure as follows; Continue home antihypertensive regimen    Will utilize p.r.n. blood pressure medication only if patient's blood pressure greater than 180/110 and she develops symptoms such as worsening chest pain or shortness of breath.    Diabetes mellitus without complication  04/15/2024: Patient's FSGs are controlled on current medication regimen.  Last A1c reviewed-   Lab Results   Component Value Date    HGBA1C 6.8 (H) 04/14/2024     Most recent fingerstick glucose reviewed- No results for input(s): "POCTGLUCOSE" in the last 24 hours.  Current correctional scale    Maintain anti-hyperglycemic dose as follows-   Antihyperglycemics " (From admission, onward)      Start     Stop Route Frequency Ordered    04/15/24 0900  SITagliptin phosphate tablet 100 mg         -- Oral Daily 04/14/24 2129          FBS this am 108 mg/dL. Patient's dm is controlled with medication. Has diabetic neuropathy. Due to BC + for MRSA and abscess of right foot. Patient was accepted for transfer to [facility] for further imaging due to concern of osteomyelitis and for surgical evaluation. Awaiting bed placement.      Depression  04/15/2024: Pharmacy has Brexpiprazole now which is what patient is on at home.   -Discontinue Aripiprazole 5 mg  -Restart Brexpiprazole 2 mg  one tab by mouth daily          VTE Risk Mitigation (From admission, onward)           Ordered     IP VTE LOW RISK PATIENT  Once         04/14/24 2122     Place DEVORA hose  Until discontinued         04/14/24 2122                    Discharge Planning   ALANA: 4/16/2024     Code Status: Full Code   Is the patient medically ready for discharge?:     Reason for patient still in hospital (select all that apply): Patient new problem, Laboratory test, and Treatment  Discharge Plan A: Home with family, Home Health        Discussed patient's case with Dr. Dominguez and Dr. Everett.           Scarlett Lam, CARROLL  Department of Hospital Medicine   [facility] - Medical Surgical Unit

## 2024-04-16 NOTE — PLAN OF CARE
Problem: Adult Inpatient Plan of Care  Goal: Plan of Care Review  Outcome: Ongoing, Progressing  Goal: Patient-Specific Goal (Individualized)  Outcome: Ongoing, Progressing  Goal: Absence of Hospital-Acquired Illness or Injury  Outcome: Ongoing, Progressing  Goal: Readiness for Transition of Care  Outcome: Ongoing, Progressing     Problem: Diabetes Comorbidity  Goal: Blood Glucose Level Within Targeted Range  Outcome: Ongoing, Progressing

## 2024-04-16 NOTE — ASSESSMENT & PLAN NOTE
04/16/2024: De-roofed pustule on bottom of 1st metatarsophalangeal joint with purulent drainage. Concerned about possible osteomyelitis. Patient is currently on Vancomycin for MRSA. Patient accepted for transfer to [facility] for further imaging and surgical evaluation of right foot. Bed placement pending.  -Wound culture pending.  -Cefepime 1 gm IV every 6 hours for gram negative coverage

## 2024-04-16 NOTE — SUBJECTIVE & OBJECTIVE
Interval History:  Has new pustule to bottom of right 1st metatarsophalangeal joint.     Review of Systems   Constitutional:  Positive for fatigue. Negative for activity change, appetite change, chills and fever.   HENT: Negative.     Eyes: Negative.    Respiratory:  Positive for cough and shortness of breath. Negative for chest tightness and wheezing.    Cardiovascular: Negative.    Gastrointestinal: Negative.    Endocrine: Negative.    Genitourinary: Negative.    Musculoskeletal:  Positive for arthralgias (rigth foot pain) and gait problem.   Skin:  Positive for color change (base of right great toe red and swelling).   Allergic/Immunologic: Negative.    Neurological:  Positive for weakness. Negative for dizziness, light-headedness and headaches.   Hematological: Negative.    Psychiatric/Behavioral: Negative.       Objective:     Vital Signs (Most Recent):  Temp: 97.8 °F (36.6 °C) (04/16/24 1547)  Pulse: 68 (04/16/24 1607)  Resp: 18 (04/16/24 1607)  BP: 136/64 (04/16/24 1547)  SpO2: 95 % (04/16/24 1607) Vital Signs (24h Range):  Temp:  [97.7 °F (36.5 °C)-99.2 °F (37.3 °C)] 97.8 °F (36.6 °C)  Pulse:  [61-87] 68  Resp:  [16-28] 18  SpO2:  [92 %-98 %] 95 %  BP: (112-158)/(62-84) 136/64     Weight: 60.2 kg (132 lb 12.8 oz)  Body mass index is 23.52 kg/m².    Intake/Output Summary (Last 24 hours) at 4/16/2024 1626  Last data filed at 4/16/2024 1231  Gross per 24 hour   Intake 1361.25 ml   Output 300 ml   Net 1061.25 ml         Physical Exam  Vitals and nursing note reviewed.   Constitutional:       General: She is not in acute distress.     Appearance: Normal appearance. She is normal weight. She is ill-appearing. She is not toxic-appearing or diaphoretic.   HENT:      Head: Normocephalic and atraumatic.      Right Ear: External ear normal.      Left Ear: External ear normal.      Nose: Nose normal.      Mouth/Throat:      Mouth: Mucous membranes are moist.      Pharynx: Oropharynx is clear.   Eyes:       Conjunctiva/sclera: Conjunctivae normal.      Pupils: Pupils are equal, round, and reactive to light.   Cardiovascular:      Rate and Rhythm: Normal rate and regular rhythm.      Heart sounds: Normal heart sounds.   Pulmonary:      Effort: Pulmonary effort is normal.      Breath sounds: Examination of the left-lower field reveals decreased breath sounds. Decreased breath sounds present. No wheezing, rhonchi or rales.      Comments: Air moving better today  Abdominal:      General: Bowel sounds are normal.      Palpations: Abdomen is soft.      Tenderness: There is no abdominal tenderness.   Genitourinary:     General: Normal vulva.   Musculoskeletal:         General: Normal range of motion.      Cervical back: Normal range of motion.      Right lower leg: No edema.      Left lower leg: No edema.        Feet:    Feet:      Right foot:      Skin integrity: Erythema and callus present.      Left foot:      Skin integrity: Skin integrity normal.   Skin:     General: Skin is warm and dry.      Capillary Refill: Capillary refill takes less than 2 seconds.   Neurological:      General: No focal deficit present.      Mental Status: She is alert and oriented to person, place, and time. Mental status is at baseline.      Motor: No weakness (Generalized).   Psychiatric:         Attention and Perception: Attention normal.         Mood and Affect: Mood normal.         Speech: Speech normal.         Behavior: Behavior normal. Behavior is cooperative.         Judgment: Judgment normal.             Significant Labs: All pertinent labs within the past 24 hours have been reviewed.  Recent Lab Results         04/16/24  0602   04/16/24  0454   04/15/24  1632        Albumin/Globulin Ratio   0.5         Albumin   2.3         ALP   92         ALT   15         Anion Gap   11         AST   13         Bands   5         Baso #   0.05         Basophil %   0.4         BILIRUBIN TOTAL   0.5         BUN   11         BUN/CREAT RATIO   15          Calcium   9.1         Chloride   98         CO2   31         Creatinine   0.74         Differential Method   Manual         eGFR   91         Eos #   0.24         Eos %   1.9         Globulin, Total   4.4         Glucose   108         Hematocrit   31.5         Hemoglobin   10.2         Lymph #   1.46         Lymph %   11.4            19         MCH   26.6         MCHC   32.4         MCV   82.2         Mono #   0.93         Mono %   7.2            11         MPV   10.4         Neutrophils, Abs   10.15         Neutrophils Relative   79.1         nRBC           Platelet Count   317         POC Glucose 136     125       Potassium   3.8         PROTEIN TOTAL   6.7         RBC   3.83         RDW   15.2         Segmented Neutrophils, Man %   65         Sodium   136         WBC   12.83                 Significant Imaging: I have reviewed all pertinent imaging results/findings within the past 24 hours.

## 2024-04-16 NOTE — PLAN OF CARE
Problem: Adult Inpatient Plan of Care  Goal: Plan of Care Review  Outcome: Ongoing, Progressing  Goal: Patient-Specific Goal (Individualized)  Outcome: Ongoing, Progressing  Goal: Absence of Hospital-Acquired Illness or Injury  Outcome: Ongoing, Progressing  Goal: Optimal Comfort and Wellbeing  Outcome: Ongoing, Progressing  Goal: Readiness for Transition of Care  Outcome: Ongoing, Progressing     Problem: Adult Inpatient Plan of Care  Goal: Patient-Specific Goal (Individualized)  Outcome: Ongoing, Progressing

## 2024-04-16 NOTE — ASSESSMENT & PLAN NOTE
04/15/2024: Pharmacy has Brexpiprazole now which is what patient is on at home.   -Discontinue Aripiprazole 5 mg  -Restart Brexpiprazole 2 mg  one tab by mouth daily

## 2024-04-16 NOTE — ASSESSMENT & PLAN NOTE
04/16/2024: Patient has hypokalemia which is Acute on Chronic and currently uncontrolled. Most recent potassium levels reviewed-   Lab Results   Component Value Date    K 3.8 04/16/2024   Patient is currently taking Potassium Chl 20 meq po twice a day. Will continue to monitor BMP daily.

## 2024-04-16 NOTE — ASSESSMENT & PLAN NOTE
04/16/2024: WBC down to 12.83  today. Air moving better today, still has some diminished BS in LLL. O2 sat ranging 92- 96% on 2L/NC. BC from 4/14 pending. Will start incentive spirometry today to help encourage patient to take deep breaths. Discussed need for PICC line for antibiotic therapy.   -Vancomycin 1,250 mg every 24 hours (Day #3)  -Duo Nebs every 6 hours  -Albuterol nebs every 4 hours prn  -Incentive Spirometry q 2 hours per patient, but every 6 hours with respiratory therapist

## 2024-04-16 NOTE — ASSESSMENT & PLAN NOTE
"04/15/2024: Patient's FSGs are controlled on current medication regimen.  Last A1c reviewed-   Lab Results   Component Value Date    HGBA1C 6.8 (H) 04/14/2024     Most recent fingerstick glucose reviewed- No results for input(s): "POCTGLUCOSE" in the last 24 hours.  Current correctional scale    Maintain anti-hyperglycemic dose as follows-   Antihyperglycemics (From admission, onward)      Start     Stop Route Frequency Ordered    04/15/24 0900  SITagliptin phosphate tablet 100 mg         -- Oral Daily 04/14/24 2129          FBS this am 108 mg/dL. Patient's dm is controlled with medication. Has diabetic neuropathy. Due to BC + for MRSA and abscess of right foot. Patient was accepted for transfer to [facility] for further imaging due to concern of osteomyelitis and for surgical evaluation. Awaiting bed placement.    "

## 2024-04-17 ENCOUNTER — HOSPITAL ENCOUNTER (INPATIENT)
Facility: HOSPITAL | Age: 64
LOS: 5 days | Discharge: HOME HEALTH | End: 2024-04-22
Attending: FAMILY MEDICINE | Admitting: FAMILY MEDICINE
Payer: MEDICAID

## 2024-04-17 VITALS
TEMPERATURE: 98 F | RESPIRATION RATE: 19 BRPM | BODY MASS INDEX: 23.53 KG/M2 | SYSTOLIC BLOOD PRESSURE: 169 MMHG | OXYGEN SATURATION: 96 % | DIASTOLIC BLOOD PRESSURE: 75 MMHG | WEIGHT: 132.81 LBS | HEART RATE: 70 BPM | HEIGHT: 63 IN

## 2024-04-17 DIAGNOSIS — E87.6 HYPOKALEMIA: ICD-10-CM

## 2024-04-17 DIAGNOSIS — J15.212 PNEUMONIA OF LEFT LOWER LOBE DUE TO METHICILLIN-RESISTANT STAPHYLOCOCCUS AUREUS (MRSA): ICD-10-CM

## 2024-04-17 DIAGNOSIS — L08.9 DIABETIC FOOT INFECTION: ICD-10-CM

## 2024-04-17 DIAGNOSIS — E11.628 DIABETIC FOOT INFECTION: ICD-10-CM

## 2024-04-17 DIAGNOSIS — J18.9 PNEUMONIA: ICD-10-CM

## 2024-04-17 DIAGNOSIS — J15.212 MRSA PNEUMONIA: ICD-10-CM

## 2024-04-17 DIAGNOSIS — B95.62 MRSA BACTEREMIA: ICD-10-CM

## 2024-04-17 DIAGNOSIS — R78.81 MRSA BACTEREMIA: Primary | ICD-10-CM

## 2024-04-17 DIAGNOSIS — E11.42 TYPE 2 DIABETES MELLITUS WITH DIABETIC POLYNEUROPATHY, WITHOUT LONG-TERM CURRENT USE OF INSULIN: ICD-10-CM

## 2024-04-17 DIAGNOSIS — B95.62 MRSA BACTEREMIA: Primary | ICD-10-CM

## 2024-04-17 DIAGNOSIS — E11.42 DIABETIC POLYNEUROPATHY ASSOCIATED WITH TYPE 2 DIABETES MELLITUS: ICD-10-CM

## 2024-04-17 DIAGNOSIS — E03.9 HYPOTHYROIDISM, UNSPECIFIED TYPE: ICD-10-CM

## 2024-04-17 DIAGNOSIS — E78.49 OTHER HYPERLIPIDEMIA: ICD-10-CM

## 2024-04-17 DIAGNOSIS — R07.9 CHEST PAIN: ICD-10-CM

## 2024-04-17 DIAGNOSIS — R78.81 MRSA BACTEREMIA: ICD-10-CM

## 2024-04-17 LAB
ALBUMIN SERPL BCP-MCNC: 2.3 G/DL (ref 3.5–5)
ALBUMIN/GLOB SERPL: 0.5 {RATIO}
ALP SERPL-CCNC: 102 U/L (ref 50–130)
ALT SERPL W P-5'-P-CCNC: 23 U/L (ref 13–56)
ANION GAP SERPL CALCULATED.3IONS-SCNC: 14 MMOL/L (ref 7–16)
ANISOCYTOSIS BLD QL SMEAR: ABNORMAL
AST SERPL W P-5'-P-CCNC: 13 U/L (ref 15–37)
BASOPHILS # BLD AUTO: 0.04 K/UL (ref 0–0.2)
BASOPHILS NFR BLD AUTO: 0.3 % (ref 0–1)
BILIRUB SERPL-MCNC: 0.5 MG/DL (ref ?–1.2)
BUN SERPL-MCNC: 9 MG/DL (ref 7–18)
BUN/CREAT SERPL: 13 (ref 6–20)
CALCIUM SERPL-MCNC: 9.3 MG/DL (ref 8.5–10.1)
CHLORIDE SERPL-SCNC: 100 MMOL/L (ref 98–107)
CO2 SERPL-SCNC: 29 MMOL/L (ref 21–32)
CREAT SERPL-MCNC: 0.69 MG/DL (ref 0.55–1.02)
DIFFERENTIAL METHOD BLD: ABNORMAL
EGFR (NO RACE VARIABLE) (RUSH/TITUS): 98 ML/MIN/1.73M2
EOSINOPHIL # BLD AUTO: 0.24 K/UL (ref 0–0.5)
EOSINOPHIL NFR BLD AUTO: 1.8 % (ref 1–4)
EOSINOPHIL NFR BLD MANUAL: 1 % (ref 1–4)
ERYTHROCYTE [DISTWIDTH] IN BLOOD BY AUTOMATED COUNT: 15.6 % (ref 11.5–14.5)
GLOBULIN SER-MCNC: 4.7 G/DL (ref 2–4)
GLUCOSE SERPL-MCNC: 112 MG/DL (ref 70–105)
GLUCOSE SERPL-MCNC: 132 MG/DL (ref 74–106)
HCT VFR BLD AUTO: 32.7 % (ref 38–47)
HGB BLD-MCNC: 10.5 G/DL (ref 12–16)
LYMPHOCYTES # BLD AUTO: 1.65 K/UL (ref 1–4.8)
LYMPHOCYTES NFR BLD AUTO: 12.1 % (ref 27–41)
LYMPHOCYTES NFR BLD MANUAL: 15 % (ref 27–41)
MCH RBC QN AUTO: 26.6 PG (ref 27–31)
MCHC RBC AUTO-ENTMCNC: 32.1 G/DL (ref 32–36)
MCV RBC AUTO: 82.8 FL (ref 80–96)
MONOCYTES # BLD AUTO: 1.17 K/UL (ref 0–0.8)
MONOCYTES NFR BLD AUTO: 8.6 % (ref 2–6)
MONOCYTES NFR BLD MANUAL: 5 % (ref 2–6)
MPC BLD CALC-MCNC: 11.1 FL (ref 9.4–12.4)
NEUTROPHILS # BLD AUTO: 10.49 K/UL (ref 1.8–7.7)
NEUTROPHILS NFR BLD AUTO: 77.2 % (ref 53–65)
NEUTS BAND NFR BLD MANUAL: 3 % (ref 1–5)
NEUTS SEG NFR BLD MANUAL: 76 % (ref 50–62)
NRBC BLD MANUAL-RTO: ABNORMAL %
PLATELET # BLD AUTO: 367 K/UL (ref 150–400)
POTASSIUM SERPL-SCNC: 3.5 MMOL/L (ref 3.5–5.1)
PROT SERPL-MCNC: 7 G/DL (ref 6.4–8.2)
RBC # BLD AUTO: 3.95 M/UL (ref 4.2–5.4)
SODIUM SERPL-SCNC: 139 MMOL/L (ref 136–145)
WBC # BLD AUTO: 13.59 K/UL (ref 4.5–11)

## 2024-04-17 PROCEDURE — 80053 COMPREHEN METABOLIC PANEL: CPT | Performed by: REGISTERED NURSE

## 2024-04-17 PROCEDURE — 25000003 PHARM REV CODE 250: Performed by: NURSE PRACTITIONER

## 2024-04-17 PROCEDURE — 25000003 PHARM REV CODE 250: Performed by: HOSPITALIST

## 2024-04-17 PROCEDURE — 25000242 PHARM REV CODE 250 ALT 637 W/ HCPCS: Performed by: NURSE PRACTITIONER

## 2024-04-17 PROCEDURE — 27000221 HC OXYGEN, UP TO 24 HOURS

## 2024-04-17 PROCEDURE — 11000001 HC ACUTE MED/SURG PRIVATE ROOM

## 2024-04-17 PROCEDURE — 99238 HOSP IP/OBS DSCHRG MGMT 30/<: CPT | Mod: ,,, | Performed by: NURSE PRACTITIONER

## 2024-04-17 PROCEDURE — 82962 GLUCOSE BLOOD TEST: CPT

## 2024-04-17 PROCEDURE — 63600175 PHARM REV CODE 636 W HCPCS: Performed by: FAMILY MEDICINE

## 2024-04-17 PROCEDURE — 85025 COMPLETE CBC W/AUTO DIFF WBC: CPT | Performed by: REGISTERED NURSE

## 2024-04-17 PROCEDURE — 94761 N-INVAS EAR/PLS OXIMETRY MLT: CPT

## 2024-04-17 PROCEDURE — 25000003 PHARM REV CODE 250

## 2024-04-17 PROCEDURE — 99223 1ST HOSP IP/OBS HIGH 75: CPT | Mod: ,,, | Performed by: HOSPITALIST

## 2024-04-17 PROCEDURE — 63600175 PHARM REV CODE 636 W HCPCS: Performed by: NURSE PRACTITIONER

## 2024-04-17 PROCEDURE — 25000003 PHARM REV CODE 250: Performed by: FAMILY MEDICINE

## 2024-04-17 PROCEDURE — 25000003 PHARM REV CODE 250: Performed by: REGISTERED NURSE

## 2024-04-17 PROCEDURE — 99900035 HC TECH TIME PER 15 MIN (STAT)

## 2024-04-17 PROCEDURE — 94640 AIRWAY INHALATION TREATMENT: CPT

## 2024-04-17 RX ORDER — BISACODYL 5 MG
10 TABLET, DELAYED RELEASE (ENTERIC COATED) ORAL DAILY PRN
Status: DISCONTINUED | OUTPATIENT
Start: 2024-04-17 | End: 2024-04-22 | Stop reason: HOSPADM

## 2024-04-17 RX ORDER — DULOXETIN HYDROCHLORIDE 30 MG/1
60 CAPSULE, DELAYED RELEASE ORAL 2 TIMES DAILY
Status: DISCONTINUED | OUTPATIENT
Start: 2024-04-17 | End: 2024-04-22 | Stop reason: HOSPADM

## 2024-04-17 RX ORDER — INSULIN ASPART 100 [IU]/ML
0-5 INJECTION, SOLUTION INTRAVENOUS; SUBCUTANEOUS
Status: DISCONTINUED | OUTPATIENT
Start: 2024-04-17 | End: 2024-04-19

## 2024-04-17 RX ORDER — NALOXONE HCL 0.4 MG/ML
0.02 VIAL (ML) INJECTION
Status: DISCONTINUED | OUTPATIENT
Start: 2024-04-17 | End: 2024-04-22 | Stop reason: HOSPADM

## 2024-04-17 RX ORDER — PREGABALIN 75 MG/1
150 CAPSULE ORAL 2 TIMES DAILY
Status: DISCONTINUED | OUTPATIENT
Start: 2024-04-17 | End: 2024-04-22 | Stop reason: HOSPADM

## 2024-04-17 RX ORDER — TRAZODONE HYDROCHLORIDE 50 MG/1
50 TABLET ORAL NIGHTLY PRN
Status: DISCONTINUED | OUTPATIENT
Start: 2024-04-17 | End: 2024-04-22 | Stop reason: HOSPADM

## 2024-04-17 RX ORDER — POTASSIUM CHLORIDE 20 MEQ/1
40 TABLET, EXTENDED RELEASE ORAL ONCE
Status: COMPLETED | OUTPATIENT
Start: 2024-04-18 | End: 2024-04-18

## 2024-04-17 RX ORDER — ACETAMINOPHEN 500 MG
1000 TABLET ORAL EVERY 6 HOURS PRN
Status: DISCONTINUED | OUTPATIENT
Start: 2024-04-17 | End: 2024-04-22 | Stop reason: HOSPADM

## 2024-04-17 RX ORDER — SIMETHICONE 80 MG
1 TABLET,CHEWABLE ORAL 3 TIMES DAILY PRN
Status: DISCONTINUED | OUTPATIENT
Start: 2024-04-17 | End: 2024-04-22 | Stop reason: HOSPADM

## 2024-04-17 RX ORDER — MUPIROCIN 20 MG/G
OINTMENT TOPICAL 2 TIMES DAILY
Status: DISCONTINUED | OUTPATIENT
Start: 2024-04-17 | End: 2024-04-22 | Stop reason: HOSPADM

## 2024-04-17 RX ORDER — ONDANSETRON HYDROCHLORIDE 2 MG/ML
8 INJECTION, SOLUTION INTRAVENOUS EVERY 6 HOURS PRN
Status: DISCONTINUED | OUTPATIENT
Start: 2024-04-17 | End: 2024-04-22 | Stop reason: HOSPADM

## 2024-04-17 RX ORDER — IBUPROFEN 200 MG
24 TABLET ORAL
Status: DISCONTINUED | OUTPATIENT
Start: 2024-04-17 | End: 2024-04-22 | Stop reason: HOSPADM

## 2024-04-17 RX ORDER — PANTOPRAZOLE SODIUM 40 MG/1
40 TABLET, DELAYED RELEASE ORAL DAILY
Status: DISCONTINUED | OUTPATIENT
Start: 2024-04-18 | End: 2024-04-22 | Stop reason: HOSPADM

## 2024-04-17 RX ORDER — CARVEDILOL 12.5 MG/1
12.5 TABLET ORAL 2 TIMES DAILY
Status: DISCONTINUED | OUTPATIENT
Start: 2024-04-17 | End: 2024-04-22 | Stop reason: HOSPADM

## 2024-04-17 RX ORDER — GUAIFENESIN AND DEXTROMETHORPHAN HYDROBROMIDE 10; 100 MG/5ML; MG/5ML
10 SYRUP ORAL EVERY 6 HOURS PRN
Status: DISCONTINUED | OUTPATIENT
Start: 2024-04-17 | End: 2024-04-22 | Stop reason: HOSPADM

## 2024-04-17 RX ORDER — LEVOTHYROXINE SODIUM 50 UG/1
50 TABLET ORAL
Status: DISCONTINUED | OUTPATIENT
Start: 2024-04-18 | End: 2024-04-22 | Stop reason: HOSPADM

## 2024-04-17 RX ORDER — TALC
6 POWDER (GRAM) TOPICAL NIGHTLY PRN
Status: DISCONTINUED | OUTPATIENT
Start: 2024-04-17 | End: 2024-04-22 | Stop reason: HOSPADM

## 2024-04-17 RX ORDER — ATORVASTATIN CALCIUM 80 MG/1
80 TABLET, FILM COATED ORAL NIGHTLY
Status: DISCONTINUED | OUTPATIENT
Start: 2024-04-17 | End: 2024-04-22 | Stop reason: HOSPADM

## 2024-04-17 RX ORDER — IBUPROFEN 200 MG
16 TABLET ORAL
Status: DISCONTINUED | OUTPATIENT
Start: 2024-04-17 | End: 2024-04-22 | Stop reason: HOSPADM

## 2024-04-17 RX ORDER — SODIUM CHLORIDE 0.9 % (FLUSH) 0.9 %
10 SYRINGE (ML) INJECTION EVERY 12 HOURS PRN
Status: DISCONTINUED | OUTPATIENT
Start: 2024-04-17 | End: 2024-04-22 | Stop reason: HOSPADM

## 2024-04-17 RX ORDER — GLUCAGON 1 MG
1 KIT INJECTION
Status: DISCONTINUED | OUTPATIENT
Start: 2024-04-17 | End: 2024-04-22 | Stop reason: HOSPADM

## 2024-04-17 RX ADMIN — IBUPROFEN 600 MG: 400 TABLET ORAL at 04:04

## 2024-04-17 RX ADMIN — LEVOTHYROXINE SODIUM 50 MCG: 50 TABLET ORAL at 05:04

## 2024-04-17 RX ADMIN — SENNOSIDES AND DOCUSATE SODIUM 1 TABLET: 8.6; 5 TABLET ORAL at 08:04

## 2024-04-17 RX ADMIN — MUPIROCIN: 20 OINTMENT TOPICAL at 08:04

## 2024-04-17 RX ADMIN — LINACLOTIDE 290 MCG: 145 CAPSULE, GELATIN COATED ORAL at 05:04

## 2024-04-17 RX ADMIN — MUPIROCIN: 20 OINTMENT TOPICAL at 10:04

## 2024-04-17 RX ADMIN — ATORVASTATIN CALCIUM 80 MG: 80 TABLET, FILM COATED ORAL at 10:04

## 2024-04-17 RX ADMIN — POTASSIUM CHLORIDE 20 MEQ: 1500 TABLET, EXTENDED RELEASE ORAL at 08:04

## 2024-04-17 RX ADMIN — DULOXETINE HYDROCHLORIDE 60 MG: 30 CAPSULE, DELAYED RELEASE ORAL at 10:04

## 2024-04-17 RX ADMIN — IPRATROPIUM BROMIDE AND ALBUTEROL SULFATE 3 ML: 2.5; .5 SOLUTION RESPIRATORY (INHALATION) at 07:04

## 2024-04-17 RX ADMIN — VANCOMYCIN HYDROCHLORIDE 1250 MG: 1 INJECTION, POWDER, LYOPHILIZED, FOR SOLUTION INTRAVENOUS at 11:04

## 2024-04-17 RX ADMIN — IBUPROFEN 600 MG: 400 TABLET ORAL at 01:04

## 2024-04-17 RX ADMIN — TRAZODONE HYDROCHLORIDE 50 MG: 50 TABLET ORAL at 10:04

## 2024-04-17 RX ADMIN — SODIUM CHLORIDE 250 ML: 9 INJECTION, SOLUTION INTRAVENOUS at 12:04

## 2024-04-17 RX ADMIN — PANTOPRAZOLE SODIUM 40 MG: 40 TABLET, DELAYED RELEASE ORAL at 08:04

## 2024-04-17 RX ADMIN — IPRATROPIUM BROMIDE AND ALBUTEROL SULFATE 3 ML: 2.5; .5 SOLUTION RESPIRATORY (INHALATION) at 12:04

## 2024-04-17 RX ADMIN — DULOXETINE 60 MG: 30 CAPSULE, DELAYED RELEASE ORAL at 08:04

## 2024-04-17 RX ADMIN — CEFEPIME 1 G: 1 INJECTION, POWDER, FOR SOLUTION INTRAMUSCULAR; INTRAVENOUS at 12:04

## 2024-04-17 RX ADMIN — PREGABALIN 150 MG: 75 CAPSULE ORAL at 10:04

## 2024-04-17 RX ADMIN — FLUTICASONE PROPIONATE 100 MCG: 50 SPRAY, METERED NASAL at 08:04

## 2024-04-17 RX ADMIN — IPRATROPIUM BROMIDE AND ALBUTEROL SULFATE 3 ML: 2.5; .5 SOLUTION RESPIRATORY (INHALATION) at 01:04

## 2024-04-17 RX ADMIN — CEFEPIME 1 G: 1 INJECTION, POWDER, FOR SOLUTION INTRAMUSCULAR; INTRAVENOUS at 05:04

## 2024-04-17 RX ADMIN — SITAGLIPTIN 100 MG: 50 TABLET, FILM COATED ORAL at 08:04

## 2024-04-17 RX ADMIN — PREGABALIN 150 MG: 50 CAPSULE ORAL at 08:04

## 2024-04-17 RX ADMIN — CARVEDILOL 12.5 MG: 6.25 TABLET, FILM COATED ORAL at 08:04

## 2024-04-17 RX ADMIN — CARVEDILOL 12.5 MG: 12.5 TABLET, FILM COATED ORAL at 10:04

## 2024-04-17 NOTE — ASSESSMENT & PLAN NOTE
"04/17/2024: Patient's FSGs are controlled on current medication regimen.  Last A1c reviewed-   Lab Results   Component Value Date    HGBA1C 6.8 (H) 04/14/2024     Most recent fingerstick glucose reviewed- No results for input(s): "POCTGLUCOSE" in the last 24 hours.  Current correctional scale    Maintain anti-hyperglycemic dose as follows-   Antihyperglycemics (From admission, onward)      Start     Stop Route Frequency Ordered    04/15/24 0900  SITagliptin phosphate tablet 100 mg         -- Oral Daily 04/14/24 2129          FBS this am 132 mg/dL. Patient's dm is controlled with medication. Has diabetic neuropathy. Due to BC + for MRSA and abscess of right foot. Patient was accepted for transfer to [facility] for further imaging due to concern of osteomyelitis and for surgical evaluation. Awaiting bed placement.    "

## 2024-04-17 NOTE — ASSESSMENT & PLAN NOTE
04/17/2024: WBC up to 13.59 today. Lungs CTA. O2 sat 97% on 2L/NC. Patient was started on Cefepime 1 gm ro right foot infection to cover gram negative   -Vancomycin 1,250 mg every 24 hours (Day #4)  -Duo Nebs every 6 hours  -Albuterol nebs every 4 hours prn  -Incentive Spirometry q 2 hours per patient, but every 6 hours with respiratory therapist  -Discharge patient for transfer to [facility] room 56

## 2024-04-17 NOTE — ASSESSMENT & PLAN NOTE
04/17/2024: Chronic, controlled. Latest blood pressure and vitals reviewed-     Temp:  [97.5 °F (36.4 °C)-98.3 °F (36.8 °C)]   Pulse:  []   Resp:  [14-20]   BP: (135-175)/(69-82)   SpO2:  [93 %-98 %] .   Home meds for hypertension were reviewed and noted below.   Hypertension Medications               carvediloL (COREG) 12.5 MG tablet Take 1 tablet (12.5 mg total) by mouth 2 (two) times daily with meals.            While in the hospital, will manage blood pressure as follows; Continue home antihypertensive regimen    Will utilize p.r.n. blood pressure medication only if patient's blood pressure greater than 180/110 and she develops symptoms such as worsening chest pain or shortness of breath.

## 2024-04-17 NOTE — ASSESSMENT & PLAN NOTE
04/16/2024: De-roofed pustule on bottom of 1st metatarsophalangeal joint with purulent drainage. Concerned about possible osteomyelitis. Patient is currently on Vancomycin for MRSA. Patient accepted for transfer to [facility] for further imaging and surgical evaluation of right foot. Bed placement pending.  -Wound culture pending.  -Cefepime 1 gm IV every 6 hours for gram negative coverage    04/17/2024: Right foot redness and swelling has improved, but continues to have purulent drainage. Wound culture grew out heavy growth staph aureus. Unable to have MRI of right foot today due to machine was down for repair. WBC up to 13.59 today, afebrile. Patient to be transferred today to [facility] to room 569.  -Cefepime 1 gm IV every 6 hours (day #2)

## 2024-04-17 NOTE — DISCHARGE SUMMARY
[facility] - Medical Surgical Unit  Hospital Medicine  Discharge Summary      Patient Name: Magali Cheung  MRN: [id number]  [facility]: [id number]  Patient Class: IP- Inpatient  Admission Date: 4/14/2024  Hospital Length of Stay: 3 days  Discharge Date and Time:  04/17/2024 4:22 PM  Attending Physician: Zenon Dominguez DO   Discharging Provider: CARROLL Ibanez  Primary Care Provider: Ariana Pinedo NP    Primary Care Team: Networked reference to record PCT     HPI:   64 y-old  female with PMH of DM, HTN, Hypothyroid, and hyperlipidemia who presents to the ED with a 4-5 day history of fever/cough/dyspnea/blood tinged sputum and chest pain that is exacerbated with cough. Was seen at [facility] on 04/12/24 where work up was suggestive of LML pneumonia. Patient was given RX for Augmentin and Azithromycin which she filled sometime on 04/13/24. Had taken 2 doses of each upon arrival tonight. She now has a WBC of 19.22, Absolute neutrophil count 17.53, Na+134, K+2.8 (chronically hypokalemic), glucose 177 mg/dl, and a lactic acid of 1.0. Patient did have positive blood cultures from her visit on 04/12/24 that were verigene gram + blood cx MRSA. This was reported in both anaerobic and aerobic bottles of both sets of BC. Patient is to be admitted to [facility] for IV abx as well as serial labs.     * No surgery found *      Hospital Course:   04/15/2024: Hospital day #2 for IV Vancomycin and duo neb treatments for pneumonia. Verigene gram + blood culture grew out MRSA. VS wnl. Continues to have diminished breath sounds to left lower lobe. O2 sat 94% on 2L/NC. Reports feeling better today. No new complaints.     04/16/2024: Hospital Day #3. Patient had increase in pain of right foot at 1st metatarsophalangeal joint which was recently treated for gout. Reports noticed a pustule on bottom of right foot at 1st metatarsophalangeal joint last night. Denies drainage. No recent injury or infection with foot  per patient. WBC today is down to 12.83, H&H stable at 10.2/31.5, K+ 3.8, BUN/Cr 11/0.74. Has been afebrile. O2 sat 92-96% on 2L/NC. De-roofed pustule on right foot, purulent drainage noted and obtained culture. Patient will need MRI to rule out osteomyelitis and surgical evaluation of foot. Spoke with Dr. Everett regarding patient's case and recommended to add Cefepime for gram negative coverage. She accepted patient to [facility].    04/17/2024: Hospital Day #4. Patient receiving Vancomycin and Cefepime to cover right foot possible abscess, pneumonia and blood cultures positive for MRSA.  Wound culture preliminary results positive for heavy growth staph aureus. WBC is up to 13.59 today. Patient remains afebrile. Redness and swelling of right foot has improved, but continues to have scant amount of purulent drainage. Lungs CTA. O2 sat 97% on room air. MRI of right foot was ordered today, but was unable to be done due to MRI machine went down.   16:10 PFC bed placement called. Patient to room 569 at [facility].     Goals of Care Treatment Preferences:  Code Status: Full Code      Consults:   Consults (From admission, onward)          Status Ordering Provider     Pharmacy to dose Vancomycin consult  Once        Provider:  (Not yet assigned)    Acknowledged STEPHENIE MALDONADO            Psychiatric  Depression  04/15/2024: Pharmacy has Brexpiprazole now which is what patient is on at home.   -Discontinue Aripiprazole 5 mg  -Restart Brexpiprazole 2 mg  one tab by mouth daily        Pulmonary  * Pneumonia of left lung due to methicillin resistant Staphylococcus aureus (MRSA)  04/17/2024: WBC up to 13.59 today. Lungs CTA. O2 sat 97% on 2L/NC. Patient was started on Cefepime 1 gm ro right foot infection to cover gram negative   -Vancomycin 1,250 mg every 24 hours (Day #4)  -Duo Nebs every 6 hours  -Albuterol nebs every 4 hours prn  -Incentive Spirometry q 2 hours per patient, but every 6 hours with respiratory therapist  -Discharge patient  for transfer to [facility] room 569      Cardiac/Vascular  Hypertension  04/17/2024: Chronic, controlled. Latest blood pressure and vitals reviewed-     Temp:  [97.5 °F (36.4 °C)-98.3 °F (36.8 °C)]   Pulse:  []   Resp:  [14-20]   BP: (135-175)/(69-82)   SpO2:  [93 %-98 %] .   Home meds for hypertension were reviewed and noted below.   Hypertension Medications               carvediloL (COREG) 12.5 MG tablet Take 1 tablet (12.5 mg total) by mouth 2 (two) times daily with meals.            While in the hospital, will manage blood pressure as follows; Continue home antihypertensive regimen    Will utilize p.r.n. blood pressure medication only if patient's blood pressure greater than 180/110 and she develops symptoms such as worsening chest pain or shortness of breath.    Renal/  Hypokalemia  04/17/2024: Patient has hypokalemia which is Acute on Chronic and currently uncontrolled. Most recent potassium levels reviewed-   Lab Results   Component Value Date    K 3.5 04/17/2024   Patient is currently taking Potassium Chl 20 meq po twice a day. Will continue to monitor BMP daily.    ID  Abscess of foot  04/16/2024: De-roofed pustule on bottom of 1st metatarsophalangeal joint with purulent drainage. Concerned about possible osteomyelitis. Patient is currently on Vancomycin for MRSA. Patient accepted for transfer to [facility] for further imaging and surgical evaluation of right foot. Bed placement pending.  -Wound culture pending.  -Cefepime 1 gm IV every 6 hours for gram negative coverage    04/17/2024: Right foot redness and swelling has improved, but continues to have purulent drainage. Wound culture grew out heavy growth staph aureus. Unable to have MRI of right foot today due to machine was down for repair. WBC up to 13.59 today, afebrile. Patient to be transferred today to [facility] to room 569.  -Cefepime 1 gm IV every 6 hours (day #2)          Endocrine  Diabetes mellitus without complication  04/17/2024: Patient's FSGs are  "controlled on current medication regimen.  Last A1c reviewed-   Lab Results   Component Value Date    HGBA1C 6.8 (H) 04/14/2024     Most recent fingerstick glucose reviewed- No results for input(s): "POCTGLUCOSE" in the last 24 hours.  Current correctional scale    Maintain anti-hyperglycemic dose as follows-   Antihyperglycemics (From admission, onward)      Start     Stop Route Frequency Ordered    04/15/24 0900  SITagliptin phosphate tablet 100 mg         -- Oral Daily 04/14/24 2129          FBS this am 132 mg/dL. Patient's dm is controlled with medication. Has diabetic neuropathy. Due to BC + for MRSA and abscess of right foot. Patient was accepted for transfer to [facility] for further imaging due to concern of osteomyelitis and for surgical evaluation. Awaiting bed placement.        Final Active Diagnoses:    Diagnosis Date Noted POA    PRINCIPAL PROBLEM:  Pneumonia of left lung due to methicillin resistant Staphylococcus aureus (MRSA) [J15.212] 04/14/2024 Yes    Abscess of foot [L02.619] 04/16/2024 No    Hypothyroid [E03.9] 04/14/2024 Yes    Hypokalemia [E87.6] 03/24/2023 Yes    Hypertension [I10]  Yes    Depression [F32.A] 09/02/2021 Yes     Chronic    Diabetes mellitus without complication [E11.9] 09/02/2021 Yes     Chronic      Problems Resolved During this Admission:       Discharged Condition: good    Disposition: Skilled Nursing Facility    Follow Up:    Patient Instructions:      Diet diabetic       Significant Diagnostic Studies: Labs: All labs within the past 24 hours have been reviewed    Pending Diagnostic Studies:       Procedure Component Value Units Date/Time    MRI Foot Toes Without Contrast Right [[id number]]     Order Status: Sent Lab Status: No result     VANCOMYCIN, TROUGH [[id number]]     Order Status: Sent Lab Status: No result     Specimen: Blood            Medications:  Reconciled Home Medications:      Medication List        START taking these medications      atorvastatin 80 MG " tablet  Commonly known as: LIPITOR  Take 1 tablet (80 mg total) by mouth every evening.     dextrose 5 % (D5W) SolP 250 mL with vancomycin 1,000 mg SolR 1,000 mg  Inject 1,000 mg into the vein every 24 hours as needed (MRSA pneumonia).     mupirocin 2 % ointment  Commonly known as: BACTROBAN  by Nasal route 2 (two) times daily.            CHANGE how you take these medications      carvediloL 12.5 MG tablet  Commonly known as: COREG  Take 1 tablet (12.5 mg total) by mouth 2 (two) times daily with meals.  What changed: when to take this            CONTINUE taking these medications      albuterol 90 mcg/actuation inhaler  Commonly known as: PROVENTIL/VENTOLIN HFA  INHALE ONE TO TWO PUFFS BY MOUTH EVERY 4 HOURS AS NEEDED FOR SHORTNESS OF BREATH OR WHEEZING     DULoxetine 60 MG capsule  Commonly known as: CYMBALTA  Take 60 mg by mouth 2 (two) times daily.     esomeprazole 40 MG capsule  Commonly known as: NEXIUM  Take 40 mg by mouth once daily.     fluticasone propionate 50 mcg/actuation nasal spray  Commonly known as: FLONASE  2 sprays (100 mcg total) by Each Nostril route once daily.     JANUVIA 100 mg Tab  Generic drug: SITagliptin phosphate  Take 100 mg by mouth once daily.     levothyroxine 50 MCG tablet  Commonly known as: SYNTHROID  Take 50 mcg by mouth before breakfast.     LINZESS 290 mcg Cap capsule  Generic drug: linaCLOtide  Take 1 capsule every day by oral route for 30 days.     potassium chloride SA 20 MEQ tablet  Commonly known as: K-DUR,KLOR-CON  Take 1 tablet (20 mEq total) by mouth once daily. for 7 days     pregabalin 150 MG capsule  Commonly known as: LYRICA  Take 1 capsule (150 mg total) by mouth 2 (two) times daily. Take two capsules by mouth three times a day     REXULTI 2 mg Tab  Generic drug: brexpiprazole  Take 1 tablet by mouth once daily.            STOP taking these medications      clonazePAM 1 MG tablet  Commonly known as: KlonoPIN     cyanocobalamin 1,000 mcg/mL injection     predniSONE 10  MG tablet  Commonly known as: DELTASONE              Indwelling Lines/Drains at time of discharge:   Lines/Drains/Airways       None                   Time spent on the discharge of patient: 30 minutes     Notified Dr. Dominguez of patient's discharge to [facility].     CARROLL Ibanez  Department of Hospital Medicine  [facility] - Medical Surgical Unit

## 2024-04-17 NOTE — PLAN OF CARE
Problem: Adult Inpatient Plan of Care  Goal: Plan of Care Review  4/17/2024 1657 by Sumit Calloway RN  Outcome: Met  4/17/2024 0942 by Sumit Calloway RN  Outcome: Ongoing, Progressing  Goal: Patient-Specific Goal (Individualized)  4/17/2024 1657 by Sumit Calloway RN  Outcome: Met  4/17/2024 0942 by Sumit Calloway RN  Outcome: Ongoing, Progressing  Goal: Absence of Hospital-Acquired Illness or Injury  4/17/2024 1657 by Sumit Calloway RN  Outcome: Met  4/17/2024 0942 by Sumit Calloway RN  Outcome: Ongoing, Progressing  Goal: Optimal Comfort and Wellbeing  4/17/2024 1657 by Sumit Calloway RN  Outcome: Met  4/17/2024 0942 by Sumit Calloway RN  Outcome: Ongoing, Progressing  Goal: Readiness for Transition of Care  4/17/2024 1657 by Sumit Calloway RN  Outcome: Met  4/17/2024 0942 by Sumit Calloway RN  Outcome: Ongoing, Progressing     Problem: Diabetes Comorbidity  Goal: Blood Glucose Level Within Targeted Range  4/17/2024 1657 by Sumit Calloway RN  Outcome: Met  4/17/2024 0942 by Sumit Calloway RN  Outcome: Ongoing, Progressing     Problem: Fall Injury Risk  Goal: Absence of Fall and Fall-Related Injury  4/17/2024 1657 by Sumit Calloway RN  Outcome: Met  4/17/2024 0942 by Sumit Calloway RN  Outcome: Ongoing, Progressing     Problem: Pain Acute  Goal: Acceptable Pain Control and Functional Ability  4/17/2024 1657 by Sumit Calloway RN  Outcome: Met  4/17/2024 0942 by Sumit Calloway RN  Outcome: Ongoing, Progressing     Problem: Gas Exchange Impaired  Goal: Optimal Gas Exchange  4/17/2024 1657 by Sumit Calloway RN  Outcome: Met  4/17/2024 0942 by Sumit Calloway RN  Outcome: Ongoing, Progressing

## 2024-04-17 NOTE — PLAN OF CARE
Patient found on 1.5 LPM NC O2 sat 93%. Treatment is in process. Will increase O2 back to 2 lpm NC when treatment is done. Lungs are clear. Respirations are even and unlabored. No shortness of breath noted.

## 2024-04-17 NOTE — NURSING
Patient transferred via EMS to [facility]. Belongings/home meds given to spouse.

## 2024-04-17 NOTE — PLAN OF CARE
Problem: Adult Inpatient Plan of Care  Goal: Absence of Hospital-Acquired Illness or Injury  Outcome: Ongoing, Progressing     Problem: Adult Inpatient Plan of Care  Goal: Plan of Care Review  Outcome: Ongoing, Not Progressing  Goal: Patient-Specific Goal (Individualized)  Outcome: Ongoing, Not Progressing  Goal: Optimal Comfort and Wellbeing  Outcome: Ongoing, Not Progressing

## 2024-04-17 NOTE — ASSESSMENT & PLAN NOTE
04/17/2024: Patient has hypokalemia which is Acute on Chronic and currently uncontrolled. Most recent potassium levels reviewed-   Lab Results   Component Value Date    K 3.5 04/17/2024   Patient is currently taking Potassium Chl 20 meq po twice a day. Will continue to monitor BMP daily.

## 2024-04-18 PROBLEM — L08.9 DIABETIC FOOT INFECTION: Status: ACTIVE | Noted: 2024-04-18

## 2024-04-18 PROBLEM — E11.628 DIABETIC FOOT INFECTION: Status: ACTIVE | Noted: 2024-04-18

## 2024-04-18 LAB
ANION GAP SERPL CALCULATED.3IONS-SCNC: 8 MMOL/L (ref 7–16)
ANISOCYTOSIS BLD QL SMEAR: ABNORMAL
BACTERIA BLD CULT: ABNORMAL
BASOPHILS # BLD AUTO: 0.08 K/UL (ref 0–0.2)
BASOPHILS NFR BLD AUTO: 0.6 % (ref 0–1)
BUN SERPL-MCNC: 9 MG/DL (ref 7–18)
BUN/CREAT SERPL: 15 (ref 6–20)
CALCIUM SERPL-MCNC: 9.1 MG/DL (ref 8.5–10.1)
CHLORIDE SERPL-SCNC: 107 MMOL/L (ref 98–107)
CO2 SERPL-SCNC: 28 MMOL/L (ref 21–32)
CREAT SERPL-MCNC: 0.62 MG/DL (ref 0.55–1.02)
CRP SERPL-MCNC: 13.7 MG/DL (ref 0–0.8)
DIFFERENTIAL METHOD BLD: ABNORMAL
EGFR (NO RACE VARIABLE) (RUSH/TITUS): 100 ML/MIN/1.73M2
EOSINOPHIL # BLD AUTO: 0.23 K/UL (ref 0–0.5)
EOSINOPHIL NFR BLD AUTO: 1.8 % (ref 1–4)
EOSINOPHIL NFR BLD MANUAL: 2 % (ref 1–4)
ERYTHROCYTE [DISTWIDTH] IN BLOOD BY AUTOMATED COUNT: 15.8 % (ref 11.5–14.5)
ERYTHROCYTE [SEDIMENTATION RATE] IN BLOOD BY WESTERGREN METHOD: 106 MM/HR (ref 0–30)
GLUCOSE SERPL-MCNC: 103 MG/DL (ref 74–106)
GLUCOSE SERPL-MCNC: 135 MG/DL (ref 70–105)
GLUCOSE SERPL-MCNC: 137 MG/DL (ref 70–105)
GLUCOSE SERPL-MCNC: 221 MG/DL (ref 70–105)
GLUCOSE SERPL-MCNC: 87 MG/DL (ref 70–105)
GLUCOSE SERPL-MCNC: 92 MG/DL (ref 70–105)
GRAM STN SPEC: ABNORMAL
HCT VFR BLD AUTO: 28.6 % (ref 38–47)
HGB BLD-MCNC: 9 G/DL (ref 12–16)
IMM GRANULOCYTES # BLD AUTO: 1.33 K/UL (ref 0–0.04)
IMM GRANULOCYTES NFR BLD: 10.6 % (ref 0–0.4)
LYMPHOCYTES # BLD AUTO: 1.54 K/UL (ref 1–4.8)
LYMPHOCYTES NFR BLD AUTO: 12.3 % (ref 27–41)
LYMPHOCYTES NFR BLD MANUAL: 13 % (ref 27–41)
MCH RBC QN AUTO: 26.5 PG (ref 27–31)
MCHC RBC AUTO-ENTMCNC: 31.5 G/DL (ref 32–36)
MCV RBC AUTO: 84.1 FL (ref 80–96)
MICROORGANISM SPEC CULT: ABNORMAL
MONOCYTES # BLD AUTO: 0.94 K/UL (ref 0–0.8)
MONOCYTES NFR BLD AUTO: 7.5 % (ref 2–6)
MONOCYTES NFR BLD MANUAL: 6 % (ref 2–6)
MPC BLD CALC-MCNC: 11.1 FL (ref 9.4–12.4)
NEUTROPHILS # BLD AUTO: 8.38 K/UL (ref 1.8–7.7)
NEUTROPHILS NFR BLD AUTO: 67.2 % (ref 53–65)
NEUTS BAND NFR BLD MANUAL: 9 % (ref 1–5)
NEUTS SEG NFR BLD MANUAL: 70 % (ref 50–62)
NRBC # BLD AUTO: 0 X10E3/UL
NRBC, AUTO (.00): 0 %
PLATELET # BLD AUTO: 341 K/UL (ref 150–400)
PLATELET MORPHOLOGY: ABNORMAL
POTASSIUM SERPL-SCNC: 3.4 MMOL/L (ref 3.5–5.1)
RBC # BLD AUTO: 3.4 M/UL (ref 4.2–5.4)
SODIUM SERPL-SCNC: 140 MMOL/L (ref 136–145)
WBC # BLD AUTO: 12.5 K/UL (ref 4.5–11)

## 2024-04-18 PROCEDURE — 11000001 HC ACUTE MED/SURG PRIVATE ROOM

## 2024-04-18 PROCEDURE — 25000003 PHARM REV CODE 250: Performed by: HOSPITALIST

## 2024-04-18 PROCEDURE — 82962 GLUCOSE BLOOD TEST: CPT

## 2024-04-18 PROCEDURE — 63600175 PHARM REV CODE 636 W HCPCS: Performed by: HOSPITALIST

## 2024-04-18 PROCEDURE — 86140 C-REACTIVE PROTEIN: CPT

## 2024-04-18 PROCEDURE — 99233 SBSQ HOSP IP/OBS HIGH 50: CPT | Mod: ,,, | Performed by: FAMILY MEDICINE

## 2024-04-18 PROCEDURE — 63600175 PHARM REV CODE 636 W HCPCS: Performed by: FAMILY MEDICINE

## 2024-04-18 PROCEDURE — 85651 RBC SED RATE NONAUTOMATED: CPT

## 2024-04-18 PROCEDURE — 85025 COMPLETE CBC W/AUTO DIFF WBC: CPT

## 2024-04-18 PROCEDURE — 25000003 PHARM REV CODE 250

## 2024-04-18 PROCEDURE — 80048 BASIC METABOLIC PNL TOTAL CA: CPT

## 2024-04-18 PROCEDURE — 25000003 PHARM REV CODE 250: Performed by: FAMILY MEDICINE

## 2024-04-18 RX ORDER — ENOXAPARIN SODIUM 100 MG/ML
40 INJECTION SUBCUTANEOUS EVERY 24 HOURS
Status: DISCONTINUED | OUTPATIENT
Start: 2024-04-18 | End: 2024-04-22 | Stop reason: HOSPADM

## 2024-04-18 RX ORDER — IBUPROFEN 400 MG/1
800 TABLET ORAL EVERY 6 HOURS PRN
Status: DISCONTINUED | OUTPATIENT
Start: 2024-04-18 | End: 2024-04-22 | Stop reason: HOSPADM

## 2024-04-18 RX ADMIN — ATORVASTATIN CALCIUM 80 MG: 80 TABLET, FILM COATED ORAL at 09:04

## 2024-04-18 RX ADMIN — MUPIROCIN: 20 OINTMENT TOPICAL at 09:04

## 2024-04-18 RX ADMIN — ENOXAPARIN SODIUM 40 MG: 40 INJECTION, SOLUTION SUBCUTANEOUS at 04:04

## 2024-04-18 RX ADMIN — TRAZODONE HYDROCHLORIDE 50 MG: 50 TABLET ORAL at 09:04

## 2024-04-18 RX ADMIN — IBUPROFEN 800 MG: 400 TABLET, FILM COATED ORAL at 03:04

## 2024-04-18 RX ADMIN — DULOXETINE HYDROCHLORIDE 60 MG: 30 CAPSULE, DELAYED RELEASE ORAL at 09:04

## 2024-04-18 RX ADMIN — CARVEDILOL 12.5 MG: 12.5 TABLET, FILM COATED ORAL at 09:04

## 2024-04-18 RX ADMIN — LEVOTHYROXINE SODIUM 50 MCG: 50 TABLET ORAL at 05:04

## 2024-04-18 RX ADMIN — PREGABALIN 150 MG: 75 CAPSULE ORAL at 09:04

## 2024-04-18 RX ADMIN — ACETAMINOPHEN 1000 MG: 500 TABLET ORAL at 03:04

## 2024-04-18 RX ADMIN — LINACLOTIDE 290 MCG: 290 CAPSULE, GELATIN COATED ORAL at 05:04

## 2024-04-18 RX ADMIN — PANTOPRAZOLE SODIUM 40 MG: 40 TABLET, DELAYED RELEASE ORAL at 09:04

## 2024-04-18 RX ADMIN — VANCOMYCIN HYDROCHLORIDE 1250 MG: 1 INJECTION, POWDER, LYOPHILIZED, FOR SOLUTION INTRAVENOUS at 05:04

## 2024-04-18 RX ADMIN — POTASSIUM CHLORIDE 40 MEQ: 1500 TABLET, EXTENDED RELEASE ORAL at 09:04

## 2024-04-18 NOTE — ASSESSMENT & PLAN NOTE
">>ASSESSMENT AND PLAN FOR DIABETIC POLYNEUROPATHY ASSOCIATED WITH TYPE 2 DIABETES MELLITUS WRITTEN ON 4/17/2024  9:52 PM BY APRIL ZHANG MD    Patient's FSGs are controlled on current medication regimen.  Last A1c reviewed-   Lab Results   Component Value Date    HGBA1C 6.8 (H) 04/14/2024     Most recent fingerstick glucose reviewed- No results for input(s): "POCTGLUCOSE" in the last 24 hours.  Current correctional scale  Low  Maintain anti-hyperglycemic dose as follows-   Antihyperglycemics (From admission, onward)    Start     Stop Route Frequency Ordered    04/17/24 2248  insulin aspart U-100 injection 0-5 Units         -- SubQ Before meals & nightly PRN 04/17/24 2148        Hold Oral hypoglycemics while patient is in the hospital.  Continue home pregabalin   "

## 2024-04-18 NOTE — ASSESSMENT & PLAN NOTE
Patient has hypokalemia which is Acute on Chronic and currently controlled. Most recent potassium levels reviewed-   Lab Results   Component Value Date    K 3.5 04/17/2024   Will continue potassium replacement per protocol and recheck repeat levels after replacement completed.

## 2024-04-18 NOTE — H&P
[facility] - [address] [place] [address] Medicine  History & Physical    Patient Name: Magali Cheung  MRN: [id number]  Patient Class: IP- Inpatient  Admission Date: 2024  Attending Physician: Arabella Everett DO   Primary Care Provider: Ariana Pinedo NP         Patient information was obtained from patient, past medical records, and ER records.     Subjective:     Principal Problem:MRSA bacteremia    Chief Complaint:   Chief Complaint   Patient presents with    Shortness of Breath        HPI: Patient is a 64 year old female that presents to [facility] via [facility] for bacteremia. She was originally being treated for CAP and first sought treatment due to SOB. During treatment, blood cultures were positive for MRSA. The patient is a well controlled diabetic with last HgA1c 6.8% however right before hospitalization she noticed non-painful skin breakdown on her right foot at the 1st MTP joint. She states that she has significant peripheral neuropathy. The patient was started on Vancomycin IV on 2024. She denies chest pain, SOB or abdominal pain.    At [facility], Lactic acid was within normal limits on multiple readings, TSH normal. WBC was elevated 19 on admission. CXR - Opacification of the lateral left mid lung suspicious for pneumonia. Recommend follow-up imaging to document resolution and exclude underlying neoplasm. There is mild right basilar atelectasis/infiltrate.     The patient will be admitted to [facility] for continued care and medical management.     Past Medical History:   Diagnosis Date    Anxiety     Chronic pain syndrome     Depression     Diabetes mellitus     GERD (gastroesophageal reflux disease)     Hypertension     Hypothyroidism     Low back pain     Low vitamin B12 level     Lumbar radiculopathy     Neuropathy        Past Surgical History:   Procedure Laterality Date    Bilateral L3-S1 MBB Bilateral 2019, 2019    Dr Barclay      SECTION      COLON SURGERY      HERNIA REPAIR      Left L3-S1 RFTC Left 10/23/2019    Dr Barclay    RADIOFREQUENCY ABLATION OF LUMBAR MEDIAL BRANCH NERVE AT SINGLE LEVEL Right 10/25/2022    Procedure: Radiofrequency Ablation, Nerve, Spinal, Lumbar, Medial Branch, Level L4-S1;  Surgeon: Roselia Barclay MD;  Location: [facility];  Service: Pain Management;  Laterality: Right;  vaccinated.schedule LEFT after right is done.    RADIOFREQUENCY ABLATION OF LUMBAR MEDIAL BRANCH NERVE AT SINGLE LEVEL Left 11/17/2022    Procedure: LEFT L4-S1 RFTC  (HAD RIGHT ON 10-25);  Surgeon: Roselia Barclay MD;  Location: [facility];  Service: Pain Management;  Laterality: Left;  VAC SELENE IN EPIC    Right L3-S1 RFTC Right 12/16/2019    Dr Barclay    SPINAL CORD STIMULATOR IMPLANT         Review of patient's allergies indicates:   Allergen Reactions    Opioids - morphine analogues        Current Facility-Administered Medications   Medication Dose Route Frequency Provider Last Rate Last Admin    acetaminophen tablet 1,000 mg  1,000 mg Oral Q6H PRN Mary Schulte MD        atorvastatin tablet 80 mg  80 mg Oral QHS Shadi Uribe MD        bisacodyL EC tablet 10 mg  10 mg Oral Daily PRN Mary Schulte MD        carvediloL tablet 12.5 mg  12.5 mg Oral BID Shadi Uribe MD        dextromethorphan-guaiFENesin  mg/5 ml liquid 10 mL  10 mL Oral Q6H PRN Mary Schulte MD        dextrose 10% bolus 125 mL 125 mL  12.5 g Intravenous PRN Shadi Uribe MD        dextrose 10% bolus 250 mL 250 mL  25 g Intravenous PRN Shadi Uribe MD        DULoxetine DR capsule 60 mg  60 mg Oral BID Sahdi Uribe MD        glucagon (human recombinant) injection 1 mg  1 mg Intramuscular PRN Shadi Uribe MD        glucose chewable tablet 16 g  16 g Oral PRN Shadi Uribe MD        glucose chewable tablet 24 g  24 g Oral PRN Shadi Uribe MD        [START ON 4/18/2024] levothyroxine tablet 50 mcg  50 mcg Oral Before breakfast  Shadi Uribe MD        [START ON 4/18/2024] linaCLOtide capsule 290 mcg  290 mcg Oral Before breakfast Shadi Uribe MD        melatonin tablet 6 mg  6 mg Oral Nightly PRN Mary Schulte MD        naloxone 0.4 mg/mL injection 0.02 mg  0.02 mg Intravenous PRN Shadi Uribe MD        ondansetron injection 8 mg  8 mg Intravenous Q6H PRN Mary Schulte MD        [START ON 4/18/2024] pantoprazole EC tablet 40 mg  40 mg Oral Daily Shadi Uribe MD        pregabalin capsule 150 mg  150 mg Oral BID Shadi Uribe MD        simethicone chewable tablet 80 mg  1 tablet Oral TID PRN Mary Schulte MD        [START ON 4/18/2024] SITagliptin phosphate tablet 100 mg  100 mg Oral Daily Shadi Uribe MD        sodium chloride 0.9% flush 10 mL  10 mL Intravenous Q12H PRN Shadi Uribe MD        traZODone tablet 50 mg  50 mg Oral Nightly PRN Mary Schulte MD         Family History       Problem Relation (Age of Onset)    Bipolar disorder Paternal Grandmother    Heart disease Father, Paternal Grandfather    Hypertension Father    Stroke Maternal Grandfather          Tobacco Use    Smoking status: Every Day     Types: Vaping with nicotine     Passive exposure: Past    Smokeless tobacco: Never    Tobacco comments:     Smoked cigarettes 46 years. Stopped smoking cigarettes and started vaping with nicotine x 2 years   Substance and Sexual Activity    Alcohol use: Yes    Drug use: Never    Sexual activity: Yes     Partners: Male     Review of Systems   Constitutional:  Positive for fatigue. Negative for activity change, appetite change, chills and fever.   HENT: Negative.  Negative for congestion, dental problem, drooling, ear discharge, ear pain and facial swelling.    Eyes: Negative.    Respiratory:  Positive for cough and shortness of breath. Negative for chest tightness and wheezing.    Cardiovascular: Negative.  Negative for chest pain, palpitations and leg swelling.   Gastrointestinal: Negative.   Negative for abdominal distention, abdominal pain and constipation.   Endocrine: Negative.  Negative for cold intolerance and heat intolerance.   Genitourinary: Negative.  Negative for difficulty urinating, dysuria and enuresis.   Musculoskeletal: Negative.  Negative for arthralgias, back pain and gait problem.   Skin:  Positive for color change and wound.   Allergic/Immunologic: Negative.  Negative for environmental allergies and food allergies.   Neurological:  Positive for weakness. Negative for dizziness, light-headedness and headaches.   Hematological: Negative.    Psychiatric/Behavioral:  Positive for sleep disturbance. Negative for agitation, decreased concentration, dysphoric mood and hallucinations.    All other systems reviewed and are negative.    Objective:     Vital Signs (Most Recent):  Temp: 97.9 °F (36.6 °C) (04/17/24 1750)  Pulse: 70 (04/17/24 1750)  Resp: 18 (04/17/24 1750)  BP: (!) 144/75 (04/17/24 1750)  SpO2: 97 % (04/17/24 1750) Vital Signs (24h Range):  Temp:  [97.5 °F (36.4 °C)-98.3 °F (36.8 °C)] 97.9 °F (36.6 °C)  Pulse:  [] 70  Resp:  [14-20] 18  SpO2:  [95 %-98 %] 97 %  BP: (135-175)/(69-82) 144/75     Weight: 59.9 kg (132 lb)  Body mass index is 24.14 kg/m².     Physical Exam  Vitals and nursing note reviewed.   Constitutional:       General: She is not in acute distress.     Appearance: Normal appearance. She is normal weight. She is not ill-appearing, toxic-appearing or diaphoretic.   HENT:      Head: Normocephalic and atraumatic.      Right Ear: External ear normal.      Left Ear: External ear normal.      Nose: Nose normal.      Mouth/Throat:      Mouth: Mucous membranes are moist.      Pharynx: Oropharynx is clear.   Eyes:      Extraocular Movements: Extraocular movements intact.      Conjunctiva/sclera: Conjunctivae normal.   Cardiovascular:      Rate and Rhythm: Normal rate and regular rhythm.      Pulses: Normal pulses.      Heart sounds: Normal heart sounds. No murmur  heard.  Pulmonary:      Effort: Pulmonary effort is normal. No respiratory distress.      Breath sounds: Normal breath sounds. No wheezing, rhonchi or rales.   Abdominal:      General: Bowel sounds are normal. There is no distension.      Palpations: Abdomen is soft.      Tenderness: There is no abdominal tenderness.   Genitourinary:     General: Normal vulva.   Musculoskeletal:         General: Normal range of motion.      Cervical back: Normal range of motion.      Right lower leg: No edema.      Left lower leg: No edema.        Feet:    Feet:      Right foot:      Skin integrity: Erythema and callus present.      Left foot:      Skin integrity: Skin integrity normal.   Skin:     General: Skin is warm and dry.      Capillary Refill: Capillary refill takes less than 2 seconds.   Neurological:      General: No focal deficit present.      Mental Status: She is alert and oriented to person, place, and time. Mental status is at baseline.      Motor: No weakness.   Psychiatric:         Attention and Perception: Attention normal.         Mood and Affect: Mood normal.         Speech: Speech normal.         Behavior: Behavior normal. Behavior is cooperative.         Thought Content: Thought content normal.         Judgment: Judgment normal.                Significant Labs: All pertinent labs within the past 24 hours have been reviewed.    Significant Imaging: I have reviewed all pertinent imaging results/findings within the past 24 hours.  Assessment/Plan:     * MRSA bacteremia  Blood culture x2 MRSA positive, drawn on 4/12/2024 4/14/2024 started on Vancomycin   Additional blood cultures were positive on 4/14, will continue to trend and treat until negative  WBC trending down  Wound culture positive for staph  Right Foot X-Ray - pending  ESR, CRP - pending      Type 2 diabetes mellitus with diabetic polyneuropathy, without long-term current use of insulin  Patient's FSGs are controlled on current medication regimen.  Last  "A1c reviewed-   Lab Results   Component Value Date    HGBA1C 6.8 (H) 04/14/2024     Most recent fingerstick glucose reviewed- No results for input(s): "POCTGLUCOSE" in the last 24 hours.  Current correctional scale  Low  Maintain anti-hyperglycemic dose as follows-   Antihyperglycemics (From admission, onward)      Start     Stop Route Frequency Ordered    04/17/24 2248  insulin aspart U-100 injection 0-5 Units         -- SubQ Before meals & nightly PRN 04/17/24 2148          Hold Oral hypoglycemics while patient is in the hospital.    Diabetic polyneuropathy associated with type 2 diabetes mellitus  Patient's FSGs are controlled on current medication regimen.  Last A1c reviewed-   Lab Results   Component Value Date    HGBA1C 6.8 (H) 04/14/2024     Most recent fingerstick glucose reviewed- No results for input(s): "POCTGLUCOSE" in the last 24 hours.  Current correctional scale  Low  Maintain anti-hyperglycemic dose as follows-   Antihyperglycemics (From admission, onward)      Start     Stop Route Frequency Ordered    04/17/24 2248  insulin aspart U-100 injection 0-5 Units         -- SubQ Before meals & nightly PRN 04/17/24 2148          Hold Oral hypoglycemics while patient is in the hospital.  Continue home pregabalin     Hypothyroid  Well controlled on current regimen  TSH WNL  Continue home levothyroxine       Hypokalemia  Patient has hypokalemia which is Acute on Chronic and currently controlled. Most recent potassium levels reviewed-   Lab Results   Component Value Date    K 3.5 04/17/2024   Will continue potassium replacement per protocol and recheck repeat levels after replacement completed.     Other hyperlipidemia  Continue home statin         VTE Risk Mitigation (From admission, onward)           Ordered     IP VTE LOW RISK PATIENT  Once         04/17/24 2103     Place sequential compression device  Until discontinued         04/17/24 2103                               Pharmacy consulted to assist with " the management of vancomycin in this patient to treat: MRSA bacteremia    Patient transferred here from [facility].    Patient weight: 59.9 kg  Patient CrCl: ~65 ml/min    Doses given prior to consult: was receiving vanc at [place]. Last dose documented on MAR was 4/16 @ 2021    Will begin vancomycin: 1250mg every 18 hours    Vancomycin level ordered.     Pharmacy will continue to monitor and make adjustments as needed.      Thank you for the consult,    April Motta, PharmD  [phone]    Shadi Uribe MD  Department of Hospital Medicine  [facility] - [facility] Medical Telemetry

## 2024-04-18 NOTE — ASSESSMENT & PLAN NOTE
Will repeat cultures today (4/18) to determine clearance and end date for treatment.   ---  Blood culture x2 MRSA positive, drawn on 4/12/2024 4/14/2024 started on Vancomycin   Additional blood cultures were positive on 4/14, will continue to trend and treat until negative  WBC trending down  Wound culture positive for staph  Right Foot X-Ray - pending  ESR, CRP - pending

## 2024-04-18 NOTE — HPI
Patient is a 64 year old female that presents to [facility] via [facility] for bacteremia. She was originally being treated for CAP and first sought treatment due to SOB. During treatment, blood cultures were positive for MRSA. The patient is a well controlled diabetic with last HgA1c 6.8% however right before hospitalization she noticed non-painful skin breakdown on her right foot at the 1st MTP joint. She states that she has significant peripheral neuropathy. The patient was started on Vancomycin IV on 4/14/2024. She denies chest pain, SOB or abdominal pain.    At [facility], Lactic acid was within normal limits on multiple readings, TSH normal. WBC was elevated 19 on admission. CXR - Opacification of the lateral left mid lung suspicious for pneumonia. Recommend follow-up imaging to document resolution and exclude underlying neoplasm. There is mild right basilar atelectasis/infiltrate.     The patient will be admitted to [facility] for continued care and medical management.

## 2024-04-18 NOTE — SUBJECTIVE & OBJECTIVE
Interval History:     No significant events overnight, no new complaints or concerns this morning. Xray of foot without evidence of osteomyelitis or deep soft tissue swelling. Does have open wound with surrounding erythema and continued purulent drainage expressed during my examination. Will defer further imaging pending recommendations from wound care.       Objective:     Vital Signs (Most Recent):  Temp: 98.2 °F (36.8 °C) (04/18/24 1406)  Pulse: 66 (04/18/24 1406)  Resp: 19 (04/18/24 1406)  BP: (!) 143/78 (04/18/24 1406)  SpO2: 98 % (04/18/24 1406) Vital Signs (24h Range):  Temp:  [97.5 °F (36.4 °C)-98.2 °F (36.8 °C)] 98.2 °F (36.8 °C)  Pulse:  [66-72] 66  Resp:  [18-19] 19  SpO2:  [96 %-98 %] 98 %  BP: (113-144)/(64-78) 143/78     Weight: 59.9 kg (132 lb)  Body mass index is 24.14 kg/m².    Intake/Output Summary (Last 24 hours) at 4/18/2024 1649  Last data filed at 4/18/2024 0222  Gross per 24 hour   Intake --   Output 200 ml   Net -200 ml         Physical Exam  Constitutional:       General: She is not in acute distress.     Appearance: Normal appearance. She is not ill-appearing.   HENT:      Head: Normocephalic and atraumatic.   Cardiovascular:      Rate and Rhythm: Normal rate and regular rhythm.   Pulmonary:      Effort: Pulmonary effort is normal. No respiratory distress.   Musculoskeletal:      Cervical back: No rigidity.        Feet:    Feet:      Right foot:      Skin integrity: Skin breakdown and erythema present.   Skin:     Coloration: Skin is not jaundiced or pale.      Findings: No rash.   Neurological:      Mental Status: She is alert.   Psychiatric:         Behavior: Behavior normal.         Thought Content: Thought content normal.             Significant Labs: All pertinent labs within the past 24 hours have been reviewed.    Significant Imaging: I have reviewed all pertinent imaging results/findings within the past 24 hours.

## 2024-04-18 NOTE — ASSESSMENT & PLAN NOTE
"Patient's FSGs are controlled on current medication regimen.  Last A1c reviewed-   Lab Results   Component Value Date    HGBA1C 6.8 (H) 04/14/2024     Most recent fingerstick glucose reviewed- No results for input(s): "POCTGLUCOSE" in the last 24 hours.  Current correctional scale  Low  Maintain anti-hyperglycemic dose as follows-   Antihyperglycemics (From admission, onward)    Start     Stop Route Frequency Ordered    04/17/24 2248  insulin aspart U-100 injection 0-5 Units         -- SubQ Before meals & nightly PRN 04/17/24 2148        Hold Oral hypoglycemics while patient is in the hospital.  "

## 2024-04-18 NOTE — SUBJECTIVE & OBJECTIVE
Past Medical History:   Diagnosis Date    Anxiety     Chronic pain syndrome     Depression     Diabetes mellitus     GERD (gastroesophageal reflux disease)     Hypertension     Hypothyroidism     Low back pain     Low vitamin B12 level     Lumbar radiculopathy     Neuropathy        Past Surgical History:   Procedure Laterality Date    Bilateral L3-S1 MBB Bilateral 2019, 2019    Dr Barclay     SECTION      COLON SURGERY      HERNIA REPAIR      Left L3-S1 RFTC Left 10/23/2019    Dr Barclay    RADIOFREQUENCY ABLATION OF LUMBAR MEDIAL BRANCH NERVE AT SINGLE LEVEL Right 10/25/2022    Procedure: Radiofrequency Ablation, Nerve, Spinal, Lumbar, Medial Branch, Level L4-S1;  Surgeon: Roselia Barclay MD;  Location: [facility];  Service: Pain Management;  Laterality: Right;  vaccinated.schedule LEFT after right is done.    RADIOFREQUENCY ABLATION OF LUMBAR MEDIAL BRANCH NERVE AT SINGLE LEVEL Left 2022    Procedure: LEFT L4-S1 RFTC  (HAD RIGHT ON 10-25);  Surgeon: Roselia Barclay MD;  Location: [facility];  Service: Pain Management;  Laterality: Left;  VAC SELENE IN EPIC    Right L3-S1 RFTC Right 2019    Dr Barclay    SPINAL CORD STIMULATOR IMPLANT         Review of patient's allergies indicates:   Allergen Reactions    Opioids - morphine analogues        Current Facility-Administered Medications   Medication Dose Route Frequency Provider Last Rate Last Admin    acetaminophen tablet 1,000 mg  1,000 mg Oral Q6H PRN Mary Schulte MD        atorvastatin tablet 80 mg  80 mg Oral QHS Shadi Uribe MD        bisacodyL EC tablet 10 mg  10 mg Oral Daily PRN Mary Schulte MD        carvediloL tablet 12.5 mg  12.5 mg Oral BID Shadi Uribe MD        dextromethorphan-guaiFENesin  mg/5 ml liquid 10 mL  10 mL Oral Q6H PRN Mary Schulte MD        dextrose 10% bolus 125 mL 125 mL  12.5 g Intravenous PRN Shadi Uribe MD        dextrose 10% bolus 250 mL 250 mL  25 g  Intravenous PRN Shadi Uribe MD        DULoxetine DR capsule 60 mg  60 mg Oral BID Shadi Uribe MD        glucagon (human recombinant) injection 1 mg  1 mg Intramuscular PRN Shadi Uribe MD        glucose chewable tablet 16 g  16 g Oral PRN Shadi Uribe MD        glucose chewable tablet 24 g  24 g Oral PRN Shadi Uribe MD        [START ON 4/18/2024] levothyroxine tablet 50 mcg  50 mcg Oral Before breakfast Shadi Uribe MD        [START ON 4/18/2024] linaCLOtide capsule 290 mcg  290 mcg Oral Before breakfast Shadi Uribe MD        melatonin tablet 6 mg  6 mg Oral Nightly PRN Mary Schulte MD        naloxone 0.4 mg/mL injection 0.02 mg  0.02 mg Intravenous PRN Shadi Uribe MD        ondansetron injection 8 mg  8 mg Intravenous Q6H PRN Mary Schulte MD        [START ON 4/18/2024] pantoprazole EC tablet 40 mg  40 mg Oral Daily Shadi Uribe MD        pregabalin capsule 150 mg  150 mg Oral BID Shadi Uribe MD        simethicone chewable tablet 80 mg  1 tablet Oral TID PRN Mary Schulte MD        [START ON 4/18/2024] SITagliptin phosphate tablet 100 mg  100 mg Oral Daily Shadi Uribe MD        sodium chloride 0.9% flush 10 mL  10 mL Intravenous Q12H PRN Shadi Uribe MD        traZODone tablet 50 mg  50 mg Oral Nightly PRN Mary Schulte MD         Family History       Problem Relation (Age of Onset)    Bipolar disorder Paternal Grandmother    Heart disease Father, Paternal Grandfather    Hypertension Father    Stroke Maternal Grandfather          Tobacco Use    Smoking status: Every Day     Types: Vaping with nicotine     Passive exposure: Past    Smokeless tobacco: Never    Tobacco comments:     Smoked cigarettes 46 years. Stopped smoking cigarettes and started vaping with nicotine x 2 years   Substance and Sexual Activity    Alcohol use: Yes    Drug use: Never    Sexual activity: Yes     Partners: Male     Review of Systems   Constitutional:  Positive for fatigue.  Negative for activity change, appetite change, chills and fever.   HENT: Negative.  Negative for congestion, dental problem, drooling, ear discharge, ear pain and facial swelling.    Eyes: Negative.    Respiratory:  Positive for cough and shortness of breath. Negative for chest tightness and wheezing.    Cardiovascular: Negative.  Negative for chest pain, palpitations and leg swelling.   Gastrointestinal: Negative.  Negative for abdominal distention, abdominal pain and constipation.   Endocrine: Negative.  Negative for cold intolerance and heat intolerance.   Genitourinary: Negative.  Negative for difficulty urinating, dysuria and enuresis.   Musculoskeletal: Negative.  Negative for arthralgias, back pain and gait problem.   Skin:  Positive for color change and wound.   Allergic/Immunologic: Negative.  Negative for environmental allergies and food allergies.   Neurological:  Positive for weakness. Negative for dizziness, light-headedness and headaches.   Hematological: Negative.    Psychiatric/Behavioral:  Positive for sleep disturbance. Negative for agitation, decreased concentration, dysphoric mood and hallucinations.    All other systems reviewed and are negative.    Objective:     Vital Signs (Most Recent):  Temp: 97.9 °F (36.6 °C) (04/17/24 1750)  Pulse: 70 (04/17/24 1750)  Resp: 18 (04/17/24 1750)  BP: (!) 144/75 (04/17/24 1750)  SpO2: 97 % (04/17/24 1750) Vital Signs (24h Range):  Temp:  [97.5 °F (36.4 °C)-98.3 °F (36.8 °C)] 97.9 °F (36.6 °C)  Pulse:  [] 70  Resp:  [14-20] 18  SpO2:  [95 %-98 %] 97 %  BP: (135-175)/(69-82) 144/75     Weight: 59.9 kg (132 lb)  Body mass index is 24.14 kg/m².     Physical Exam  Vitals and nursing note reviewed.   Constitutional:       General: She is not in acute distress.     Appearance: Normal appearance. She is normal weight. She is not ill-appearing, toxic-appearing or diaphoretic.   HENT:      Head: Normocephalic and atraumatic.      Right Ear: External ear normal.       Left Ear: External ear normal.      Nose: Nose normal.      Mouth/Throat:      Mouth: Mucous membranes are moist.      Pharynx: Oropharynx is clear.   Eyes:      Extraocular Movements: Extraocular movements intact.      Conjunctiva/sclera: Conjunctivae normal.   Cardiovascular:      Rate and Rhythm: Normal rate and regular rhythm.      Pulses: Normal pulses.      Heart sounds: Normal heart sounds. No murmur heard.  Pulmonary:      Effort: Pulmonary effort is normal. No respiratory distress.      Breath sounds: Normal breath sounds. No wheezing, rhonchi or rales.   Abdominal:      General: Bowel sounds are normal. There is no distension.      Palpations: Abdomen is soft.      Tenderness: There is no abdominal tenderness.   Genitourinary:     General: Normal vulva.   Musculoskeletal:         General: Normal range of motion.      Cervical back: Normal range of motion.      Right lower leg: No edema.      Left lower leg: No edema.        Feet:    Feet:      Right foot:      Skin integrity: Erythema and callus present.      Left foot:      Skin integrity: Skin integrity normal.   Skin:     General: Skin is warm and dry.      Capillary Refill: Capillary refill takes less than 2 seconds.   Neurological:      General: No focal deficit present.      Mental Status: She is alert and oriented to person, place, and time. Mental status is at baseline.      Motor: No weakness.   Psychiatric:         Attention and Perception: Attention normal.         Mood and Affect: Mood normal.         Speech: Speech normal.         Behavior: Behavior normal. Behavior is cooperative.         Thought Content: Thought content normal.         Judgment: Judgment normal.                Significant Labs: All pertinent labs within the past 24 hours have been reviewed.    Significant Imaging: I have reviewed all pertinent imaging results/findings within the past 24 hours.

## 2024-04-18 NOTE — ASSESSMENT & PLAN NOTE
Small open wound right plantar surface, surrounding erythema and swelling. Small amount serous and purulent drainage.     Wound culture with MRSA and no Gram(-) organisms noted. Will discontinue cefepime.     Xray without clear evidence of osteomyelitis or deep tissue infection.     - continue vancomycin  - wound care   - surgery evaluation as indicated

## 2024-04-18 NOTE — PROGRESS NOTES
[facility] - [address] [place] [address] Medicine  Progress Note    Patient Name: Magali Cheung  MRN: [id number]  Patient Class: IP- Inpatient   Admission Date: 4/17/2024  Length of Stay: 1 days  Attending Physician: Arabella Everett DO  Primary Care Provider: Ariana Pinedo NP        Subjective:     Principal Problem:MRSA bacteremia        HPI:  Patient is a 64 year old female that presents to [facility] via [facility] for bacteremia. She was originally being treated for CAP and first sought treatment due to SOB. During treatment, blood cultures were positive for MRSA. The patient is a well controlled diabetic with last HgA1c 6.8% however right before hospitalization she noticed non-painful skin breakdown on her right foot at the 1st MTP joint. She states that she has significant peripheral neuropathy. The patient was started on Vancomycin IV on 4/14/2024. She denies chest pain, SOB or abdominal pain.    At [facility], Lactic acid was within normal limits on multiple readings, TSH normal. WBC was elevated 19 on admission. CXR - Opacification of the lateral left mid lung suspicious for pneumonia. Recommend follow-up imaging to document resolution and exclude underlying neoplasm. There is mild right basilar atelectasis/infiltrate.     The patient will be admitted to [facility] for continued care and medical management.     Overview/Hospital Course:  No notes on file    Interval History:     No significant events overnight, no new complaints or concerns this morning. Xray of foot without evidence of osteomyelitis or deep soft tissue swelling. Does have open wound with surrounding erythema and continued purulent drainage expressed during my examination. Will defer further imaging pending recommendations from wound care.       Objective:     Vital Signs (Most Recent):  Temp: 98.2 °F (36.8 °C) (04/18/24 1406)  Pulse: 66 (04/18/24 1406)  Resp: 19 (04/18/24 1406)  BP: (!) 143/78 (04/18/24  1406)  SpO2: 98 % (04/18/24 1406) Vital Signs (24h Range):  Temp:  [97.5 °F (36.4 °C)-98.2 °F (36.8 °C)] 98.2 °F (36.8 °C)  Pulse:  [66-72] 66  Resp:  [18-19] 19  SpO2:  [96 %-98 %] 98 %  BP: (113-144)/(64-78) 143/78     Weight: 59.9 kg (132 lb)  Body mass index is 24.14 kg/m².    Intake/Output Summary (Last 24 hours) at 4/18/2024 1649  Last data filed at 4/18/2024 0222  Gross per 24 hour   Intake --   Output 200 ml   Net -200 ml         Physical Exam  Constitutional:       General: She is not in acute distress.     Appearance: Normal appearance. She is not ill-appearing.   HENT:      Head: Normocephalic and atraumatic.   Cardiovascular:      Rate and Rhythm: Normal rate and regular rhythm.   Pulmonary:      Effort: Pulmonary effort is normal. No respiratory distress.   Musculoskeletal:      Cervical back: No rigidity.        Feet:    Feet:      Right foot:      Skin integrity: Skin breakdown and erythema present.   Skin:     Coloration: Skin is not jaundiced or pale.      Findings: No rash.   Neurological:      Mental Status: She is alert.   Psychiatric:         Behavior: Behavior normal.         Thought Content: Thought content normal.             Significant Labs: All pertinent labs within the past 24 hours have been reviewed.    Significant Imaging: I have reviewed all pertinent imaging results/findings within the past 24 hours.    Assessment/Plan:      * MRSA bacteremia  Will repeat cultures today (4/18) to determine clearance and end date for treatment.   ---  Blood culture x2 MRSA positive, drawn on 4/12/2024 4/14/2024 started on Vancomycin   Additional blood cultures were positive on 4/14, will continue to trend and treat until negative  WBC trending down  Wound culture positive for staph  Right Foot X-Ray - pending  ESR, CRP - pending      Diabetic foot infection  Small open wound right plantar surface, surrounding erythema and swelling. Small amount serous and purulent drainage.     Wound culture with  "MRSA and no Gram(-) organisms noted. Will discontinue cefepime.     Xray without clear evidence of osteomyelitis or deep tissue infection.     - continue vancomycin  - wound care   - surgery evaluation as indicated       Hypothyroid  Well controlled on current regimen  TSH WNL  Continue home levothyroxine       Other hyperlipidemia  Continue home statin       Hypokalemia  Patient has hypokalemia which is Acute on Chronic and currently controlled. Most recent potassium levels reviewed-   Lab Results   Component Value Date    K 3.5 04/17/2024   Will continue potassium replacement per protocol and recheck repeat levels after replacement completed.     Type 2 diabetes mellitus with diabetic polyneuropathy, without long-term current use of insulin  >>ASSESSMENT AND PLAN FOR DIABETIC POLYNEUROPATHY ASSOCIATED WITH TYPE 2 DIABETES MELLITUS WRITTEN ON 4/17/2024  9:52 PM BY APRIL ZHANG MD    Patient's FSGs are controlled on current medication regimen.  Last A1c reviewed-   Lab Results   Component Value Date    HGBA1C 6.8 (H) 04/14/2024     Most recent fingerstick glucose reviewed- No results for input(s): "POCTGLUCOSE" in the last 24 hours.  Current correctional scale  Low  Maintain anti-hyperglycemic dose as follows-   Antihyperglycemics (From admission, onward)      Start     Stop Route Frequency Ordered    04/17/24 2248  insulin aspart U-100 injection 0-5 Units         -- SubQ Before meals & nightly PRN 04/17/24 2148          Hold Oral hypoglycemics while patient is in the hospital.  Continue home pregabalin       VTE Risk Mitigation (From admission, onward)           Ordered     enoxaparin injection 40 mg  Every 24 hours         04/18/24 0004     IP VTE LOW RISK PATIENT  Once         04/17/24 2103                    Discharge Planning   ALANA:      Code Status: Full Code   Is the patient medically ready for discharge?:     Reason for patient still in hospital (select all that apply): Treatment  Discharge Plan A: Home " with family                  Arabella Everett DO  Department of Hospital Medicine   [facility] - [address] [place] [address]

## 2024-04-18 NOTE — ASSESSMENT & PLAN NOTE
Blood culture x2 MRSA positive, drawn on 4/12/2024 4/14/2024 started on Vancomycin   Additional blood cultures were positive on 4/14, will continue to trend and treat until negative  WBC trending down  Wound culture positive for staph  Right Foot X-Ray - pending  ESR, CRP - pending

## 2024-04-18 NOTE — PLAN OF CARE
[facility] [address]  Initial Discharge Assessment       Primary Care Provider: Ariana Pinedo NP    Admission Diagnosis: MRSA pneumonia [J15.212]    Admission Date: 4/17/2024  Expected Discharge Date:          Payor: MEDICAID [place] / Plan: [place]ICA[place] [employer] [place] / Product Type: Managed Medicaid /     Extended Emergency Contact Information  Primary Emergency Contact: Mark Anthony Cheung  Mobile Phone: [phone]  Relation: Friend   needed? No    Discharge Plan A: Home with family  Discharge Plan B: Home with family      [facility] [place] [address] [place] [address]  Phone: [phone] Fax: [phone]    [facility]] [address] [place]  [address] [facility] [address]  Phone: [phone] Fax: [phone]      Initial Assessment (most recent)       Adult Discharge Assessment - 04/18/24 1509          Discharge Assessment    Assessment Type Discharge Planning Assessment     Source of Information patient;family     People in Home spouse     Do you expect to return to your current living situation? Yes     Do you have help at home or someone to help you manage your care at home? Yes     Who are your caregiver(s) and their phone number(s)? Mark Anthony Cheung -  [phone]     Current cognitive status: Alert/Oriented     Walking or Climbing Stairs Difficulty no     Dressing/Bathing Difficulty no     Home Accessibility wheelchair accessible     Equipment Currently Used at Home none     Readmission within 30 days? Yes     Do you currently have service(s) that help you manage your care at home? No     Do you take prescription medications? Yes     Do you have prescription coverage? Yes     Coverage [place] [employer]     Do you have any problems affording any of your prescribed medications? No     Is the patient taking medications as prescribed? yes     Who is going to help you get home at discharge?  Mark Anthony Cheung -      How do you get to doctors appointments? car, drives self     Are you on dialysis? No     Do you take coumadin? No     Discharge Plan A Home with family     Discharge Plan B Home with family     Discharge Plan discussed with: Patient;Spouse/sig other     Name(s) and Number(s) Mark Anthony Cheung -                  CM spoke to pt and her  in pt's room. Pt is not current with HH and has not DME. Plan is to d/c home. CM will continue to follow for anticipated d/c needs

## 2024-04-18 NOTE — PROGRESS NOTES
Pharmacy consulted to assist with the management of vancomycin in this patient to treat: MRSA bacteremia    Patient transferred here from [facility].    Patient weight: 59.9 kg  Patient CrCl: ~65 ml/min    Doses given prior to consult: was receiving vanc at [facility]. Last dose documented on MAR was 4/16 @ 2021    Will begin vancomycin: 1250mg every 18 hours    Vancomycin level ordered.     Pharmacy will continue to monitor and make adjustments as needed.      Thank you for the consult,    April Motta, PharmD  [phone]

## 2024-04-19 LAB
ANION GAP SERPL CALCULATED.3IONS-SCNC: 8 MMOL/L (ref 7–16)
BASOPHILS # BLD AUTO: 0.12 K/UL (ref 0–0.2)
BASOPHILS NFR BLD AUTO: 0.9 % (ref 0–1)
BUN SERPL-MCNC: 9 MG/DL (ref 7–18)
BUN/CREAT SERPL: 14 (ref 6–20)
CALCIUM SERPL-MCNC: 9.8 MG/DL (ref 8.5–10.1)
CHLORIDE SERPL-SCNC: 109 MMOL/L (ref 98–107)
CO2 SERPL-SCNC: 28 MMOL/L (ref 21–32)
CREAT SERPL-MCNC: 0.65 MG/DL (ref 0.55–1.02)
DIFFERENTIAL METHOD BLD: ABNORMAL
EGFR (NO RACE VARIABLE) (RUSH/TITUS): 99 ML/MIN/1.73M2
EOSINOPHIL # BLD AUTO: 0.3 K/UL (ref 0–0.5)
EOSINOPHIL NFR BLD AUTO: 2.4 % (ref 1–4)
EOSINOPHIL NFR BLD MANUAL: 4 % (ref 1–4)
ERYTHROCYTE [DISTWIDTH] IN BLOOD BY AUTOMATED COUNT: 15.9 % (ref 11.5–14.5)
GLUCOSE SERPL-MCNC: 103 MG/DL (ref 70–105)
GLUCOSE SERPL-MCNC: 104 MG/DL (ref 74–106)
GLUCOSE SERPL-MCNC: 112 MG/DL (ref 70–105)
GLUCOSE SERPL-MCNC: 127 MG/DL (ref 70–105)
GLUCOSE SERPL-MCNC: 186 MG/DL (ref 70–105)
HCT VFR BLD AUTO: 30.7 % (ref 38–47)
HGB BLD-MCNC: 9.8 G/DL (ref 12–16)
HYPOCHROMIA BLD QL SMEAR: ABNORMAL
IMM GRANULOCYTES # BLD AUTO: 1.47 K/UL (ref 0–0.04)
IMM GRANULOCYTES NFR BLD: 11.5 % (ref 0–0.4)
LYMPHOCYTES # BLD AUTO: 2.43 K/UL (ref 1–4.8)
LYMPHOCYTES NFR BLD AUTO: 19.1 % (ref 27–41)
LYMPHOCYTES NFR BLD MANUAL: 21 % (ref 27–41)
MCH RBC QN AUTO: 26.6 PG (ref 27–31)
MCHC RBC AUTO-ENTMCNC: 31.9 G/DL (ref 32–36)
MCV RBC AUTO: 83.4 FL (ref 80–96)
METAMYELOCYTES NFR BLD MANUAL: 3 %
MONOCYTES # BLD AUTO: 0.89 K/UL (ref 0–0.8)
MONOCYTES NFR BLD AUTO: 7 % (ref 2–6)
MONOCYTES NFR BLD MANUAL: 3 % (ref 2–6)
MPC BLD CALC-MCNC: 11 FL (ref 9.4–12.4)
NEUTROPHILS # BLD AUTO: 7.53 K/UL (ref 1.8–7.7)
NEUTROPHILS NFR BLD AUTO: 59.1 % (ref 53–65)
NEUTS BAND NFR BLD MANUAL: 1 % (ref 1–5)
NEUTS SEG NFR BLD MANUAL: 68 % (ref 50–62)
NRBC # BLD AUTO: 0 X10E3/UL
NRBC, AUTO (.00): 0 %
PLATELET # BLD AUTO: 408 K/UL (ref 150–400)
PLATELET MORPHOLOGY: ABNORMAL
POTASSIUM SERPL-SCNC: 4.1 MMOL/L (ref 3.5–5.1)
RBC # BLD AUTO: 3.68 M/UL (ref 4.2–5.4)
SODIUM SERPL-SCNC: 141 MMOL/L (ref 136–145)
URATE SERPL-MCNC: 5 MG/DL (ref 2.6–6)
WBC # BLD AUTO: 12.74 K/UL (ref 4.5–11)

## 2024-04-19 PROCEDURE — 63600175 PHARM REV CODE 636 W HCPCS: Performed by: FAMILY MEDICINE

## 2024-04-19 PROCEDURE — 25000003 PHARM REV CODE 250: Performed by: FAMILY MEDICINE

## 2024-04-19 PROCEDURE — 85025 COMPLETE CBC W/AUTO DIFF WBC: CPT

## 2024-04-19 PROCEDURE — 11000001 HC ACUTE MED/SURG PRIVATE ROOM

## 2024-04-19 PROCEDURE — 25000003 PHARM REV CODE 250

## 2024-04-19 PROCEDURE — 25000003 PHARM REV CODE 250: Performed by: HOSPITALIST

## 2024-04-19 PROCEDURE — 99233 SBSQ HOSP IP/OBS HIGH 50: CPT | Mod: ,,, | Performed by: FAMILY MEDICINE

## 2024-04-19 PROCEDURE — 80048 BASIC METABOLIC PNL TOTAL CA: CPT

## 2024-04-19 PROCEDURE — 63600175 PHARM REV CODE 636 W HCPCS: Performed by: HOSPITALIST

## 2024-04-19 PROCEDURE — 84550 ASSAY OF BLOOD/URIC ACID: CPT | Performed by: STUDENT IN AN ORGANIZED HEALTH CARE EDUCATION/TRAINING PROGRAM

## 2024-04-19 PROCEDURE — 99223 1ST HOSP IP/OBS HIGH 75: CPT | Mod: ,,, | Performed by: STUDENT IN AN ORGANIZED HEALTH CARE EDUCATION/TRAINING PROGRAM

## 2024-04-19 PROCEDURE — 82962 GLUCOSE BLOOD TEST: CPT

## 2024-04-19 RX ORDER — ARIPIPRAZOLE 5 MG/1
5 TABLET ORAL DAILY
Status: DISCONTINUED | OUTPATIENT
Start: 2024-04-19 | End: 2024-04-22 | Stop reason: HOSPADM

## 2024-04-19 RX ADMIN — DULOXETINE HYDROCHLORIDE 60 MG: 30 CAPSULE, DELAYED RELEASE ORAL at 09:04

## 2024-04-19 RX ADMIN — ARIPIPRAZOLE 5 MG: 5 TABLET ORAL at 11:04

## 2024-04-19 RX ADMIN — CARVEDILOL 12.5 MG: 12.5 TABLET, FILM COATED ORAL at 08:04

## 2024-04-19 RX ADMIN — IBUPROFEN 800 MG: 400 TABLET, FILM COATED ORAL at 09:04

## 2024-04-19 RX ADMIN — VANCOMYCIN HYDROCHLORIDE 1250 MG: 1 INJECTION, POWDER, LYOPHILIZED, FOR SOLUTION INTRAVENOUS at 11:04

## 2024-04-19 RX ADMIN — IBUPROFEN 800 MG: 400 TABLET, FILM COATED ORAL at 04:04

## 2024-04-19 RX ADMIN — TRAZODONE HYDROCHLORIDE 50 MG: 50 TABLET ORAL at 08:04

## 2024-04-19 RX ADMIN — PREGABALIN 150 MG: 75 CAPSULE ORAL at 09:04

## 2024-04-19 RX ADMIN — ATORVASTATIN CALCIUM 80 MG: 80 TABLET, FILM COATED ORAL at 08:04

## 2024-04-19 RX ADMIN — IBUPROFEN 800 MG: 400 TABLET, FILM COATED ORAL at 03:04

## 2024-04-19 RX ADMIN — ENOXAPARIN SODIUM 40 MG: 40 INJECTION, SOLUTION SUBCUTANEOUS at 05:04

## 2024-04-19 RX ADMIN — PREGABALIN 150 MG: 75 CAPSULE ORAL at 08:04

## 2024-04-19 RX ADMIN — PANTOPRAZOLE SODIUM 40 MG: 40 TABLET, DELAYED RELEASE ORAL at 10:04

## 2024-04-19 RX ADMIN — MUPIROCIN: 20 OINTMENT TOPICAL at 10:04

## 2024-04-19 RX ADMIN — LEVOTHYROXINE SODIUM 50 MCG: 50 TABLET ORAL at 05:04

## 2024-04-19 RX ADMIN — CARVEDILOL 12.5 MG: 12.5 TABLET, FILM COATED ORAL at 09:04

## 2024-04-19 RX ADMIN — MUPIROCIN: 20 OINTMENT TOPICAL at 08:04

## 2024-04-19 RX ADMIN — DULOXETINE HYDROCHLORIDE 60 MG: 30 CAPSULE, DELAYED RELEASE ORAL at 08:04

## 2024-04-19 NOTE — HPI
64-year-old female seen in consult today by General surgery for right foot wound.  The wound is located plantar surface below great toe.  The wound is approximately 2 cm with skin still intact, no purulence or drainage noted.  The patient reports 1st noticing wound about 2 weeks ago.  She is unsure of mechanism that may have caused a wound.  No reported fevers, chills, nausea, vomiting reported.  Past medical history includes DM type 2, neuropathy, HTN.

## 2024-04-19 NOTE — PLAN OF CARE
04/19/24 @ 1604 -XANDER spoke to MD -- pt will require IV Abx on d/c. Pt can d/c home with HH and home infusion. Choice form complete. Referrals sent to [facility] and [facility].CM will continue to follow for anticipated d/c needs.

## 2024-04-19 NOTE — CONSULTS
[facility]  General Surgery  Consult Note    Patient Name: Magali Cheung  MRN: [id number]  Code Status: Full Code  Admission Date: 4/17/2024  Hospital Length of Stay: 2 days  Attending Physician: Arabella Everett DO  Primary Care Provider: Ariana Pinedo NP    Patient information was obtained from patient, past medical records, and ER records.     Inpatient consult to General Surgery  Consult performed by: Blade Ward ACNP  Consult ordered by: Arabella Everett DO  Reason for consult: wound right foot      Subjective:     Principal Problem: MRSA bacteremia    History of Present Illness: 64-year-old female seen in consult today by General surgery for right foot wound.  The wound is located plantar surface below great toe.  The wound is approximately 2 cm with skin still intact, no purulence or drainage noted.  The patient reports 1st noticing wound about 2 weeks ago.  She is unsure of mechanism that may have caused a wound.  No reported fevers, chills, nausea, vomiting reported.  Past medical history includes DM type 2, neuropathy, HTN.    Current Facility-Administered Medications   Medication Dose Route Frequency Provider Last Rate Last Admin    acetaminophen tablet 1,000 mg  1,000 mg Oral Q6H PRN Mary Schulte MD   1,000 mg at 04/18/24 0329    ARIPiprazole tablet 5 mg  5 mg Oral Daily Arabella Everett DO   5 mg at 04/19/24 1136    atorvastatin tablet 80 mg  80 mg Oral QHS Shadi Uribe MD   80 mg at 04/18/24 2139    bisacodyL EC tablet 10 mg  10 mg Oral Daily PRN Mary Schulte MD        carvediloL tablet 12.5 mg  12.5 mg Oral BID Shadi Uribe MD   12.5 mg at 04/19/24 0956    dextromethorphan-guaiFENesin  mg/5 ml liquid 10 mL  10 mL Oral Q6H PRN Mary Schulte MD        dextrose 10% bolus 125 mL 125 mL  12.5 g Intravenous PRN Shadi Uribe MD        dextrose 10% bolus 250 mL 250 mL  25 g Intravenous PRN Shadi Uribe MD         DULoxetine DR capsule 60 mg  60 mg Oral BID Shadi Uribe MD   60 mg at 04/19/24 0956    enoxaparin injection 40 mg  40 mg Subcutaneous Q24H (prophylaxis, 1700) Mary Schulte MD   40 mg at 04/18/24 1658    glucagon (human recombinant) injection 1 mg  1 mg Intramuscular PRN Shadi Uribe MD        glucose chewable tablet 16 g  16 g Oral PRN Shadi Uribe MD        glucose chewable tablet 24 g  24 g Oral PRN Shadi Uribe MD        ibuprofen tablet 800 mg  800 mg Oral Q6H PRN Arabella Everett DO   800 mg at 04/19/24 0956    insulin aspart U-100 injection 0-5 Units  0-5 Units Subcutaneous QID (AC + HS) PRN Shadi Uribe MD        levothyroxine tablet 50 mcg  50 mcg Oral Before breakfast Shadi Uribe MD   50 mcg at 04/19/24 0509    linaCLOtide capsule 290 mcg  290 mcg Oral Before breakfast Shadi Uribe MD   290 mcg at 04/18/24 0533    melatonin tablet 6 mg  6 mg Oral Nightly PRN Mary Schulte MD        mupirocin 2 % ointment   Nasal BID Arabella Everett DO   Given at 04/19/24 1001    naloxone 0.4 mg/mL injection 0.02 mg  0.02 mg Intravenous PRN Shadi Uribe MD        ondansetron injection 8 mg  8 mg Intravenous Q6H PRN Mary Schulte MD        pantoprazole EC tablet 40 mg  40 mg Oral Daily Shadi Uribe MD   40 mg at 04/19/24 1002    pregabalin capsule 150 mg  150 mg Oral BID Shadi Uribe MD   150 mg at 04/19/24 0956    simethicone chewable tablet 80 mg  1 tablet Oral TID PRN Mary Schulte MD        sodium chloride 0.9% flush 10 mL  10 mL Intravenous Q12H PRN Shadi Uribe MD        traZODone tablet 50 mg  50 mg Oral Nightly PRN Mary Schulte MD   50 mg at 04/18/24 2138    vancomycin (VANCOCIN) 1,250 mg in dextrose 5 % (D5W) 250 mL IVPB  1,250 mg Intravenous Q18H Arabella Everett .7 mL/hr at 04/19/24 1136 1,250 mg at 04/19/24 1136    vancomycin - pharmacy to dose   Intravenous pharmacy to manage frequency Arabella Everett DO            Review of patient's allergies indicates:   Allergen Reactions    Opioids - morphine analogues        Past Medical History:   Diagnosis Date    Anxiety     Chronic pain syndrome     Depression     Diabetes mellitus     GERD (gastroesophageal reflux disease)     Hypertension     Hypothyroidism     Low back pain     Low vitamin B12 level     Lumbar radiculopathy     Neuropathy      Past Surgical History:   Procedure Laterality Date    Bilateral L3-S1 MBB Bilateral 2019, 2019    Dr Barclay     SECTION      COLON SURGERY      HERNIA REPAIR      Left L3-S1 RFTC Left 10/23/2019    Dr Barclay    RADIOFREQUENCY ABLATION OF LUMBAR MEDIAL BRANCH NERVE AT SINGLE LEVEL Right 10/25/2022    Procedure: Radiofrequency Ablation, Nerve, Spinal, Lumbar, Medial Branch, Level L4-S1;  Surgeon: Roselia Barclay MD;  Location: [facility];  Service: Pain Management;  Laterality: Right;  vaccinated.schedule LEFT after right is done.    RADIOFREQUENCY ABLATION OF LUMBAR MEDIAL BRANCH NERVE AT SINGLE LEVEL Left 2022    Procedure: LEFT L4-S1 RFTC  (HAD RIGHT ON 10-25);  Surgeon: Roselia Barclay MD;  Location: [facility];  Service: Pain Management;  Laterality: Left;  VAC SELENE IN EPIC    Right L3-S1 RFTC Right 2019    Dr Barclay    SPINAL CORD STIMULATOR IMPLANT       Family History       Problem Relation (Age of Onset)    Bipolar disorder Paternal Grandmother    Heart disease Father, Paternal Grandfather    Hypertension Father    Stroke Maternal Grandfather          Tobacco Use    Smoking status: Every Day     Types: Vaping with nicotine     Passive exposure: Past    Smokeless tobacco: Never    Tobacco comments:     Smoked cigarettes 46 years. Stopped smoking cigarettes and started vaping with nicotine x 2 years   Substance and Sexual Activity    Alcohol use: Yes    Drug use: Never    Sexual activity: Yes     Partners: Male     Review of Systems   Constitutional:   Positive for fatigue. Negative for chills and fever.   Respiratory:  Positive for cough. Negative for chest tightness and wheezing.    Cardiovascular:  Negative for chest pain.   Gastrointestinal:  Negative for abdominal pain.   Skin:  Positive for wound. Negative for color change.   Allergic/Immunologic: Negative for environmental allergies and food allergies.   Neurological:  Negative for weakness.   Psychiatric/Behavioral:  Positive for sleep disturbance.    All other systems reviewed and are negative.    Objective:     Vital Signs (Most Recent):  Temp: 97.3 °F (36.3 °C) (04/19/24 1017)  Pulse: 72 (04/19/24 1017)  Resp: 18 (04/19/24 1017)  BP: (!) 154/76 (04/19/24 1017)  SpO2: 98 % (04/19/24 1145) Vital Signs (24h Range):  Temp:  [97.3 °F (36.3 °C)-98.3 °F (36.8 °C)] 97.3 °F (36.3 °C)  Pulse:  [63-76] 72  Resp:  [18-19] 18  SpO2:  [95 %-99 %] 98 %  BP: (116-154)/(61-78) 154/76     Weight: 59.9 kg (132 lb)  Body mass index is 24.14 kg/m².     Physical Exam  Constitutional:       General: She is not in acute distress.     Appearance: Normal appearance. She is not ill-appearing.   HENT:      Head: Normocephalic and atraumatic.   Cardiovascular:      Rate and Rhythm: Normal rate and regular rhythm.      Pulses: Normal pulses.   Pulmonary:      Effort: Pulmonary effort is normal. No respiratory distress.   Abdominal:      General: There is no distension.      Palpations: Abdomen is soft.      Tenderness: There is no abdominal tenderness.   Musculoskeletal:      Cervical back: No rigidity.        Feet:    Feet:      Right foot:      Skin integrity: Skin breakdown and erythema present.   Skin:     Capillary Refill: Capillary refill takes less than 2 seconds.      Coloration: Skin is not jaundiced or pale.      Findings: No rash.   Neurological:      General: No focal deficit present.      Mental Status: She is alert and oriented to person, place, and time.   Psychiatric:         Behavior: Behavior normal.          Thought Content: Thought content normal.            I have reviewed all pertinent lab results within the past 24 hours.    Significant Diagnostics:  I have reviewed all pertinent imaging results/findings within the past 24 hours.    Assessment/Plan:     Diabetic foot infection  4/19:  Wounds are proximally 2 cm right foot in the area of the 1st MTP joint.  Previous small incision had been made top of wound culture.  Cultures grew MRSA currently on IV vancomycin.  Today bedside debridement with dressing placed.  No other surgery indicated.  Surgery will sign off this time call with questions or concerns.  Follow up with Dr. Isaacs 2 weeks after discharge.  Recommend or Bactrim at discharge      VTE Risk Mitigation (From admission, onward)           Ordered     enoxaparin injection 40 mg  Every 24 hours         04/18/24 0004     IP VTE LOW RISK PATIENT  Once         04/17/24 8400                    Thank you for your consult. I will sign off. Please contact us if you have any additional questions.    Blade Ward, CANDE  General Surgery  [facility] [address] [facility]

## 2024-04-19 NOTE — ASSESSMENT & PLAN NOTE
"    Patient's FSGs are controlled on current medication regimen.  Last A1c reviewed-   Lab Results   Component Value Date    HGBA1C 6.8 (H) 04/14/2024     Most recent fingerstick glucose reviewed- No results for input(s): "POCTGLUCOSE" in the last 24 hours.  Current correctional scale  Low  Maintain anti-hyperglycemic dose as follows-   Antihyperglycemics (From admission, onward)      Start     Stop Route Frequency Ordered    04/20/24 0900  SITagliptin phosphate tablet 100 mg         -- Oral Daily 04/19/24 1646          Continue home pregabalin     Will resume home Januvia as patient declines SSI and glucose monitoring.   "

## 2024-04-19 NOTE — SUBJECTIVE & OBJECTIVE
Interval History:     No significant events overnight, no new complaints or concerns this morning. Unfortunately unable to get MRI of due to patient's pain pump. Will await wound care assessment. Repeat blood cultures today.      Objective:     Vital Signs (Most Recent):  Temp: 97.7 °F (36.5 °C) (04/19/24 1421)  Pulse: (!) 59 (04/19/24 1421)  Resp: 16 (04/19/24 1421)  BP: (!) 172/82 (04/19/24 1421)  SpO2: 97 % (04/19/24 1421) Vital Signs (24h Range):  Temp:  [97.3 °F (36.3 °C)-98.3 °F (36.8 °C)] 97.7 °F (36.5 °C)  Pulse:  [59-76] 59  Resp:  [16-18] 16  SpO2:  [95 %-99 %] 97 %  BP: (116-172)/(61-82) 172/82     Weight: 59.9 kg (132 lb)  Body mass index is 24.14 kg/m².  No intake or output data in the 24 hours ending 04/19/24 1747        Physical Exam  Constitutional:       General: She is not in acute distress.     Appearance: Normal appearance. She is not ill-appearing.   HENT:      Head: Normocephalic and atraumatic.   Cardiovascular:      Rate and Rhythm: Normal rate and regular rhythm.   Pulmonary:      Effort: Pulmonary effort is normal. No respiratory distress.   Musculoskeletal:      Cervical back: No rigidity.        Feet:    Feet:      Right foot:      Skin integrity: Skin breakdown and erythema present.   Skin:     Coloration: Skin is not jaundiced or pale.      Findings: No rash.   Neurological:      Mental Status: She is alert.   Psychiatric:         Behavior: Behavior normal.         Thought Content: Thought content normal.             Significant Labs: All pertinent labs within the past 24 hours have been reviewed.    Significant Imaging: I have reviewed all pertinent imaging results/findings within the past 24 hours.

## 2024-04-19 NOTE — SUBJECTIVE & OBJECTIVE
Current Facility-Administered Medications   Medication Dose Route Frequency Provider Last Rate Last Admin    acetaminophen tablet 1,000 mg  1,000 mg Oral Q6H PRN Mary Schulte MD   1,000 mg at 04/18/24 0329    ARIPiprazole tablet 5 mg  5 mg Oral Daily Arabella Everett, DO   5 mg at 04/19/24 1136    atorvastatin tablet 80 mg  80 mg Oral QHS Shadi Uribe MD   80 mg at 04/18/24 2139    bisacodyL EC tablet 10 mg  10 mg Oral Daily PRN Mary Schulte MD        carvediloL tablet 12.5 mg  12.5 mg Oral BID Shadi Uribe MD   12.5 mg at 04/19/24 0956    dextromethorphan-guaiFENesin  mg/5 ml liquid 10 mL  10 mL Oral Q6H PRN Mary Schulte MD        dextrose 10% bolus 125 mL 125 mL  12.5 g Intravenous PRN Shadi Uribe MD        dextrose 10% bolus 250 mL 250 mL  25 g Intravenous PRN Shadi Uribe MD        DULoxetine DR capsule 60 mg  60 mg Oral BID Shadi Uribe MD   60 mg at 04/19/24 0956    enoxaparin injection 40 mg  40 mg Subcutaneous Q24H (prophylaxis, 1700) Mary Schulte MD   40 mg at 04/18/24 1658    glucagon (human recombinant) injection 1 mg  1 mg Intramuscular PRN Shadi Uribe MD        glucose chewable tablet 16 g  16 g Oral PRN Shadi Uribe MD        glucose chewable tablet 24 g  24 g Oral PRN Shadi Uribe MD        ibuprofen tablet 800 mg  800 mg Oral Q6H PRN Arabella Everett DO   800 mg at 04/19/24 0956    insulin aspart U-100 injection 0-5 Units  0-5 Units Subcutaneous QID (AC + HS) PRN Shadi Uribe MD        levothyroxine tablet 50 mcg  50 mcg Oral Before breakfast Shadi Uribe MD   50 mcg at 04/19/24 0509    linaCLOtide capsule 290 mcg  290 mcg Oral Before breakfast Shadi Uribe MD   290 mcg at 04/18/24 0533    melatonin tablet 6 mg  6 mg Oral Nightly PRN Mary Schulte MD        mupirocin 2 % ointment   Nasal BID Arabella Everett,    Given at 04/19/24 1001    naloxone 0.4 mg/mL injection 0.02 mg  0.02 mg Intravenous PRN Shadi Uribe MD         ondansetron injection 8 mg  8 mg Intravenous Q6H PRN Mary Schulte MD        pantoprazole EC tablet 40 mg  40 mg Oral Daily Shadi Uribe MD   40 mg at 24 1002    pregabalin capsule 150 mg  150 mg Oral BID Shadi Uribe MD   150 mg at 24 0956    simethicone chewable tablet 80 mg  1 tablet Oral TID PRN Mary Schulte MD        sodium chloride 0.9% flush 10 mL  10 mL Intravenous Q12H PRN Shadi Uribe MD        traZODone tablet 50 mg  50 mg Oral Nightly PRN Mary Schulte MD   50 mg at 24 2138    vancomycin (VANCOCIN) 1,250 mg in dextrose 5 % (D5W) 250 mL IVPB  1,250 mg Intravenous Q18H Arabella Everett .7 mL/hr at 24 1136 1,250 mg at 24 1136    vancomycin - pharmacy to dose   Intravenous pharmacy to manage frequency Arabella Everett DO           Review of patient's allergies indicates:   Allergen Reactions    Opioids - morphine analogues        Past Medical History:   Diagnosis Date    Anxiety     Chronic pain syndrome     Depression     Diabetes mellitus     GERD (gastroesophageal reflux disease)     Hypertension     Hypothyroidism     Low back pain     Low vitamin B12 level     Lumbar radiculopathy     Neuropathy      Past Surgical History:   Procedure Laterality Date    Bilateral L3-S1 MBB Bilateral 2019, 2019    Dr Barclay     SECTION      COLON SURGERY      HERNIA REPAIR      Left L3-S1 RFTC Left 10/23/2019    Dr Barclay    RADIOFREQUENCY ABLATION OF LUMBAR MEDIAL BRANCH NERVE AT SINGLE LEVEL Right 10/25/2022    Procedure: Radiofrequency Ablation, Nerve, Spinal, Lumbar, Medial Branch, Level L4-S1;  Surgeon: Roselia Barclay MD;  Location: [facility];  Service: Pain Management;  Laterality: Right;  vaccinated.schedule LEFT after right is done.    RADIOFREQUENCY ABLATION OF LUMBAR MEDIAL BRANCH NERVE AT SINGLE LEVEL Left 2022    Procedure: LEFT L4-S1 RFTC  (HAD RIGHT ON 10-25);  Surgeon: Roselia Barclay MD;  Location:  [facility];  Service: Pain Management;  Laterality: Left;  VAC SELENE IN EPIC    Right L3-S1 RFTC Right 12/16/2019    Dr Barclay    SPINAL CORD STIMULATOR IMPLANT       Family History       Problem Relation (Age of Onset)    Bipolar disorder Paternal Grandmother    Heart disease Father, Paternal Grandfather    Hypertension Father    Stroke Maternal Grandfather          Tobacco Use    Smoking status: Every Day     Types: Vaping with nicotine     Passive exposure: Past    Smokeless tobacco: Never    Tobacco comments:     Smoked cigarettes 46 years. Stopped smoking cigarettes and started vaping with nicotine x 2 years   Substance and Sexual Activity    Alcohol use: Yes    Drug use: Never    Sexual activity: Yes     Partners: Male     Review of Systems   Constitutional:  Positive for fatigue. Negative for chills and fever.   Respiratory:  Positive for cough. Negative for chest tightness and wheezing.    Cardiovascular:  Negative for chest pain.   Gastrointestinal:  Negative for abdominal pain.   Skin:  Positive for wound. Negative for color change.   Allergic/Immunologic: Negative for environmental allergies and food allergies.   Neurological:  Negative for weakness.   Psychiatric/Behavioral:  Positive for sleep disturbance.    All other systems reviewed and are negative.    Objective:     Vital Signs (Most Recent):  Temp: 97.3 °F (36.3 °C) (04/19/24 1017)  Pulse: 72 (04/19/24 1017)  Resp: 18 (04/19/24 1017)  BP: (!) 154/76 (04/19/24 1017)  SpO2: 98 % (04/19/24 1145) Vital Signs (24h Range):  Temp:  [97.3 °F (36.3 °C)-98.3 °F (36.8 °C)] 97.3 °F (36.3 °C)  Pulse:  [63-76] 72  Resp:  [18-19] 18  SpO2:  [95 %-99 %] 98 %  BP: (116-154)/(61-78) 154/76     Weight: 59.9 kg (132 lb)  Body mass index is 24.14 kg/m².     Physical Exam  Constitutional:       General: She is not in acute distress.     Appearance: Normal appearance. She is not ill-appearing.   HENT:      Head: Normocephalic and atraumatic.   Cardiovascular:       Rate and Rhythm: Normal rate and regular rhythm.      Pulses: Normal pulses.   Pulmonary:      Effort: Pulmonary effort is normal. No respiratory distress.   Abdominal:      General: There is no distension.      Palpations: Abdomen is soft.      Tenderness: There is no abdominal tenderness.   Musculoskeletal:      Cervical back: No rigidity.        Feet:    Feet:      Right foot:      Skin integrity: Skin breakdown and erythema present.   Skin:     Capillary Refill: Capillary refill takes less than 2 seconds.      Coloration: Skin is not jaundiced or pale.      Findings: No rash.   Neurological:      General: No focal deficit present.      Mental Status: She is alert and oriented to person, place, and time.   Psychiatric:         Behavior: Behavior normal.         Thought Content: Thought content normal.            I have reviewed all pertinent lab results within the past 24 hours.    Significant Diagnostics:  I have reviewed all pertinent imaging results/findings within the past 24 hours.

## 2024-04-19 NOTE — ASSESSMENT & PLAN NOTE
4/19:  Wounds are proximally 2 cm right foot in the area of the 1st MTP joint.  Previous small incision had been made top of wound culture.  Cultures grew MRSA currently on IV vancomycin.  Today bedside debridement with dressing placed.  No other surgery indicated.  Surgery will sign off this time call with questions or concerns.  Follow up with Dr. Isaacs 2 weeks after discharge.  Recommend or Bactrim at discharge

## 2024-04-19 NOTE — PROGRESS NOTES
[facility] - [address] [place] [address] Medicine  Progress Note    Patient Name: Magali Cheung  MRN: [id number]  Patient Class: IP- Inpatient   Admission Date: 4/17/2024  Length of Stay: 2 days  Attending Physician: Arabella Everett DO  Primary Care Provider: Ariana Pinedo NP        Subjective:     Principal Problem:MRSA bacteremia        HPI:  Patient is a 64 year old female that presents to [facility] via [facility] for bacteremia. She was originally being treated for CAP and first sought treatment due to SOB. During treatment, blood cultures were positive for MRSA. The patient is a well controlled diabetic with last HgA1c 6.8% however right before hospitalization she noticed non-painful skin breakdown on her right foot at the 1st MTP joint. She states that she has significant peripheral neuropathy. The patient was started on Vancomycin IV on 4/14/2024. She denies chest pain, SOB or abdominal pain.    At [facility], Lactic acid was within normal limits on multiple readings, TSH normal. WBC was elevated 19 on admission. CXR - Opacification of the lateral left mid lung suspicious for pneumonia. Recommend follow-up imaging to document resolution and exclude underlying neoplasm. There is mild right basilar atelectasis/infiltrate.     The patient will be admitted to [facility] for continued care and medical management.     Overview/Hospital Course:  No notes on file    Interval History:     No significant events overnight, no new complaints or concerns this morning. Unfortunately unable to get MRI of due to patient's pain pump. Will await wound care assessment. Repeat blood cultures today.      Objective:     Vital Signs (Most Recent):  Temp: 97.7 °F (36.5 °C) (04/19/24 1421)  Pulse: (!) 59 (04/19/24 1421)  Resp: 16 (04/19/24 1421)  BP: (!) 172/82 (04/19/24 1421)  SpO2: 97 % (04/19/24 1421) Vital Signs (24h Range):  Temp:  [97.3 °F (36.3 °C)-98.3 °F (36.8 °C)] 97.7 °F (36.5 °C)  Pulse:   [59-76] 59  Resp:  [16-18] 16  SpO2:  [95 %-99 %] 97 %  BP: (116-172)/(61-82) 172/82     Weight: 59.9 kg (132 lb)  Body mass index is 24.14 kg/m².  No intake or output data in the 24 hours ending 04/19/24 8577        Physical Exam  Constitutional:       General: She is not in acute distress.     Appearance: Normal appearance. She is not ill-appearing.   HENT:      Head: Normocephalic and atraumatic.   Cardiovascular:      Rate and Rhythm: Normal rate and regular rhythm.   Pulmonary:      Effort: Pulmonary effort is normal. No respiratory distress.   Musculoskeletal:      Cervical back: No rigidity.        Feet:    Feet:      Right foot:      Skin integrity: Skin breakdown and erythema present.   Skin:     Coloration: Skin is not jaundiced or pale.      Findings: No rash.   Neurological:      Mental Status: She is alert.   Psychiatric:         Behavior: Behavior normal.         Thought Content: Thought content normal.             Significant Labs: All pertinent labs within the past 24 hours have been reviewed.    Significant Imaging: I have reviewed all pertinent imaging results/findings within the past 24 hours.      Assessment/Plan:      * MRSA bacteremia  Will repeat cultures today (4/18) to determine clearance and end date for treatment.   ---  Blood culture x2 MRSA positive, drawn on 4/12/2024 4/14/2024 started on Vancomycin   Additional blood cultures were positive on 4/14, will continue to trend and treat until negative  WBC trending down  Wound culture positive for staph  Right Foot X-Ray - pending  ESR, CRP - pending      Diabetic foot infection  Small open wound right plantar surface, surrounding erythema and swelling. Small amount serous and purulent drainage.     Wound culture with MRSA and no Gram(-) organisms noted. Will discontinue cefepime.     Xray without clear evidence of osteomyelitis or deep tissue infection.     - continue vancomycin  - wound care   - surgery evaluation as indicated  "      Hypothyroid  Well controlled on current regimen  TSH WNL  Continue home levothyroxine       Other hyperlipidemia  Continue home statin       Hypokalemia  Patient has hypokalemia which is Acute on Chronic and currently controlled. Most recent potassium levels reviewed-   Lab Results   Component Value Date    K 3.5 04/17/2024   Will continue potassium replacement per protocol and recheck repeat levels after replacement completed.     Type 2 diabetes mellitus with diabetic polyneuropathy, without long-term current use of insulin      Patient's FSGs are controlled on current medication regimen.  Last A1c reviewed-   Lab Results   Component Value Date    HGBA1C 6.8 (H) 04/14/2024     Most recent fingerstick glucose reviewed- No results for input(s): "POCTGLUCOSE" in the last 24 hours.  Current correctional scale  Low  Maintain anti-hyperglycemic dose as follows-   Antihyperglycemics (From admission, onward)      Start     Stop Route Frequency Ordered    04/20/24 0900  SITagliptin phosphate tablet 100 mg         -- Oral Daily 04/19/24 1646          Continue home pregabalin     Will resume home Januvia as patient declines SSI and glucose monitoring.       VTE Risk Mitigation (From admission, onward)           Ordered     enoxaparin injection 40 mg  Every 24 hours         04/18/24 0004     IP VTE LOW RISK PATIENT  Once         04/17/24 2103                    Discharge Planning   ALANA: 4/22/2024     Code Status: Full Code   Is the patient medically ready for discharge?:     Reason for patient still in hospital (select all that apply): Treatment  Discharge Plan A: Home with family                  Arabella Everett DO  Department of Hospital Medicine   [facility] - [address] [place] [address]    "

## 2024-04-19 NOTE — PROGRESS NOTES
[facility] Tele[facility]  Wound Care    Patient Name:  Magali Cheung   MRN:  [id number]  Date: 4/19/2024  Diagnosis: MRSA bacteremia    History:     Past Medical History:   Diagnosis Date    Anxiety     Chronic pain syndrome     Depression     Diabetes mellitus     GERD (gastroesophageal reflux disease)     Hypertension     Hypothyroidism     Low back pain     Low vitamin B12 level     Lumbar radiculopathy     Neuropathy        Social History     Socioeconomic History    Marital status:    Tobacco Use    Smoking status: Every Day     Types: Vaping with nicotine     Passive exposure: Past    Smokeless tobacco: Never    Tobacco comments:     Smoked cigarettes 46 years. Stopped smoking cigarettes and started vaping with nicotine x 2 years   Substance and Sexual Activity    Alcohol use: Yes    Drug use: Never    Sexual activity: Yes     Partners: Male     Social Determinants of Health     Financial Resource Strain: Low Risk  (4/16/2024)    Overall Financial Resource Strain (CARDIA)     Difficulty of Paying Living Expenses: Not hard at all   Food Insecurity: No Food Insecurity (4/15/2024)    Hunger Vital Sign     Worried About Running Out of Food in the Last Year: Never true     Ran Out of Food in the Last Year: Never true   Transportation Needs: No Transportation Needs (4/15/2024)    PRAPARE - Transportation     Lack of Transportation (Medical): No     Lack of Transportation (Non-Medical): No   Physical Activity: Inactive (4/15/2024)    Exercise Vital Sign     Days of Exercise per Week: 0 days     Minutes of Exercise per Session: 0 min   Stress: No Stress Concern Present (4/15/2024)    [employer] [place] [employer] - Occupational Stress Questionnaire     Feeling of Stress : Only a little   Social Connections: Moderately Integrated (4/15/2024)    Social Connection and Isolation Panel [NHANES]     Frequency of Communication with Friends and Family: More than three times a week      "Frequency of Social Gatherings with Friends and Family: Three times a week     Attends Islam Services: More than 4 times per year     Active Member of Clubs or Organizations: No     Attends Club or Organization Meetings: Never     Marital Status:    Housing Stability: Low Risk  (4/16/2024)    Housing Stability Vital Sign     Unable to Pay for Housing in the Last Year: No     Number of Times Moved in the Last Year: 0     Homeless in the Last Year: No       Precautions:     Allergies as of 04/16/2024 - Reviewed 04/16/2024   Allergen Reaction Noted    Opioids - morphine analogues  03/30/2021       WOC Assessment Details/Treatment        04/19/24 1743   WOCN Assessment   WOCN Total Time (mins) 45   Visit Date 04/19/24   Visit Time 1140   Consult Type New   WOCN Speciality Wound   Wound other  (abcess)   Number of Wounds 1   Intervention assessed;chart review;orders        Wound 04/15/24 0925 Other (comment) Right anterior Foot   Date First Assessed/Time First Assessed: 04/15/24 0925   Present on Original Admission: Yes  Primary Wound Type: Other (comment)  Side: Right  Orientation: anterior  Location: Foot   Wound Image    Dressing Appearance Open to air   Drainage Amount None   Appearance Yang;Dry  (edematous)     WOC Team consulted for "wounds, RLE"    Narrative: Pt alert and oriented, sitting up in bed.  Reports has had 1st episode of gout recently.     Active Wounds and Recommendations:     Post debridement wound, base, R great toe, lateral -      Routine wound care    Goals for Wound Healing: Promote moist wound healing, Manage drainage, Apply antimicrobial, Reduce pain, Reduce bioburden, and Educate on proper wound management post D/C     Barriers to Wound Healing: multiple co-morbidities diabetes infection    Orders placed.    Thank you for the consult.     We will continue to follow. See additional note under Notes Tab for tentative f/u plan/dates.    04/19/2024  "

## 2024-04-19 NOTE — PROGRESS NOTES
04/19/24 1809   Wound Care Follow Up   Wound Care Follow-up? Yes   Wound Care- Next Visit Date 04/24/24   Follow Up Plan Jaquelin POC

## 2024-04-20 PROBLEM — E87.6 HYPOKALEMIA: Status: RESOLVED | Noted: 2023-03-24 | Resolved: 2024-04-20

## 2024-04-20 LAB
ANION GAP SERPL CALCULATED.3IONS-SCNC: 8 MMOL/L (ref 7–16)
ANISOCYTOSIS BLD QL SMEAR: ABNORMAL
BASOPHILS # BLD AUTO: 0.09 K/UL (ref 0–0.2)
BASOPHILS NFR BLD AUTO: 0.8 % (ref 0–1)
BUN SERPL-MCNC: 8 MG/DL (ref 7–18)
BUN/CREAT SERPL: 13 (ref 6–20)
CALCIUM SERPL-MCNC: 9.5 MG/DL (ref 8.5–10.1)
CHLORIDE SERPL-SCNC: 107 MMOL/L (ref 98–107)
CO2 SERPL-SCNC: 30 MMOL/L (ref 21–32)
CREAT SERPL-MCNC: 0.62 MG/DL (ref 0.55–1.02)
DIFFERENTIAL METHOD BLD: ABNORMAL
EGFR (NO RACE VARIABLE) (RUSH/TITUS): 100 ML/MIN/1.73M2
EOSINOPHIL # BLD AUTO: 0.24 K/UL (ref 0–0.5)
EOSINOPHIL NFR BLD AUTO: 2.1 % (ref 1–4)
EOSINOPHIL NFR BLD MANUAL: 2 % (ref 1–4)
ERYTHROCYTE [DISTWIDTH] IN BLOOD BY AUTOMATED COUNT: 15.7 % (ref 11.5–14.5)
GLUCOSE SERPL-MCNC: 138 MG/DL (ref 74–106)
GLUCOSE SERPL-MCNC: 87 MG/DL (ref 70–105)
HCT VFR BLD AUTO: 30.2 % (ref 38–47)
HGB BLD-MCNC: 9.5 G/DL (ref 12–16)
HYPOCHROMIA BLD QL SMEAR: ABNORMAL
IMM GRANULOCYTES # BLD AUTO: 1.04 K/UL (ref 0–0.04)
IMM GRANULOCYTES NFR BLD: 9.2 % (ref 0–0.4)
LYMPHOCYTES # BLD AUTO: 1.44 K/UL (ref 1–4.8)
LYMPHOCYTES NFR BLD AUTO: 12.7 % (ref 27–41)
LYMPHOCYTES NFR BLD MANUAL: 15 % (ref 27–41)
MCH RBC QN AUTO: 26.4 PG (ref 27–31)
MCHC RBC AUTO-ENTMCNC: 31.5 G/DL (ref 32–36)
MCV RBC AUTO: 83.9 FL (ref 80–96)
METAMYELOCYTES NFR BLD MANUAL: 2 %
MONOCYTES # BLD AUTO: 0.71 K/UL (ref 0–0.8)
MONOCYTES NFR BLD AUTO: 6.3 % (ref 2–6)
MONOCYTES NFR BLD MANUAL: 4 % (ref 2–6)
MPC BLD CALC-MCNC: 11 FL (ref 9.4–12.4)
MYELOCYTES NFR BLD MANUAL: 1 %
NEUTROPHILS # BLD AUTO: 7.81 K/UL (ref 1.8–7.7)
NEUTROPHILS NFR BLD AUTO: 68.9 % (ref 53–65)
NEUTS BAND NFR BLD MANUAL: 5 % (ref 1–5)
NEUTS SEG NFR BLD MANUAL: 71 % (ref 50–62)
NRBC # BLD AUTO: 0 X10E3/UL
NRBC, AUTO (.00): 0 %
PLATELET # BLD AUTO: 387 K/UL (ref 150–400)
PLATELET MORPHOLOGY: ABNORMAL
POTASSIUM SERPL-SCNC: 4.5 MMOL/L (ref 3.5–5.1)
RBC # BLD AUTO: 3.6 M/UL (ref 4.2–5.4)
SODIUM SERPL-SCNC: 140 MMOL/L (ref 136–145)
VANCOMYCIN TROUGH SERPL-MCNC: 10 ΜG/ML (ref 10–20)
WBC # BLD AUTO: 11.33 K/UL (ref 4.5–11)

## 2024-04-20 PROCEDURE — 25000003 PHARM REV CODE 250: Performed by: FAMILY MEDICINE

## 2024-04-20 PROCEDURE — 63600175 PHARM REV CODE 636 W HCPCS: Performed by: FAMILY MEDICINE

## 2024-04-20 PROCEDURE — 80048 BASIC METABOLIC PNL TOTAL CA: CPT

## 2024-04-20 PROCEDURE — 80202 ASSAY OF VANCOMYCIN: CPT | Performed by: FAMILY MEDICINE

## 2024-04-20 PROCEDURE — 63600175 PHARM REV CODE 636 W HCPCS: Performed by: HOSPITALIST

## 2024-04-20 PROCEDURE — 85025 COMPLETE CBC W/AUTO DIFF WBC: CPT

## 2024-04-20 PROCEDURE — 11000001 HC ACUTE MED/SURG PRIVATE ROOM

## 2024-04-20 PROCEDURE — 87040 BLOOD CULTURE FOR BACTERIA: CPT | Performed by: FAMILY MEDICINE

## 2024-04-20 PROCEDURE — 25000003 PHARM REV CODE 250

## 2024-04-20 PROCEDURE — 36415 COLL VENOUS BLD VENIPUNCTURE: CPT | Performed by: FAMILY MEDICINE

## 2024-04-20 PROCEDURE — 99233 SBSQ HOSP IP/OBS HIGH 50: CPT | Mod: ,,, | Performed by: FAMILY MEDICINE

## 2024-04-20 RX ADMIN — IBUPROFEN 800 MG: 400 TABLET, FILM COATED ORAL at 09:04

## 2024-04-20 RX ADMIN — PANTOPRAZOLE SODIUM 40 MG: 40 TABLET, DELAYED RELEASE ORAL at 09:04

## 2024-04-20 RX ADMIN — VANCOMYCIN HYDROCHLORIDE 1250 MG: 1 INJECTION, POWDER, LYOPHILIZED, FOR SOLUTION INTRAVENOUS at 05:04

## 2024-04-20 RX ADMIN — ARIPIPRAZOLE 5 MG: 5 TABLET ORAL at 09:04

## 2024-04-20 RX ADMIN — LEVOTHYROXINE SODIUM 50 MCG: 50 TABLET ORAL at 05:04

## 2024-04-20 RX ADMIN — VANCOMYCIN HYDROCHLORIDE 1250 MG: 1 INJECTION, POWDER, LYOPHILIZED, FOR SOLUTION INTRAVENOUS at 04:04

## 2024-04-20 RX ADMIN — SITAGLIPTIN 100 MG: 100 TABLET, FILM COATED ORAL at 09:04

## 2024-04-20 RX ADMIN — CARVEDILOL 12.5 MG: 12.5 TABLET, FILM COATED ORAL at 09:04

## 2024-04-20 RX ADMIN — ATORVASTATIN CALCIUM 80 MG: 80 TABLET, FILM COATED ORAL at 09:04

## 2024-04-20 RX ADMIN — IBUPROFEN 800 MG: 400 TABLET, FILM COATED ORAL at 02:04

## 2024-04-20 RX ADMIN — DULOXETINE HYDROCHLORIDE 60 MG: 30 CAPSULE, DELAYED RELEASE ORAL at 09:04

## 2024-04-20 RX ADMIN — PREGABALIN 150 MG: 75 CAPSULE ORAL at 09:04

## 2024-04-20 RX ADMIN — ENOXAPARIN SODIUM 40 MG: 40 INJECTION, SOLUTION SUBCUTANEOUS at 04:04

## 2024-04-20 RX ADMIN — IBUPROFEN 800 MG: 400 TABLET, FILM COATED ORAL at 04:04

## 2024-04-20 RX ADMIN — MUPIROCIN: 20 OINTMENT TOPICAL at 09:04

## 2024-04-20 RX ADMIN — LINACLOTIDE 290 MCG: 290 CAPSULE, GELATIN COATED ORAL at 05:04

## 2024-04-20 NOTE — SUBJECTIVE & OBJECTIVE
Interval History:     No significant events overnight, no new complaints or concerns this morning. Continues to have some redness and small lesion on plantar surface of foot however much improved. Awaiting repeat blood cultures.       Objective:     Vital Signs (Most Recent):  Temp: 97.7 °F (36.5 °C) (04/20/24 1027)  Pulse: 62 (04/20/24 1027)  Resp: 18 (04/20/24 1027)  BP: (!) 156/75 (04/20/24 1027)  SpO2: 97 % (04/20/24 1027) Vital Signs (24h Range):  Temp:  [97.6 °F (36.4 °C)-98.1 °F (36.7 °C)] 97.7 °F (36.5 °C)  Pulse:  [59-74] 62  Resp:  [16-19] 18  SpO2:  [94 %-98 %] 97 %  BP: (111-181)/(63-82) 156/75     Weight: 59.9 kg (132 lb 0.9 oz)  Body mass index is 24.15 kg/m².  No intake or output data in the 24 hours ending 04/20/24 1318        Physical Exam  Constitutional:       General: She is not in acute distress.     Appearance: Normal appearance. She is not ill-appearing.   HENT:      Head: Normocephalic and atraumatic.   Cardiovascular:      Rate and Rhythm: Normal rate and regular rhythm.   Pulmonary:      Effort: Pulmonary effort is normal. No respiratory distress.   Musculoskeletal:      Cervical back: No rigidity.        Feet:    Feet:      Right foot:      Skin integrity: Skin breakdown and erythema present.   Skin:     Coloration: Skin is not jaundiced or pale.      Findings: No rash.   Neurological:      Mental Status: She is alert.   Psychiatric:         Behavior: Behavior normal.         Thought Content: Thought content normal.             Significant Labs: All pertinent labs within the past 24 hours have been reviewed.    Significant Imaging: I have reviewed all pertinent imaging results/findings within the past 24 hours.

## 2024-04-20 NOTE — ASSESSMENT & PLAN NOTE
Patient has hypokalemia which is Acute on Chronic and currently controlled. Most recent potassium levels reviewed-   Lab Results   Component Value Date    K 4.5 04/20/2024   Will continue potassium replacement per protocol and recheck repeat levels after replacement completed.

## 2024-04-20 NOTE — ASSESSMENT & PLAN NOTE
Will repeat cultures today to determine clearance and end date for treatment. Patient prefers treatment at home with daptomycin, etc if possible. Case management is assisting.   ---  Blood culture x2 MRSA positive, drawn on 4/12/2024 4/14/2024 started on Vancomycin   Additional blood cultures were positive on 4/14, will continue to trend and treat until negative  WBC trending down  Wound culture positive for staph  Right Foot X-Ray - pending  ESR, CRP - pending

## 2024-04-20 NOTE — PROGRESS NOTES
Pharmacokinetic Assessment Follow Up: IV Vancomycin    Vancomycin serum concentration assessment(s):    The trough level was drawn correctly and can be used to guide therapy at this time. The measurement is below the desired definitive target range of 15 to 20 mcg/mL.    Vancomycin Regimen Plan:    Change regimen to Vancomycin 1250 mg IV every 12 hours with next serum trough concentration measured at 1630 prior to 4th dose on 4/21    Drug levels (last 3 results):  Recent Labs   Lab Result Units 04/20/24  0442   Vancomycin, Trough µg/mL 10.0       Pharmacy will continue to follow and monitor vancomycin.    Please contact pharmacy at extension [phone] for questions regarding this assessment.    Patient brief summary:  Magali Cheung is a 64 y.o. female initiated on antimicrobial therapy with IV Vancomycin for treatment of bacteremia    The patient's current regimen is vanc 1250 mg IV q12h    Drug Allergies:   Review of patient's allergies indicates:   Allergen Reactions    Opioids - morphine analogues        Actual Body Weight:   59.9 kg    Renal Function:   Estimated Creatinine Clearance: 73.5 mL/min (based on SCr of 0.62 mg/dL).,     Dialysis Method (if applicable):  N/A    CBC (last 72 hours):  Recent Labs   Lab Result Units 04/18/24 0244 04/19/24 0242 04/20/24 0442   WBC K/uL 12.50* 12.74* 11.33*   Hemoglobin g/dL 9.0* 9.8* 9.5*   Hematocrit % 28.6* 30.7* 30.2*   Platelet Count K/uL 341 408* 387   Lymphocytes % % 12.3* 19.1* 12.7*   Lymphocytes, Man % % 13* 21* 15*   Monocytes % % 7.5* 7.0* 6.3*   Monocytes, Man % % 6 3 4   Eosinophils % % 1.8 2.4 2.1   Eosinophils, Man % % 2 4 2   Basophils % % 0.6 0.9 0.8   Diff Type  Manual Manual Manual       Metabolic Panel (last 72 hours):  Recent Labs   Lab Result Units 04/18/24 0244 04/19/24 0242 04/20/24  0442   Sodium mmol/L 140 141 140   Potassium mmol/L 3.4* 4.1 4.5   Chloride mmol/L 107 109* 107   CO2 mmol/L 28 28 30   Glucose mg/dL 103 104 138*   BUN mg/dL 9 9 8    Creatinine mg/dL 0.62 0.65 0.62       Vancomycin Administrations:  vancomycin given in the last 96 hours                     vancomycin (VANCOCIN) 1,250 mg in dextrose 5 % (D5W) 250 mL IVPB (mg) 1,250 mg New Bag 04/20/24 0517     1,250 mg New Bag 04/19/24 1136     1,250 mg New Bag 04/18/24 1703     1,250 mg New Bag 04/17/24 2323    vancomycin (VANCOCIN) 1,250 mg in dextrose 5 % (D5W) 250 mL IVPB (mg) 1,250 mg New Bag 04/16/24 2021                    Microbiologic Results:  Microbiology Results (last 7 days)       ** No results found for the last 168 hours. **

## 2024-04-20 NOTE — PROGRESS NOTES
[facility] - [address] [place] [address] Medicine  Progress Note    Patient Name: Magali Cheung  MRN: [id number]  Patient Class: IP- Inpatient   Admission Date: 4/17/2024  Length of Stay: 3 days  Attending Physician: Arabella Everett DO  Primary Care Provider: Ariana Pinedo NP        Subjective:     Principal Problem:MRSA bacteremia        HPI:  Patient is a 64 year old female that presents to [facility] via [facility] for bacteremia. She was originally being treated for CAP and first sought treatment due to SOB. During treatment, blood cultures were positive for MRSA. The patient is a well controlled diabetic with last HgA1c 6.8% however right before hospitalization she noticed non-painful skin breakdown on her right foot at the 1st MTP joint. She states that she has significant peripheral neuropathy. The patient was started on Vancomycin IV on 4/14/2024. She denies chest pain, SOB or abdominal pain.    At [facility], Lactic acid was within normal limits on multiple readings, TSH normal. WBC was elevated 19 on admission. CXR - Opacification of the lateral left mid lung suspicious for pneumonia. Recommend follow-up imaging to document resolution and exclude underlying neoplasm. There is mild right basilar atelectasis/infiltrate.     The patient will be admitted to [facility] for continued care and medical management.     Overview/Hospital Course:  No notes on file    Interval History:     No significant events overnight, no new complaints or concerns this morning. Continues to have some redness and small lesion on plantar surface of foot however much improved. Awaiting repeat blood cultures.       Objective:     Vital Signs (Most Recent):  Temp: 97.7 °F (36.5 °C) (04/20/24 1027)  Pulse: 62 (04/20/24 1027)  Resp: 18 (04/20/24 1027)  BP: (!) 156/75 (04/20/24 1027)  SpO2: 97 % (04/20/24 1027) Vital Signs (24h Range):  Temp:  [97.6 °F (36.4 °C)-98.1 °F (36.7 °C)] 97.7 °F (36.5 °C)  Pulse:   [59-74] 62  Resp:  [16-19] 18  SpO2:  [94 %-98 %] 97 %  BP: (111-181)/(63-82) 156/75     Weight: 59.9 kg (132 lb 0.9 oz)  Body mass index is 24.15 kg/m².  No intake or output data in the 24 hours ending 04/20/24 1318        Physical Exam  Constitutional:       General: She is not in acute distress.     Appearance: Normal appearance. She is not ill-appearing.   HENT:      Head: Normocephalic and atraumatic.   Cardiovascular:      Rate and Rhythm: Normal rate and regular rhythm.   Pulmonary:      Effort: Pulmonary effort is normal. No respiratory distress.   Musculoskeletal:      Cervical back: No rigidity.        Feet:    Feet:      Right foot:      Skin integrity: Skin breakdown and erythema present.   Skin:     Coloration: Skin is not jaundiced or pale.      Findings: No rash.   Neurological:      Mental Status: She is alert.   Psychiatric:         Behavior: Behavior normal.         Thought Content: Thought content normal.             Significant Labs: All pertinent labs within the past 24 hours have been reviewed.    Significant Imaging: I have reviewed all pertinent imaging results/findings within the past 24 hours.        Assessment/Plan:      * MRSA bacteremia  Will repeat cultures today to determine clearance and end date for treatment. Patient prefers treatment at home with daptomycin, etc if possible. Case management is assisting.   ---  Blood culture x2 MRSA positive, drawn on 4/12/2024 4/14/2024 started on Vancomycin   Additional blood cultures were positive on 4/14, will continue to trend and treat until negative  WBC trending down  Wound culture positive for staph  Right Foot X-Ray - pending  ESR, CRP - pending      Diabetic foot infection  Small open wound right plantar surface, surrounding erythema and swelling. Small amount serous and purulent drainage.     Wound culture with MRSA and no Gram(-) organisms noted. Will discontinue cefepime.     Xray without clear evidence of osteomyelitis or deep  "tissue infection.     - continue vancomycin  - wound care   - surgery evaluation as indicated       Hypothyroid  Well controlled on current regimen  TSH WNL  Continue home levothyroxine       Other hyperlipidemia  Continue home statin       Type 2 diabetes mellitus with diabetic polyneuropathy, without long-term current use of insulin      Patient's FSGs are controlled on current medication regimen.  Last A1c reviewed-   Lab Results   Component Value Date    HGBA1C 6.8 (H) 04/14/2024     Most recent fingerstick glucose reviewed- No results for input(s): "POCTGLUCOSE" in the last 24 hours.  Current correctional scale  Low  Maintain anti-hyperglycemic dose as follows-   Antihyperglycemics (From admission, onward)      Start     Stop Route Frequency Ordered    04/20/24 0900  SITagliptin phosphate tablet 100 mg         -- Oral Daily 04/19/24 1646          Continue home pregabalin     Will resume home Januvia as patient declines SSI and glucose monitoring.       VTE Risk Mitigation (From admission, onward)           Ordered     enoxaparin injection 40 mg  Every 24 hours         04/18/24 0004     IP VTE LOW RISK PATIENT  Once         04/17/24 2103                    Discharge Planning   ALANA: 4/22/2024     Code Status: Full Code   Is the patient medically ready for discharge?:     Reason for patient still in hospital (select all that apply): Treatment  Discharge Plan A: Home with family                  Arabella Everett DO  Department of Hospital Medicine   [facility]    "

## 2024-04-21 LAB
ANION GAP SERPL CALCULATED.3IONS-SCNC: 9 MMOL/L (ref 7–16)
ANISOCYTOSIS BLD QL SMEAR: ABNORMAL
BACTERIA BLD CULT: NORMAL
BASOPHILS # BLD AUTO: 0.08 K/UL (ref 0–0.2)
BASOPHILS NFR BLD AUTO: 0.7 % (ref 0–1)
BUN SERPL-MCNC: 8 MG/DL (ref 7–18)
BUN/CREAT SERPL: 15 (ref 6–20)
CALCIUM SERPL-MCNC: 9.2 MG/DL (ref 8.5–10.1)
CHLORIDE SERPL-SCNC: 104 MMOL/L (ref 98–107)
CO2 SERPL-SCNC: 29 MMOL/L (ref 21–32)
CREAT SERPL-MCNC: 0.55 MG/DL (ref 0.55–1.02)
CRP SERPL-MCNC: 8.11 MG/DL (ref 0–0.8)
DIFFERENTIAL METHOD BLD: ABNORMAL
EGFR (NO RACE VARIABLE) (RUSH/TITUS): 103 ML/MIN/1.73M2
EOSINOPHIL # BLD AUTO: 0.24 K/UL (ref 0–0.5)
EOSINOPHIL NFR BLD AUTO: 2.2 % (ref 1–4)
EOSINOPHIL NFR BLD MANUAL: 2 % (ref 1–4)
ERYTHROCYTE [DISTWIDTH] IN BLOOD BY AUTOMATED COUNT: 15.9 % (ref 11.5–14.5)
ERYTHROCYTE [SEDIMENTATION RATE] IN BLOOD BY WESTERGREN METHOD: 90 MM/HR (ref 0–30)
GLUCOSE SERPL-MCNC: 118 MG/DL (ref 74–106)
HCT VFR BLD AUTO: 27.8 % (ref 38–47)
HGB BLD-MCNC: 8.9 G/DL (ref 12–16)
IMM GRANULOCYTES # BLD AUTO: 0.63 K/UL (ref 0–0.04)
IMM GRANULOCYTES NFR BLD: 5.7 % (ref 0–0.4)
LYMPHOCYTES # BLD AUTO: 1.54 K/UL (ref 1–4.8)
LYMPHOCYTES NFR BLD AUTO: 14 % (ref 27–41)
LYMPHOCYTES NFR BLD MANUAL: 14 % (ref 27–41)
MCH RBC QN AUTO: 26.7 PG (ref 27–31)
MCHC RBC AUTO-ENTMCNC: 32 G/DL (ref 32–36)
MCV RBC AUTO: 83.5 FL (ref 80–96)
MONOCYTES # BLD AUTO: 0.63 K/UL (ref 0–0.8)
MONOCYTES NFR BLD AUTO: 5.7 % (ref 2–6)
MONOCYTES NFR BLD MANUAL: 3 % (ref 2–6)
MPC BLD CALC-MCNC: 10.6 FL (ref 9.4–12.4)
NEUTROPHILS # BLD AUTO: 7.9 K/UL (ref 1.8–7.7)
NEUTROPHILS NFR BLD AUTO: 71.7 % (ref 53–65)
NEUTS SEG NFR BLD MANUAL: 81 % (ref 50–62)
NRBC # BLD AUTO: 0 X10E3/UL
NRBC, AUTO (.00): 0 %
PLATELET # BLD AUTO: 399 K/UL (ref 150–400)
PLATELET MORPHOLOGY: ABNORMAL
POTASSIUM SERPL-SCNC: 4.1 MMOL/L (ref 3.5–5.1)
RBC # BLD AUTO: 3.33 M/UL (ref 4.2–5.4)
SODIUM SERPL-SCNC: 138 MMOL/L (ref 136–145)
VANCOMYCIN TROUGH SERPL-MCNC: 19.6 ΜG/ML (ref 10–20)
WBC # BLD AUTO: 11.02 K/UL (ref 4.5–11)

## 2024-04-21 PROCEDURE — 63600175 PHARM REV CODE 636 W HCPCS: Performed by: HOSPITALIST

## 2024-04-21 PROCEDURE — 63600175 PHARM REV CODE 636 W HCPCS: Performed by: FAMILY MEDICINE

## 2024-04-21 PROCEDURE — 99233 SBSQ HOSP IP/OBS HIGH 50: CPT | Mod: ,,, | Performed by: HOSPITALIST

## 2024-04-21 PROCEDURE — 85651 RBC SED RATE NONAUTOMATED: CPT | Performed by: HOSPITALIST

## 2024-04-21 PROCEDURE — 25000003 PHARM REV CODE 250

## 2024-04-21 PROCEDURE — 80048 BASIC METABOLIC PNL TOTAL CA: CPT

## 2024-04-21 PROCEDURE — 25000003 PHARM REV CODE 250: Performed by: FAMILY MEDICINE

## 2024-04-21 PROCEDURE — 80202 ASSAY OF VANCOMYCIN: CPT | Performed by: FAMILY MEDICINE

## 2024-04-21 PROCEDURE — 85025 COMPLETE CBC W/AUTO DIFF WBC: CPT

## 2024-04-21 PROCEDURE — 25000003 PHARM REV CODE 250: Performed by: HOSPITALIST

## 2024-04-21 PROCEDURE — 11000001 HC ACUTE MED/SURG PRIVATE ROOM

## 2024-04-21 PROCEDURE — 96372 THER/PROPH/DIAG INJ SC/IM: CPT

## 2024-04-21 PROCEDURE — 86140 C-REACTIVE PROTEIN: CPT | Performed by: HOSPITALIST

## 2024-04-21 RX ADMIN — IBUPROFEN 800 MG: 400 TABLET, FILM COATED ORAL at 03:04

## 2024-04-21 RX ADMIN — ATORVASTATIN CALCIUM 80 MG: 80 TABLET, FILM COATED ORAL at 08:04

## 2024-04-21 RX ADMIN — IBUPROFEN 800 MG: 400 TABLET, FILM COATED ORAL at 05:04

## 2024-04-21 RX ADMIN — SITAGLIPTIN 100 MG: 100 TABLET, FILM COATED ORAL at 08:04

## 2024-04-21 RX ADMIN — IBUPROFEN 800 MG: 400 TABLET, FILM COATED ORAL at 10:04

## 2024-04-21 RX ADMIN — DULOXETINE HYDROCHLORIDE 60 MG: 30 CAPSULE, DELAYED RELEASE ORAL at 08:04

## 2024-04-21 RX ADMIN — ARIPIPRAZOLE 5 MG: 5 TABLET ORAL at 08:04

## 2024-04-21 RX ADMIN — VANCOMYCIN HYDROCHLORIDE 1250 MG: 1 INJECTION, POWDER, LYOPHILIZED, FOR SOLUTION INTRAVENOUS at 06:04

## 2024-04-21 RX ADMIN — MUPIROCIN: 20 OINTMENT TOPICAL at 08:04

## 2024-04-21 RX ADMIN — LEVOTHYROXINE SODIUM 50 MCG: 50 TABLET ORAL at 06:04

## 2024-04-21 RX ADMIN — TRAZODONE HYDROCHLORIDE 50 MG: 50 TABLET ORAL at 08:04

## 2024-04-21 RX ADMIN — PANTOPRAZOLE SODIUM 40 MG: 40 TABLET, DELAYED RELEASE ORAL at 08:04

## 2024-04-21 RX ADMIN — CARVEDILOL 12.5 MG: 12.5 TABLET, FILM COATED ORAL at 08:04

## 2024-04-21 RX ADMIN — CEFTRIAXONE SODIUM 1 G: 1 INJECTION, POWDER, FOR SOLUTION INTRAMUSCULAR; INTRAVENOUS at 08:04

## 2024-04-21 RX ADMIN — LINACLOTIDE 290 MCG: 290 CAPSULE, GELATIN COATED ORAL at 06:04

## 2024-04-21 RX ADMIN — PREGABALIN 150 MG: 75 CAPSULE ORAL at 08:04

## 2024-04-21 RX ADMIN — ENOXAPARIN SODIUM 40 MG: 40 INJECTION, SOLUTION SUBCUTANEOUS at 05:04

## 2024-04-21 NOTE — PLAN OF CARE
Problem: Fall Injury Risk  Goal: Absence of Fall and Fall-Related Injury  Outcome: Ongoing, Progressing  Intervention: Identify and Manage Contributors  Flowsheets (Taken 4/21/2024 1755)  Self-Care Promotion:   independence encouraged   safe use of adaptive equipment encouraged  Medication Review/Management: medications reviewed  Intervention: Promote Injury-Free Environment  Flowsheets (Taken 4/21/2024 1755)  Safety Promotion/Fall Prevention:   assistive device/personal item within reach   bed alarm set   instructed to call staff for mobility   toileting scheduled   nonskid shoes/socks when out of bed

## 2024-04-21 NOTE — PROGRESS NOTES
Pharmacy Consult    Consulted to assist in the management of Vanc therapy.  Patient is currently receiving Vanc 1250mg iv q12hr.  Trough drawn at 1630 on 4/21. Reported as 19.6 mcg/ml.  BUN/SCR = 8/0.55.  Since trough Is in appropriate range, no changes needed at this time.  Will continue to monitor and make adjustment as necessary.      Fabien Sung, GIULIA.Ph.

## 2024-04-22 VITALS
OXYGEN SATURATION: 96 % | DIASTOLIC BLOOD PRESSURE: 94 MMHG | RESPIRATION RATE: 18 BRPM | SYSTOLIC BLOOD PRESSURE: 193 MMHG | BODY MASS INDEX: 24.54 KG/M2 | HEART RATE: 56 BPM | HEIGHT: 62 IN | WEIGHT: 133.38 LBS | TEMPERATURE: 98 F

## 2024-04-22 PROBLEM — M86.9 OSTEOMYELITIS OF RIGHT FOOT: Status: ACTIVE | Noted: 2024-04-22

## 2024-04-22 LAB
ANION GAP SERPL CALCULATED.3IONS-SCNC: 12 MMOL/L (ref 7–16)
ANISOCYTOSIS BLD QL SMEAR: ABNORMAL
AORTIC ROOT ANNULUS: 2.45 CM
AORTIC VALVE CUSP SEPERATION: 1.49 CM
AV INDEX (PROSTH): 0.3
AV MEAN GRADIENT: 10 MMHG
AV PEAK GRADIENT: 19 MMHG
AV VALVE AREA BY VELOCITY RATIO: 0.67 CM²
AV VALVE AREA: 0.71 CM²
AV VELOCITY RATIO: 0.29
BASOPHILS # BLD AUTO: 0.08 K/UL (ref 0–0.2)
BASOPHILS NFR BLD AUTO: 0.9 % (ref 0–1)
BASOPHILS NFR BLD MANUAL: 1 % (ref 0–1)
BSA FOR ECHO PROCEDURE: 1.62 M2
BUN SERPL-MCNC: 7 MG/DL (ref 7–18)
BUN/CREAT SERPL: 11 (ref 6–20)
CALCIUM SERPL-MCNC: 9.2 MG/DL (ref 8.5–10.1)
CHLORIDE SERPL-SCNC: 104 MMOL/L (ref 98–107)
CO2 SERPL-SCNC: 25 MMOL/L (ref 21–32)
CREAT SERPL-MCNC: 0.65 MG/DL (ref 0.55–1.02)
CV ECHO LV RWT: 0.82 CM
DIFFERENTIAL METHOD BLD: ABNORMAL
DOP CALC AO PEAK VEL: 2.2 M/S
DOP CALC AO VTI: 60.6 CM
DOP CALC LVOT AREA: 2.3 CM2
DOP CALC LVOT DIAMETER: 1.73 CM
DOP CALC LVOT PEAK VEL: 0.63 M/S
DOP CALC LVOT STROKE VOLUME: 42.76 CM3
DOP CALC MV VTI: 38.3 CM
DOP CALCLVOT PEAK VEL VTI: 18.2 CM
E WAVE DECELERATION TIME: 182.2 MSEC
E/A RATIO: 1.16
E/E' RATIO: 15.38 M/S
ECHO LV POSTERIOR WALL: 1.57 CM (ref 0.6–1.1)
EGFR (NO RACE VARIABLE) (RUSH/TITUS): 99 ML/MIN/1.73M2
EJECTION FRACTION: 60 %
EOSINOPHIL # BLD AUTO: 0.32 K/UL (ref 0–0.5)
EOSINOPHIL NFR BLD AUTO: 3.5 % (ref 1–4)
EOSINOPHIL NFR BLD MANUAL: 1 % (ref 1–4)
ERYTHROCYTE [DISTWIDTH] IN BLOOD BY AUTOMATED COUNT: 15.9 % (ref 11.5–14.5)
FRACTIONAL SHORTENING: 36 % (ref 28–44)
GLUCOSE SERPL-MCNC: 135 MG/DL (ref 74–106)
HCT VFR BLD AUTO: 29.9 % (ref 38–47)
HGB BLD-MCNC: 9.1 G/DL (ref 12–16)
IMM GRANULOCYTES # BLD AUTO: 0.34 K/UL (ref 0–0.04)
IMM GRANULOCYTES NFR BLD: 3.7 % (ref 0–0.4)
INTERVENTRICULAR SEPTUM: 1.57 CM (ref 0.6–1.1)
IVC DIAMETER: 1.81 CM
LA MAJOR: 3.58 CM
LEFT ATRIUM SIZE: 3.21 CM
LEFT INTERNAL DIMENSION IN SYSTOLE: 2.46 CM (ref 2.1–4)
LEFT VENTRICLE DIASTOLIC VOLUME INDEX: 39.11 ML/M2
LEFT VENTRICLE DIASTOLIC VOLUME: 62.96 ML
LEFT VENTRICLE MASS INDEX: 146 G/M2
LEFT VENTRICLE SYSTOLIC VOLUME INDEX: 13.3 ML/M2
LEFT VENTRICLE SYSTOLIC VOLUME: 21.42 ML
LEFT VENTRICULAR INTERNAL DIMENSION IN DIASTOLE: 3.83 CM (ref 3.5–6)
LEFT VENTRICULAR MASS: 235.58 G
LV LATERAL E/E' RATIO: 16.67 M/S
LV SEPTAL E/E' RATIO: 14.29 M/S
LVOT MG: 1.02 MMHG
LVOT MV: 0.49 CM/S
LYMPHOCYTES # BLD AUTO: 1.32 K/UL (ref 1–4.8)
LYMPHOCYTES NFR BLD AUTO: 14.5 % (ref 27–41)
LYMPHOCYTES NFR BLD MANUAL: 11 % (ref 27–41)
MCH RBC QN AUTO: 26.3 PG (ref 27–31)
MCHC RBC AUTO-ENTMCNC: 30.4 G/DL (ref 32–36)
MCV RBC AUTO: 86.4 FL (ref 80–96)
METAMYELOCYTES NFR BLD MANUAL: 1 %
MICROCYTES BLD QL SMEAR: ABNORMAL
MONOCYTES # BLD AUTO: 0.55 K/UL (ref 0–0.8)
MONOCYTES NFR BLD AUTO: 6.1 % (ref 2–6)
MONOCYTES NFR BLD MANUAL: 7 % (ref 2–6)
MPC BLD CALC-MCNC: 10.7 FL (ref 9.4–12.4)
MV MEAN GRADIENT: 3 MMHG
MV PEAK A VEL: 0.86 M/S
MV PEAK E VEL: 1 M/S
MV PEAK GRADIENT: 10 MMHG
MV STENOSIS PRESSURE HALF TIME: 40.38 MS
MV VALVE AREA BY CONTINUITY EQUATION: 1.12 CM2
MV VALVE AREA P 1/2 METHOD: 5.45 CM2
NEUTROPHILS # BLD AUTO: 6.48 K/UL (ref 1.8–7.7)
NEUTROPHILS NFR BLD AUTO: 71.3 % (ref 53–65)
NEUTS BAND NFR BLD MANUAL: 2 % (ref 1–5)
NEUTS SEG NFR BLD MANUAL: 77 % (ref 50–62)
NRBC # BLD AUTO: 0 X10E3/UL
NRBC, AUTO (.00): 0 %
OHS CV RV/LV RATIO: 0.75 CM
PISA TR MAX VEL: 2.03 M/S
PLATELET # BLD AUTO: 400 K/UL (ref 150–400)
PLATELET MORPHOLOGY: ABNORMAL
POLYCHROMASIA BLD QL SMEAR: ABNORMAL
POTASSIUM SERPL-SCNC: 3.9 MMOL/L (ref 3.5–5.1)
PV PEAK GRADIENT: 3 MMHG
PV PEAK VELOCITY: 0.9 M/S
RA MAJOR: 3.08 CM
RA PRESSURE ESTIMATED: 3 MMHG
RBC # BLD AUTO: 3.46 M/UL (ref 4.2–5.4)
RIGHT VENTRICULAR END-DIASTOLIC DIMENSION: 2.89 CM
RV TB RVSP: 5 MMHG
SODIUM SERPL-SCNC: 137 MMOL/L (ref 136–145)
TDI LATERAL: 0.06 M/S
TDI SEPTAL: 0.07 M/S
TDI: 0.07 M/S
TR MAX PG: 16 MMHG
TRICUSPID ANNULAR PLANE SYSTOLIC EXCURSION: 1.82 CM
TV REST PULMONARY ARTERY PRESSURE: 19 MMHG
URATE SERPL-MCNC: 5.4 MG/DL (ref 2.6–6)
WBC # BLD AUTO: 9.09 K/UL (ref 4.5–11)
Z-SCORE OF LEFT VENTRICULAR DIMENSION IN END DIASTOLE: -1.76
Z-SCORE OF LEFT VENTRICULAR DIMENSION IN END SYSTOLE: -1.11

## 2024-04-22 PROCEDURE — 80048 BASIC METABOLIC PNL TOTAL CA: CPT

## 2024-04-22 PROCEDURE — 99239 HOSP IP/OBS DSCHRG MGMT >30: CPT | Mod: ,,, | Performed by: HOSPITALIST

## 2024-04-22 PROCEDURE — A9503 TC99M MEDRONATE: HCPCS | Performed by: HOSPITALIST

## 2024-04-22 PROCEDURE — 25000003 PHARM REV CODE 250

## 2024-04-22 PROCEDURE — 85025 COMPLETE CBC W/AUTO DIFF WBC: CPT

## 2024-04-22 PROCEDURE — 25000003 PHARM REV CODE 250: Performed by: FAMILY MEDICINE

## 2024-04-22 PROCEDURE — 02HV33Z INSERTION OF INFUSION DEVICE INTO SUPERIOR VENA CAVA, PERCUTANEOUS APPROACH: ICD-10-PCS | Performed by: RADIOLOGY

## 2024-04-22 PROCEDURE — 63600175 PHARM REV CODE 636 W HCPCS: Performed by: HOSPITALIST

## 2024-04-22 PROCEDURE — 84550 ASSAY OF BLOOD/URIC ACID: CPT | Performed by: HOSPITALIST

## 2024-04-22 PROCEDURE — 63600175 PHARM REV CODE 636 W HCPCS: Performed by: FAMILY MEDICINE

## 2024-04-22 PROCEDURE — 25000003 PHARM REV CODE 250: Performed by: HOSPITALIST

## 2024-04-22 RX ORDER — TC 99M MEDRONATE 20 MG/10ML
26 INJECTION, POWDER, LYOPHILIZED, FOR SOLUTION INTRAVENOUS
Status: COMPLETED | OUTPATIENT
Start: 2024-04-22 | End: 2024-04-22

## 2024-04-22 RX ORDER — HYDRALAZINE HYDROCHLORIDE 25 MG/1
25 TABLET, FILM COATED ORAL EVERY 8 HOURS
Status: DISCONTINUED | OUTPATIENT
Start: 2024-04-22 | End: 2024-04-22 | Stop reason: HOSPADM

## 2024-04-22 RX ADMIN — CEFTRIAXONE SODIUM 1 G: 1 INJECTION, POWDER, FOR SOLUTION INTRAMUSCULAR; INTRAVENOUS at 10:04

## 2024-04-22 RX ADMIN — PREGABALIN 150 MG: 75 CAPSULE ORAL at 09:04

## 2024-04-22 RX ADMIN — LINACLOTIDE 290 MCG: 290 CAPSULE, GELATIN COATED ORAL at 05:04

## 2024-04-22 RX ADMIN — PANTOPRAZOLE SODIUM 40 MG: 40 TABLET, DELAYED RELEASE ORAL at 09:04

## 2024-04-22 RX ADMIN — LEVOTHYROXINE SODIUM 50 MCG: 50 TABLET ORAL at 05:04

## 2024-04-22 RX ADMIN — SITAGLIPTIN 100 MG: 100 TABLET, FILM COATED ORAL at 09:04

## 2024-04-22 RX ADMIN — ARIPIPRAZOLE 5 MG: 5 TABLET ORAL at 09:04

## 2024-04-22 RX ADMIN — DULOXETINE HYDROCHLORIDE 60 MG: 30 CAPSULE, DELAYED RELEASE ORAL at 09:04

## 2024-04-22 RX ADMIN — TECHNETIUM TC 99M MEDRONATE 26 MILLICURIE: 20 INJECTION, POWDER, LYOPHILIZED, FOR SOLUTION INTRAVENOUS at 11:04

## 2024-04-22 RX ADMIN — CARVEDILOL 12.5 MG: 12.5 TABLET, FILM COATED ORAL at 09:04

## 2024-04-22 RX ADMIN — IBUPROFEN 800 MG: 400 TABLET, FILM COATED ORAL at 11:04

## 2024-04-22 RX ADMIN — VANCOMYCIN HYDROCHLORIDE 1250 MG: 1 INJECTION, POWDER, LYOPHILIZED, FOR SOLUTION INTRAVENOUS at 05:04

## 2024-04-22 RX ADMIN — HYDRALAZINE HYDROCHLORIDE 25 MG: 25 TABLET ORAL at 01:04

## 2024-04-22 RX ADMIN — IBUPROFEN 800 MG: 400 TABLET, FILM COATED ORAL at 03:04

## 2024-04-22 NOTE — PROGRESS NOTES
[facility] - [address] [place] [address] Medicine  Progress Note    Patient Name: Magali Cheung  MRN: [id number]  Patient Class: IP- Inpatient   Admission Date: 4/17/2024  Length of Stay: 4 days  Attending Physician: Sanjuana Wray MD  Primary Care Provider: Ariana Pinedo NP        Subjective:     Principal Problem:MRSA bacteremia    HPI:  Patient is a 64 year old female that presents to [facility] via [facility] for bacteremia. She was originally being treated for CAP and first sought treatment due to SOB. During treatment, blood cultures were positive for MRSA. The patient is a well controlled diabetic with last HgA1c 6.8% however right before hospitalization she noticed non-painful skin breakdown on her right foot at the 1st MTP joint. She states that she has significant peripheral neuropathy. The patient was started on Vancomycin IV on 4/14/2024. She denies chest pain, SOB or abdominal pain.    At [facility], Lactic acid was within normal limits on multiple readings, TSH normal. WBC was elevated 19 on admission. CXR - Opacification of the lateral left mid lung suspicious for pneumonia. Recommend follow-up imaging to document resolution and exclude underlying neoplasm. There is mild right basilar atelectasis/infiltrate.     The patient will be admitted to [facility] for continued care and medical management.     Overview/Hospital Course:  No notes on file    Interval History:     Review of Systems   Constitutional:  Positive for fatigue. Negative for activity change, appetite change, chills and fever.   HENT: Negative.  Negative for congestion, dental problem, drooling, ear discharge, ear pain and facial swelling.    Eyes: Negative.    Respiratory:  Positive for cough and shortness of breath. Negative for chest tightness and wheezing.    Cardiovascular: Negative.  Negative for chest pain, palpitations and leg swelling.   Gastrointestinal: Negative.  Negative for abdominal  distention, abdominal pain and constipation.   Endocrine: Negative.  Negative for cold intolerance and heat intolerance.   Genitourinary: Negative.  Negative for difficulty urinating, dysuria and enuresis.   Musculoskeletal: Negative.  Negative for arthralgias, back pain and gait problem.   Skin:  Positive for color change and wound.   Allergic/Immunologic: Negative.  Negative for environmental allergies and food allergies.   Neurological:  Positive for weakness. Negative for dizziness, light-headedness and headaches.   Hematological: Negative.    Psychiatric/Behavioral:  Positive for sleep disturbance. Negative for agitation, decreased concentration, dysphoric mood and hallucinations.    All other systems reviewed and are negative.    Objective:     Vital Signs (Most Recent):  Temp: 97.9 °F (36.6 °C) (04/21/24 1816)  Pulse: 66 (04/21/24 1816)  Resp: 18 (04/21/24 1816)  BP: (!) 150/79 (04/21/24 1816)  SpO2: 95 % (04/21/24 1816) Vital Signs (24h Range):  Temp:  [97.9 °F (36.6 °C)-98.2 °F (36.8 °C)] 97.9 °F (36.6 °C)  Pulse:  [59-72] 66  Resp:  [16-18] 18  SpO2:  [94 %-99 %] 95 %  BP: (109-150)/(64-79) 150/79     Weight: 59.9 kg (132 lb 0.9 oz)  Body mass index is 24.15 kg/m².  No intake or output data in the 24 hours ending 04/21/24 2009      Physical Exam  Constitutional:       General: She is not in acute distress.     Appearance: Normal appearance. She is not ill-appearing.   HENT:      Head: Normocephalic and atraumatic.   Cardiovascular:      Rate and Rhythm: Normal rate and regular rhythm.   Pulmonary:      Effort: Pulmonary effort is normal. No respiratory distress.   Musculoskeletal:      Cervical back: No rigidity.        Feet:    Feet:      Right foot:      Skin integrity: Skin breakdown and erythema present.   Skin:     Coloration: Skin is not jaundiced or pale.      Findings: No rash.   Neurological:      Mental Status: She is alert.   Psychiatric:         Behavior: Behavior normal.         Thought  "Content: Thought content normal.             Significant Labs: All pertinent labs within the past 24 hours have been reviewed.    Significant Imaging: I have reviewed all pertinent imaging results/findings within the past 24 hours.    Assessment/Plan:      * MRSA bacteremia  Will repeat cultures today to determine clearance and end date for treatment. Patient prefers treatment at home with daptomycin, etc if possible. Case management is assisting.   ---  Blood culture x2 MRSA positive, drawn on 4/12/2024 4/14/2024 started on Vancomycin   Additional blood cultures were positive on 4/14, will continue to trend and treat until negative  WBC trending down  Wound culture positive for staph  Right Foot X-Ray - pending  ESR, CRP - pending    4/21:   Plan for PICC line and discharge with home health with IV antibiotics.  Bone scan to evaluate for osteomyelitis.      Diabetic foot infection  Small open wound right plantar surface, surrounding erythema and swelling. Small amount serous and purulent drainage.     Wound culture with MRSA and no Gram(-) organisms noted. Will discontinue cefepime.     Xray without clear evidence of osteomyelitis or deep tissue infection.     - continue vancomycin  - wound care   - surgery evaluation as indicated       Hypothyroid  Well controlled on current regimen  TSH WNL  Continue home levothyroxine       Other hyperlipidemia  Continue home statin       Type 2 diabetes mellitus with diabetic polyneuropathy, without long-term current use of insulin      Patient's FSGs are controlled on current medication regimen.  Last A1c reviewed-   Lab Results   Component Value Date    HGBA1C 6.8 (H) 04/14/2024     Most recent fingerstick glucose reviewed- No results for input(s): "POCTGLUCOSE" in the last 24 hours.  Current correctional scale  Low  Maintain anti-hyperglycemic dose as follows-   Antihyperglycemics (From admission, onward)      Start     Stop Route Frequency Ordered    04/20/24 0900  " SITagliptin phosphate tablet 100 mg         -- Oral Daily 04/19/24 1646          Continue home pregabalin     Will resume home Januvia as patient declines SSI and glucose monitoring.       VTE Risk Mitigation (From admission, onward)           Ordered     enoxaparin injection 40 mg  Every 24 hours         04/18/24 0004     IP VTE LOW RISK PATIENT  Once         04/17/24 2103                    Discharge Planning   ALANA: 4/22/2024     Code Status: Full Code   Is the patient medically ready for discharge?:     Reason for patient still in hospital (select all that apply): Treatment  Discharge Plan A: Home with family                  Sanjuana Wray MD  Department of Hospital Medicine   [facility]

## 2024-04-22 NOTE — DISCHARGE SUMMARY
[facility] - [address] [place] [address] Medicine  Discharge Summary      Patient Name: Magali Cheung  MRN: [id number]  BP: [id number]  Patient Class: IP- Inpatient  Admission Date: 4/17/2024  Hospital Length of Stay: 5 days  Discharge Date and Time:  04/22/2024 5:17 PM  Attending Physician: Sanjuana Wray MD   Discharging Provider: Sanjuana Wray MD  Primary Care Provider: Ariana Pinedo NP    Primary Care Team: Networked reference to record PCT     HPI:   Patient is a 64 year old female that presents to [facility] via [facility] for bacteremia. She was originally being treated for CAP and first sought treatment due to SOB. During treatment, blood cultures were positive for MRSA. The patient is a well controlled diabetic with last HgA1c 6.8% however right before hospitalization she noticed non-painful skin breakdown on her right foot at the 1st MTP joint. She states that she has significant peripheral neuropathy. The patient was started on Vancomycin IV on 4/14/2024. She denies chest pain, SOB or abdominal pain.    At [facility], Lactic acid was within normal limits on multiple readings, TSH normal. WBC was elevated 19 on admission. CXR - Opacification of the lateral left mid lung suspicious for pneumonia. Recommend follow-up imaging to document resolution and exclude underlying neoplasm. There is mild right basilar atelectasis/infiltrate.     The patient will be admitted to [facility] for continued care and medical management.     * No surgery found *      Hospital Course:   No notes on file     Goals of Care Treatment Preferences:  Code Status: Full Code      Consults:   Consults (From admission, onward)          Status Ordering Provider     Inpatient consult to Social Work  Once        Provider:  (Not yet assigned)    Ordered SANJUANA WRAY     Inpatient consult to Social Work  Once        Provider:  (Not yet assigned)    Completed SANJUANA WRAY     Inpatient  consult to General Surgery  Once        Provider:  Tee Jade, RUBENS Conde     Pharmacy to dose Vancomycin consult  Once        Provider:  (Not yet assigned)    Acknowledged SARAHINGAAPRIL            Cardiac/Vascular  Other hyperlipidemia  Continue home statin       ID  * Osteomyelitis of right foot    Osteomyelitis on bone scan.  Elevated inflammatory markers.      Echo    Left Ventricle: The left ventricle is normal in size. Mildly increased   wall thickness. There is mild concentric hypertrophy. There is normal   systolic function with a visually estimated ejection fraction of 55 - 70%.   Ejection fraction by visual approximation is 60%.    Aortic Valve: The aortic valve is a trileaflet valve. There is moderate   aortic valve sclerosis. Mildly calcified cusps. Mildly restricted motion.    Mitral Valve: There is moderate bileaflet sclerosis. Mildly thickened   leaflets. There is mild mitral annular calcification present. There is no   stenosis. The mean pressure gradient across the mitral valve is 3 mmHg at   a heart rate of  bpm.    IVC/SVC: Normal venous pressure at 3 mmHg.    Pericardium: There is a trivial effusion.  NM Bone Scan 3 Phase Foot  Narrative: EXAMINATION:  NM BONE SCAN 3 PHASE FOOT    CLINICAL HISTORY:  Post-menopause state, Osteoporosis  Foot swelling, diabetic, osteomyelitis suspected, xray done;right foot;    COMPARISON:  None available    FINDINGS:  Patient was injected with a 26.0 millicuries of technetium 99M MDP intravenously.  Blood flow, blood pool and delayed imaging over both feet.  There is increased activity in all phases overlying the 1st metatarsophalangeal joint.  No other areas of abnormal activity.  Impression: Increased activity all phases overlying the 1st metatarsophalangeal joint foot indicate osteomyelitis.    Antibiotics through 5/27.      MRSA bacteremia  Will repeat cultures today to determine clearance and end date for treatment. Patient  "prefers treatment at home with daptomycin, etc if possible. Case management is assisting.   ---  Blood culture x2 MRSA positive, drawn on 4/12/2024 4/14/2024 started on Vancomycin   Additional blood cultures were positive on 4/14, will continue to trend and treat until negative  WBC trending down  Wound culture positive for staph  Right Foot X-Ray - pending  ESR, CRP - pending    4/21:   Plan for PICC line and discharge with home health with IV antibiotics.  Bone scan to evaluate for osteomyelitis.      Endocrine  Hypothyroid  Well controlled on current regimen  TSH WNL  Continue home levothyroxine       Type 2 diabetes mellitus with diabetic polyneuropathy, without long-term current use of insulin      Patient's FSGs are controlled on current medication regimen.  Last A1c reviewed-   Lab Results   Component Value Date    HGBA1C 6.8 (H) 04/14/2024     Most recent fingerstick glucose reviewed- No results for input(s): "POCTGLUCOSE" in the last 24 hours.  Current correctional scale  Low  Maintain anti-hyperglycemic dose as follows-   Antihyperglycemics (From admission, onward)      Start     Stop Route Frequency Ordered    04/20/24 0900  SITagliptin phosphate tablet 100 mg         -- Oral Daily 04/19/24 1646          Continue home pregabalin     Will resume home Januvia as patient declines SSI and glucose monitoring.       Final Active Diagnoses:    Diagnosis Date Noted POA    PRINCIPAL PROBLEM:  Osteomyelitis of right foot [M86.9] 04/22/2024 Yes    MRSA bacteremia [R78.81, B95.62] 04/17/2024 Yes    Diabetic foot infection [E11.628, L08.9] 04/18/2024 Yes    Hypothyroid [E03.9] 04/14/2024 Yes    Other hyperlipidemia [E78.49] 04/04/2023 Yes    Type 2 diabetes mellitus with diabetic polyneuropathy, without long-term current use of insulin [E11.42] 09/02/2021 Yes      Problems Resolved During this Admission:    Diagnosis Date Noted Date Resolved POA    Hypokalemia [E87.6] 03/24/2023 04/20/2024 Yes       Discharged " Condition: poor    Disposition:     Follow Up:   Follow-up Information       Ariana Pinedo NP Follow up in 1 week(s).    Specialty: Family Medicine  Why: hospital follow-up; patient already booked it for 9am on Friday.  Contact information:  [address]  [place] [address]  [phone]               Tee Jade, DO Follow up in 2 week(s).    Specialties: General Surgery, Surgery  Why: hospital follow-up for MRSA osteomyelitis demonstrated on bone scan  Contact information:  [address] [facility]  [place] [address]  [phone]                           Patient Instructions:      Diet Adult Regular     Diet diabetic     Activity as tolerated       Significant Diagnostic Studies: Labs: BMP:   Recent Labs   Lab 04/21/24  0441 04/22/24  0547   * 135*    137   K 4.1 3.9    104   CO2 29 25   BUN 8 7   CREATININE 0.55 0.65   CALCIUM 9.2 9.2    and CBC   Recent Labs   Lab 04/21/24  0441 04/22/24  0547   WBC 11.02* 9.09   HGB 8.9* 9.1*   HCT 27.8* 29.9*    400       Pending Diagnostic Studies:       Procedure Component Value Units Date/Time    MRI Foot (Forefoot) Right Without Contrast [[id number]]     Order Status: Sent Lab Status: No result     VANCOMYCIN, TROUGH [[id number]]     Order Status: Sent Lab Status: No result     Specimen: Blood            Medications:  Reconciled Home Medications:      Medication List        CHANGE how you take these medications      carvediloL 12.5 MG tablet  Commonly known as: COREG  Take 1 tablet (12.5 mg total) by mouth 2 (two) times daily with meals.  What changed: when to take this            CONTINUE taking these medications      albuterol 90 mcg/actuation inhaler  Commonly known as: PROVENTIL/VENTOLIN HFA  INHALE ONE TO TWO PUFFS BY MOUTH EVERY 4 HOURS AS NEEDED FOR SHORTNESS OF BREATH OR WHEEZING     atorvastatin 80 MG tablet  Commonly known as: LIPITOR  Take 1 tablet (80 mg total) by mouth every  evening.     dextrose 5 % (D5W) SolP 250 mL with vancomycin 1,000 mg SolR 1,000 mg  Inject 1,000 mg into the vein every 24 hours as needed (MRSA pneumonia).     DULoxetine 60 MG capsule  Commonly known as: CYMBALTA  Take 60 mg by mouth 2 (two) times daily.     esomeprazole 40 MG capsule  Commonly known as: NEXIUM  Take 40 mg by mouth once daily.     fluticasone propionate 50 mcg/actuation nasal spray  Commonly known as: FLONASE  2 sprays (100 mcg total) by Each Nostril route once daily.     JANUVIA 100 mg Tab  Generic drug: SITagliptin phosphate  Take 100 mg by mouth once daily.     levothyroxine 50 MCG tablet  Commonly known as: SYNTHROID  Take 50 mcg by mouth before breakfast.     LINZESS 290 mcg Cap capsule  Generic drug: linaCLOtide  Take 1 capsule every day by oral route for 30 days.     mupirocin 2 % ointment  Commonly known as: BACTROBAN  by Nasal route 2 (two) times daily.     pregabalin 150 MG capsule  Commonly known as: LYRICA  Take 1 capsule (150 mg total) by mouth 2 (two) times daily. Take two capsules by mouth three times a day     REXULTI 2 mg Tab  Generic drug: brexpiprazole  Take 1 tablet by mouth once daily.            STOP taking these medications      potassium chloride SA 20 MEQ tablet  Commonly known as: K-DUR,KLOR-CON              Indwelling Lines/Drains at time of discharge:   Lines/Drains/Airways       Peripherally Inserted Central Catheter Line  Duration             PICC Double Lumen 04/22/24 0945 left basilic <1 day                    Time spent on the discharge of patient: 45 minutes         Sanjuana Wray MD  Department of Hospital Medicine  [facility] - [facility]

## 2024-04-22 NOTE — ASSESSMENT & PLAN NOTE
Will repeat cultures today to determine clearance and end date for treatment. Patient prefers treatment at home with daptomycin, etc if possible. Case management is assisting.   ---  Blood culture x2 MRSA positive, drawn on 4/12/2024 4/14/2024 started on Vancomycin   Additional blood cultures were positive on 4/14, will continue to trend and treat until negative  WBC trending down  Wound culture positive for staph  Right Foot X-Ray - pending  ESR, CRP - pending    4/21:   Plan for PICC line and discharge with home health with IV antibiotics.  Bone scan to evaluate for osteomyelitis.

## 2024-04-22 NOTE — PLAN OF CARE
04/22/24 @ 1536 - Per CM conversation with MD earlier, pt will be ready for d/c today. Pt's d/c medication orders sent to [facility] along with Vanc trough levels. Pt will be required to complete bone scan before d/c. Radiology advised this would be about 1445, today. CM will continue to follow.

## 2024-04-22 NOTE — PROCEDURES
Procedure performed by Kiko FLORES under the supervision of Dr. Sheppard . 5 Bangladeshi PICC line placed in basilic vein in left upper extremity. Informed consent obtained including risks and possible complications. Patient pepped with chloro prep and draped in a sterile fashion. 2 cc 1% lidocaine infused. Utilizing U/S guidance a 45 cm 5 Bangladeshi PICC line placed and position verified by fluoroscopy. PICC line flushed with heparinized saline.Statlock and bandage applied. Patient tolerated procedure well with no immediate complication.

## 2024-04-22 NOTE — SUBJECTIVE & OBJECTIVE
Interval History:     Review of Systems   Constitutional:  Positive for fatigue. Negative for activity change, appetite change, chills and fever.   HENT: Negative.  Negative for congestion, dental problem, drooling, ear discharge, ear pain and facial swelling.    Eyes: Negative.    Respiratory:  Positive for cough and shortness of breath. Negative for chest tightness and wheezing.    Cardiovascular: Negative.  Negative for chest pain, palpitations and leg swelling.   Gastrointestinal: Negative.  Negative for abdominal distention, abdominal pain and constipation.   Endocrine: Negative.  Negative for cold intolerance and heat intolerance.   Genitourinary: Negative.  Negative for difficulty urinating, dysuria and enuresis.   Musculoskeletal: Negative.  Negative for arthralgias, back pain and gait problem.   Skin:  Positive for color change and wound.   Allergic/Immunologic: Negative.  Negative for environmental allergies and food allergies.   Neurological:  Positive for weakness. Negative for dizziness, light-headedness and headaches.   Hematological: Negative.    Psychiatric/Behavioral:  Positive for sleep disturbance. Negative for agitation, decreased concentration, dysphoric mood and hallucinations.    All other systems reviewed and are negative.    Objective:     Vital Signs (Most Recent):  Temp: 97.9 °F (36.6 °C) (04/21/24 1816)  Pulse: 66 (04/21/24 1816)  Resp: 18 (04/21/24 1816)  BP: (!) 150/79 (04/21/24 1816)  SpO2: 95 % (04/21/24 1816) Vital Signs (24h Range):  Temp:  [97.9 °F (36.6 °C)-98.2 °F (36.8 °C)] 97.9 °F (36.6 °C)  Pulse:  [59-72] 66  Resp:  [16-18] 18  SpO2:  [94 %-99 %] 95 %  BP: (109-150)/(64-79) 150/79     Weight: 59.9 kg (132 lb 0.9 oz)  Body mass index is 24.15 kg/m².  No intake or output data in the 24 hours ending 04/21/24 2009      Physical Exam  Constitutional:       General: She is not in acute distress.     Appearance: Normal appearance. She is not ill-appearing.   HENT:      Head:  Normocephalic and atraumatic.   Cardiovascular:      Rate and Rhythm: Normal rate and regular rhythm.   Pulmonary:      Effort: Pulmonary effort is normal. No respiratory distress.   Musculoskeletal:      Cervical back: No rigidity.        Feet:    Feet:      Right foot:      Skin integrity: Skin breakdown and erythema present.   Skin:     Coloration: Skin is not jaundiced or pale.      Findings: No rash.   Neurological:      Mental Status: She is alert.   Psychiatric:         Behavior: Behavior normal.         Thought Content: Thought content normal.             Significant Labs: All pertinent labs within the past 24 hours have been reviewed.    Significant Imaging: I have reviewed all pertinent imaging results/findings within the past 24 hours.

## 2024-04-22 NOTE — ASSESSMENT & PLAN NOTE
Osteomyelitis on bone scan.  Elevated inflammatory markers.      Echo    Left Ventricle: The left ventricle is normal in size. Mildly increased   wall thickness. There is mild concentric hypertrophy. There is normal   systolic function with a visually estimated ejection fraction of 55 - 70%.   Ejection fraction by visual approximation is 60%.    Aortic Valve: The aortic valve is a trileaflet valve. There is moderate   aortic valve sclerosis. Mildly calcified cusps. Mildly restricted motion.    Mitral Valve: There is moderate bileaflet sclerosis. Mildly thickened   leaflets. There is mild mitral annular calcification present. There is no   stenosis. The mean pressure gradient across the mitral valve is 3 mmHg at   a heart rate of  bpm.    IVC/SVC: Normal venous pressure at 3 mmHg.    Pericardium: There is a trivial effusion.  NM Bone Scan 3 Phase Foot  Narrative: EXAMINATION:  NM BONE SCAN 3 PHASE FOOT    CLINICAL HISTORY:  Post-menopause state, Osteoporosis  Foot swelling, diabetic, osteomyelitis suspected, xray done;right foot;    COMPARISON:  None available    FINDINGS:  Patient was injected with a 26.0 millicuries of technetium 99M MDP intravenously.  Blood flow, blood pool and delayed imaging over both feet.  There is increased activity in all phases overlying the 1st metatarsophalangeal joint.  No other areas of abnormal activity.  Impression: Increased activity all phases overlying the 1st metatarsophalangeal joint foot indicate osteomyelitis.    Antibiotics through 5/27.

## 2024-04-23 NOTE — PLAN OF CARE
[facility] Telemetry  Discharge Final Note    Primary Care Provider: Ariana Pinedo NP    Expected Discharge Date: 4/22/2024    Final Discharge Note (most recent)       Final Note - 04/23/24 0836          Final Note    Assessment Type Final Discharge Note     Anticipated Discharge Disposition Home-Health Care Svc        Post-Acute Status    Post-Acute Authorization IV Infusion;Home Health     Home Health Status Set-up Complete/Auth obtained     IV Infusion Status Set-up Complete/Auth obtained     Discharge Delays None known at this time                   Contact Info       Ariana Pinedo NP   Specialty: Family Medicine   Relationship: PCP - General    [address] [place] [address] Ext  [place] [address]   Phone: [phone]       Next Steps: Follow up in 1 week(s)    Instructions: hospital follow-up; patient already booked it for 9am on Friday.    Tee Jade DO   Specialty: General Surgery, Surgery    [address] [facility] [place] [address]   Phone: [phone]       Next Steps: Follow up in 2 week(s)    Instructions: hospital follow-up for MRSA osteomyelitis demonstrated on bone scan          Pt d/c home with [facility] for IV Abx.

## 2024-04-23 NOTE — NURSING
Discharge Instructions, Medication instructions and follow-up Appt instructions were explained to Patient and  by Charge Nurse, Both voiced understanding. Copy of all Discharge Instructions were Provided to Patient. NAD noted, Denies Pain. Pt to discharge Home with Home Health assisting with care. Pt noted to have PICC Double Lumen in basilic vein in left upper extremity, dsg noted to be Clean, Dry and intact. No drainage;No redness;No swelling;No warmth No abnormal discoloration;No redness;Dry;Warm;No swelling;No warmth noted to ext. Offered to Wheel patient out to Personal Vehicle, Pt declined and stated that she would rather walk. Pt was accompanied by her Spouse.

## 2024-04-26 ENCOUNTER — HOSPITAL ENCOUNTER (EMERGENCY)
Facility: HOSPITAL | Age: 64
Discharge: HOME | End: 2024-04-26
Payer: MEDICAID

## 2024-04-26 VITALS
HEIGHT: 62 IN | OXYGEN SATURATION: 97 % | RESPIRATION RATE: 16 BRPM | TEMPERATURE: 98 F | HEART RATE: 71 BPM | SYSTOLIC BLOOD PRESSURE: 157 MMHG | DIASTOLIC BLOOD PRESSURE: 81 MMHG | WEIGHT: 127 LBS | BODY MASS INDEX: 23.37 KG/M2

## 2024-04-26 DIAGNOSIS — L08.9 WOUND INFECTION: Primary | ICD-10-CM

## 2024-04-26 DIAGNOSIS — T14.8XXA WOUND INFECTION: Primary | ICD-10-CM

## 2024-04-26 LAB
ALBUMIN SERPL BCP-MCNC: 3 G/DL (ref 3.5–5)
ALBUMIN/GLOB SERPL: 0.7 {RATIO}
ALP SERPL-CCNC: 83 U/L (ref 50–130)
ALT SERPL W P-5'-P-CCNC: 16 U/L (ref 13–56)
ANION GAP SERPL CALCULATED.3IONS-SCNC: 12 MMOL/L (ref 7–16)
AST SERPL W P-5'-P-CCNC: 11 U/L (ref 15–37)
BACTERIA BLD CULT: NORMAL
BACTERIA BLD CULT: NORMAL
BASOPHILS # BLD AUTO: 0.15 K/UL (ref 0–0.2)
BASOPHILS NFR BLD AUTO: 1.1 % (ref 0–1)
BILIRUB SERPL-MCNC: 0.4 MG/DL (ref ?–1.2)
BUN SERPL-MCNC: 13 MG/DL (ref 7–18)
BUN/CREAT SERPL: 14 (ref 6–20)
CALCIUM SERPL-MCNC: 8.8 MG/DL (ref 8.5–10.1)
CHLORIDE SERPL-SCNC: 94 MMOL/L (ref 98–107)
CO2 SERPL-SCNC: 29 MMOL/L (ref 21–32)
CREAT SERPL-MCNC: 0.9 MG/DL (ref 0.55–1.02)
DIFFERENTIAL METHOD BLD: ABNORMAL
EGFR (NO RACE VARIABLE) (RUSH/TITUS): 72 ML/MIN/1.73M2
EOSINOPHIL # BLD AUTO: 0.38 K/UL (ref 0–0.5)
EOSINOPHIL NFR BLD AUTO: 2.7 % (ref 1–4)
EOSINOPHIL NFR BLD MANUAL: 1 % (ref 1–4)
ERYTHROCYTE [DISTWIDTH] IN BLOOD BY AUTOMATED COUNT: 15.4 % (ref 11.5–14.5)
GLOBULIN SER-MCNC: 4.3 G/DL (ref 2–4)
GLUCOSE SERPL-MCNC: 102 MG/DL (ref 74–106)
HCT VFR BLD AUTO: 33.5 % (ref 38–47)
HGB BLD-MCNC: 10.6 G/DL (ref 12–16)
LACTATE SERPL-SCNC: 1 MMOL/L (ref 0.4–2)
LYMPHOCYTES # BLD AUTO: 2.18 K/UL (ref 1–4.8)
LYMPHOCYTES NFR BLD AUTO: 15.3 % (ref 27–41)
LYMPHOCYTES NFR BLD MANUAL: 18 % (ref 27–41)
MCH RBC QN AUTO: 26.2 PG (ref 27–31)
MCHC RBC AUTO-ENTMCNC: 31.6 G/DL (ref 32–36)
MCV RBC AUTO: 82.7 FL (ref 80–96)
MONOCYTES # BLD AUTO: 1.15 K/UL (ref 0–0.8)
MONOCYTES NFR BLD AUTO: 8.1 % (ref 2–6)
MPC BLD CALC-MCNC: 9.8 FL (ref 9.4–12.4)
NEUTROPHILS # BLD AUTO: 10.39 K/UL (ref 1.8–7.7)
NEUTROPHILS NFR BLD AUTO: 72.8 % (ref 53–65)
NEUTS BAND NFR BLD MANUAL: 1 % (ref 1–5)
NEUTS SEG NFR BLD MANUAL: 80 % (ref 50–62)
NRBC BLD MANUAL-RTO: ABNORMAL %
PLATELET # BLD AUTO: 561 K/UL (ref 150–400)
POTASSIUM SERPL-SCNC: 3.4 MMOL/L (ref 3.5–5.1)
PROT SERPL-MCNC: 7.3 G/DL (ref 6.4–8.2)
RBC # BLD AUTO: 4.05 M/UL (ref 4.2–5.4)
SODIUM SERPL-SCNC: 132 MMOL/L (ref 136–145)
WBC # BLD AUTO: 14.25 K/UL (ref 4.5–11)

## 2024-04-26 PROCEDURE — 80053 COMPREHEN METABOLIC PANEL: CPT | Performed by: REGISTERED NURSE

## 2024-04-26 PROCEDURE — 99284 EMERGENCY DEPT VISIT MOD MDM: CPT | Mod: ,,, | Performed by: REGISTERED NURSE

## 2024-04-26 PROCEDURE — 36415 COLL VENOUS BLD VENIPUNCTURE: CPT | Performed by: REGISTERED NURSE

## 2024-04-26 PROCEDURE — 99900035 HC TECH TIME PER 15 MIN (STAT)

## 2024-04-26 PROCEDURE — 85025 COMPLETE CBC W/AUTO DIFF WBC: CPT | Performed by: REGISTERED NURSE

## 2024-04-26 PROCEDURE — 83605 ASSAY OF LACTIC ACID: CPT | Performed by: REGISTERED NURSE

## 2024-04-26 PROCEDURE — 99284 EMERGENCY DEPT VISIT MOD MDM: CPT | Mod: 25

## 2024-04-26 NOTE — ED PROVIDER NOTES
"Encounter Date: 2024       History     Chief Complaint   Patient presents with    Wound Infection     64 y-old  female received to ED with complaint of "my white blood cell count is up." Patient was seen by her PCP this AM where she had baseline lab drawn. Her PCP told her to present to the ED for evaluation.       Review of patient's allergies indicates:   Allergen Reactions    Opioids - morphine analogues      Past Medical History:   Diagnosis Date    Anxiety     Chronic pain syndrome     Depression     Diabetes mellitus     GERD (gastroesophageal reflux disease)     Hypertension     Hypothyroidism     Low back pain     Low vitamin B12 level     Lumbar radiculopathy     Neuropathy      Past Surgical History:   Procedure Laterality Date    Bilateral L3-S1 MBB Bilateral 2019, 2019    Dr Barclay     SECTION      COLON SURGERY      HERNIA REPAIR      Left L3-S1 RFTC Left 10/23/2019    Dr Barclay    RADIOFREQUENCY ABLATION OF LUMBAR MEDIAL BRANCH NERVE AT SINGLE LEVEL Right 10/25/2022    Procedure: Radiofrequency Ablation, Nerve, Spinal, Lumbar, Medial Branch, Level L4-S1;  Surgeon: Roselia Barclay MD;  Location: [facility];  Service: Pain Management;  Laterality: Right;  vaccinated.schedule LEFT after right is done.    RADIOFREQUENCY ABLATION OF LUMBAR MEDIAL BRANCH NERVE AT SINGLE LEVEL Left 2022    Procedure: LEFT L4-S1 RFTC  (HAD RIGHT ON 10-25);  Surgeon: Roselia Barclay MD;  Location: [facility];  Service: Pain Management;  Laterality: Left;  VAC SELENE IN EPIC    Right L3-S1 RFTC Right 2019    Dr Barclay    SPINAL CORD STIMULATOR IMPLANT       Family History   Problem Relation Name Age of Onset    Hypertension Father      Heart disease Father      Stroke Maternal Grandfather      Bipolar disorder Paternal Grandmother      Heart disease Paternal Grandfather       Social History     Tobacco Use    Smoking status: Every Day     Types: Vaping with nicotine     " Passive exposure: Past    Smokeless tobacco: Never    Tobacco comments:     Smoked cigarettes 46 years. Stopped smoking cigarettes and started vaping with nicotine x 2 years   Substance Use Topics    Alcohol use: Yes    Drug use: Never     Review of Systems   Constitutional: Negative.    HENT: Negative.     Eyes: Negative.    Respiratory: Negative.     Cardiovascular: Negative.    Gastrointestinal: Negative.    Endocrine: Negative.    Genitourinary: Negative.    Musculoskeletal: Negative.    Skin:  Positive for wound.   Allergic/Immunologic: Negative.    Neurological: Negative.    Hematological: Negative.    Psychiatric/Behavioral: Negative.         Physical Exam     Initial Vitals [04/26/24 1220]   BP Pulse Resp Temp SpO2   (!) 157/81 71 16 97.6 °F (36.4 °C) 97 %      MAP       --         Physical Exam    Nursing note and vitals reviewed.  Constitutional: She appears well-developed and well-nourished.   HENT:   Head: Normocephalic and atraumatic.   Right Ear: External ear normal.   Left Ear: External ear normal.   Nose: Nose normal.   Mouth/Throat: Oropharynx is clear and moist.   Eyes: Conjunctivae are normal.   Neck: Neck supple.   Normal range of motion.  Cardiovascular:  Normal rate, regular rhythm, normal heart sounds and intact distal pulses.           Pulmonary/Chest: Breath sounds normal.   Abdominal: Abdomen is soft. Bowel sounds are normal.   Musculoskeletal:         General: Normal range of motion.      Cervical back: Normal range of motion and neck supple.      Comments: Small area of redness to her right foot.      Neurological: She is alert and oriented to person, place, and time. She has normal strength. GCS score is 15. GCS eye subscore is 4. GCS verbal subscore is 5. GCS motor subscore is 6.   Skin: Skin is warm and dry. Capillary refill takes less than 2 seconds.   Psychiatric: She has a normal mood and affect. Her behavior is normal. Judgment and thought content normal.         Medical Screening  Exam   See Full Note    ED Course   Procedures  Labs Reviewed   COMPREHENSIVE METABOLIC PANEL - Abnormal; Notable for the following components:       Result Value    Sodium 132 (*)     Potassium 3.4 (*)     Chloride 94 (*)     Albumin 3.0 (*)     Globulin 4.3 (*)     AST 11 (*)     All other components within normal limits   CBC WITH DIFFERENTIAL - Abnormal; Notable for the following components:    WBC 14.25 (*)     RBC 4.05 (*)     Hemoglobin 10.6 (*)     Hematocrit 33.5 (*)     MCH 26.2 (*)     MCHC 31.6 (*)     RDW 15.4 (*)     Platelet Count 561 (*)     Neutrophils % 72.8 (*)     Lymphocytes % 15.3 (*)     Neutrophils, Abs 10.39 (*)     Monocytes % 8.1 (*)     Basophils % 1.1 (*)     Monocytes, Absolute 1.15 (*)     All other components within normal limits   MANUAL DIFFERENTIAL - Abnormal; Notable for the following components:    Segmented Neutrophils, Man % 80 (*)     Lymphocytes, Man % 18 (*)     All other components within normal limits   LACTIC ACID, PLASMA - Normal   CBC W/ AUTO DIFFERENTIAL    Narrative:     The following orders were created for panel order CBC auto differential.  Procedure                               Abnormality         Status                     ---------                               -----------         ------                     CBC with Differential[[id number]]       Abnormal            Final result               Manual Differential[[id number]]         Abnormal            Final result                 Please view results for these tests on the individual orders.          Imaging Results              X-Ray Chest PA And Lateral (Final result)  Result time 04/26/24 12:51:23      Final result by Kenji Conrad II, MD (04/26/24 12:51:23)                   Impression:      But improved left upper lung density from previous.  No other changes.      Electronically signed by: Kenji Conrad  Date:    04/26/2024  Time:    12:51               Narrative:    EXAMINATION:  XR CHEST PA AND  "LATERAL    CLINICAL HISTORY:  leukocytosis;    COMPARISON:  17 April 2024    TECHNIQUE:  XR CHEST PA AND LATERAL    FINDINGS:  The heart and mediastinum are normal in size and configuration.  The pulmonary vascularity is normal in caliber.  Left upper lung density has improved.  No new lung infiltrates, effusions, pneumothorax or other abnormality is demonstrated.                                       Medications - No data to display  Medical Decision Making  64 y-old  female received to ED with complaint of "my white blood cell count is up." Patient was seen by her PCP this AM where she had baseline lab drawn. Her PCP told her to present to the ED for evaluation.       Problems Addressed:  Wound infection:     Details: Patient is being treated for osteomyelitis and bacteremia    Amount and/or Complexity of Data Reviewed  Labs: ordered.     Details: WBC 14.25, Na+132, K+ 3.4, BUN/Creat 13/0.9  Radiology: ordered.     Details: Chest X-ray: The heart and mediastinum are normal in size and configuration.  The pulmonary vascularity is normal in caliber.  Left upper lung density has improved.  No new lung infiltrates, effusions, pneumothorax or other abnormality is demonstrated.    Risk  Risk Details: Normal Lactic acid. WBC 14.25. She has no complaints. CXR shows The heart and mediastinum are normal in size and configuration.  The pulmonary vascularity is normal in caliber.  Left upper lung density has improved.  No new lung infiltrates, effusions, pneumothorax or other abnormality is demonstrated. She has had multiple CX which all show MRSA that is sensitive to Vanc which she is currently taking. I discussed this at length including my recommendation to continue current therapy and return as needed.                                       Clinical Impression:   Final diagnoses:  [T14.8XXA, L08.9] Wound infection (Primary)        ED Disposition Condition    Discharge Stable          ED Prescriptions    None   "     Follow-up Information    None          Abran Piper NP-C  04/26/24 3059

## 2024-04-26 NOTE — ED TRIAGE NOTES
Here from [facility] office for evaluation of elevated white count.  Receiving IV vancomycin at home for infected bone MRSA in foot.  States coughing some, non productive.

## 2024-04-29 ENCOUNTER — TELEPHONE (OUTPATIENT)
Dept: EMERGENCY MEDICINE | Facility: HOSPITAL | Age: 64
End: 2024-04-29
Payer: MEDICAID

## 2024-04-29 NOTE — TELEPHONE ENCOUNTER
PT STATES SHE IS FEELING SOME BETTER, BUT STILL FEELS FATIGUED. ENCOURAGED TO FU WITH HER SURGEON THAT IS TREATING HER FOOT WOUND INFECTION AND HAS HER ON THE VANCOMYCIN.

## 2024-05-06 ENCOUNTER — LAB REQUISITION (OUTPATIENT)
Dept: LAB | Facility: HOSPITAL | Age: 64
End: 2024-05-06
Attending: NURSE PRACTITIONER
Payer: MEDICAID

## 2024-05-06 DIAGNOSIS — M86.171 OTHER ACUTE OSTEOMYELITIS, RIGHT ANKLE AND FOOT: ICD-10-CM

## 2024-05-06 LAB
ANION GAP SERPL CALCULATED.3IONS-SCNC: 14 MMOL/L (ref 7–16)
BASOPHILS # BLD AUTO: 0.05 K/UL (ref 0–0.2)
BASOPHILS NFR BLD AUTO: 0.7 % (ref 0–1)
BUN SERPL-MCNC: 11 MG/DL (ref 7–18)
BUN/CREAT SERPL: 16 (ref 6–20)
CALCIUM SERPL-MCNC: 8.2 MG/DL (ref 8.5–10.1)
CHLORIDE SERPL-SCNC: 94 MMOL/L (ref 98–107)
CO2 SERPL-SCNC: 29 MMOL/L (ref 21–32)
CREAT SERPL-MCNC: 0.7 MG/DL (ref 0.55–1.02)
DIFFERENTIAL METHOD BLD: ABNORMAL
EGFR (NO RACE VARIABLE) (RUSH/TITUS): 97 ML/MIN/1.73M2
EOSINOPHIL # BLD AUTO: 0.98 K/UL (ref 0–0.5)
EOSINOPHIL NFR BLD AUTO: 13 % (ref 1–4)
EOSINOPHIL NFR BLD MANUAL: 11 % (ref 1–4)
ERYTHROCYTE [DISTWIDTH] IN BLOOD BY AUTOMATED COUNT: 15 % (ref 11.5–14.5)
GLUCOSE SERPL-MCNC: 106 MG/DL (ref 74–106)
HCT VFR BLD AUTO: 30.1 % (ref 38–47)
HGB BLD-MCNC: 9.9 G/DL (ref 12–16)
LYMPHOCYTES # BLD AUTO: 1.74 K/UL (ref 1–4.8)
LYMPHOCYTES NFR BLD AUTO: 23 % (ref 27–41)
LYMPHOCYTES NFR BLD MANUAL: 20 % (ref 27–41)
MCH RBC QN AUTO: 26.8 PG (ref 27–31)
MCHC RBC AUTO-ENTMCNC: 32.9 G/DL (ref 32–36)
MCV RBC AUTO: 81.6 FL (ref 80–96)
MONOCYTES # BLD AUTO: 0.62 K/UL (ref 0–0.8)
MONOCYTES NFR BLD AUTO: 8.2 % (ref 2–6)
MONOCYTES NFR BLD MANUAL: 4 % (ref 2–6)
MPC BLD CALC-MCNC: 11 FL (ref 9.4–12.4)
NEUTROPHILS # BLD AUTO: 4.17 K/UL (ref 1.8–7.7)
NEUTROPHILS NFR BLD AUTO: 55.1 % (ref 53–65)
NEUTS BAND NFR BLD MANUAL: 1 % (ref 1–5)
NEUTS SEG NFR BLD MANUAL: 64 % (ref 50–62)
NRBC BLD MANUAL-RTO: ABNORMAL %
PLATELET # BLD AUTO: 366 K/UL (ref 150–400)
PLATELET MORPHOLOGY: NORMAL
POTASSIUM SERPL-SCNC: 3.7 MMOL/L (ref 3.5–5.1)
RBC # BLD AUTO: 3.69 M/UL (ref 4.2–5.4)
RBC MORPH BLD: NORMAL
SODIUM SERPL-SCNC: 133 MMOL/L (ref 136–145)
VANCOMYCIN TROUGH SERPL-MCNC: 7 ΜG/ML (ref 10–20)
WBC # BLD AUTO: 7.56 K/UL (ref 4.5–11)

## 2024-05-06 PROCEDURE — 80048 BASIC METABOLIC PNL TOTAL CA: CPT | Performed by: NURSE PRACTITIONER

## 2024-05-06 PROCEDURE — 80202 ASSAY OF VANCOMYCIN: CPT | Performed by: NURSE PRACTITIONER

## 2024-05-06 PROCEDURE — 85025 COMPLETE CBC W/AUTO DIFF WBC: CPT | Performed by: NURSE PRACTITIONER

## 2024-05-07 ENCOUNTER — OFFICE VISIT (OUTPATIENT)
Dept: SURGERY | Facility: CLINIC | Age: 64
End: 2024-05-07
Payer: MEDICAID

## 2024-05-07 DIAGNOSIS — M86.9 OSTEOMYELITIS, UNSPECIFIED SITE, UNSPECIFIED TYPE: Primary | ICD-10-CM

## 2024-05-07 PROCEDURE — 99213 OFFICE O/P EST LOW 20 MIN: CPT | Mod: PBBFAC | Performed by: STUDENT IN AN ORGANIZED HEALTH CARE EDUCATION/TRAINING PROGRAM

## 2024-05-07 PROCEDURE — 1159F MED LIST DOCD IN RCRD: CPT | Mod: CPTII,,, | Performed by: STUDENT IN AN ORGANIZED HEALTH CARE EDUCATION/TRAINING PROGRAM

## 2024-05-07 PROCEDURE — 3044F HG A1C LEVEL LT 7.0%: CPT | Mod: CPTII,,, | Performed by: STUDENT IN AN ORGANIZED HEALTH CARE EDUCATION/TRAINING PROGRAM

## 2024-05-07 PROCEDURE — 99204 OFFICE O/P NEW MOD 45 MIN: CPT | Mod: S$PBB,,, | Performed by: STUDENT IN AN ORGANIZED HEALTH CARE EDUCATION/TRAINING PROGRAM

## 2024-05-07 PROCEDURE — 1111F DSCHRG MED/CURRENT MED MERGE: CPT | Mod: CPTII,,, | Performed by: STUDENT IN AN ORGANIZED HEALTH CARE EDUCATION/TRAINING PROGRAM

## 2024-05-08 NOTE — PROGRESS NOTES
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  Patient was seen in the hospital and had a small ulcer with small abscess that was cleaned out; she does have bacteremia of MRSA and has been on IV antibiotics for 2 weeks.  Patient had a bone scan of the foot which showed increased activity all phases overlying the 1st metatarsophalangeal joint on the right foot in indicating osteomyelitis.  Patient denies any pain and states since she has been on IV vancomycin at home through her PICC line, the pain has drastically improved.  Denies any chest pain/shortness of breath, nausea/vomiting/diarrhea, fever/chills.    Chief Complaint   Patient presents with    Follow-up       Review of patient's allergies indicates:   Allergen Reactions    Opioids - morphine analogues        Current Outpatient Medications   Medication Sig Dispense Refill    albuterol (PROVENTIL/VENTOLIN HFA) 90 mcg/actuation inhaler INHALE ONE TO TWO PUFFS BY MOUTH EVERY 4 HOURS AS NEEDED FOR SHORTNESS OF BREATH OR WHEEZING 18 g 3    atorvastatin (LIPITOR) 80 MG tablet Take 1 tablet (80 mg total) by mouth every evening.      carvediloL (COREG) 12.5 MG tablet Take 1 tablet (12.5 mg total) by mouth 2 (two) times daily with meals. (Patient taking differently: Take 12.5 mg by mouth 2 (two) times daily.) 180 tablet 0    dextrose 5 % (D5W) SolP 250 mL with vancomycin 1,000 mg SolR 1,000 mg Inject 1,000 mg into the vein every 24 hours as needed (MRSA pneumonia).      DULoxetine (CYMBALTA) 60 MG capsule Take 60 mg by mouth 2 (two) times daily.      esomeprazole (NEXIUM) 40 MG capsule Take 40 mg by mouth once daily.      fluticasone propionate (FLONASE) 50 mcg/actuation nasal spray 2 sprays (100 mcg total) by Each Nostril route once daily. 16 g 3    JANUVIA 100 mg Tab Take 100 mg by mouth once daily.      levothyroxine  (SYNTHROID) 50 MCG tablet Take 50 mcg by mouth before breakfast.      LINZESS 290 mcg Cap capsule Take 1 capsule every day by oral route for 30 days.      mupirocin (BACTROBAN) 2 % ointment by Nasal route 2 (two) times daily.      pregabalin (LYRICA) 150 MG capsule Take 1 capsule (150 mg total) by mouth 2 (two) times daily. Take two capsules by mouth three times a day 180 capsule 3    REXULTI 2 mg Tab Take 1 tablet by mouth once daily.       No current facility-administered medications for this visit.       Past Medical History:   Diagnosis Date    Anxiety     Chronic pain syndrome     Depression     Diabetes mellitus     GERD (gastroesophageal reflux disease)     Hypertension     Hypothyroidism     Low back pain     Low vitamin B12 level     Lumbar radiculopathy     Neuropathy      Past Surgical History:   Procedure Laterality Date    Bilateral L3-S1 MBB Bilateral 2019, 2019    Dr Barclay     SECTION      COLON SURGERY      HERNIA REPAIR      Left L3-S1 RFTC Left 10/23/2019    Dr Barclay    RADIOFREQUENCY ABLATION OF LUMBAR MEDIAL BRANCH NERVE AT SINGLE LEVEL Right 10/25/2022    Procedure: Radiofrequency Ablation, Nerve, Spinal, Lumbar, Medial Branch, Level L4-S1;  Surgeon: Roselia Barclay MD;  Location: [facility];  Service: Pain Management;  Laterality: Right;  vaccinated.schedule LEFT after right is done.    RADIOFREQUENCY ABLATION OF LUMBAR MEDIAL BRANCH NERVE AT SINGLE LEVEL Left 2022    Procedure: LEFT L4-S1 RFTC  (HAD RIGHT ON 10-25);  Surgeon: Roselia Barclay MD;  Location: [facility];  Service: Pain Management;  Laterality: Left;  VAC SELENE IN EPIC    Right L3-S1 RFTC Right 2019    Dr Barclay    SPINAL CORD STIMULATOR IMPLANT       Family History   Problem Relation Name Age of Onset    Hypertension Father      Heart disease Father      Stroke Maternal Grandfather      Bipolar disorder Paternal Grandmother      Heart disease Paternal Grandfather       Social  History     Tobacco Use    Smoking status: Every Day     Types: Vaping with nicotine     Passive exposure: Past    Smokeless tobacco: Never    Tobacco comments:     Smoked cigarettes 46 years. Stopped smoking cigarettes and started vaping with nicotine x 2 years   Substance Use Topics    Alcohol use: Yes    Drug use: Never        Review of Systems:  Review of Systems   Constitutional: Negative.  Negative for fatigue and unexpected weight change.   HENT: Negative.  Negative for trouble swallowing.    Eyes: Negative.    Respiratory: Negative.  Negative for chest tightness and shortness of breath.    Cardiovascular: Negative.  Negative for chest pain.   Gastrointestinal: Negative.  Negative for abdominal pain, blood in stool and nausea.   Endocrine: Negative.    Genitourinary: Negative.  Negative for hematuria.   Musculoskeletal: Negative.  Negative for back pain and myalgias.   Neurological: Negative.  Negative for dizziness, speech difficulty, weakness and light-headedness.   Psychiatric/Behavioral: Negative.  Negative for agitation and behavioral problems.           Objective     Vital Signs (Most Recent)              Physical Exam:  Physical Exam  Constitutional:       General: She is not in acute distress.     Appearance: Normal appearance.   HENT:      Head: Normocephalic.   Cardiovascular:      Rate and Rhythm: Normal rate.   Pulmonary:      Effort: Pulmonary effort is normal. No respiratory distress.   Abdominal:      General: There is no distension.      Tenderness: There is no abdominal tenderness.   Musculoskeletal:         General: Normal range of motion.        Feet:    Feet:      Comments: Small 0.5 cm opening from previous abscess which is healing with granulation tissue.  Some surrounding erythema; no induration or fluctuance  Skin:     General: Skin is warm.      Coloration: Skin is not jaundiced.   Neurological:      General: No focal deficit present.      Mental Status: She is alert and oriented to  person, place, and time.      Cranial Nerves: No cranial nerve deficit.            Assessment and Plan   Right 1st metatarsophalangeal joint osteomyelitis    PLAN:    Patient needs another 4 weeks of IV vancomycin; we will call viral care and give the prescription to continue those dosages as well as the labs that she will get weekly.  Patient to follow back in 4 weeks

## 2024-06-04 ENCOUNTER — OFFICE VISIT (OUTPATIENT)
Dept: SURGERY | Facility: CLINIC | Age: 64
End: 2024-06-04
Payer: MEDICAID

## 2024-06-04 DIAGNOSIS — M86.9 OSTEOMYELITIS, UNSPECIFIED SITE, UNSPECIFIED TYPE: Primary | ICD-10-CM

## 2024-06-04 PROCEDURE — 99213 OFFICE O/P EST LOW 20 MIN: CPT | Mod: S$PBB,,, | Performed by: STUDENT IN AN ORGANIZED HEALTH CARE EDUCATION/TRAINING PROGRAM

## 2024-06-04 PROCEDURE — 3044F HG A1C LEVEL LT 7.0%: CPT | Mod: CPTII,,, | Performed by: STUDENT IN AN ORGANIZED HEALTH CARE EDUCATION/TRAINING PROGRAM

## 2024-06-04 PROCEDURE — 1159F MED LIST DOCD IN RCRD: CPT | Mod: CPTII,,, | Performed by: STUDENT IN AN ORGANIZED HEALTH CARE EDUCATION/TRAINING PROGRAM

## 2024-06-04 PROCEDURE — 99213 OFFICE O/P EST LOW 20 MIN: CPT | Mod: PBBFAC | Performed by: STUDENT IN AN ORGANIZED HEALTH CARE EDUCATION/TRAINING PROGRAM

## 2024-06-04 NOTE — PROGRESS NOTES
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  Patient was seen in the hospital and had a small ulcer with small abscess that was cleaned out; she does have bacteremia of MRSA and has been on IV antibiotics for 2 weeks.  Patient had a bone scan of the foot which showed increased activity all phases overlying the 1st metatarsophalangeal joint on the right foot in indicating osteomyelitis.  Patient denies any pain and states since she has been on IV vancomycin at home through her PICC line, the pain has drastically improved.  Denies any chest pain/shortness of breath, nausea/vomiting/diarrhea, fever/chills.    Chief Complaint   Patient presents with    Wound Check       Review of patient's allergies indicates:   Allergen Reactions    Opioids - morphine analogues        Current Outpatient Medications   Medication Sig Dispense Refill    albuterol (PROVENTIL/VENTOLIN HFA) 90 mcg/actuation inhaler INHALE ONE TO TWO PUFFS BY MOUTH EVERY 4 HOURS AS NEEDED FOR SHORTNESS OF BREATH OR WHEEZING 18 g 3    atorvastatin (LIPITOR) 80 MG tablet Take 1 tablet (80 mg total) by mouth every evening.      carvediloL (COREG) 12.5 MG tablet Take 1 tablet (12.5 mg total) by mouth 2 (two) times daily with meals. (Patient taking differently: Take 12.5 mg by mouth 2 (two) times daily.) 180 tablet 0    dextrose 5 % (D5W) SolP 250 mL with vancomycin 1,000 mg SolR 1,000 mg Inject 1,000 mg into the vein every 24 hours as needed (MRSA pneumonia).      DULoxetine (CYMBALTA) 60 MG capsule Take 60 mg by mouth 2 (two) times daily.      esomeprazole (NEXIUM) 40 MG capsule Take 40 mg by mouth once daily.      fluticasone propionate (FLONASE) 50 mcg/actuation nasal spray 2 sprays (100 mcg total) by Each Nostril route once daily. 16 g 3    JANUVIA 100 mg Tab Take 100 mg by mouth once daily.      levothyroxine  (SYNTHROID) 50 MCG tablet Take 50 mcg by mouth before breakfast.      LINZESS 290 mcg Cap capsule Take 1 capsule every day by oral route for 30 days.      mupirocin (BACTROBAN) 2 % ointment by Nasal route 2 (two) times daily.      pregabalin (LYRICA) 150 MG capsule Take 1 capsule (150 mg total) by mouth 2 (two) times daily. Take two capsules by mouth three times a day 180 capsule 3    REXULTI 2 mg Tab Take 1 tablet by mouth once daily.       No current facility-administered medications for this visit.       Past Medical History:   Diagnosis Date    Anxiety     Chronic pain syndrome     Depression     Diabetes mellitus     GERD (gastroesophageal reflux disease)     Hypertension     Hypothyroidism     Low back pain     Low vitamin B12 level     Lumbar radiculopathy     Neuropathy      Past Surgical History:   Procedure Laterality Date    Bilateral L3-S1 MBB Bilateral 2019, 2019    Dr Barclay     SECTION      COLON SURGERY      HERNIA REPAIR      Left L3-S1 RFTC Left 10/23/2019    Dr Barclay    RADIOFREQUENCY ABLATION OF LUMBAR MEDIAL BRANCH NERVE AT SINGLE LEVEL Right 10/25/2022    Procedure: Radiofrequency Ablation, Nerve, Spinal, Lumbar, Medial Branch, Level L4-S1;  Surgeon: Roselia Barclay MD;  Location: [facility];  Service: Pain Management;  Laterality: Right;  vaccinated.schedule LEFT after right is done.    RADIOFREQUENCY ABLATION OF LUMBAR MEDIAL BRANCH NERVE AT SINGLE LEVEL Left 2022    Procedure: LEFT L4-S1 RFTC  (HAD RIGHT ON 10-25);  Surgeon: Roselia Barclay MD;  Location: [facility];  Service: Pain Management;  Laterality: Left;  VAC SELENE IN EPIC    Right L3-S1 RFTC Right 2019    Dr Barclay    SPINAL CORD STIMULATOR IMPLANT       Family History   Problem Relation Name Age of Onset    Hypertension Father      Heart disease Father      Stroke Maternal Grandfather      Bipolar disorder Paternal Grandmother      Heart disease Paternal Grandfather       Social  History     Tobacco Use    Smoking status: Every Day     Types: Vaping with nicotine     Passive exposure: Past    Smokeless tobacco: Never    Tobacco comments:     Smoked cigarettes 46 years. Stopped smoking cigarettes and started vaping with nicotine x 2 years   Substance Use Topics    Alcohol use: Yes    Drug use: Never        Review of Systems:  Review of Systems   Constitutional: Negative.  Negative for fatigue and unexpected weight change.   HENT: Negative.  Negative for trouble swallowing.    Eyes: Negative.    Respiratory: Negative.  Negative for chest tightness and shortness of breath.    Cardiovascular: Negative.  Negative for chest pain.   Gastrointestinal: Negative.  Negative for abdominal pain, blood in stool and nausea.   Endocrine: Negative.    Genitourinary: Negative.  Negative for hematuria.   Musculoskeletal: Negative.  Negative for back pain and myalgias.   Neurological: Negative.  Negative for dizziness, speech difficulty, weakness and light-headedness.   Psychiatric/Behavioral: Negative.  Negative for agitation and behavioral problems.           Objective     Vital Signs (Most Recent)              Physical Exam:  Physical Exam  Constitutional:       General: She is not in acute distress.     Appearance: Normal appearance.   HENT:      Head: Normocephalic.   Cardiovascular:      Rate and Rhythm: Normal rate.   Pulmonary:      Effort: Pulmonary effort is normal. No respiratory distress.   Abdominal:      General: There is no distension.      Tenderness: There is no abdominal tenderness.   Musculoskeletal:         General: Normal range of motion.        Feet:    Feet:      Comments:   Opening healed; still a small amount of erythema, no induration or fluctuance  Skin:     General: Skin is warm.      Coloration: Skin is not jaundiced.   Neurological:      General: No focal deficit present.      Mental Status: She is alert and oriented to person, place, and time.      Cranial Nerves: No cranial nerve  deficit.            Assessment and Plan   Right 1st metatarsophalangeal joint osteomyelitis    PLAN:     Patient completed 4 weeks of IV antibiotics; we will check CBC, BMP, C-reactive protein and sed rate, procalcitonin.       If level still elevated, we will get bone scan to evaluate and compare; if still showing active inflammation, patient may need another several weeks of IV antibiotics.

## 2024-06-06 ENCOUNTER — TELEPHONE (OUTPATIENT)
Dept: SURGERY | Facility: CLINIC | Age: 64
End: 2024-06-06
Payer: MEDICAID

## 2024-06-06 DIAGNOSIS — M86.9 OSTEOMYELITIS, UNSPECIFIED SITE, UNSPECIFIED TYPE: Primary | ICD-10-CM

## 2024-06-06 NOTE — TELEPHONE ENCOUNTER
----- Message from Kierra Garcia sent at 6/6/2024  2:55 PM CDT -----  Who Called: Magali Cheung    Caller is requesting information on test results.    Name of Test (Lab/Mammo/Etc): Labs   Date of Test: 6/4  Where the test was performed: [facility]  Ordering Provider: Kalie    Preferred Method of Contact: Phone Call  Patient's Preferred Phone Number on File: [phone]   Best Call Back Number, if different:  Additional Information:    Informed pt of bone scan scheduled for 6/11/24 at 0830 and additional 4 weeks of IV antibiotics, pt voiced understanding, order for IV antibiotics faxed to [facility].

## 2024-06-07 NOTE — TELEPHONE ENCOUNTER
----- Message from Tee Jade DO sent at 6/6/2024  5:06 PM CDT -----  Order a repeat bone scan of a right foot and set that up.  We will contact the patient once we get the results.  This will need to be done as soon as possible, being that the patient has a PICC line and may need further IV antibiotics.     Go ahead and call [facility] and approve for 4 more weeks of IV antibiotics.  If bone scan comes back negative, we will stop the antibiotics.  ----- Message -----  From: Lab, Background User  Sent: 6/4/2024   2:50 PM CDT  To: Tee Jade DO    Informed pt of bone scan scheduled for 6/11/24 at 0830 and additional 4 weeks of IV antibiotics, pt voiced understanding, order for IV antibiotics faxed to [facility].

## 2024-06-11 ENCOUNTER — HOSPITAL ENCOUNTER (OUTPATIENT)
Dept: RADIOLOGY | Facility: HOSPITAL | Age: 64
Discharge: HOME | End: 2024-06-11
Attending: STUDENT IN AN ORGANIZED HEALTH CARE EDUCATION/TRAINING PROGRAM
Payer: MEDICAID

## 2024-06-11 DIAGNOSIS — M86.9 OSTEOMYELITIS, UNSPECIFIED SITE, UNSPECIFIED TYPE: ICD-10-CM

## 2024-06-11 PROCEDURE — A9503 TC99M MEDRONATE: HCPCS | Performed by: STUDENT IN AN ORGANIZED HEALTH CARE EDUCATION/TRAINING PROGRAM

## 2024-06-11 PROCEDURE — 78315 BONE IMAGING 3 PHASE: CPT | Mod: 26,,, | Performed by: RADIOLOGY

## 2024-06-11 PROCEDURE — 78315 BONE IMAGING 3 PHASE: CPT | Mod: TC

## 2024-06-11 RX ADMIN — TECHNETIUM TC 99M MEDRONATE 25.3 MILLICURIE: 25 INJECTION, POWDER, FOR SOLUTION INTRAVENOUS at 08:06

## 2024-06-13 ENCOUNTER — HOSPITAL ENCOUNTER (EMERGENCY)
Facility: HOSPITAL | Age: 64
Discharge: HOME | End: 2024-06-13
Payer: MEDICAID

## 2024-06-13 VITALS
HEIGHT: 62 IN | SYSTOLIC BLOOD PRESSURE: 180 MMHG | BODY MASS INDEX: 24.29 KG/M2 | WEIGHT: 132 LBS | OXYGEN SATURATION: 99 % | RESPIRATION RATE: 17 BRPM | HEART RATE: 90 BPM | TEMPERATURE: 98 F | DIASTOLIC BLOOD PRESSURE: 100 MMHG

## 2024-06-13 DIAGNOSIS — R19.7 DIARRHEA OF PRESUMED INFECTIOUS ORIGIN: Primary | ICD-10-CM

## 2024-06-13 LAB
ANION GAP SERPL CALCULATED.3IONS-SCNC: 15 MMOL/L (ref 7–16)
BASOPHILS # BLD AUTO: 0.05 K/UL (ref 0–0.2)
BASOPHILS NFR BLD AUTO: 0.7 % (ref 0–1)
BUN SERPL-MCNC: 7 MG/DL (ref 7–18)
BUN/CREAT SERPL: 9 (ref 6–20)
CALCIUM SERPL-MCNC: 10.2 MG/DL (ref 8.5–10.1)
CHLORIDE SERPL-SCNC: 103 MMOL/L (ref 98–107)
CO2 SERPL-SCNC: 30 MMOL/L (ref 21–32)
CREAT SERPL-MCNC: 0.78 MG/DL (ref 0.55–1.02)
DIFFERENTIAL METHOD BLD: ABNORMAL
EGFR (NO RACE VARIABLE) (RUSH/TITUS): 85 ML/MIN/1.73M2
EOSINOPHIL # BLD AUTO: 0.62 K/UL (ref 0–0.5)
EOSINOPHIL NFR BLD AUTO: 8.2 % (ref 1–4)
ERYTHROCYTE [DISTWIDTH] IN BLOOD BY AUTOMATED COUNT: 15.5 % (ref 11.5–14.5)
GLUCOSE SERPL-MCNC: 113 MG/DL (ref 74–106)
HCT VFR BLD AUTO: 33.6 % (ref 38–47)
HGB BLD-MCNC: 10.2 G/DL (ref 12–16)
LYMPHOCYTES # BLD AUTO: 1.54 K/UL (ref 1–4.8)
LYMPHOCYTES NFR BLD AUTO: 20.4 % (ref 27–41)
MCH RBC QN AUTO: 23.8 PG (ref 27–31)
MCHC RBC AUTO-ENTMCNC: 30.4 G/DL (ref 32–36)
MCV RBC AUTO: 78.5 FL (ref 80–96)
MONOCYTES # BLD AUTO: 0.52 K/UL (ref 0–0.8)
MONOCYTES NFR BLD AUTO: 6.9 % (ref 2–6)
MPC BLD CALC-MCNC: 9.4 FL (ref 9.4–12.4)
NEUTROPHILS # BLD AUTO: 4.82 K/UL (ref 1.8–7.7)
NEUTROPHILS NFR BLD AUTO: 63.8 % (ref 53–65)
PLATELET # BLD AUTO: 429 K/UL (ref 150–400)
POTASSIUM SERPL-SCNC: 4 MMOL/L (ref 3.5–5.1)
RBC # BLD AUTO: 4.28 M/UL (ref 4.2–5.4)
SODIUM SERPL-SCNC: 144 MMOL/L (ref 136–145)
WBC # BLD AUTO: 7.55 K/UL (ref 4.5–11)

## 2024-06-13 PROCEDURE — 99284 EMERGENCY DEPT VISIT MOD MDM: CPT | Mod: 25

## 2024-06-13 PROCEDURE — 99284 EMERGENCY DEPT VISIT MOD MDM: CPT | Mod: ,,,

## 2024-06-13 PROCEDURE — 85025 COMPLETE CBC W/AUTO DIFF WBC: CPT

## 2024-06-13 PROCEDURE — 63600175 PHARM REV CODE 636 W HCPCS

## 2024-06-13 PROCEDURE — 96361 HYDRATE IV INFUSION ADD-ON: CPT

## 2024-06-13 PROCEDURE — 36415 COLL VENOUS BLD VENIPUNCTURE: CPT

## 2024-06-13 PROCEDURE — 25000003 PHARM REV CODE 250

## 2024-06-13 PROCEDURE — 96374 THER/PROPH/DIAG INJ IV PUSH: CPT

## 2024-06-13 PROCEDURE — 80048 BASIC METABOLIC PNL TOTAL CA: CPT

## 2024-06-13 RX ORDER — ONDANSETRON HYDROCHLORIDE 2 MG/ML
4 INJECTION, SOLUTION INTRAVENOUS
Status: COMPLETED | OUTPATIENT
Start: 2024-06-13 | End: 2024-06-13

## 2024-06-13 RX ORDER — HEPARIN SODIUM (PORCINE) LOCK FLUSH IV SOLN 100 UNIT/ML 100 UNIT/ML
100 SOLUTION INTRAVENOUS
Status: DISCONTINUED | OUTPATIENT
Start: 2024-06-13 | End: 2024-06-13

## 2024-06-13 RX ORDER — HEPARIN SODIUM (PORCINE) LOCK FLUSH IV SOLN 100 UNIT/ML 100 UNIT/ML
10 SOLUTION INTRAVENOUS
Status: COMPLETED | OUTPATIENT
Start: 2024-06-13 | End: 2024-06-13

## 2024-06-13 RX ORDER — VANCOMYCIN HYDROCHLORIDE 125 MG/1
125 CAPSULE ORAL 4 TIMES DAILY
Qty: 56 CAPSULE | Refills: 0 | Status: SHIPPED | OUTPATIENT
Start: 2024-06-13 | End: 2024-06-27

## 2024-06-13 RX ORDER — DIPHENOXYLATE HYDROCHLORIDE AND ATROPINE SULFATE 2.5; .025 MG/1; MG/1
1 TABLET ORAL
Status: COMPLETED | OUTPATIENT
Start: 2024-06-13 | End: 2024-06-13

## 2024-06-13 RX ADMIN — DIPHENOXYLATE HYDROCHLORIDE AND ATROPINE SULFATE 1 TABLET: 2.5; .025 TABLET ORAL at 01:06

## 2024-06-13 RX ADMIN — HEPARIN SODIUM (PORCINE) LOCK FLUSH IV SOLN 100 UNIT/ML 500 UNITS: 100 SOLUTION at 03:06

## 2024-06-13 RX ADMIN — SODIUM CHLORIDE 1000 ML: 9 INJECTION, SOLUTION INTRAVENOUS at 01:06

## 2024-06-13 RX ADMIN — HEPARIN SODIUM (PORCINE) LOCK FLUSH IV SOLN 100 UNIT/ML 500 UNITS: 100 SOLUTION at 02:06

## 2024-06-13 RX ADMIN — ONDANSETRON 4 MG: 2 INJECTION INTRAMUSCULAR; INTRAVENOUS at 01:06

## 2024-06-13 NOTE — ED TRIAGE NOTES
Presents from [facility] office with diarrhea x 3 days.  States is on Daptomycin daily for osteomylitis.

## 2024-06-13 NOTE — ED PROVIDER NOTES
Encounter Date: 2024       History     Chief Complaint   Patient presents with    Diarrhea     Patient is a 64-year-old  female with a past medical history is significant for anxiety, depression, diabetes, GERD, hypertension, hypothyroidism, chronic low back pain, low vitamin B12, lumbar radiculopathy, and neuropathy who presents to the emergency department POV with complaint of diarrhea.  Patient is currently receiving weekly daptomycin secondary to osteomyelitis of the right foot due to diabetic foot ulcer.  Patient is being followed by wound care here at [facility].  Patient stated that her symptoms have been ongoing for the past 3 days, has had too numerous to count diarrhea bowel movements.  Patient denies any abdominal pain upon physical assessment, does endorse mild nausea, however does deny any fevers.    The history is provided by the patient.   Diarrhea   This is a recurrent problem. The current episode started several days ago. The problem occurs more than 10 times per day. The problem has been unchanged. The stool consistency is described as Mucous and watery. The patient states that diarrhea does not awaken her from sleep. Pertinent negatives include no abdominal pain, arthralgias, bloating, chills, coughing, fever, headaches, increased  flatus, myalgias, sweats, URI, vomiting or weight loss. She has tried nothing for the symptoms.     Review of patient's allergies indicates:   Allergen Reactions    Opioids - morphine analogues      Past Medical History:   Diagnosis Date    Anxiety     Chronic pain syndrome     Depression     Diabetes mellitus     GERD (gastroesophageal reflux disease)     Hypertension     Hypothyroidism     Low back pain     Low vitamin B12 level     Lumbar radiculopathy     Neuropathy      Past Surgical History:   Procedure Laterality Date    Bilateral L3-S1 MBB Bilateral 2019, 2019    Dr Barclay     SECTION      COLON SURGERY      HERNIA  REPAIR      Left L3-S1 RFTC Left 10/23/2019    Dr Barclay    RADIOFREQUENCY ABLATION OF LUMBAR MEDIAL BRANCH NERVE AT SINGLE LEVEL Right 10/25/2022    Procedure: Radiofrequency Ablation, Nerve, Spinal, Lumbar, Medial Branch, Level L4-S1;  Surgeon: Roselia Barclay MD;  Location: [facility];  Service: Pain Management;  Laterality: Right;  vaccinated.schedule LEFT after right is done.    RADIOFREQUENCY ABLATION OF LUMBAR MEDIAL BRANCH NERVE AT SINGLE LEVEL Left 11/17/2022    Procedure: LEFT L4-S1 RFTC  (HAD RIGHT ON 10-25);  Surgeon: Roselia Barclay MD;  Location: [facility];  Service: Pain Management;  Laterality: Left;  VAC SELENE IN EPIC    Right L3-S1 RFTC Right 12/16/2019    Dr Barclay    SPINAL CORD STIMULATOR IMPLANT       Family History   Problem Relation Name Age of Onset    Hypertension Father      Heart disease Father      Stroke Maternal Grandfather      Bipolar disorder Paternal Grandmother      Heart disease Paternal Grandfather       Social History     Tobacco Use    Smoking status: Every Day     Types: Vaping with nicotine     Passive exposure: Past    Smokeless tobacco: Never    Tobacco comments:     Smoked cigarettes 46 years. Stopped smoking cigarettes and started vaping with nicotine x 2 years   Substance Use Topics    Alcohol use: Yes    Drug use: Never     Review of Systems   Constitutional: Negative.  Negative for chills, fever and weight loss.   HENT: Negative.     Eyes: Negative.    Respiratory: Negative.  Negative for cough.    Cardiovascular: Negative.    Gastrointestinal:  Positive for diarrhea and nausea. Negative for abdominal pain, bloating, flatus and vomiting.   Endocrine: Negative.    Genitourinary: Negative.    Musculoskeletal: Negative.  Negative for arthralgias and myalgias.   Skin: Negative.    Allergic/Immunologic: Negative.    Neurological: Negative.  Negative for headaches.   Hematological: Negative.    Psychiatric/Behavioral: Negative.         Physical Exam      Initial Vitals [06/13/24 1250]   BP Pulse Resp Temp SpO2   (!) 191/120 91 16 97.9 °F (36.6 °C) 98 %      MAP       --         Physical Exam    Nursing note and vitals reviewed.  Constitutional: Vital signs are normal. She appears well-developed and well-nourished. She is not diaphoretic. She is cooperative.  Non-toxic appearance. She does not have a sickly appearance. She does not appear ill. No distress.   Cardiovascular:  Normal rate, regular rhythm, S1 normal, S2 normal, normal heart sounds, intact distal pulses and normal pulses.     Exam reveals no gallop, no S3, no S4, no distant heart sounds and no friction rub.       No murmur heard.  No systolic murmur is present.  No diastolic murmur is present.  Pulmonary/Chest: Effort normal and breath sounds normal.   Abdominal: Abdomen is soft and flat. Bowel sounds are normal. There is no abdominal tenderness. No hernia.     Neurological: She is alert and oriented to person, place, and time. She has normal strength and normal reflexes. She displays normal reflexes. No cranial nerve deficit or sensory deficit. She displays a negative Romberg sign. GCS eye subscore is 4. GCS verbal subscore is 5. GCS motor subscore is 6.   Skin: Skin is warm, dry and intact. Capillary refill takes less than 2 seconds. No rash noted.   Psychiatric: She has a normal mood and affect. Her speech is normal and behavior is normal. Judgment and thought content normal. Cognition and memory are normal.         Medical Screening Exam   See Full Note    ED Course   Procedures  Labs Reviewed   BASIC METABOLIC PANEL - Abnormal; Notable for the following components:       Result Value    Glucose 113 (*)     Calcium 10.2 (*)     All other components within normal limits   CBC WITH DIFFERENTIAL - Abnormal; Notable for the following components:    Hemoglobin 10.2 (*)     Hematocrit 33.6 (*)     MCV 78.5 (*)     MCH 23.8 (*)     MCHC 30.4 (*)     RDW 15.5 (*)     Platelet Count 429 (*)      Lymphocytes % 20.4 (*)     Monocytes % 6.9 (*)     Eosinophils % 8.2 (*)     Eosinophils, Absolute 0.62 (*)     All other components within normal limits   CBC W/ AUTO DIFFERENTIAL    Narrative:     The following orders were created for panel order CBC auto differential.  Procedure                               Abnormality         Status                     ---------                               -----------         ------                     CBC with Differential[[id number]]       Abnormal            Final result                 Please view results for these tests on the individual orders.          Imaging Results    None          Medications   sodium chloride 0.9% bolus 1,000 mL 1,000 mL (1,000 mLs Intravenous New Bag 6/13/24 1308)   heparin lock flush (porcine) injection 1,000 Units (has no administration in time range)   ondansetron injection 4 mg (4 mg Intravenous Given 6/13/24 1308)   diphenoxylate-atropine 2.5-0.025 mg per tablet 1 tablet (1 tablet Oral Given 6/13/24 1317)     Medical Decision Making  Patient is a 64-year-old  female with a past medical history is significant for anxiety, depression, diabetes, GERD, hypertension, hypothyroidism, chronic low back pain, low vitamin B12, lumbar radiculopathy, and neuropathy who presents to the emergency department POV with complaint of diarrhea.  Patient is currently receiving weekly daptomycin secondary to osteomyelitis of the right foot due to diabetic foot ulcer.  Patient is being followed by wound care here at [facility].  Patient stated that her symptoms have been ongoing for the past 3 days, has had too numerous to count diarrhea bowel movements.  Patient denies any abdominal pain upon physical assessment, does endorse mild nausea, however does deny any fevers.    The history is provided by the patient.   Diarrhea   This is a recurrent problem. The current episode started several days ago. The problem occurs more than 10 times per day.  The problem has been unchanged. The stool consistency is described as Mucous and watery. The patient states that diarrhea does not awaken her from sleep. Pertinent negatives include no abdominal pain, arthralgias, bloating, chills, coughing, fever, headaches, increased  flatus, myalgias, sweats, URI, vomiting or weight loss. She has tried nothing for the symptoms.       Amount and/or Complexity of Data Reviewed  Labs: ordered. Decision-making details documented in ED Course.    Risk  Prescription drug management.               ED Course as of 06/13/24 1338   Thu Jun 13, 2024   1313 Discussed with patient the possibility of C difficile colitis due to prolonged IV antibiotic treatment.  Patient will self collect stool sample and return it to the hospital lab with an outpatient order for C diff toxin.  Patient will need to follow up with her PCP next week for the results. [AC]   1324 Prescription for p.o. Vancomycin 125 mg 4 times a day for 2 weeks given just in case C diff toxin comes back positive over the weekend and patient is unable to see her primary care provider.  Patient is instructed to get vancomycin filled only if her C diff toxin is positive.  Patient is also instructed to get Align probiotic over-the-counter and start taking daily. [AC]   1326 Platelet Count(!): 429 [AC]   1327 RDW(!): 15.5 [AC]   1327 MCHC(!): 30.4 [AC]   1327 MCH(!): 23.8 [AC]   1327 MCV(!): 78.5 [AC]   1327 Hemoglobin(!): 10.2 [AC]   1327 Hematocrit(!): 33.6 [AC]   1335 Sodium: 144 [AC]   1336 Potassium: 4.0 [AC]   1336 Chloride: 103 [AC]   1336 CO2: 30 [AC]   1336 Anion Gap: 15 [AC]   1336 Glucose(!): 113 [AC]   1336 BUN: 7 [AC]   1336 Creatinine: 0.78 [AC]   1336 BUN/CREAT RATIO: 9 [AC]   1336 Calcium(!): 10.2 [AC]   1336 eGFR: 85 [AC]   1336 BP(!): 191/120  Will recheck prior to discharge and treat if still elevated. Patient is asymptomatic with normal neuro exam.  [AC]      ED Course User Index  [AC] Shadi Lundy FNP                              Clinical Impression:   Final diagnoses:  [R19.7] Diarrhea of presumed infectious origin (Primary)        ED Disposition Condition    Discharge Stable          ED Prescriptions       Medication Sig Dispense Start Date End Date Auth. Provider    vancomycin (VANCOCIN) 125 MG capsule Take 1 capsule (125 mg total) by mouth 4 (four) times daily. for 14 days 56 capsule 6/13/2024 6/27/2024 Shadi Lundy FNP          Follow-up Information       Follow up With Specialties Details Why Contact Info    Ariana Pinedo NP Family Medicine In 3 days  [address] [place] [address] Ext  [place] [id number] [phone]               Shadi Lundy FNP  06/13/24 0588

## 2024-06-13 NOTE — DISCHARGE INSTRUCTIONS
-Take medication as directed by the label on the bottle.    -Follow-up with PCP for C diff toxin results once submitted.    -Drink plenty of water.  -follow up with local PCP as needed.  -The examination and treatment you have received in the Emergency Department today have been rendered on an emergency basis only and are not intended to be a substitute for an effort to provide complete medical care. You should contact your follow-up physician as it is important that you let him or her check you and report any new or remaining problems since it is impossible to recognize and treat all elements of an injury or illness in a single emergency care center visit. \

## 2024-06-14 ENCOUNTER — APPOINTMENT (OUTPATIENT)
Dept: LAB | Facility: HOSPITAL | Age: 64
End: 2024-06-14
Payer: MEDICAID

## 2024-06-14 ENCOUNTER — TELEPHONE (OUTPATIENT)
Dept: EMERGENCY MEDICINE | Facility: HOSPITAL | Age: 64
End: 2024-06-14
Payer: MEDICAID

## 2024-06-14 NOTE — TELEPHONE ENCOUNTER
RETURNED CALL AND STATED WAS ABOUT THE SAME. INSTRUCTED TO BRING STOOL SAMPLE ASAP TO LAB SO IT COULD BE SENT OFF.

## 2024-06-16 ENCOUNTER — TELEPHONE (OUTPATIENT)
Dept: EMERGENCY MEDICINE | Facility: HOSPITAL | Age: 64
End: 2024-06-16
Payer: MEDICAID

## 2024-06-16 NOTE — TELEPHONE ENCOUNTER
Spoke with patient about positive c-diff. Instructed pt to fill Vanc and complete treatment as well as continue to take the OTC probiotic. Follow up with pcp after abx completion.

## 2024-06-17 ENCOUNTER — TELEPHONE (OUTPATIENT)
Dept: SURGERY | Facility: CLINIC | Age: 64
End: 2024-06-17
Payer: MEDICAID

## 2024-06-17 NOTE — TELEPHONE ENCOUNTER
----- Message from Scarlett Rangel sent at 6/17/2024  4:38 PM CDT -----  Serenity form [facility] calling to see if PA form has been signed yet. Call her at  [phone]    Informed Serenity that PA was signed and faxed on 6/11/24, PA re faxed on 6/14/24.

## 2024-06-28 ENCOUNTER — DOCUMENT SCAN (OUTPATIENT)
Dept: HOME HEALTH SERVICES | Facility: HOSPITAL | Age: 64
End: 2024-06-28
Payer: MEDICAID

## 2024-07-08 ENCOUNTER — OFFICE VISIT (OUTPATIENT)
Dept: SURGERY | Facility: CLINIC | Age: 64
End: 2024-07-08
Payer: MEDICAID

## 2024-07-08 VITALS
RESPIRATION RATE: 20 BRPM | HEART RATE: 80 BPM | WEIGHT: 132 LBS | BODY MASS INDEX: 24.29 KG/M2 | DIASTOLIC BLOOD PRESSURE: 80 MMHG | SYSTOLIC BLOOD PRESSURE: 137 MMHG | HEIGHT: 62 IN | OXYGEN SATURATION: 98 %

## 2024-07-08 DIAGNOSIS — M86.179 OTHER ACUTE OSTEOMYELITIS, UNSPECIFIED ANKLE AND FOOT: ICD-10-CM

## 2024-07-08 DIAGNOSIS — M86.9 OSTEOMYELITIS, UNSPECIFIED SITE, UNSPECIFIED TYPE: Primary | ICD-10-CM

## 2024-07-08 PROCEDURE — 1159F MED LIST DOCD IN RCRD: CPT | Mod: CPTII,,, | Performed by: STUDENT IN AN ORGANIZED HEALTH CARE EDUCATION/TRAINING PROGRAM

## 2024-07-08 PROCEDURE — 3079F DIAST BP 80-89 MM HG: CPT | Mod: CPTII,,, | Performed by: STUDENT IN AN ORGANIZED HEALTH CARE EDUCATION/TRAINING PROGRAM

## 2024-07-08 PROCEDURE — 3075F SYST BP GE 130 - 139MM HG: CPT | Mod: CPTII,,, | Performed by: STUDENT IN AN ORGANIZED HEALTH CARE EDUCATION/TRAINING PROGRAM

## 2024-07-08 PROCEDURE — 3008F BODY MASS INDEX DOCD: CPT | Mod: CPTII,,, | Performed by: STUDENT IN AN ORGANIZED HEALTH CARE EDUCATION/TRAINING PROGRAM

## 2024-07-08 PROCEDURE — 99999 PR PBB SHADOW E&M-EST. PATIENT-LVL IV: CPT | Mod: PBBFAC,,, | Performed by: STUDENT IN AN ORGANIZED HEALTH CARE EDUCATION/TRAINING PROGRAM

## 2024-07-08 PROCEDURE — 3044F HG A1C LEVEL LT 7.0%: CPT | Mod: CPTII,,, | Performed by: STUDENT IN AN ORGANIZED HEALTH CARE EDUCATION/TRAINING PROGRAM

## 2024-07-08 PROCEDURE — 99214 OFFICE O/P EST MOD 30 MIN: CPT | Mod: PBBFAC | Performed by: STUDENT IN AN ORGANIZED HEALTH CARE EDUCATION/TRAINING PROGRAM

## 2024-07-08 PROCEDURE — 99213 OFFICE O/P EST LOW 20 MIN: CPT | Mod: S$PBB,,, | Performed by: STUDENT IN AN ORGANIZED HEALTH CARE EDUCATION/TRAINING PROGRAM

## 2024-07-08 NOTE — PROGRESS NOTES
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  Patient was seen in the hospital and had a small ulcer with small abscess that was cleaned out; she does have bacteremia of MRSA and has been on IV antibiotics for 2 weeks.  Patient had a bone scan of the foot which showed increased activity all phases overlying the 1st metatarsophalangeal joint on the right foot in indicating osteomyelitis.  Patient denies any pain and states since she has been on IV vancomycin at home through her PICC line, the pain has drastically improved.  Denies any chest pain/shortness of breath, nausea/vomiting/diarrhea, fever/chills.    7/8:  Patient doing well.  Completed 6 weeks of IV antibiotics.  States the area on her right inner great toe has healed.  No inflammation.  Patient states the area still swollen at times, but denies any significant tenderness to palpation.  Patient to have her blood drawn later today by wound care with possible dressing change to the PICC line in the left upper extremity.    Chief Complaint   Patient presents with    Follow-up       Review of patient's allergies indicates:   Allergen Reactions    Opioids - morphine analogues Other (See Comments)       Current Outpatient Medications   Medication Sig Dispense Refill    albuterol (PROVENTIL/VENTOLIN HFA) 90 mcg/actuation inhaler INHALE ONE TO TWO PUFFS BY MOUTH EVERY 4 HOURS AS NEEDED FOR SHORTNESS OF BREATH OR WHEEZING 18 g 3    atorvastatin (LIPITOR) 80 MG tablet Take 1 tablet (80 mg total) by mouth every evening.      carvediloL (COREG) 12.5 MG tablet Take 1 tablet (12.5 mg total) by mouth 2 (two) times daily with meals. (Patient taking differently: Take 12.5 mg by mouth 2 (two) times daily.) 180 tablet 0    DULoxetine (CYMBALTA) 60 MG capsule Take 60 mg by mouth 2 (two) times daily.      esomeprazole (NEXIUM) 40 MG  capsule Take 40 mg by mouth once daily.      fluticasone propionate (FLONASE) 50 mcg/actuation nasal spray 2 sprays (100 mcg total) by Each Nostril route once daily. 16 g 3    JANUVIA 100 mg Tab Take 100 mg by mouth once daily.      levothyroxine (SYNTHROID) 50 MCG tablet Take 50 mcg by mouth before breakfast.      LINZESS 290 mcg Cap capsule Take 1 capsule every day by oral route for 30 days.      pregabalin (LYRICA) 150 MG capsule Take 1 capsule (150 mg total) by mouth 2 (two) times daily. Take two capsules by mouth three times a day 180 capsule 3    REXULTI 2 mg Tab Take 1 tablet by mouth once daily.      dextrose 5 % (D5W) SolP 250 mL with vancomycin 1,000 mg SolR 1,000 mg Inject 1,000 mg into the vein every 24 hours as needed (MRSA pneumonia). (Patient not taking: Reported on 2024)      mupirocin (BACTROBAN) 2 % ointment by Nasal route 2 (two) times daily. (Patient not taking: Reported on 2024)       No current facility-administered medications for this visit.       Past Medical History:   Diagnosis Date    Anxiety     Chronic pain syndrome     Depression     Diabetes mellitus     GERD (gastroesophageal reflux disease)     Hypertension     Hypothyroidism     Low back pain     Low vitamin B12 level     Lumbar radiculopathy     Neuropathy      Past Surgical History:   Procedure Laterality Date    Bilateral L3-S1 MBB Bilateral 2019, 2019    Dr Barclay     SECTION      COLON SURGERY      HERNIA REPAIR      Left L3-S1 RFTC Left 10/23/2019    Dr Barclay    RADIOFREQUENCY ABLATION OF LUMBAR MEDIAL BRANCH NERVE AT SINGLE LEVEL Right 10/25/2022    Procedure: Radiofrequency Ablation, Nerve, Spinal, Lumbar, Medial Branch, Level L4-S1;  Surgeon: Roselia Barclay MD;  Location: [facility];  Service: Pain Management;  Laterality: Right;  vaccinated.schedule LEFT after right is done.    RADIOFREQUENCY ABLATION OF LUMBAR MEDIAL BRANCH NERVE AT SINGLE LEVEL Left 2022    Procedure: LEFT  "L4-S1 RFTC  (HAD RIGHT ON 10-25);  Surgeon: Roselia Barclay MD;  Location: [facility];  Service: Pain Management;  Laterality: Left;  VAC SELENE IN EPIC    Right L3-S1 RFTC Right 12/16/2019    Dr Barclay    SPINAL CORD STIMULATOR IMPLANT       Family History   Problem Relation Name Age of Onset    Hypertension Father      Heart disease Father      Stroke Maternal Grandfather      Bipolar disorder Paternal Grandmother      Heart disease Paternal Grandfather       Social History     Tobacco Use    Smoking status: Every Day     Types: Vaping with nicotine     Passive exposure: Past    Smokeless tobacco: Never    Tobacco comments:     Smoked cigarettes 46 years. Stopped smoking cigarettes and started vaping with nicotine x 2 years   Substance Use Topics    Alcohol use: Yes    Drug use: Never        Review of Systems:  Review of Systems   Constitutional: Negative.  Negative for fatigue and unexpected weight change.   HENT: Negative.  Negative for trouble swallowing.    Eyes: Negative.    Respiratory: Negative.  Negative for chest tightness and shortness of breath.    Cardiovascular: Negative.  Negative for chest pain.   Gastrointestinal: Negative.  Negative for abdominal pain, blood in stool and nausea.   Endocrine: Negative.    Genitourinary: Negative.  Negative for hematuria.   Musculoskeletal: Negative.  Negative for back pain and myalgias.   Neurological: Negative.  Negative for dizziness, speech difficulty, weakness and light-headedness.   Psychiatric/Behavioral: Negative.  Negative for agitation and behavioral problems.           Objective     Vital Signs (Most Recent)  Pulse: 80 (07/08/24 1025)  Resp: 20 (07/08/24 1025)  BP: 137/80 (07/08/24 1025)  SpO2: 98 % (07/08/24 1025)  5' 2" (1.575 m)  59.9 kg (132 lb)     Physical Exam:  Physical Exam  Constitutional:       General: She is not in acute distress.     Appearance: Normal appearance.   HENT:      Head: Normocephalic.   Cardiovascular:      Rate and " Rhythm: Normal rate.   Pulmonary:      Effort: Pulmonary effort is normal. No respiratory distress.   Abdominal:      General: There is no distension.      Tenderness: There is no abdominal tenderness.   Musculoskeletal:         General: Normal range of motion.   Feet:      Comments: Wound healed, erythema resolved.  Still small amount of swelling, but no drainage.  Nontender to palpation  Skin:     General: Skin is warm.      Coloration: Skin is not jaundiced.   Neurological:      General: No focal deficit present.      Mental Status: She is alert and oriented to person, place, and time.      Cranial Nerves: No cranial nerve deficit.            Assessment and Plan   Right 1st metatarsophalangeal joint osteomyelitis    PLAN:    Recheck CBC, BMP, C-reactive protein and sed rate, procalcitonin.       If level still elevated, we will get MRI of right foot; if still showing active inflammation, patient may need another several weeks of IV antibiotics.       Addendum:  Sed rate 52  C-reactive protein 16.9    We will order MRI of the right foot with and without contrast

## 2024-07-11 ENCOUNTER — TELEPHONE (OUTPATIENT)
Dept: SURGERY | Facility: CLINIC | Age: 64
End: 2024-07-11
Payer: MEDICAID

## 2024-07-11 NOTE — TELEPHONE ENCOUNTER
----- Message from Kaley Cullen sent at 7/11/2024  4:01 PM CDT -----  Who Called: Magali Cheung    Caller is requesting assistance/information from provider's office.      Preferred Method of Contact: Phone Call  Patient's Preferred Phone Number on File: [phone]  Additional Information: Pt states she is still waiting on update on blood work that was done 07/08    Spoke with pt, informed her that a copy of her lab work has been put on Dr. Jade's desk for him to view, informed pt that I will call her with an update of what is next once Dr. Jade has looked over lab work, pt voiced understanding.

## 2024-07-12 ENCOUNTER — TELEPHONE (OUTPATIENT)
Dept: SURGERY | Facility: CLINIC | Age: 64
End: 2024-07-12
Payer: MEDICAID

## 2024-07-12 DIAGNOSIS — M86.179 OTHER ACUTE OSTEOMYELITIS, UNSPECIFIED ANKLE AND FOOT: Primary | ICD-10-CM

## 2024-07-12 DIAGNOSIS — M86.9 OSTEOMYELITIS, UNSPECIFIED SITE, UNSPECIFIED TYPE: ICD-10-CM

## 2024-07-12 NOTE — TELEPHONE ENCOUNTER
Spoke with pt, informed her that her lab was slightly elevated and that we are going to schedule for her to have a bone scan per Dr. Jade, bone scan scheduled for 7/19/24 at 0900, pt voiced understanding.

## 2024-07-15 PROBLEM — J15.212 PNEUMONIA OF LEFT LUNG DUE TO METHICILLIN RESISTANT STAPHYLOCOCCUS AUREUS (MRSA): Status: RESOLVED | Noted: 2024-04-14 | Resolved: 2024-07-15

## 2024-07-19 ENCOUNTER — HOSPITAL ENCOUNTER (OUTPATIENT)
Dept: RADIOLOGY | Facility: HOSPITAL | Age: 64
Discharge: HOME | End: 2024-07-19
Attending: STUDENT IN AN ORGANIZED HEALTH CARE EDUCATION/TRAINING PROGRAM
Payer: MEDICAID

## 2024-07-19 DIAGNOSIS — M86.179 OTHER ACUTE OSTEOMYELITIS, UNSPECIFIED ANKLE AND FOOT: ICD-10-CM

## 2024-07-19 DIAGNOSIS — M86.9 OSTEOMYELITIS, UNSPECIFIED SITE, UNSPECIFIED TYPE: ICD-10-CM

## 2024-07-19 PROCEDURE — A9503 TC99M MEDRONATE: HCPCS | Performed by: STUDENT IN AN ORGANIZED HEALTH CARE EDUCATION/TRAINING PROGRAM

## 2024-07-19 PROCEDURE — 78315 BONE IMAGING 3 PHASE: CPT | Mod: 26,,, | Performed by: RADIOLOGY

## 2024-07-19 PROCEDURE — 78315 BONE IMAGING 3 PHASE: CPT | Mod: TC

## 2024-07-19 RX ADMIN — TECHNETIUM TC 99M MEDRONATE 26.3 MILLICURIE: 25 INJECTION, POWDER, FOR SOLUTION INTRAVENOUS at 09:07

## 2024-07-23 DIAGNOSIS — M86.9 OSTEOMYELITIS, UNSPECIFIED SITE, UNSPECIFIED TYPE: Primary | ICD-10-CM

## 2024-07-23 DIAGNOSIS — M86.179 OTHER ACUTE OSTEOMYELITIS, UNSPECIFIED ANKLE AND FOOT: ICD-10-CM

## 2024-07-24 DIAGNOSIS — M86.9 OSTEOMYELITIS, UNSPECIFIED SITE, UNSPECIFIED TYPE: Primary | ICD-10-CM

## 2024-07-24 DIAGNOSIS — M86.179 OTHER ACUTE OSTEOMYELITIS, UNSPECIFIED ANKLE AND FOOT: ICD-10-CM

## 2024-07-25 ENCOUNTER — HOSPITAL ENCOUNTER (OUTPATIENT)
Dept: RADIOLOGY | Facility: HOSPITAL | Age: 64
Discharge: HOME | End: 2024-07-25
Attending: STUDENT IN AN ORGANIZED HEALTH CARE EDUCATION/TRAINING PROGRAM
Payer: MEDICAID

## 2024-07-25 ENCOUNTER — TELEPHONE (OUTPATIENT)
Dept: SURGERY | Facility: CLINIC | Age: 64
End: 2024-07-25
Payer: MEDICAID

## 2024-07-25 ENCOUNTER — DOCUMENT SCAN (OUTPATIENT)
Dept: HOME HEALTH SERVICES | Facility: HOSPITAL | Age: 64
End: 2024-07-25
Payer: MEDICAID

## 2024-07-25 DIAGNOSIS — M86.179 OTHER ACUTE OSTEOMYELITIS, UNSPECIFIED ANKLE AND FOOT: ICD-10-CM

## 2024-07-25 DIAGNOSIS — M86.9 OSTEOMYELITIS, UNSPECIFIED SITE, UNSPECIFIED TYPE: ICD-10-CM

## 2024-07-25 PROCEDURE — 78315 BONE IMAGING 3 PHASE: CPT | Mod: TC

## 2024-07-25 PROCEDURE — A9503 TC99M MEDRONATE: HCPCS | Performed by: STUDENT IN AN ORGANIZED HEALTH CARE EDUCATION/TRAINING PROGRAM

## 2024-07-25 PROCEDURE — 78315 BONE IMAGING 3 PHASE: CPT | Mod: 26,,, | Performed by: RADIOLOGY

## 2024-07-25 RX ORDER — TC 99M MEDRONATE 20 MG/10ML
25.2 INJECTION, POWDER, LYOPHILIZED, FOR SOLUTION INTRAVENOUS
Status: COMPLETED | OUTPATIENT
Start: 2024-07-25 | End: 2024-07-25

## 2024-07-25 RX ADMIN — TECHNETIUM TC 99M MEDRONATE 25.2 MILLICURIE: 20 INJECTION, POWDER, LYOPHILIZED, FOR SOLUTION INTRAVENOUS at 08:07

## 2024-07-25 NOTE — TELEPHONE ENCOUNTER
Informed pt that Dr. Jade is out of the office until Monday 7/29/24 and that I have printed results from her bone scan for Dr. Jade to look over Monday, pt voiced understanding.

## 2024-07-31 ENCOUNTER — TELEPHONE (OUTPATIENT)
Dept: SURGERY | Facility: CLINIC | Age: 64
End: 2024-07-31
Payer: MEDICAID

## 2024-07-31 NOTE — TELEPHONE ENCOUNTER
----- Message from Tee Jade DO sent at 7/19/2024  3:12 PM CDT -----  Notify patient and vital care, continue IV antibiotics for another 6 weeks and then repeat bone scan at that time  ----- Message -----  From: Interface, Rad Results In  Sent: 7/19/2024  12:28 PM CDT  To: Tee Jade DO    Pt notified and vital care notified.

## 2024-08-12 ENCOUNTER — DOCUMENT SCAN (OUTPATIENT)
Dept: HOME HEALTH SERVICES | Facility: HOSPITAL | Age: 64
End: 2024-08-12
Payer: MEDICAID

## 2024-08-24 ENCOUNTER — DOCUMENT SCAN (OUTPATIENT)
Dept: HOME HEALTH SERVICES | Facility: HOSPITAL | Age: 64
End: 2024-08-24
Payer: MEDICAID

## 2024-08-25 ENCOUNTER — HOSPITAL ENCOUNTER (OUTPATIENT)
Facility: HOSPITAL | Age: 64
Discharge: HOME | End: 2024-08-26
Attending: EMERGENCY MEDICINE | Admitting: FAMILY MEDICINE
Payer: MEDICAID

## 2024-08-25 DIAGNOSIS — L03.114 CELLULITIS OF LEFT UPPER EXTREMITY: Primary | ICD-10-CM

## 2024-08-25 DIAGNOSIS — L03.90 CELLULITIS: ICD-10-CM

## 2024-08-25 DIAGNOSIS — R07.9 CHEST PAIN: ICD-10-CM

## 2024-08-25 DIAGNOSIS — M79.602 LEFT ARM PAIN: ICD-10-CM

## 2024-08-25 PROBLEM — I82.619 BASILIC VEIN THROMBOSIS: Status: ACTIVE | Noted: 2024-08-25

## 2024-08-25 LAB
ALBUMIN SERPL BCP-MCNC: 3.2 G/DL (ref 3.5–5)
ALBUMIN/GLOB SERPL: 0.7 {RATIO}
ALP SERPL-CCNC: 75 U/L (ref 50–130)
ALT SERPL W P-5'-P-CCNC: 13 U/L (ref 13–56)
ANION GAP SERPL CALCULATED.3IONS-SCNC: 9 MMOL/L (ref 7–16)
ANISOCYTOSIS BLD QL SMEAR: ABNORMAL
AST SERPL W P-5'-P-CCNC: 8 U/L (ref 15–37)
BASOPHILS # BLD AUTO: 0.09 K/UL (ref 0–0.2)
BASOPHILS NFR BLD AUTO: 0.7 % (ref 0–1)
BILIRUB SERPL-MCNC: 0.4 MG/DL (ref ?–1.2)
BUN SERPL-MCNC: 9 MG/DL (ref 7–18)
BUN/CREAT SERPL: 9 (ref 6–20)
CALCIUM SERPL-MCNC: 9.4 MG/DL (ref 8.5–10.1)
CHLORIDE SERPL-SCNC: 101 MMOL/L (ref 98–107)
CO2 SERPL-SCNC: 28 MMOL/L (ref 21–32)
CREAT SERPL-MCNC: 0.99 MG/DL (ref 0.55–1.02)
DIFFERENTIAL METHOD BLD: ABNORMAL
EGFR (NO RACE VARIABLE) (RUSH/TITUS): 64 ML/MIN/1.73M2
EOSINOPHIL # BLD AUTO: 0.17 K/UL (ref 0–0.5)
EOSINOPHIL NFR BLD AUTO: 1.3 % (ref 1–4)
ERYTHROCYTE [DISTWIDTH] IN BLOOD BY AUTOMATED COUNT: 16.7 % (ref 11.5–14.5)
GLOBULIN SER-MCNC: 4.6 G/DL (ref 2–4)
GLUCOSE SERPL-MCNC: 100 MG/DL (ref 74–106)
GLUCOSE SERPL-MCNC: 110 MG/DL (ref 70–105)
GLUCOSE SERPL-MCNC: 127 MG/DL (ref 70–105)
HCT VFR BLD AUTO: 28.3 % (ref 38–47)
HGB BLD-MCNC: 8.4 G/DL (ref 12–16)
HYPOCHROMIA BLD QL SMEAR: ABNORMAL
IMM GRANULOCYTES # BLD AUTO: 0.06 K/UL (ref 0–0.04)
IMM GRANULOCYTES NFR BLD: 0.5 % (ref 0–0.4)
LYMPHOCYTES # BLD AUTO: 1.6 K/UL (ref 1–4.8)
LYMPHOCYTES NFR BLD AUTO: 12.4 % (ref 27–41)
MCH RBC QN AUTO: 20.5 PG (ref 27–31)
MCHC RBC AUTO-ENTMCNC: 29.7 G/DL (ref 32–36)
MCV RBC AUTO: 69.2 FL (ref 80–96)
MICROCYTES BLD QL SMEAR: ABNORMAL
MONOCYTES # BLD AUTO: 1.17 K/UL (ref 0–0.8)
MONOCYTES NFR BLD AUTO: 9.1 % (ref 2–6)
MPC BLD CALC-MCNC: 10.5 FL (ref 9.4–12.4)
NEUTROPHILS # BLD AUTO: 9.8 K/UL (ref 1.8–7.7)
NEUTROPHILS NFR BLD AUTO: 76 % (ref 53–65)
NRBC # BLD AUTO: 0 X10E3/UL
NRBC, AUTO (.00): 0 %
PLATELET # BLD AUTO: 487 K/UL (ref 150–400)
PLATELET MORPHOLOGY: ABNORMAL
POTASSIUM SERPL-SCNC: 3.1 MMOL/L (ref 3.5–5.1)
PROT SERPL-MCNC: 7.8 G/DL (ref 6.4–8.2)
RBC # BLD AUTO: 4.09 M/UL (ref 4.2–5.4)
SODIUM SERPL-SCNC: 135 MMOL/L (ref 136–145)
WBC # BLD AUTO: 12.89 K/UL (ref 4.5–11)

## 2024-08-25 PROCEDURE — 25000003 PHARM REV CODE 250: Performed by: FAMILY MEDICINE

## 2024-08-25 PROCEDURE — 87040 BLOOD CULTURE FOR BACTERIA: CPT | Mod: 91 | Performed by: EMERGENCY MEDICINE

## 2024-08-25 PROCEDURE — G0378 HOSPITAL OBSERVATION PER HR: HCPCS

## 2024-08-25 PROCEDURE — 82962 GLUCOSE BLOOD TEST: CPT

## 2024-08-25 PROCEDURE — 63600175 PHARM REV CODE 636 W HCPCS: Performed by: FAMILY MEDICINE

## 2024-08-25 PROCEDURE — 85025 COMPLETE CBC W/AUTO DIFF WBC: CPT | Performed by: EMERGENCY MEDICINE

## 2024-08-25 PROCEDURE — 80053 COMPREHEN METABOLIC PANEL: CPT | Performed by: EMERGENCY MEDICINE

## 2024-08-25 PROCEDURE — 36415 COLL VENOUS BLD VENIPUNCTURE: CPT | Performed by: EMERGENCY MEDICINE

## 2024-08-25 PROCEDURE — 99285 EMERGENCY DEPT VISIT HI MDM: CPT | Mod: 25

## 2024-08-25 PROCEDURE — 96372 THER/PROPH/DIAG INJ SC/IM: CPT | Performed by: FAMILY MEDICINE

## 2024-08-25 PROCEDURE — 99900035 HC TECH TIME PER 15 MIN (STAT)

## 2024-08-25 PROCEDURE — 96365 THER/PROPH/DIAG IV INF INIT: CPT

## 2024-08-25 RX ORDER — POTASSIUM CHLORIDE 750 MG/1
10 TABLET, EXTENDED RELEASE ORAL DAILY
COMMUNITY
Start: 2024-08-01

## 2024-08-25 RX ORDER — DULOXETIN HYDROCHLORIDE 30 MG/1
60 CAPSULE, DELAYED RELEASE ORAL 2 TIMES DAILY
Status: DISCONTINUED | OUTPATIENT
Start: 2024-08-25 | End: 2024-08-26 | Stop reason: HOSPADM

## 2024-08-25 RX ORDER — CARVEDILOL 12.5 MG/1
12.5 TABLET ORAL 2 TIMES DAILY WITH MEALS
Status: DISCONTINUED | OUTPATIENT
Start: 2024-08-25 | End: 2024-08-26 | Stop reason: HOSPADM

## 2024-08-25 RX ORDER — POTASSIUM CHLORIDE 750 MG/1
10 TABLET, EXTENDED RELEASE ORAL DAILY
Status: DISCONTINUED | OUTPATIENT
Start: 2024-08-25 | End: 2024-08-26 | Stop reason: HOSPADM

## 2024-08-25 RX ORDER — IBUPROFEN 200 MG
16 TABLET ORAL
Status: DISCONTINUED | OUTPATIENT
Start: 2024-08-25 | End: 2024-08-26 | Stop reason: HOSPADM

## 2024-08-25 RX ORDER — CLONAZEPAM 0.5 MG/1
1 TABLET ORAL DAILY PRN
Status: DISCONTINUED | OUTPATIENT
Start: 2024-08-25 | End: 2024-08-26 | Stop reason: HOSPADM

## 2024-08-25 RX ORDER — NALOXONE HCL 0.4 MG/ML
0.02 VIAL (ML) INJECTION
Status: DISCONTINUED | OUTPATIENT
Start: 2024-08-25 | End: 2024-08-26 | Stop reason: HOSPADM

## 2024-08-25 RX ORDER — LEVOTHYROXINE SODIUM 50 UG/1
50 TABLET ORAL
Status: DISCONTINUED | OUTPATIENT
Start: 2024-08-26 | End: 2024-08-26 | Stop reason: HOSPADM

## 2024-08-25 RX ORDER — CYANOCOBALAMIN 1000 UG/ML
1000 INJECTION, SOLUTION INTRAMUSCULAR; SUBCUTANEOUS
COMMUNITY
Start: 2024-08-01

## 2024-08-25 RX ORDER — ENOXAPARIN SODIUM 100 MG/ML
60 INJECTION SUBCUTANEOUS ONCE
Status: COMPLETED | OUTPATIENT
Start: 2024-08-25 | End: 2024-08-25

## 2024-08-25 RX ORDER — PREGABALIN 75 MG/1
150 CAPSULE ORAL 2 TIMES DAILY
Status: DISCONTINUED | OUTPATIENT
Start: 2024-08-25 | End: 2024-08-26 | Stop reason: HOSPADM

## 2024-08-25 RX ORDER — IBUPROFEN 200 MG
24 TABLET ORAL
Status: DISCONTINUED | OUTPATIENT
Start: 2024-08-25 | End: 2024-08-26 | Stop reason: HOSPADM

## 2024-08-25 RX ORDER — ACETAMINOPHEN 325 MG/1
650 TABLET ORAL EVERY 4 HOURS PRN
Status: DISCONTINUED | OUTPATIENT
Start: 2024-08-25 | End: 2024-08-26 | Stop reason: HOSPADM

## 2024-08-25 RX ORDER — HYDROCHLOROTHIAZIDE 12.5 MG/1
12.5 TABLET ORAL DAILY
COMMUNITY
Start: 2024-08-01

## 2024-08-25 RX ORDER — ATORVASTATIN CALCIUM 80 MG/1
80 TABLET, FILM COATED ORAL NIGHTLY
Status: DISCONTINUED | OUTPATIENT
Start: 2024-08-25 | End: 2024-08-26 | Stop reason: HOSPADM

## 2024-08-25 RX ORDER — INSULIN ASPART 100 [IU]/ML
0-5 INJECTION, SOLUTION INTRAVENOUS; SUBCUTANEOUS
Status: DISCONTINUED | OUTPATIENT
Start: 2024-08-25 | End: 2024-08-26 | Stop reason: HOSPADM

## 2024-08-25 RX ORDER — ONDANSETRON 4 MG/1
8 TABLET, ORALLY DISINTEGRATING ORAL EVERY 8 HOURS PRN
Status: DISCONTINUED | OUTPATIENT
Start: 2024-08-25 | End: 2024-08-26 | Stop reason: HOSPADM

## 2024-08-25 RX ORDER — CLONAZEPAM 1 MG/1
1 TABLET ORAL DAILY PRN
COMMUNITY
Start: 2024-06-07

## 2024-08-25 RX ORDER — LINACLOTIDE 145 UG/1
145 CAPSULE, GELATIN COATED ORAL DAILY
COMMUNITY
Start: 2024-08-01

## 2024-08-25 RX ORDER — HYDROCHLOROTHIAZIDE 12.5 MG/1
12.5 TABLET ORAL DAILY
Status: DISCONTINUED | OUTPATIENT
Start: 2024-08-25 | End: 2024-08-26 | Stop reason: HOSPADM

## 2024-08-25 RX ORDER — SODIUM CHLORIDE 0.9 % (FLUSH) 0.9 %
10 SYRINGE (ML) INJECTION EVERY 12 HOURS PRN
Status: DISCONTINUED | OUTPATIENT
Start: 2024-08-25 | End: 2024-08-26 | Stop reason: HOSPADM

## 2024-08-25 RX ORDER — BREXPIPRAZOLE 3 MG/1
1 TABLET ORAL DAILY
COMMUNITY
Start: 2024-08-05

## 2024-08-25 RX ORDER — GLUCAGON 1 MG
1 KIT INJECTION
Status: DISCONTINUED | OUTPATIENT
Start: 2024-08-25 | End: 2024-08-26 | Stop reason: HOSPADM

## 2024-08-25 RX ADMIN — ACETAMINOPHEN 650 MG: 325 TABLET ORAL at 04:08

## 2024-08-25 RX ADMIN — ATORVASTATIN CALCIUM 80 MG: 80 TABLET, FILM COATED ORAL at 08:08

## 2024-08-25 RX ADMIN — PREGABALIN 150 MG: 75 CAPSULE ORAL at 08:08

## 2024-08-25 RX ADMIN — CARVEDILOL 12.5 MG: 12.5 TABLET, FILM COATED ORAL at 04:08

## 2024-08-25 RX ADMIN — POTASSIUM CHLORIDE 10 MEQ: 750 TABLET, EXTENDED RELEASE ORAL at 04:08

## 2024-08-25 RX ADMIN — DULOXETINE HYDROCHLORIDE 60 MG: 30 CAPSULE, DELAYED RELEASE ORAL at 08:08

## 2024-08-25 RX ADMIN — CEFTRIAXONE SODIUM 1 G: 1 INJECTION, POWDER, FOR SOLUTION INTRAMUSCULAR; INTRAVENOUS at 06:08

## 2024-08-25 RX ADMIN — LINACLOTIDE 145 MCG: 145 CAPSULE, GELATIN COATED ORAL at 05:08

## 2024-08-25 RX ADMIN — SITAGLIPTIN 100 MG: 100 TABLET, FILM COATED ORAL at 04:08

## 2024-08-25 RX ADMIN — ENOXAPARIN SODIUM 60 MG: 60 INJECTION SUBCUTANEOUS at 05:08

## 2024-08-25 RX ADMIN — HYDROCHLOROTHIAZIDE 12.5 MG: 12.5 TABLET ORAL at 04:08

## 2024-08-25 NOTE — ASSESSMENT & PLAN NOTE
Patient with thrombus distal to catheter.  We will give a single dose of 60 mg Lovenox tonight.  Defer re-dosing until decision made about need for another PICC line.  This is a superficial vein but would  consider a month of Eliquis.

## 2024-08-25 NOTE — ASSESSMENT & PLAN NOTE
Possible cellulitis  but just as likely inflammation from phlebitis.  Has been on vanc, we will start Rocephin.

## 2024-08-25 NOTE — PHARMACY MED REC
"Admission Medication History     The home medication history was taken by Hollie Evans.    You may go to "Admission" then "Reconcile Home Medications" tabs to review and/or act upon these items.     The home medication list has been updated by the Pharmacy department.   Please read ALL comments highlighted in yellow.   Please address this information as you see fit.    Feel free to contact us if you have any questions or require assistance.  Medications Updated:  Linzess 290 mcg to 145 mcg  Rexulti 2 mg to 3 mg  Medications Added:  Cyanocobalamin 1,000 mcg/ml  Hydrochlorothiazide 12.5 mg  Potassium Chloride 10 meq      Patient reports no longer taking the following medication(s). The medication(s) listed below were removed from the home medication list. Please reorder if appropriate:  Mupirocin 2% ointment    Medications listed below were obtained from: Patient/family and Analytic software- [employer]   Medication Sig    atorvastatin (LIPITOR) 80 MG tablet Take 1 tablet (80 mg total) by mouth every evening. (Patient taking differently: Take 80 mg by mouth once daily.)    carvediloL (COREG) 12.5 MG tablet Take 1 tablet (12.5 mg total) by mouth 2 (two) times daily with meals. (Patient taking differently: Take 12.5 mg by mouth 2 (two) times daily.)    cyanocobalamin 1,000 mcg/mL injection Inject 1,000 mcg into the muscle every 30 days.    DULoxetine (CYMBALTA) 60 MG capsule Take 60 mg by mouth 2 (two) times daily.    esomeprazole (NEXIUM) 40 MG capsule Take 40 mg by mouth once daily.    hydroCHLOROthiazide (HYDRODIURIL) 12.5 MG Tab Take 12.5 mg by mouth once daily.    JANUVIA 100 mg Tab Take 100 mg by mouth once daily.    levothyroxine (SYNTHROID) 50 MCG tablet Take 50 mcg by mouth before breakfast.    LINZESS 145 mcg Cap capsule Take 145 mcg by mouth once daily.    potassium chloride (KLOR-CON) 10 MEQ TbSR Take 10 mEq by mouth once daily.    pregabalin (LYRICA) 150 MG capsule Take 1 capsule (150 mg " total) by mouth 2 (two) times daily. Take two capsules by mouth three times a day (Patient taking differently: Take 300 mg by mouth 2 (two) times daily.)    REXULTI 3 mg Tab Take 1 tablet by mouth once daily.    albuterol (PROVENTIL/VENTOLIN HFA) 90 mcg/actuation inhaler INHALE ONE TO TWO PUFFS BY MOUTH EVERY 4 HOURS AS NEEDED FOR SHORTNESS OF BREATH OR WHEEZING    clonazePAM (KLONOPIN) 1 MG tablet Take 1 mg by mouth daily as needed.    fluticasone propionate (FLONASE) 50 mcg/actuation nasal spray 2 sprays (100 mcg total) by Each Nostril route once daily.         Current Outpatient Medications on File Prior to Encounter   Medication Sig Dispense Refill Last Dose    atorvastatin (LIPITOR) 80 MG tablet Take 1 tablet (80 mg total) by mouth every evening. (Patient taking differently: Take 80 mg by mouth once daily.)   8/24/2024    carvediloL (COREG) 12.5 MG tablet Take 1 tablet (12.5 mg total) by mouth 2 (two) times daily with meals. (Patient taking differently: Take 12.5 mg by mouth 2 (two) times daily.) 180 tablet 0 8/24/2024    cyanocobalamin 1,000 mcg/mL injection Inject 1,000 mcg into the muscle every 30 days.   Past Month    DULoxetine (CYMBALTA) 60 MG capsule Take 60 mg by mouth 2 (two) times daily.   8/24/2024    esomeprazole (NEXIUM) 40 MG capsule Take 40 mg by mouth once daily.   8/24/2024    hydroCHLOROthiazide (HYDRODIURIL) 12.5 MG Tab Take 12.5 mg by mouth once daily.   8/24/2024    JANUVIA 100 mg Tab Take 100 mg by mouth once daily.   8/24/2024    levothyroxine (SYNTHROID) 50 MCG tablet Take 50 mcg by mouth before breakfast.   8/24/2024    LINZESS 145 mcg Cap capsule Take 145 mcg by mouth once daily.   8/24/2024    potassium chloride (KLOR-CON) 10 MEQ TbSR Take 10 mEq by mouth once daily.   8/24/2024    pregabalin (LYRICA) 150 MG capsule Take 1 capsule (150 mg total) by mouth 2 (two) times daily. Take two capsules by mouth three times a day (Patient taking differently: Take 300 mg by mouth 2 (two) times  daily.) 180 capsule 3 8/24/2024    REXULTI 3 mg Tab Take 1 tablet by mouth once daily.   8/24/2024    albuterol (PROVENTIL/VENTOLIN HFA) 90 mcg/actuation inhaler INHALE ONE TO TWO PUFFS BY MOUTH EVERY 4 HOURS AS NEEDED FOR SHORTNESS OF BREATH OR WHEEZING 18 g 3 More than a month    clonazePAM (KLONOPIN) 1 MG tablet Take 1 mg by mouth daily as needed.   More than a month    fluticasone propionate (FLONASE) 50 mcg/actuation nasal spray 2 sprays (100 mcg total) by Each Nostril route once daily. 16 g 3     [DISCONTINUED] dextrose 5 % (D5W) SolP 250 mL with vancomycin 1,000 mg SolR 1,000 mg Inject 1,000 mg into the vein every 24 hours as needed (MRSA pneumonia). (Patient not taking: Reported on 7/8/2024)       [DISCONTINUED] LINZESS 290 mcg Cap capsule Take 1 capsule every day by oral route for 30 days.       [DISCONTINUED] mupirocin (BACTROBAN) 2 % ointment by Nasal route 2 (two) times daily. (Patient not taking: Reported on 7/8/2024)       [DISCONTINUED] REXULTI 2 mg Tab Take 1 tablet by mouth once daily.            Potential issues to be addressed PRIOR TO DISCHARGE  No issues identified.    Hollie Evans  Pharmacy [facility] Specialist - Medication History  EXT. [id number]    .

## 2024-08-25 NOTE — SUBJECTIVE & OBJECTIVE
Past Medical History:   Diagnosis Date    Anxiety     Chronic pain syndrome     Depression     Diabetes mellitus     GERD (gastroesophageal reflux disease)     Hypertension     Hypothyroidism     Low back pain     Low vitamin B12 level     Lumbar radiculopathy     Neuropathy        Past Surgical History:   Procedure Laterality Date    Bilateral L3-S1 MBB Bilateral 2019, 2019    Dr Barclay     SECTION      COLON SURGERY      HERNIA REPAIR      Left L3-S1 RFTC Left 10/23/2019    Dr Barclay    RADIOFREQUENCY ABLATION OF LUMBAR MEDIAL BRANCH NERVE AT SINGLE LEVEL Right 10/25/2022    Procedure: Radiofrequency Ablation, Nerve, Spinal, Lumbar, Medial Branch, Level L4-S1;  Surgeon: Roselia Barclay MD;  Location: [facility];  Service: Pain Management;  Laterality: Right;  vaccinated.schedule LEFT after right is done.    RADIOFREQUENCY ABLATION OF LUMBAR MEDIAL BRANCH NERVE AT SINGLE LEVEL Left 2022    Procedure: LEFT L4-S1 RFTC  (HAD RIGHT ON 10-25);  Surgeon: Roselia Barclay MD;  Location: [facility];  Service: Pain Management;  Laterality: Left;  VAC SELENE IN EPIC    Right L3-S1 RFTC Right 2019    Dr Barclay    SPINAL CORD STIMULATOR IMPLANT         Review of patient's allergies indicates:   Allergen Reactions    Opioids - morphine analogues Other (See Comments)       No current facility-administered medications on file prior to encounter.     Current Outpatient Medications on File Prior to Encounter   Medication Sig    atorvastatin (LIPITOR) 80 MG tablet Take 1 tablet (80 mg total) by mouth every evening. (Patient taking differently: Take 80 mg by mouth once daily.)    carvediloL (COREG) 12.5 MG tablet Take 1 tablet (12.5 mg total) by mouth 2 (two) times daily with meals. (Patient taking differently: Take 12.5 mg by mouth 2 (two) times daily.)    cyanocobalamin 1,000 mcg/mL injection Inject 1,000 mcg into the muscle every 30 days.    DULoxetine (CYMBALTA) 60 MG capsule Take 60 mg  by mouth 2 (two) times daily.    esomeprazole (NEXIUM) 40 MG capsule Take 40 mg by mouth once daily.    hydroCHLOROthiazide (HYDRODIURIL) 12.5 MG Tab Take 12.5 mg by mouth once daily.    JANUVIA 100 mg Tab Take 100 mg by mouth once daily.    levothyroxine (SYNTHROID) 50 MCG tablet Take 50 mcg by mouth before breakfast.    LINZESS 145 mcg Cap capsule Take 145 mcg by mouth once daily.    potassium chloride (KLOR-CON) 10 MEQ TbSR Take 10 mEq by mouth once daily.    pregabalin (LYRICA) 150 MG capsule Take 1 capsule (150 mg total) by mouth 2 (two) times daily. Take two capsules by mouth three times a day (Patient taking differently: Take 300 mg by mouth 2 (two) times daily.)    REXULTI 3 mg Tab Take 1 tablet by mouth once daily.    albuterol (PROVENTIL/VENTOLIN HFA) 90 mcg/actuation inhaler INHALE ONE TO TWO PUFFS BY MOUTH EVERY 4 HOURS AS NEEDED FOR SHORTNESS OF BREATH OR WHEEZING    clonazePAM (KLONOPIN) 1 MG tablet Take 1 mg by mouth daily as needed.    fluticasone propionate (FLONASE) 50 mcg/actuation nasal spray 2 sprays (100 mcg total) by Each Nostril route once daily.    [DISCONTINUED] dextrose 5 % (D5W) SolP 250 mL with vancomycin 1,000 mg SolR 1,000 mg Inject 1,000 mg into the vein every 24 hours as needed (MRSA pneumonia). (Patient not taking: Reported on 7/8/2024)    [DISCONTINUED] LINZESS 290 mcg Cap capsule Take 1 capsule every day by oral route for 30 days.    [DISCONTINUED] mupirocin (BACTROBAN) 2 % ointment by Nasal route 2 (two) times daily. (Patient not taking: Reported on 7/8/2024)    [DISCONTINUED] REXULTI 2 mg Tab Take 1 tablet by mouth once daily.     Family History       Problem Relation (Age of Onset)    Bipolar disorder Paternal Grandmother    Heart disease Father, Paternal Grandfather    Hypertension Father    Stroke Maternal Grandfather          Tobacco Use    Smoking status: Every Day     Types: Vaping with nicotine     Passive exposure: Past    Smokeless tobacco: Never    Tobacco comments:      Smoked cigarettes 46 years. Stopped smoking cigarettes and started vaping with nicotine x 2 years   Substance and Sexual Activity    Alcohol use: Yes    Drug use: Never    Sexual activity: Yes     Partners: Male     Review of Systems   Constitutional:  Negative for chills and fever.   HENT:  Negative for sinus pressure and sinus pain.    Respiratory:  Negative for chest tightness and shortness of breath.    Cardiovascular:  Negative for chest pain and leg swelling.   Gastrointestinal:  Negative for nausea and vomiting.   Genitourinary:  Negative for dysuria and hematuria.   Musculoskeletal:  Positive for back pain. Negative for joint swelling.        Chronic   Skin:  Negative for rash and wound.   Neurological:  Negative for dizziness and light-headedness.   Hematological:  Negative for adenopathy. Does not bruise/bleed easily.     Objective:     Vital Signs (Most Recent):  Temp: 98.5 °F (36.9 °C) (08/25/24 1649)  Pulse: 81 (08/25/24 1649)  Resp: 18 (08/25/24 1649)  BP: (!) 150/64 (08/25/24 1649)  SpO2: 97 % (08/25/24 1649) Vital Signs (24h Range):  Temp:  [98.5 °F (36.9 °C)-98.7 °F (37.1 °C)] 98.5 °F (36.9 °C)  Pulse:  [69-84] 81  Resp:  [18-19] 18  SpO2:  [97 %-100 %] 97 %  BP: (119-173)/(59-84) 150/64     Weight: 59.4 kg (131 lb)  Body mass index is 23.96 kg/m².     Physical Exam  Vitals reviewed.   Constitutional:       General: She is not in acute distress.     Appearance: She is not toxic-appearing.   HENT:      Head: Normocephalic.      Right Ear: External ear normal.      Left Ear: External ear normal.      Nose: Nose normal.   Eyes:      General: No scleral icterus.     Extraocular Movements: Extraocular movements intact.      Pupils: Pupils are equal, round, and reactive to light.   Cardiovascular:      Rate and Rhythm: Normal rate and regular rhythm.      Heart sounds: Normal heart sounds.   Pulmonary:      Effort: Pulmonary effort is normal. No respiratory distress.      Breath sounds: Normal breath  sounds.   Abdominal:      General: Abdomen is flat. Bowel sounds are normal. There is no distension.      Palpations: Abdomen is soft.      Tenderness: There is no abdominal tenderness.   Musculoskeletal:      Right lower leg: No edema.      Left lower leg: No edema.   Skin:     Comments: Some erythema, tenderness, and warmth around PICC line insertion site and somewhat distally   Neurological:      General: No focal deficit present.      Mental Status: She is alert and oriented to person, place, and time.   Psychiatric:         Mood and Affect: Mood normal.         Behavior: Behavior normal.              CRANIAL NERVES     CN III, IV, VI   Pupils are equal, round, and reactive to light.       Significant Labs: All pertinent labs within the past 24 hours have been reviewed.  BMP:   Recent Labs   Lab 08/25/24  1030      *   K 3.1*      CO2 28   BUN 9   CREATININE 0.99   CALCIUM 9.4     CBC:   Recent Labs   Lab 08/25/24  1030   WBC 12.89*   HGB 8.4*   HCT 28.3*   *       Significant Imaging: I have reviewed all pertinent imaging results/findings within the past 24 hours.

## 2024-08-25 NOTE — ASSESSMENT & PLAN NOTE
Has been on long-term IV vancomycin for osteomyelitis.  We will consult Dr. Jade, her treating physician, to determine whether continued IV therapy is desired.  If so she will need another PICC line placed.  Current PICC is functioning poorly and is being removed

## 2024-08-25 NOTE — ED NOTES
Dr. Nichols instructed to me to go flush both lines in PICC line and try to pull back blood. I attempted to flush red line and could not even when getting pt to lift arm in the air. I then attempted to flush blue line and was able to but was a hard push and blue line would not pull back blood even when lifting arm in the air. Pt's  reports blue line has not been so difficult to push and red line has not worked for a while as neither lines have been able to pull back blood for quite a while per . Pt reports PICC line being in place for 4 months.

## 2024-08-25 NOTE — NURSING
Pt arrived to floor via WC per ED staff. Transferred self to hospital bed. Able to make needs known. No complaints voiced at present. Oriented to room and call bell system. Bed in lowest position. Call bell within reach. Safety precautions ongoing. Dr. Bray notified of pts arrival to floor.

## 2024-08-25 NOTE — H&P
[facility] - [facility] Tele[facility] Medicine  History & Physical    Patient Name: Magali Cheung  MRN: [id number]  Patient Class: OP- Observation  Admission Date: 2024  Attending Physician: Jamison Bray Jr., MD   Primary Care Provider: Araina Pinedo NP         Patient information was obtained from patient and ER records.     Subjective:     Principal Problem:Cellulitis of left upper extremity    Chief Complaint:   Chief Complaint   Patient presents with    Arm Swelling     Patient presents to the ED with c/o possible blood clot. Patient has a PICC line in left arm and she noticed swelling around the picc line yesterday and home health told her to come in for possible blood clot. Patient has had PICC line for around 4 months        HPI: Patient is a very pleasant 64-year-old white female from [place], [place].  Patient presents complaining of left arm pain.  Patient has a had a PICC line in her left arm for approximately 4 months.  She she had a foot ulcer at the right MTP joint area and osteomyelitis.  The wound itself has healed but she has been receiving IV vancomycin.  Today she print presents to [facility] ED with pain swelling and redness around the PICC site.  The PICC has not been functioning well and our nurses found it difficult to flush or draw from.  Evaluation in the ED revealed a thrombus in the vein but distal to patient's line.  There was concern for cellulitis and patient is admitted to hospital medicine service    Past Medical History:   Diagnosis Date    Anxiety     Chronic pain syndrome     Depression     Diabetes mellitus     GERD (gastroesophageal reflux disease)     Hypertension     Hypothyroidism     Low back pain     Low vitamin B12 level     Lumbar radiculopathy     Neuropathy        Past Surgical History:   Procedure Laterality Date    Bilateral L3-S1 MBB Bilateral 2019, 2019    Dr Barclay     SECTION      COLON SURGERY      HERNIA REPAIR       Left L3-S1 RFTC Left 10/23/2019    Dr Barclay    RADIOFREQUENCY ABLATION OF LUMBAR MEDIAL BRANCH NERVE AT SINGLE LEVEL Right 10/25/2022    Procedure: Radiofrequency Ablation, Nerve, Spinal, Lumbar, Medial Branch, Level L4-S1;  Surgeon: Roselia Barclay MD;  Location: [facility] PAIN MGMT;  Service: Pain Management;  Laterality: Right;  vaccinated.schedule LEFT after right is done.    RADIOFREQUENCY ABLATION OF LUMBAR MEDIAL BRANCH NERVE AT SINGLE LEVEL Left 11/17/2022    Procedure: LEFT L4-S1 RFTC  (HAD RIGHT ON 10-25);  Surgeon: Roselia Barclay MD;  Location: [facility] PAIN MGMT;  Service: Pain Management;  Laterality: Left;  VAC SELENE IN EPIC    Right L3-S1 RFTC Right 12/16/2019    Dr Barclay    SPINAL CORD STIMULATOR IMPLANT         Review of patient's allergies indicates:   Allergen Reactions    Opioids - morphine analogues Other (See Comments)       No current facility-administered medications on file prior to encounter.     Current Outpatient Medications on File Prior to Encounter   Medication Sig    atorvastatin (LIPITOR) 80 MG tablet Take 1 tablet (80 mg total) by mouth every evening. (Patient taking differently: Take 80 mg by mouth once daily.)    carvediloL (COREG) 12.5 MG tablet Take 1 tablet (12.5 mg total) by mouth 2 (two) times daily with meals. (Patient taking differently: Take 12.5 mg by mouth 2 (two) times daily.)    cyanocobalamin 1,000 mcg/mL injection Inject 1,000 mcg into the muscle every 30 days.    DULoxetine (CYMBALTA) 60 MG capsule Take 60 mg by mouth 2 (two) times daily.    esomeprazole (NEXIUM) 40 MG capsule Take 40 mg by mouth once daily.    hydroCHLOROthiazide (HYDRODIURIL) 12.5 MG Tab Take 12.5 mg by mouth once daily.    JANUVIA 100 mg Tab Take 100 mg by mouth once daily.    levothyroxine (SYNTHROID) 50 MCG tablet Take 50 mcg by mouth before breakfast.    LINZESS 145 mcg Cap capsule Take 145 mcg by mouth once daily.    potassium chloride (KLOR-CON) 10 MEQ TbSR Take 10 mEq by mouth once daily.     pregabalin (LYRICA) 150 MG capsule Take 1 capsule (150 mg total) by mouth 2 (two) times daily. Take two capsules by mouth three times a day (Patient taking differently: Take 300 mg by mouth 2 (two) times daily.)    REXULTI 3 mg Tab Take 1 tablet by mouth once daily.    albuterol (PROVENTIL/VENTOLIN HFA) 90 mcg/actuation inhaler INHALE ONE TO TWO PUFFS BY MOUTH EVERY 4 HOURS AS NEEDED FOR SHORTNESS OF BREATH OR WHEEZING    clonazePAM (KLONOPIN) 1 MG tablet Take 1 mg by mouth daily as needed.    fluticasone propionate (FLONASE) 50 mcg/actuation nasal spray 2 sprays (100 mcg total) by Each Nostril route once daily.    [DISCONTINUED] dextrose 5 % (D5W) SolP 250 mL with vancomycin 1,000 mg SolR 1,000 mg Inject 1,000 mg into the vein every 24 hours as needed (MRSA pneumonia). (Patient not taking: Reported on 7/8/2024)    [DISCONTINUED] LINZESS 290 mcg Cap capsule Take 1 capsule every day by oral route for 30 days.    [DISCONTINUED] mupirocin (BACTROBAN) 2 % ointment by Nasal route 2 (two) times daily. (Patient not taking: Reported on 7/8/2024)    [DISCONTINUED] REXULTI 2 mg Tab Take 1 tablet by mouth once daily.     Family History       Problem Relation (Age of Onset)    Bipolar disorder Paternal Grandmother    Heart disease Father, Paternal Grandfather    Hypertension Father    Stroke Maternal Grandfather          Tobacco Use    Smoking status: Every Day     Types: Vaping with nicotine     Passive exposure: Past    Smokeless tobacco: Never    Tobacco comments:     Smoked cigarettes 46 years. Stopped smoking cigarettes and started vaping with nicotine x 2 years   Substance and Sexual Activity    Alcohol use: Yes    Drug use: Never    Sexual activity: Yes     Partners: Male     Review of Systems   Constitutional:  Negative for chills and fever.   HENT:  Negative for sinus pressure and sinus pain.    Respiratory:  Negative for chest tightness and shortness of breath.    Cardiovascular:  Negative for chest pain and leg  swelling.   Gastrointestinal:  Negative for nausea and vomiting.   Genitourinary:  Negative for dysuria and hematuria.   Musculoskeletal:  Positive for back pain. Negative for joint swelling.        Chronic   Skin:  Negative for rash and wound.   Neurological:  Negative for dizziness and light-headedness.   Hematological:  Negative for adenopathy. Does not bruise/bleed easily.     Objective:     Vital Signs (Most Recent):  Temp: 98.5 °F (36.9 °C) (08/25/24 1649)  Pulse: 81 (08/25/24 1649)  Resp: 18 (08/25/24 1649)  BP: (!) 150/64 (08/25/24 1649)  SpO2: 97 % (08/25/24 1649) Vital Signs (24h Range):  Temp:  [98.5 °F (36.9 °C)-98.7 °F (37.1 °C)] 98.5 °F (36.9 °C)  Pulse:  [69-84] 81  Resp:  [18-19] 18  SpO2:  [97 %-100 %] 97 %  BP: (119-173)/(59-84) 150/64     Weight: 59.4 kg (131 lb)  Body mass index is 23.96 kg/m².     Physical Exam  Vitals reviewed.   Constitutional:       General: She is not in acute distress.     Appearance: She is not toxic-appearing.   HENT:      Head: Normocephalic.      Right Ear: External ear normal.      Left Ear: External ear normal.      Nose: Nose normal.   Eyes:      General: No scleral icterus.     Extraocular Movements: Extraocular movements intact.      Pupils: Pupils are equal, round, and reactive to light.   Cardiovascular:      Rate and Rhythm: Normal rate and regular rhythm.      Heart sounds: Normal heart sounds.   Pulmonary:      Effort: Pulmonary effort is normal. No respiratory distress.      Breath sounds: Normal breath sounds.   Abdominal:      General: Abdomen is flat. Bowel sounds are normal. There is no distension.      Palpations: Abdomen is soft.      Tenderness: There is no abdominal tenderness.   Musculoskeletal:      Right lower leg: No edema.      Left lower leg: No edema.   Skin:     Comments: Some erythema, tenderness, and warmth around PICC line insertion site and somewhat distally   Neurological:      General: No focal deficit present.      Mental Status: She is  alert and oriented to person, place, and time.   Psychiatric:         Mood and Affect: Mood normal.         Behavior: Behavior normal.              CRANIAL NERVES     CN III, IV, VI   Pupils are equal, round, and reactive to light.       Significant Labs: All pertinent labs within the past 24 hours have been reviewed.  BMP:   Recent Labs   Lab 08/25/24  1030      *   K 3.1*      CO2 28   BUN 9   CREATININE 0.99   CALCIUM 9.4     CBC:   Recent Labs   Lab 08/25/24  1030   WBC 12.89*   HGB 8.4*   HCT 28.3*   *       Significant Imaging: I have reviewed all pertinent imaging results/findings within the past 24 hours.  Assessment/Plan:     * Cellulitis of left upper extremity    Possible cellulitis  but just as likely inflammation from phlebitis.  Has been on vanc, we will start Rocephin.    Osteomyelitis of right foot    Has been on long-term IV vancomycin for osteomyelitis.  We will consult Dr. Jade, her treating physician, to determine whether continued IV therapy is desired.  If so she will need another PICC line placed.  Current PICC is functioning poorly and is being removed    Basilic vein thrombosis    Patient with thrombus distal to catheter.  We will give a single dose of 60 mg Lovenox tonight.  Defer re-dosing until decision made about need for another PICC line.  This is a superficial vein but would  consider a month of Eliquis.    Hypertension  Chronic, controlled. Latest blood pressure and vitals reviewed-     Temp:  [98.5 °F (36.9 °C)-98.7 °F (37.1 °C)]   Pulse:  [69-84]   Resp:  [18-19]   BP: (119-173)/(59-84)   SpO2:  [97 %-100 %] .   Home meds for hypertension were reviewed and noted below.   Hypertension Medications               carvediloL (COREG) 12.5 MG tablet Take 1 tablet (12.5 mg total) by mouth 2 (two) times daily with meals.    hydroCHLOROthiazide (HYDRODIURIL) 12.5 MG Tab Take 12.5 mg by mouth once daily.            While in the hospital, will manage blood pressure  "as follows; Continue home antihypertensive regimen    Will utilize p.r.n. blood pressure medication only if patient's blood pressure greater than 180/110 and she develops symptoms such as worsening chest pain or shortness of breath.    Type 2 diabetes mellitus with diabetic polyneuropathy, without long-term current use of insulin  Patient's FSGs are controlled on current medication regimen.  Last A1c reviewed-   Lab Results   Component Value Date    HGBA1C 6.8 (H) 04/14/2024     Most recent fingerstick glucose reviewed- No results for input(s): "POCTGLUCOSE" in the last 24 hours.  Current correctional scale  Low  Maintain anti-hyperglycemic dose as follows-   Antihyperglycemics (From admission, onward)      Start     Stop Route Frequency Ordered    08/25/24 1545  SITagliptin phosphate tablet 100 mg         -- Oral Daily 08/25/24 1442    08/25/24 1544  insulin aspart U-100 injection 0-5 Units         -- SubQ Before meals & nightly PRN 08/25/24 1447                VTE Risk Mitigation (From admission, onward)           Ordered     IP VTE HIGH RISK PATIENT  Once         08/25/24 1447     Place sequential compression device  Until discontinued         08/25/24 1447                       On 08/25/2024, patient should be placed in hospital observation services under my care.             Jamison Bray Jr, MD  Department of Hospital Medicine  [facility] - [address] [place], [place] [address]          "

## 2024-08-25 NOTE — ED PROVIDER NOTES
Encounter Date: 2024    SCRIBE #1 NOTE: I, Roselia Comer, am scribing for, and in the presence of,  Reese Nichols MD.       History     Chief Complaint   Patient presents with    Arm Swelling     Patient presents to the ED with c/o possible blood clot. Patient has a PICC line in left arm and she noticed swelling around the picc line yesterday and home health told her to come in for possible blood clot. Patient has had PICC line for around 4 months     This patient is a 64 y.o. female that presents to the ED with c/o Arm Swelling. Patient has a PICC line in left arm and she noticed swelling around the picc line yesterday. The home health told her to come in for possible blood clot. Patient has had PICC line for around 4 months. Patient reports the area around the PICC is sore and swelling and it was just used yesterday. Patient has Mhx of DM, HTN and Chronic pain syndrome. Patients states Dr. Jade installed the PICC. When examined the patient's left arm was swollen around the area where the PICC is located.     The history is provided by the patient and the spouse. No  was used.     Review of patient's allergies indicates:   Allergen Reactions    Opioids - morphine analogues Other (See Comments)     Past Medical History:   Diagnosis Date    Anxiety     Chronic pain syndrome     Depression     Diabetes mellitus     GERD (gastroesophageal reflux disease)     Hypertension     Hypothyroidism     Low back pain     Low vitamin B12 level     Lumbar radiculopathy     Neuropathy      Past Surgical History:   Procedure Laterality Date    Bilateral L3-S1 MBB Bilateral 2019, 2019    Dr Barclay     SECTION      COLON SURGERY      HERNIA REPAIR      Left L3-S1 RFTC Left 10/23/2019    Dr Barclay    RADIOFREQUENCY ABLATION OF LUMBAR MEDIAL BRANCH NERVE AT SINGLE LEVEL Right 10/25/2022    Procedure: Radiofrequency Ablation, Nerve, Spinal, Lumbar, Medial Branch, Level L4-S1;  Surgeon: Roselia SULTANA  MD Deny;  Location: [facility] PAIN MGMT;  Service: Pain Management;  Laterality: Right;  vaccinated.schedule LEFT after right is done.    RADIOFREQUENCY ABLATION OF LUMBAR MEDIAL BRANCH NERVE AT SINGLE LEVEL Left 11/17/2022    Procedure: LEFT L4-S1 RFTC  (HAD RIGHT ON 10-25);  Surgeon: Roselia Barclay MD;  Location: [facility] PAIN MGMT;  Service: Pain Management;  Laterality: Left;  VAC SELENE IN EPIC    Right L3-S1 RFTC Right 12/16/2019    Dr Barclay    SPINAL CORD STIMULATOR IMPLANT       Family History   Problem Relation Name Age of Onset    Hypertension Father      Heart disease Father      Stroke Maternal Grandfather      Bipolar disorder Paternal Grandmother      Heart disease Paternal Grandfather       Social History     Tobacco Use    Smoking status: Every Day     Types: Vaping with nicotine     Passive exposure: Past    Smokeless tobacco: Never    Tobacco comments:     Smoked cigarettes 46 years. Stopped smoking cigarettes and started vaping with nicotine x 2 years   Substance Use Topics    Alcohol use: Yes    Drug use: Never     Review of Systems   Constitutional:  Negative for fever.   Respiratory:  Negative for cough, chest tightness and shortness of breath.    Cardiovascular:  Negative for chest pain and leg swelling.   Gastrointestinal:  Negative for diarrhea, nausea and vomiting.       Physical Exam     Initial Vitals [08/25/24 1004]   BP Pulse Resp Temp SpO2   (!) 168/80 76 18 98.7 °F (37.1 °C) 97 %      MAP       --         Physical Exam    Nursing note and vitals reviewed.  HENT:   Head: Normocephalic and atraumatic.   Mouth/Throat: Oropharynx is clear and moist.   Eyes: Pupils are equal, round, and reactive to light.   Neck: Neck supple.   Normal range of motion.  Cardiovascular:  Normal rate and regular rhythm.           Pulmonary/Chest: Effort normal and breath sounds normal.   Abdominal: Abdomen is soft. She exhibits no distension.   Musculoskeletal:         General: Normal range of motion.         Arms:       Cervical back: Normal range of motion and neck supple.      Comments: Patient has redness and swelling and tenderness around her Left arm where her PICC is installed.      Neurological: She is alert.   Skin: Skin is warm. Capillary refill takes less than 2 seconds.   Psychiatric: She has a normal mood and affect.         ED Course   Procedures  Labs Reviewed   COMPREHENSIVE METABOLIC PANEL - Abnormal       Result Value    Sodium 135 (*)     Potassium 3.1 (*)     Chloride 101      CO2 28      Anion Gap 9      Glucose 100      BUN 9      Creatinine 0.99      BUN/Creatinine Ratio 9      Calcium 9.4      Total Protein 7.8      Albumin 3.2 (*)     Globulin 4.6 (*)     A/G Ratio 0.7      Bilirubin, Total 0.4      Alk Phos 75      ALT 13      AST 8 (*)     eGFR 64     CBC WITH DIFFERENTIAL - Abnormal    WBC 12.89 (*)     RBC 4.09 (*)     Hemoglobin 8.4 (*)     Hematocrit 28.3 (*)     MCV 69.2 (*)     MCH 20.5 (*)     MCHC 29.7 (*)     RDW 16.7 (*)     Platelet Count 487 (*)     MPV 10.5      Neutrophils % 76.0 (*)     Lymphocytes % 12.4 (*)     Monocytes % 9.1 (*)     Eosinophils % 1.3      Basophils % 0.7      Immature Granulocytes % 0.5 (*)     nRBC, Auto 0.0      Neutrophils, Abs 9.80 (*)     Lymphocytes, Absolute 1.60      Monocytes, Absolute 1.17 (*)     Eosinophils, Absolute 0.17      Basophils, Absolute 0.09      Immature Granulocytes, Absolute 0.06 (*)     nRBC, Absolute 0.00      Diff Type Scan Smear     CBC MORPHOLOGY - Abnormal    Platelet Morphology Large & Giant Platelets (*)     Anisocytosis 1+      Microcytosis 1+      Hypochromic 1+     POCT GLUCOSE MONITORING CONTINUOUS - Abnormal    POC Glucose 110 (*)    CULTURE, BLOOD   CULTURE, BLOOD   CBC W/ AUTO DIFFERENTIAL    Narrative:     The following orders were created for panel order CBC auto differential.  Procedure                               Abnormality         Status                     ---------                               -----------          ------                     CBC with Differential[[id number]]       Abnormal            Final result                 Please view results for these tests on the individual orders.          Imaging Results              US Upper Extremity Veins Left (Final result)  Result time 08/25/24 12:17:58      Final result by Kenji Conrad II, MD (08/25/24 12:17:58)                   Impression:      Suggestion of thrombus as described above.    Ultrasound images stored and captured.      Electronically signed by: Kenji Conrad  Date:    08/25/2024  Time:    12:17               Narrative:    EXAMINATION:  US UPPER EXTREMITY VEINS LEFT    CLINICAL HISTORY:  Left arm swelling;    TECHNIQUE:  Duplex and color flow Doppler and dynamic compression was performed of the left upper extremity veins was performed.    COMPARISON:  None.    FINDINGS:  The PICC line of present within the basilic vein with suggestion of thrombus distal to the catheter insertion.  No other evidence of echogenic, non-compressible thrombus seen in the visualized veins of the extremities.  Color Doppler venous waveform pattern is within normal limits.                                       X-Ray Chest AP Portable (Final result)  Result time 08/25/24 12:01:28      Final result by Kenji Conrad II, MD (08/25/24 12:01:28)                   Impression:      No evidence of acute cardiopulmonary disease.      Electronically signed by: Kenji Conrad  Date:    08/25/2024  Time:    12:01               Narrative:    EXAMINATION:  XR CHEST AP PORTABLE    CLINICAL HISTORY:  Left arm pain;    COMPARISON:  26 April 2024    TECHNIQUE:  XR CHEST AP PORTABLE    FINDINGS:  The heart and mediastinum are normal in size and configuration.  Left arm catheter is unchanged is the pulmonary vascularity is normal in caliber.  No lung infiltrates, effusions, pneumothorax or other abnormality is demonstrated.                                       X-Ray Humerus 2 View Left  (Final result)  Result time 08/25/24 12:03:17      Final result by Kenji Conrad II, MD (08/25/24 12:03:17)                   Impression:      Catheter appears within normal limits.  No other evidence of abnormality demonstrated      Electronically signed by: Kenji Conrad  Date:    08/25/2024  Time:    12:03               Narrative:    EXAMINATION:  XR HUMERUS 2 VIEW LEFT    CLINICAL HISTORY:  Pain in left arm    COMPARISON:  None available    TECHNIQUE:  XR HUMERUS 2 VIEW LEFT    FINDINGS:  No evidence of fracture seen.  The left arm catheter appears within normal limits.  The alignment of the joints appears normal.  No degenerative change is present.  No soft tissue abnormality is seen.                                       Medications   atorvastatin tablet 80 mg (has no administration in time range)   carvediloL tablet 12.5 mg (has no administration in time range)   clonazePAM tablet 1 mg (has no administration in time range)   DULoxetine DR capsule 60 mg (has no administration in time range)   hydroCHLOROthiazide tablet 12.5 mg (has no administration in time range)   SITagliptin phosphate tablet 100 mg (has no administration in time range)   levothyroxine tablet 50 mcg (has no administration in time range)   linaCLOtide capsule 145 mcg (has no administration in time range)   potassium chloride SA CR tablet 10 mEq (has no administration in time range)   pregabalin capsule 150 mg (has no administration in time range)   sodium chloride 0.9% flush 10 mL (has no administration in time range)   naloxone 0.4 mg/mL injection 0.02 mg (has no administration in time range)   glucose chewable tablet 16 g (has no administration in time range)   glucose chewable tablet 24 g (has no administration in time range)   glucagon (human recombinant) injection 1 mg (has no administration in time range)   potassium bicarbonate disintegrating tablet 50 mEq (has no administration in time range)   potassium bicarbonate  disintegrating tablet 35 mEq (has no administration in time range)   potassium bicarbonate disintegrating tablet 60 mEq (has no administration in time range)   acetaminophen tablet 650 mg (has no administration in time range)   ondansetron disintegrating tablet 8 mg (has no administration in time range)   insulin aspart U-100 injection 0-5 Units (has no administration in time range)   dextrose 10% bolus 125 mL 125 mL (has no administration in time range)   dextrose 10% bolus 250 mL 250 mL (has no administration in time range)     Medical Decision Making  Amount and/or Complexity of Data Reviewed  Labs: ordered.  Radiology: ordered.    Risk  OTC drugs.  Prescription drug management.              Attending Attestation:           Physician Attestation for Scribe:  Physician Attestation Statement for Scribe #1: I, Reese Nichols MD, reviewed documentation, as scribed by Roselia Pringle in my presence, and it is both accurate and complete.             ED Course as of 08/25/24 1527   Sun Aug 25, 2024   1016 Medical decision-making:  Differential diagnosis includes PICC line malfunction, cellulitis, infection, thrombophlebitis, DVT.  All testing ordered and interpreted by me. [BB]   1109 PICC line will only flush on 1 side and the other side only flushes with difficulty.  Neither side we will allow blood draw. [BB]   1113 Chest x-ray by my interpretation shows no acute disease.  PICC line is in place.  Left humerus x-ray by my interpretation shows no acute fracture.  No visible break in PICC line [BB]   1138 Venous ultrasound of left upper extremity shows no clot.  There is some edema around the proximal PICC line.  Questionable thrombus at site of PICC. [BB]   1155 CMP is unremarkable except mild hypokalemia. [BB]      ED Course User Index  [BB] Reese Nichols MD                           Clinical Impression:  Final diagnoses:  [M79.602] Left arm pain  [L03.90] Cellulitis  [R07.9] Chest pain          ED Disposition Condition     Observation                 Reese Nichols MD  08/25/24 [id number]

## 2024-08-25 NOTE — ASSESSMENT & PLAN NOTE
"Patient's FSGs are controlled on current medication regimen.  Last A1c reviewed-   Lab Results   Component Value Date    HGBA1C 6.8 (H) 04/14/2024     Most recent fingerstick glucose reviewed- No results for input(s): "POCTGLUCOSE" in the last 24 hours.  Current correctional scale  Low  Maintain anti-hyperglycemic dose as follows-   Antihyperglycemics (From admission, onward)      Start     Stop Route Frequency Ordered    08/25/24 1545  SITagliptin phosphate tablet 100 mg         -- Oral Daily 08/25/24 1442    08/25/24 1544  insulin aspart U-100 injection 0-5 Units         -- SubQ Before meals & nightly PRN 08/25/24 1447            "

## 2024-08-25 NOTE — HPI
Patient is a very pleasant 64-year-old white female from [place], [place].  Patient presents complaining of left arm pain.  Patient has a had a PICC line in her left arm for approximately 4 months.  She she had a foot ulcer at the right MTP joint area and osteomyelitis.  The wound itself has healed but she has been receiving IV vancomycin for osteomyelitis.  Today she presents to [facility] ED with pain, swelling and redness around the PICC site.  The PICC has not been functioning well and our nurses found it difficult to flush or draw from.  Evaluation in the ED revealed a thrombus in the vein but distal to patient's line.  There was concern for cellulitis and patient is admitted to hospital medicine service.

## 2024-08-25 NOTE — ASSESSMENT & PLAN NOTE
Chronic, controlled. Latest blood pressure and vitals reviewed-     Temp:  [98.5 °F (36.9 °C)-98.7 °F (37.1 °C)]   Pulse:  [69-84]   Resp:  [18-19]   BP: (119-173)/(59-84)   SpO2:  [97 %-100 %] .   Home meds for hypertension were reviewed and noted below.   Hypertension Medications               carvediloL (COREG) 12.5 MG tablet Take 1 tablet (12.5 mg total) by mouth 2 (two) times daily with meals.    hydroCHLOROthiazide (HYDRODIURIL) 12.5 MG Tab Take 12.5 mg by mouth once daily.            While in the hospital, will manage blood pressure as follows; Continue home antihypertensive regimen    Will utilize p.r.n. blood pressure medication only if patient's blood pressure greater than 180/110 and she develops symptoms such as worsening chest pain or shortness of breath.

## 2024-08-26 VITALS
HEIGHT: 62 IN | OXYGEN SATURATION: 100 % | DIASTOLIC BLOOD PRESSURE: 82 MMHG | TEMPERATURE: 97 F | RESPIRATION RATE: 18 BRPM | WEIGHT: 131.38 LBS | SYSTOLIC BLOOD PRESSURE: 156 MMHG | BODY MASS INDEX: 24.18 KG/M2 | HEART RATE: 64 BPM

## 2024-08-26 LAB
ANION GAP SERPL CALCULATED.3IONS-SCNC: 10 MMOL/L (ref 7–16)
ANISOCYTOSIS BLD QL SMEAR: ABNORMAL
BASOPHILS # BLD AUTO: 0.11 K/UL (ref 0–0.2)
BASOPHILS NFR BLD AUTO: 1.1 % (ref 0–1)
BUN SERPL-MCNC: 11 MG/DL (ref 7–18)
BUN/CREAT SERPL: 10 (ref 6–20)
CALCIUM SERPL-MCNC: 9.4 MG/DL (ref 8.5–10.1)
CHLORIDE SERPL-SCNC: 99 MMOL/L (ref 98–107)
CO2 SERPL-SCNC: 30 MMOL/L (ref 21–32)
CREAT SERPL-MCNC: 1.06 MG/DL (ref 0.55–1.02)
DIFFERENTIAL METHOD BLD: ABNORMAL
EGFR (NO RACE VARIABLE) (RUSH/TITUS): 59 ML/MIN/1.73M2
EOSINOPHIL # BLD AUTO: 0.28 K/UL (ref 0–0.5)
EOSINOPHIL NFR BLD AUTO: 2.8 % (ref 1–4)
ERYTHROCYTE [DISTWIDTH] IN BLOOD BY AUTOMATED COUNT: 16.5 % (ref 11.5–14.5)
GLUCOSE SERPL-MCNC: 133 MG/DL (ref 74–106)
HCT VFR BLD AUTO: 27.9 % (ref 38–47)
HGB BLD-MCNC: 8.1 G/DL (ref 12–16)
HYPOCHROMIA BLD QL SMEAR: ABNORMAL
IMM GRANULOCYTES # BLD AUTO: 0.05 K/UL (ref 0–0.04)
IMM GRANULOCYTES NFR BLD: 0.5 % (ref 0–0.4)
LYMPHOCYTES # BLD AUTO: 1.33 K/UL (ref 1–4.8)
LYMPHOCYTES NFR BLD AUTO: 13.2 % (ref 27–41)
MCH RBC QN AUTO: 20.2 PG (ref 27–31)
MCHC RBC AUTO-ENTMCNC: 29 G/DL (ref 32–36)
MCV RBC AUTO: 69.6 FL (ref 80–96)
MICROCYTES BLD QL SMEAR: ABNORMAL
MONOCYTES # BLD AUTO: 1.1 K/UL (ref 0–0.8)
MONOCYTES NFR BLD AUTO: 10.9 % (ref 2–6)
MPC BLD CALC-MCNC: 10.6 FL (ref 9.4–12.4)
NEUTROPHILS # BLD AUTO: 7.24 K/UL (ref 1.8–7.7)
NEUTROPHILS NFR BLD AUTO: 71.5 % (ref 53–65)
NRBC # BLD AUTO: 0 X10E3/UL
NRBC, AUTO (.00): 0 %
OVALOCYTES BLD QL SMEAR: ABNORMAL
PLATELET # BLD AUTO: 453 K/UL (ref 150–400)
PLATELET MORPHOLOGY: ABNORMAL
POTASSIUM SERPL-SCNC: 3.3 MMOL/L (ref 3.5–5.1)
RBC # BLD AUTO: 4.01 M/UL (ref 4.2–5.4)
SODIUM SERPL-SCNC: 136 MMOL/L (ref 136–145)
TARGETS BLD QL SMEAR: ABNORMAL
WBC # BLD AUTO: 10.11 K/UL (ref 4.5–11)

## 2024-08-26 PROCEDURE — 25000003 PHARM REV CODE 250: Performed by: FAMILY MEDICINE

## 2024-08-26 PROCEDURE — 80048 BASIC METABOLIC PNL TOTAL CA: CPT | Performed by: FAMILY MEDICINE

## 2024-08-26 PROCEDURE — 85025 COMPLETE CBC W/AUTO DIFF WBC: CPT | Performed by: FAMILY MEDICINE

## 2024-08-26 PROCEDURE — 99239 HOSP IP/OBS DSCHRG MGMT >30: CPT | Mod: ,,, | Performed by: STUDENT IN AN ORGANIZED HEALTH CARE EDUCATION/TRAINING PROGRAM

## 2024-08-26 PROCEDURE — 25000003 PHARM REV CODE 250: Performed by: STUDENT IN AN ORGANIZED HEALTH CARE EDUCATION/TRAINING PROGRAM

## 2024-08-26 PROCEDURE — 36415 COLL VENOUS BLD VENIPUNCTURE: CPT | Performed by: FAMILY MEDICINE

## 2024-08-26 PROCEDURE — G0378 HOSPITAL OBSERVATION PER HR: HCPCS

## 2024-08-26 RX ORDER — MUPIROCIN 20 MG/G
OINTMENT TOPICAL 2 TIMES DAILY
Status: DISCONTINUED | OUTPATIENT
Start: 2024-08-26 | End: 2024-08-26 | Stop reason: HOSPADM

## 2024-08-26 RX ADMIN — SITAGLIPTIN 100 MG: 100 TABLET, FILM COATED ORAL at 08:08

## 2024-08-26 RX ADMIN — MUPIROCIN: 20 OINTMENT TOPICAL at 08:08

## 2024-08-26 RX ADMIN — LINACLOTIDE 145 MCG: 145 CAPSULE, GELATIN COATED ORAL at 08:08

## 2024-08-26 RX ADMIN — POTASSIUM CHLORIDE 10 MEQ: 750 TABLET, EXTENDED RELEASE ORAL at 08:08

## 2024-08-26 RX ADMIN — PREGABALIN 150 MG: 75 CAPSULE ORAL at 08:08

## 2024-08-26 RX ADMIN — CARVEDILOL 12.5 MG: 12.5 TABLET, FILM COATED ORAL at 08:08

## 2024-08-26 RX ADMIN — DULOXETINE HYDROCHLORIDE 60 MG: 30 CAPSULE, DELAYED RELEASE ORAL at 08:08

## 2024-08-26 RX ADMIN — HYDROCHLOROTHIAZIDE 12.5 MG: 12.5 TABLET ORAL at 08:08

## 2024-08-26 RX ADMIN — LEVOTHYROXINE SODIUM 50 MCG: 50 TABLET ORAL at 05:08

## 2024-08-26 NOTE — CONSULTS
[facility] - [facility] Medical Telemetry  General Surgery  Consult Note    Patient Name: Magali Cheung  MRN: [id number]  Code Status: Full Code  Admission Date: 8/25/2024  Hospital Length of Stay: 0 days  Attending Physician: Ja Venegas DO  Primary Care Provider: Ariana Pinedo NP    Patient information was obtained from patient and ER records.     Inpatient consult to General Surgery  Consult performed by: Tee Jade DO  Consult ordered by: Jamison Bray Jr., MD        Subjective:     Principal Problem: Cellulitis of left upper extremity    History of Present Illness: 64-year-old  female presented to the ER with complaints of swelling to the left arm and left arm pain.  Patient was had a PICC line for several months and receiving multiple IV antibiotics for recurrent osteomyelitis to right metatarsophalangeal joint area.  Patient has had bone scans showed abnormally increased uptake by the 1st MTP joint of the right foot as can be seen with inflammatory arthritis such as septic arthritis with adjacent osteomyelitis. This appearance is similar to the study from July 19, 2024 but moderately improved compared to the study from April 22, 2024. It should be stated that it can take months for osteomyelitis to completely heal. It should also be stated the increased uptake of radiotracer on this exam reflects bone healing and not specifically active infection.  Patient has been receiving IV antibiotics via the PICC line; the PICC line has not been functioning as well.  Patient developed some swelling and pain in the arm and recent left upper extremity venous Doppler showed the PICC line of present within the basilic vein with suggestion of thrombus distal to the catheter insertion. No other evidence of echogenic, non-compressible thrombus seen in the visualized veins of the extremities. Color Doppler venous waveform pattern is within normal limits.  Denies any chest pain/shortness of breath,  nausea/vomiting/diarrhea, fever/chills.      No current facility-administered medications on file prior to encounter.     Current Outpatient Medications on File Prior to Encounter   Medication Sig    atorvastatin (LIPITOR) 80 MG tablet Take 1 tablet (80 mg total) by mouth every evening. (Patient taking differently: Take 80 mg by mouth once daily.)    carvediloL (COREG) 12.5 MG tablet Take 1 tablet (12.5 mg total) by mouth 2 (two) times daily with meals. (Patient taking differently: Take 12.5 mg by mouth 2 (two) times daily.)    cyanocobalamin 1,000 mcg/mL injection Inject 1,000 mcg into the muscle every 30 days.    DULoxetine (CYMBALTA) 60 MG capsule Take 60 mg by mouth 2 (two) times daily.    esomeprazole (NEXIUM) 40 MG capsule Take 40 mg by mouth once daily.    hydroCHLOROthiazide (HYDRODIURIL) 12.5 MG Tab Take 12.5 mg by mouth once daily.    JANUVIA 100 mg Tab Take 100 mg by mouth once daily.    levothyroxine (SYNTHROID) 50 MCG tablet Take 50 mcg by mouth before breakfast.    LINZESS 145 mcg Cap capsule Take 145 mcg by mouth once daily.    potassium chloride (KLOR-CON) 10 MEQ TbSR Take 10 mEq by mouth once daily.    pregabalin (LYRICA) 150 MG capsule Take 1 capsule (150 mg total) by mouth 2 (two) times daily. Take two capsules by mouth three times a day (Patient taking differently: Take 300 mg by mouth 2 (two) times daily.)    REXULTI 3 mg Tab Take 1 tablet by mouth once daily.    albuterol (PROVENTIL/VENTOLIN HFA) 90 mcg/actuation inhaler INHALE ONE TO TWO PUFFS BY MOUTH EVERY 4 HOURS AS NEEDED FOR SHORTNESS OF BREATH OR WHEEZING    clonazePAM (KLONOPIN) 1 MG tablet Take 1 mg by mouth daily as needed.    fluticasone propionate (FLONASE) 50 mcg/actuation nasal spray 2 sprays (100 mcg total) by Each Nostril route once daily.       Review of patient's allergies indicates:   Allergen Reactions    Opioids - morphine analogues Other (See Comments)       Past Medical History:   Diagnosis Date    Anxiety     Chronic  pain syndrome     Depression     Diabetes mellitus     GERD (gastroesophageal reflux disease)     Hypertension     Hypothyroidism     Low back pain     Low vitamin B12 level     Lumbar radiculopathy     Neuropathy      Past Surgical History:   Procedure Laterality Date    Bilateral L3-S1 MBB Bilateral 2019, 2019    Dr Barclay     SECTION      COLON SURGERY      HERNIA REPAIR      Left L3-S1 RFTC Left 10/23/2019    Dr Barclay    RADIOFREQUENCY ABLATION OF LUMBAR MEDIAL BRANCH NERVE AT SINGLE LEVEL Right 10/25/2022    Procedure: Radiofrequency Ablation, Nerve, Spinal, Lumbar, Medial Branch, Level L4-S1;  Surgeon: Roselia Barclay MD;  Location: [facility];  Service: Pain Management;  Laterality: Right;  vaccinated.schedule LEFT after right is done.    RADIOFREQUENCY ABLATION OF LUMBAR MEDIAL BRANCH NERVE AT SINGLE LEVEL Left 2022    Procedure: LEFT L4-S1 RFTC  (HAD RIGHT ON 10-25);  Surgeon: Roselia Barclay MD;  Location: [facility];  Service: Pain Management;  Laterality: Left;  VAC SELENE IN EPIC    Right L3-S1 RFTC Right 2019    Dr Barclay    SPINAL CORD STIMULATOR IMPLANT       Family History       Problem Relation (Age of Onset)    Bipolar disorder Paternal Grandmother    Heart disease Father, Paternal Grandfather    Hypertension Father    Stroke Maternal Grandfather          Tobacco Use    Smoking status: Every Day     Types: Vaping with nicotine     Passive exposure: Past    Smokeless tobacco: Never    Tobacco comments:     Smoked cigarettes 46 years. Stopped smoking cigarettes and started vaping with nicotine x 2 years   Substance and Sexual Activity    Alcohol use: Yes    Drug use: Never    Sexual activity: Yes     Partners: Male     Review of Systems   Constitutional: Negative.  Negative for fatigue and unexpected weight change.   HENT: Negative.  Negative for trouble swallowing.    Eyes: Negative.    Respiratory: Negative.  Negative for chest tightness and shortness  of breath.    Cardiovascular: Negative.  Negative for chest pain.   Gastrointestinal: Negative.  Negative for abdominal pain, blood in stool and nausea.   Endocrine: Negative.    Genitourinary: Negative.  Negative for hematuria.   Musculoskeletal: Negative.  Negative for back pain and myalgias.        Left arm swelling   Neurological: Negative.  Negative for dizziness, speech difficulty, weakness and light-headedness.   Psychiatric/Behavioral: Negative.  Negative for agitation and behavioral problems.      Objective:     Vital Signs (Most Recent):  Temp: 97.3 °F (36.3 °C) (08/26/24 0759)  Pulse: 64 (08/26/24 0759)  Resp: 18 (08/26/24 0759)  BP: (!) 156/82 (08/26/24 0759)  SpO2: 100 % (08/26/24 0759) Vital Signs (24h Range):  Temp:  [97.3 °F (36.3 °C)-98.7 °F (37.1 °C)] 97.3 °F (36.3 °C)  Pulse:  [55-84] 64  Resp:  [17-19] 18  SpO2:  [95 %-100 %] 100 %  BP: (106-173)/(59-84) 156/82     Weight: 59.6 kg (131 lb 6.3 oz)  Body mass index is 24.03 kg/m².     Physical Exam  Constitutional:       General: She is not in acute distress.     Appearance: Normal appearance.   HENT:      Head: Normocephalic.   Cardiovascular:      Rate and Rhythm: Normal rate.   Pulmonary:      Effort: Pulmonary effort is normal. No respiratory distress.   Abdominal:      General: There is no distension.      Tenderness: There is no abdominal tenderness.   Musculoskeletal:         General: Normal range of motion.      Comments: Mild left arm swelling; no significant erythema; minimal TTP   Skin:     General: Skin is warm.      Coloration: Skin is not jaundiced.   Neurological:      General: No focal deficit present.      Mental Status: She is alert and oriented to person, place, and time.      Cranial Nerves: No cranial nerve deficit.            I have reviewed all pertinent lab results within the past 24 hours.  CBC:   Recent Labs   Lab 08/26/24  0420   WBC 10.11   RBC 4.01*   HGB 8.1*   HCT 27.9*   *   MCV 69.6*   MCH 20.2*   MCHC 29.0*      BMP:   Recent Labs   Lab 08/26/24  0420   *      K 3.3*   CL 99   CO2 30   BUN 11   CREATININE 1.06*   CALCIUM 9.4       Significant Diagnostics:  I have reviewed all pertinent imaging results/findings within the past 24 hours.    Assessment/Plan:     Basilic vein thrombosis  Superficial thrombus    No oral anticoagulation needed.    Elevation, warm compresses, NSAIDs.    Follow up in Surgery clinic next week    Osteomyelitis of right foot  Improving    Bone scan 7/25/24:  Abnormally increased uptake by the 1st MTP joint of the right foot as can be seen with inflammatory arthritis such as septic arthritis with adjacent osteomyelitis.  This appearance is similar to the study from July 19, 2024 but moderately improved compared to the study from April 22, 2024.  It should be stated that it can take months for osteomyelitis to completely heal.  It should also be stated the increased uptake of radiotracer on this exam reflects bone healing and not specifically active infection.    Patient was finishing a 6 week round of antibiotics after bone scan, in which course would be complete.    Due to superficial thrombus, stop PICC line at this time and ok to stop antibiotics now; will repeat bone scan in 3 months    Follow up in Surgery clinic next week              VTE Risk Mitigation (From admission, onward)           Ordered     IP VTE HIGH RISK PATIENT  Once         08/25/24 1447     Place sequential compression device  Until discontinued         08/25/24 1447                    Thank you for your consult. I will sign off. Please contact us if you have any additional questions.    Tee Anthony, DO  General Surgery  [facility] - [address] [place] [address]

## 2024-08-26 NOTE — ASSESSMENT & PLAN NOTE
Has been on long-term IV vancomycin for osteomyelitis.  We will consult Dr. Jade, her treating physician, to determine whether continued IV therapy is desired.  If so she will need another PICC line placed.  Current PICC is functioning poorly and is being removed  Completed antibiotic

## 2024-08-26 NOTE — ASSESSMENT & PLAN NOTE
Chronic, controlled. Latest blood pressure and vitals reviewed-     Temp:  [97.3 °F (36.3 °C)-98.6 °F (37 °C)]   Pulse:  [55-84]   Resp:  [17-19]   BP: (106-173)/(59-83)   SpO2:  [95 %-100 %] .   Home meds for hypertension were reviewed and noted below.   Hypertension Medications               carvediloL (COREG) 12.5 MG tablet Take 1 tablet (12.5 mg total) by mouth 2 (two) times daily with meals.    hydroCHLOROthiazide (HYDRODIURIL) 12.5 MG Tab Take 12.5 mg by mouth once daily.            While in the hospital, will manage blood pressure as follows; Continue home antihypertensive regimen    Will utilize p.r.n. blood pressure medication only if patient's blood pressure greater than 180/110 and she develops symptoms such as worsening chest pain or shortness of breath.   08-Sep-2017 11:37

## 2024-08-26 NOTE — PLAN OF CARE
[facility] - [facility] [place] [facility] Tele[facility]  Initial Discharge Assessment       Primary Care Provider: Ariana Pinedo NP    Admission Diagnosis: Cellulitis [L03.90]  Left arm pain [M79.602]    Admission Date: 8/25/2024  Expected Discharge Date: 8/26/2024    Transition of Care Barriers: None    Payor: MEDICAID [place] / Plan: MEDICAID [place] [employer] / Product Type: Managed Medicaid /     Extended Emergency Contact Information  Primary Emergency Contact: SkyeMark Anthony  Mobile Phone: [phone]  Relation: Friend   needed? No    Discharge Plan A: Home with family, Home Health  Discharge Plan B: Home with family, Home Health      [facility] - [place] [facility] [address] [facility] [address] [place] [address]  Phone: [phone] Fax: [phone]    [facility]  [address] [place] [address] [place] [address]  Phone: [phone] Fax: [phone]      Initial Assessment (most recent)       Adult Discharge Assessment - 08/26/24 1237          Discharge Assessment    Assessment Type Discharge Planning Assessment     Confirmed/corrected address, phone number and insurance Yes     Source of Information patient     People in Home spouse     Do you expect to return to your current living situation? Yes     Do you have help at home or someone to help you manage your care at home? Yes     Who are your caregiver(s) and their phone number(s)? spouse     Prior to hospitilization cognitive status: Alert/Oriented     Current cognitive status: Alert/Oriented     Walking or Climbing Stairs Difficulty no     Dressing/Bathing Difficulty no     Home Accessibility wheelchair accessible     Home Layout Able to live on 1st floor     Equipment Currently Used at Home none     Do you currently have service(s) that help you manage your care at home? No     Do you take prescription medications? Yes     Do you have prescription coverage? Yes     Do you have any problems affording any of your  prescribed medications? No     Who is going to help you get home at discharge? spouse     How do you get to doctors appointments? family or friend will provide;car, drives self     Are you on dialysis? No     Do you take coumadin? No     Discharge Plan A Home with family;Home Health     Discharge Plan B Home with family;Home Health     DME Needed Upon Discharge  none     Discharge Plan discussed with: Patient     Transition of Care Barriers None                   [facility] - [facility] Telemetry  Discharge Final Note    Primary Care Provider: Ariana Pinedo NP    Expected Discharge Date: 8/26/2024    Final Discharge Note (most recent)       Final Note - 08/26/24 1247          Final Note    Assessment Type Final Discharge Note     Anticipated Discharge Disposition Home-Health Care Svc        Post-Acute Status    Post-Acute Authorization Home Health     Home Health Status Set-up Complete/Auth obtained     Discharge Delays None known at this time                     Important Message from Medicare             Contact Info       Ariana Pinedo NP   Specialty: Family Medicine   Relationship: PCP - General    [address]  [place] [address]   Phone: [phone]       Next Steps: Go on 9/4/2024    Instructions: 2:00 pm    Tee Jade DO   Specialty: General Surgery, Surgery    [address] [facility] [place] [address]   Phone: [phone]       Next Steps: Follow up    Instructions: Follow up as previously scheduled          Pt lives home with spouse, current with [facility], clinicals faxed to marly with [facility], no dme pta, sdoh up to date, dc home today with

## 2024-08-26 NOTE — HPI
64-year-old  female presented to the ER with complaints of swelling to the left arm and left arm pain.  Patient was had a PICC line for several months and receiving multiple IV antibiotics for recurrent osteomyelitis to right metatarsophalangeal joint area.  Patient has had bone scans showed abnormally increased uptake by the 1st MTP joint of the right foot as can be seen with inflammatory arthritis such as septic arthritis with adjacent osteomyelitis. This appearance is similar to the study from July 19, 2024 but moderately improved compared to the study from April 22, 2024. It should be stated that it can take months for osteomyelitis to completely heal. It should also be stated the increased uptake of radiotracer on this exam reflects bone healing and not specifically active infection.  Patient has been receiving IV antibiotics via the PICC line; the PICC line has not been functioning as well.  Patient developed some swelling and pain in the arm and recent left upper extremity venous Doppler showed the PICC line of present within the basilic vein with suggestion of thrombus distal to the catheter insertion. No other evidence of echogenic, non-compressible thrombus seen in the visualized veins of the extremities. Color Doppler venous waveform pattern is within normal limits.  Denies any chest pain/shortness of breath, nausea/vomiting/diarrhea, fever/chills.

## 2024-08-26 NOTE — NURSING
Pt c/o Sob and a slight chest pain stated that it was not as intense as  yesterday. Pt /114 gave morning BP meds and notified HCP and cardiologist. Pt was also given PRN IV hydralazine 10mg. Pt is currently  on his way to cath lab. Pt awake and alert no other complaints at this time. Family is at bedside.

## 2024-08-26 NOTE — NURSING
Discharge instructions reviewed with patient and spouse; and copy given to patient. Patient and spouse voiced understanding regarding:meds, appt., signs and symptoms to report to physician.

## 2024-08-26 NOTE — SUBJECTIVE & OBJECTIVE
No current facility-administered medications on file prior to encounter.     Current Outpatient Medications on File Prior to Encounter   Medication Sig    atorvastatin (LIPITOR) 80 MG tablet Take 1 tablet (80 mg total) by mouth every evening. (Patient taking differently: Take 80 mg by mouth once daily.)    carvediloL (COREG) 12.5 MG tablet Take 1 tablet (12.5 mg total) by mouth 2 (two) times daily with meals. (Patient taking differently: Take 12.5 mg by mouth 2 (two) times daily.)    cyanocobalamin 1,000 mcg/mL injection Inject 1,000 mcg into the muscle every 30 days.    DULoxetine (CYMBALTA) 60 MG capsule Take 60 mg by mouth 2 (two) times daily.    esomeprazole (NEXIUM) 40 MG capsule Take 40 mg by mouth once daily.    hydroCHLOROthiazide (HYDRODIURIL) 12.5 MG Tab Take 12.5 mg by mouth once daily.    JANUVIA 100 mg Tab Take 100 mg by mouth once daily.    levothyroxine (SYNTHROID) 50 MCG tablet Take 50 mcg by mouth before breakfast.    LINZESS 145 mcg Cap capsule Take 145 mcg by mouth once daily.    potassium chloride (KLOR-CON) 10 MEQ TbSR Take 10 mEq by mouth once daily.    pregabalin (LYRICA) 150 MG capsule Take 1 capsule (150 mg total) by mouth 2 (two) times daily. Take two capsules by mouth three times a day (Patient taking differently: Take 300 mg by mouth 2 (two) times daily.)    REXULTI 3 mg Tab Take 1 tablet by mouth once daily.    albuterol (PROVENTIL/VENTOLIN HFA) 90 mcg/actuation inhaler INHALE ONE TO TWO PUFFS BY MOUTH EVERY 4 HOURS AS NEEDED FOR SHORTNESS OF BREATH OR WHEEZING    clonazePAM (KLONOPIN) 1 MG tablet Take 1 mg by mouth daily as needed.    fluticasone propionate (FLONASE) 50 mcg/actuation nasal spray 2 sprays (100 mcg total) by Each Nostril route once daily.       Review of patient's allergies indicates:   Allergen Reactions    Opioids - morphine analogues Other (See Comments)       Past Medical History:   Diagnosis Date    Anxiety     Chronic pain syndrome     Depression     Diabetes  mellitus     GERD (gastroesophageal reflux disease)     Hypertension     Hypothyroidism     Low back pain     Low vitamin B12 level     Lumbar radiculopathy     Neuropathy      Past Surgical History:   Procedure Laterality Date    Bilateral L3-S1 MBB Bilateral 2019, 2019    Dr Barclay     SECTION      COLON SURGERY      HERNIA REPAIR      Left L3-S1 RFTC Left 10/23/2019    Dr Barclay    RADIOFREQUENCY ABLATION OF LUMBAR MEDIAL BRANCH NERVE AT SINGLE LEVEL Right 10/25/2022    Procedure: Radiofrequency Ablation, Nerve, Spinal, Lumbar, Medial Branch, Level L4-S1;  Surgeon: Roselia Barclay MD;  [facility]: [facility];  Service: Pain Management;  Laterality: Right;  vaccinated.schedule LEFT after right is done.    RADIOFREQUENCY ABLATION OF LUMBAR MEDIAL BRANCH NERVE AT SINGLE LEVEL Left 2022    Procedure: LEFT L4-S1 RFTC  (HAD RIGHT ON 10-25);  Surgeon: Roselia Barclay MD;  Location: Critical access hospital [facility];  Service: Pain Management;  Laterality: Left;  VAC SELENE IN EPIC    Right L3-S1 RFTC Right 2019    Dr Barclay    SPINAL CORD STIMULATOR IMPLANT       Family History       Problem Relation (Age of Onset)    Bipolar disorder Paternal Grandmother    Heart disease Father, Paternal Grandfather    Hypertension Father    Stroke Maternal Grandfather          Tobacco Use    Smoking status: Every Day     Types: Vaping with nicotine     Passive exposure: Past    Smokeless tobacco: Never    Tobacco comments:     Smoked cigarettes 46 years. Stopped smoking cigarettes and started vaping with nicotine x 2 years   Substance and Sexual Activity    Alcohol use: Yes    Drug use: Never    Sexual activity: Yes     Partners: Male     Review of Systems   Constitutional: Negative.  Negative for fatigue and unexpected weight change.   HENT: Negative.  Negative for trouble swallowing.    Eyes: Negative.    Respiratory: Negative.  Negative for chest tightness and shortness of breath.    Cardiovascular: Negative.   Negative for chest pain.   Gastrointestinal: Negative.  Negative for abdominal pain, blood in stool and nausea.   Endocrine: Negative.    Genitourinary: Negative.  Negative for hematuria.   Musculoskeletal: Negative.  Negative for back pain and myalgias.        Left arm swelling   Neurological: Negative.  Negative for dizziness, speech difficulty, weakness and light-headedness.   Psychiatric/Behavioral: Negative.  Negative for agitation and behavioral problems.      Objective:     Vital Signs (Most Recent):  Temp: 97.3 °F (36.3 °C) (08/26/24 0759)  Pulse: 64 (08/26/24 0759)  Resp: 18 (08/26/24 0759)  BP: (!) 156/82 (08/26/24 0759)  SpO2: 100 % (08/26/24 0759) Vital Signs (24h Range):  Temp:  [97.3 °F (36.3 °C)-98.7 °F (37.1 °C)] 97.3 °F (36.3 °C)  Pulse:  [55-84] 64  Resp:  [17-19] 18  SpO2:  [95 %-100 %] 100 %  BP: (106-173)/(59-84) 156/82     Weight: 59.6 kg (131 lb 6.3 oz)  Body mass index is 24.03 kg/m².     Physical Exam  Constitutional:       General: She is not in acute distress.     Appearance: Normal appearance.   HENT:      Head: Normocephalic.   Cardiovascular:      Rate and Rhythm: Normal rate.   Pulmonary:      Effort: Pulmonary effort is normal. No respiratory distress.   Abdominal:      General: There is no distension.      Tenderness: There is no abdominal tenderness.   Musculoskeletal:         General: Normal range of motion.      Comments: Mild left arm swelling; no significant erythema; minimal TTP   Skin:     General: Skin is warm.      Coloration: Skin is not jaundiced.   Neurological:      General: No focal deficit present.      Mental Status: She is alert and oriented to person, place, and time.      Cranial Nerves: No cranial nerve deficit.            I have reviewed all pertinent lab results within the past 24 hours.  CBC:   Recent Labs   Lab 08/26/24  0420   WBC 10.11   RBC 4.01*   HGB 8.1*   HCT 27.9*   *   MCV 69.6*   MCH 20.2*   MCHC 29.0*     BMP:   Recent Labs   Lab  08/26/24  0420   *      K 3.3*   CL 99   CO2 30   BUN 11   CREATININE 1.06*   CALCIUM 9.4       Significant Diagnostics:  I have reviewed all pertinent imaging results/findings within the past 24 hours.

## 2024-08-26 NOTE — ASSESSMENT & PLAN NOTE
Superficial thrombus    No oral anticoagulation needed.    Elevation, warm compresses, NSAIDs.    Follow up in Surgery clinic next week

## 2024-08-26 NOTE — NURSING
Pic line pulled from Left Basilic , no complains no distress noted pt tolerated well . Wrapped with gauze and coband .

## 2024-08-26 NOTE — ASSESSMENT & PLAN NOTE
Improving    Bone scan 7/25/24:  Abnormally increased uptake by the 1st MTP joint of the right foot as can be seen with inflammatory arthritis such as septic arthritis with adjacent osteomyelitis.  This appearance is similar to the study from July 19, 2024 but moderately improved compared to the study from April 22, 2024.  It should be stated that it can take months for osteomyelitis to completely heal.  It should also be stated the increased uptake of radiotracer on this exam reflects bone healing and not specifically active infection.    Patient was finishing a 6 week round of antibiotics after bone scan, in which course would be complete.    Due to superficial thrombus, stop PICC line at this time and ok to stop antibiotics now; will repeat bone scan in 3 months    Follow up in Surgery clinic next week

## 2024-08-26 NOTE — DISCHARGE SUMMARY
[facility] Hospital Medicine  Discharge Summary      Patient Name: Magali Cheung  MRN: [id number]  [facility]: [id number]  Patient Class: OP- Observation  Admission Date: 8/25/2024  Hospital Length of Stay: 0 days  Discharge Date and Time:  08/26/2024 11:04 AM  Attending Physician: Ja Venegas DO   Discharging Provider: Ja Venegas DO  Primary Care Provider: Ariana Pinedo NP    Primary Care Team: Networked reference to record PCT     HPI:   Patient is a very pleasant 64-year-old white female from [place], [place].  Patient presents complaining of left arm pain.  Patient has a had a PICC line in her left arm for approximately 4 months.  She she had a foot ulcer at the right MTP joint area and osteomyelitis.  The wound itself has healed but she has been receiving IV vancomycin for osteomyelitis.  Today she presents to [facility] ED with pain, swelling and redness around the PICC site.  The PICC has not been functioning well and our nurses found it difficult to flush or draw from.  Evaluation in the ED revealed a thrombus in the vein but distal to patient's line.  There was concern for cellulitis and patient is admitted to hospital medicine service.    * No surgery found *      Hospital Course:   8/26 stable for discharge.  Follow up General surgery.  Has a appointment next Tuesday.  Follow up PCP     Goals of Care Treatment Preferences:  Code Status: Full Code         Consults:   Consults (From admission, onward)          Status Ordering Provider     Inpatient consult to General Surgery  Once        Provider:  Tee Jade DO    Completed DALIA DUMONT JR            Cardiac/Vascular  Hypertension  Chronic, controlled. Latest blood pressure and vitals reviewed-     Temp:  [97.3 °F (36.3 °C)-98.6 °F (37 °C)]   Pulse:  [55-84]   Resp:  [17-19]   BP: (106-173)/(59-83)   SpO2:  [95 %-100 %] .   Home meds for hypertension were reviewed and noted below.   Hypertension Medications   "             carvediloL (COREG) 12.5 MG tablet Take 1 tablet (12.5 mg total) by mouth 2 (two) times daily with meals.    hydroCHLOROthiazide (HYDRODIURIL) 12.5 MG Tab Take 12.5 mg by mouth once daily.            While in the hospital, will manage blood pressure as follows; Continue home antihypertensive regimen    Will utilize p.r.n. blood pressure medication only if patient's blood pressure greater than 180/110 and she develops symptoms such as worsening chest pain or shortness of breath.    ID  * Cellulitis of left upper extremity    No cellulitis    Osteomyelitis of right foot    Has been on long-term IV vancomycin for osteomyelitis.  We will consult Dr. Jade, her treating physician, to determine whether continued IV therapy is desired.  If so she will need another PICC line placed.  Current PICC is functioning poorly and is being removed  Completed antibiotic    Hematology  Basilic vein thrombosis  Superficial vein.  No need for anticoagulation      Endocrine  Type 2 diabetes mellitus with diabetic polyneuropathy, without long-term current use of insulin  Patient's FSGs are controlled on current medication regimen.  Last A1c reviewed-   Lab Results   Component Value Date    HGBA1C 6.8 (H) 04/14/2024     Most recent fingerstick glucose reviewed- No results for input(s): "POCTGLUCOSE" in the last 24 hours.  Current correctional scale  Low  Maintain anti-hyperglycemic dose as follows-   Antihyperglycemics (From admission, onward)      Start     Stop Route Frequency Ordered    08/25/24 1545  SITagliptin phosphate tablet 100 mg         -- Oral Daily 08/25/24 1442    08/25/24 1544  insulin aspart U-100 injection 0-5 Units         -- SubQ Before meals & nightly PRN 08/25/24 1447                Final Active Diagnoses:    Diagnosis Date Noted POA    PRINCIPAL PROBLEM:  Cellulitis of left upper extremity [L03.114] 08/25/2024 Yes    Basilic vein thrombosis [I82.619] 08/25/2024 Yes    Osteomyelitis of right foot [M86.9] " 04/22/2024 Yes    Hypertension [I10]  Yes    Type 2 diabetes mellitus with diabetic polyneuropathy, without long-term current use of insulin [E11.42] 09/02/2021 Yes      Problems Resolved During this Admission:       Discharged Condition: good    Disposition: Home or Self Care    Follow Up:   Follow-up Information       Ariana Pinedo NP. Schedule an appointment as soon as possible for a visit in 1 week(s).    Specialty: Family Medicine  Contact information:  [address]  [place] [address]  [phone]               Tee Jade, DO Follow up.    Specialties: General Surgery, Surgery  Why: Follow up as previously scheduled  Contact information:  [address] [facility] [address]  [place] [address]  [phone]                           Patient Instructions:      Diet diabetic     Activity as tolerated       Significant Diagnostic Studies: Labs: All labs within the past 24 hours have been reviewed    Pending Diagnostic Studies:       None           Medications:  Reconciled Home Medications:      Medication List        CHANGE how you take these medications      atorvastatin 80 MG tablet  Commonly known as: LIPITOR  Take 1 tablet (80 mg total) by mouth every evening.  What changed: when to take this     carvediloL 12.5 MG tablet  Commonly known as: COREG  Take 1 tablet (12.5 mg total) by mouth 2 (two) times daily with meals.  What changed: when to take this            CONTINUE taking these medications      albuterol 90 mcg/actuation inhaler  Commonly known as: PROVENTIL/VENTOLIN HFA  INHALE ONE TO TWO PUFFS BY MOUTH EVERY 4 HOURS AS NEEDED FOR SHORTNESS OF BREATH OR WHEEZING     clonazePAM 1 MG tablet  Commonly known as: KlonoPIN  Take 1 mg by mouth daily as needed.     cyanocobalamin 1,000 mcg/mL injection  Inject 1,000 mcg into the muscle every 30 days.     DULoxetine 60 MG capsule  Commonly known as: CYMBALTA  Take 60 mg by mouth 2 (two) times daily.     esomeprazole 40 MG  capsule  Commonly known as: NEXIUM  Take 40 mg by mouth once daily.     fluticasone propionate 50 mcg/actuation nasal spray  Commonly known as: FLONASE  2 sprays (100 mcg total) by Each Nostril route once daily.     hydroCHLOROthiazide 12.5 MG Tab  Commonly known as: HYDRODIURIL  Take 12.5 mg by mouth once daily.     JANUVIA 100 mg Tab  Generic drug: SITagliptin phosphate  Take 100 mg by mouth once daily.     levothyroxine 50 MCG tablet  Commonly known as: SYNTHROID  Take 50 mcg by mouth before breakfast.     LINZESS 145 mcg Cap capsule  Generic drug: linaCLOtide  Take 145 mcg by mouth once daily.     potassium chloride 10 MEQ Tbsr  Commonly known as: KLOR-CON  Take 10 mEq by mouth once daily.     pregabalin 150 MG capsule  Commonly known as: LYRICA  Take 1 capsule (150 mg total) by mouth 2 (two) times daily. Take two capsules by mouth three times a day     REXULTI 3 mg Tab  Generic drug: brexpiprazole  Take 1 tablet by mouth once daily.              Indwelling Lines/Drains at time of discharge:   Lines/Drains/Airways       Peripherally Inserted Central Catheter Line  Duration             PICC Double Lumen 04/22/24 0945 left basilic 126 days                    Time spent on the discharge of patient: >30 minutes         aJ Venegas DO  Department of Hospital Medicine  [facility]

## 2024-08-26 NOTE — HOSPITAL COURSE
8/26 stable for discharge.  Follow up General surgery.  Has a appointment next Tuesday.  Follow up PCP

## 2024-08-27 ENCOUNTER — TELEPHONE (OUTPATIENT)
Dept: CARDIOLOGY | Facility: HOSPITAL | Age: 64
End: 2024-08-27
Payer: MEDICAID

## 2024-08-27 NOTE — PROGRESS NOTES
[facility]  Wound Care    Patient Name:  Magali Cheung   MRN:  [id number]  Date: 8/27/2024  Diagnosis: Cellulitis of left upper extremity    History:     Past Medical History:   Diagnosis Date    Anxiety     Chronic pain syndrome     Depression     Diabetes mellitus     GERD (gastroesophageal reflux disease)     Hypertension     Hypothyroidism     Low back pain     Low vitamin B12 level     Lumbar radiculopathy     Neuropathy        Social History     Socioeconomic History    Marital status:    Tobacco Use    Smoking status: Every Day     Types: Vaping with nicotine     Passive exposure: Past    Smokeless tobacco: Never    Tobacco comments:     Smoked cigarettes 46 years. Stopped smoking cigarettes and started vaping with nicotine x 2 years   Substance and Sexual Activity    Alcohol use: Yes    Drug use: Never    Sexual activity: Yes     Partners: Male     Social Determinants of Health     Financial Resource Strain: Low Risk  (4/19/2024)    Overall Financial Resource Strain (CARDIA)     Difficulty of Paying Living Expenses: Not hard at all   Food Insecurity: No Food Insecurity (4/19/2024)    Hunger Vital Sign     Worried About Running Out of Food in the Last Year: Never true     Ran Out of Food in the Last Year: Never true   Transportation Needs: No Transportation Needs (4/19/2024)    PRAPARE - Transportation     Lack of Transportation (Medical): No     Lack of Transportation (Non-Medical): No   Physical Activity: Inactive (4/15/2024)    Exercise Vital Sign     Days of Exercise per Week: 0 days     Minutes of Exercise per Session: 0 min   Stress: No Stress Concern Present (4/19/2024)    [employer] - Occupational Stress Questionnaire     Feeling of Stress : Only a little   Housing Stability: Low Risk  (4/19/2024)    Housing Stability Vital Sign     Unable to Pay for Housing in the Last Year: No     Homeless in the Last Year: No        Precautions:     Allergies as of 08/25/2024 - Reviewed 08/25/2024   Allergen Reaction Noted    Opioids - morphine analogues Other (See Comments) 03/30/2021       [facility] Assessment Details/Treatment       Narrative: Seen patient for initiation of preventative skin care measures    Patient in bed, Alert.  Ambulating in hallway. States has no skin care issues.   Keron score 22.     Consult skin care for any issues         08/27/2024

## 2024-08-31 LAB
BACTERIA BLD CULT: NORMAL
BACTERIA BLD CULT: NORMAL

## 2024-09-03 ENCOUNTER — DOCUMENT SCAN (OUTPATIENT)
Dept: HOME HEALTH SERVICES | Facility: HOSPITAL | Age: 64
End: 2024-09-03
Payer: MEDICAID

## 2024-09-03 ENCOUNTER — TELEPHONE (OUTPATIENT)
Dept: CARDIOLOGY | Facility: HOSPITAL | Age: 64
End: 2024-09-03
Payer: MEDICAID

## 2024-09-04 ENCOUNTER — DOCUMENT SCAN (OUTPATIENT)
Dept: HOME HEALTH SERVICES | Facility: HOSPITAL | Age: 64
End: 2024-09-04
Payer: MEDICAID

## 2024-09-09 ENCOUNTER — OFFICE VISIT (OUTPATIENT)
Dept: SURGERY | Facility: CLINIC | Age: 64
End: 2024-09-09
Payer: MEDICAID

## 2024-09-09 VITALS — HEIGHT: 62 IN | WEIGHT: 131.38 LBS | BODY MASS INDEX: 24.18 KG/M2

## 2024-09-09 DIAGNOSIS — M86.9 OSTEOMYELITIS, UNSPECIFIED SITE, UNSPECIFIED TYPE: Primary | ICD-10-CM

## 2024-09-09 PROCEDURE — 99999 PR PBB SHADOW E&M-EST. PATIENT-LVL III: CPT | Mod: PBBFAC,,, | Performed by: STUDENT IN AN ORGANIZED HEALTH CARE EDUCATION/TRAINING PROGRAM

## 2024-09-09 PROCEDURE — 3008F BODY MASS INDEX DOCD: CPT | Mod: CPTII,,, | Performed by: STUDENT IN AN ORGANIZED HEALTH CARE EDUCATION/TRAINING PROGRAM

## 2024-09-09 PROCEDURE — 1159F MED LIST DOCD IN RCRD: CPT | Mod: CPTII,,, | Performed by: STUDENT IN AN ORGANIZED HEALTH CARE EDUCATION/TRAINING PROGRAM

## 2024-09-09 PROCEDURE — 99213 OFFICE O/P EST LOW 20 MIN: CPT | Mod: PBBFAC | Performed by: STUDENT IN AN ORGANIZED HEALTH CARE EDUCATION/TRAINING PROGRAM

## 2024-09-09 PROCEDURE — 3044F HG A1C LEVEL LT 7.0%: CPT | Mod: CPTII,,, | Performed by: STUDENT IN AN ORGANIZED HEALTH CARE EDUCATION/TRAINING PROGRAM

## 2024-09-09 PROCEDURE — 99213 OFFICE O/P EST LOW 20 MIN: CPT | Mod: S$PBB,,, | Performed by: STUDENT IN AN ORGANIZED HEALTH CARE EDUCATION/TRAINING PROGRAM

## 2024-09-12 NOTE — PROGRESS NOTES
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  Patient was seen in the hospital and had a small ulcer with small abscess that was cleaned out; she does have bacteremia of MRSA and has been on IV antibiotics for 2 weeks.  Patient had a bone scan of the foot which showed increased activity all phases overlying the 1st metatarsophalangeal joint on the right foot in indicating osteomyelitis.  Patient denies any pain and states since she has been on IV vancomycin at home through her PICC line, the pain has drastically improved.  Denies any chest pain/shortness of breath, nausea/vomiting/diarrhea, fever/chills.    7/8:  Patient doing well.  Completed 6 weeks of IV antibiotics.  States the area on her right inner great toe has healed.  No inflammation.  Patient states the area still swollen at times, but denies any significant tenderness to palpation.  Patient to have her blood drawn later today by wound care with possible dressing change to the PICC line in the left upper extremity.    9/9:  Patient presents for re-evaluation.  Patient had a PICC line in her arm receiving IV antibiotics chronically for right metatarsophalangeal joint osteomyelitis.  Bone scan shows improvement but still with some activity at the site.  Patient was hospitalized previously due to developing a thrombus in the basilic vein; patient was hospitalized and catheter removed.  Swelling has improved in the arm with elevation, warm compresses and NSAIDs, no oral anticoagulation.  Denies any chest pain/shortness of breath, nausea/vomiting/diarrhea, fever/chills.  Denies any pain to the toe but still states she has some swelling at the site    Chief Complaint   Patient presents with    Follow-up     Follow up for right food wound/peg line        Review of patient's allergies indicates:   Allergen Reactions     Opioids - morphine analogues Other (See Comments)       Current Outpatient Medications   Medication Sig Dispense Refill    albuterol (PROVENTIL/VENTOLIN HFA) 90 mcg/actuation inhaler INHALE ONE TO TWO PUFFS BY MOUTH EVERY 4 HOURS AS NEEDED FOR SHORTNESS OF BREATH OR WHEEZING 18 g 3    atorvastatin (LIPITOR) 80 MG tablet Take 1 tablet (80 mg total) by mouth every evening. (Patient taking differently: Take 80 mg by mouth once daily.)      carvediloL (COREG) 12.5 MG tablet Take 1 tablet (12.5 mg total) by mouth 2 (two) times daily with meals. (Patient taking differently: Take 12.5 mg by mouth 2 (two) times daily.) 180 tablet 0    clonazePAM (KLONOPIN) 1 MG tablet Take 1 mg by mouth daily as needed.      cyanocobalamin 1,000 mcg/mL injection Inject 1,000 mcg into the muscle every 30 days.      DULoxetine (CYMBALTA) 60 MG capsule Take 60 mg by mouth 2 (two) times daily.      esomeprazole (NEXIUM) 40 MG capsule Take 40 mg by mouth once daily.      fluticasone propionate (FLONASE) 50 mcg/actuation nasal spray 2 sprays (100 mcg total) by Each Nostril route once daily. 16 g 3    hydroCHLOROthiazide (HYDRODIURIL) 12.5 MG Tab Take 12.5 mg by mouth once daily.      JANUVIA 100 mg Tab Take 100 mg by mouth once daily.      levothyroxine (SYNTHROID) 50 MCG tablet Take 50 mcg by mouth before breakfast.      LINZESS 145 mcg Cap capsule Take 145 mcg by mouth once daily.      potassium chloride (KLOR-CON) 10 MEQ TbSR Take 10 mEq by mouth once daily.      pregabalin (LYRICA) 150 MG capsule Take 1 capsule (150 mg total) by mouth 2 (two) times daily. Take two capsules by mouth three times a day (Patient taking differently: Take 300 mg by mouth 2 (two) times daily.) 180 capsule 3    REXULTI 3 mg Tab Take 1 tablet by mouth once daily.       No current facility-administered medications for this visit.       Past Medical History:   Diagnosis Date    Anxiety     Chronic pain syndrome     Depression     Diabetes mellitus     GERD  (gastroesophageal reflux disease)     Hypertension     Hypothyroidism     Low back pain     Low vitamin B12 level     Lumbar radiculopathy     Neuropathy      Past Surgical History:   Procedure Laterality Date    Bilateral L3-S1 MBB Bilateral 2019, 2019    Dr Barclay     SECTION      COLON SURGERY      HERNIA REPAIR      Left L3-S1 RFTC Left 10/23/2019    Dr Barclay    RADIOFREQUENCY ABLATION OF LUMBAR MEDIAL BRANCH NERVE AT SINGLE LEVEL Right 10/25/2022    Procedure: Radiofrequency Ablation, Nerve, Spinal, Lumbar, Medial Branch, Level L4-S1;  Surgeon: Roselia Barclay MD;  Location: [facility];  Service: Pain Management;  Laterality: Right;  vaccinated.schedule LEFT after right is done.    RADIOFREQUENCY ABLATION OF LUMBAR MEDIAL BRANCH NERVE AT SINGLE LEVEL Left 2022    Procedure: LEFT L4-S1 RFTC  (HAD RIGHT ON 10-25);  Surgeon: Roselia Barclay MD;  Location: [facility];  Service: Pain Management;  Laterality: Left;  VAC SELENE IN EPIC    Right L3-S1 RFTC Right 2019    Dr Barclay    SPINAL CORD STIMULATOR IMPLANT       Family History   Problem Relation Name Age of Onset    Hypertension Father      Heart disease Father      Stroke Maternal Grandfather      Bipolar disorder Paternal Grandmother      Heart disease Paternal Grandfather       Social History     Tobacco Use    Smoking status: Every Day     Types: Vaping with nicotine     Passive exposure: Past    Smokeless tobacco: Never    Tobacco comments:     Smoked cigarettes 46 years. Stopped smoking cigarettes and started vaping with nicotine x 2 years   Substance Use Topics    Alcohol use: Yes    Drug use: Never        Review of Systems:  Review of Systems   Constitutional: Negative.  Negative for fatigue and unexpected weight change.   HENT: Negative.  Negative for trouble swallowing.    Eyes: Negative.    Respiratory: Negative.  Negative for chest tightness and shortness of breath.    Cardiovascular: Negative.  Negative  "for chest pain.   Gastrointestinal: Negative.  Negative for abdominal pain, blood in stool and nausea.   Endocrine: Negative.    Genitourinary: Negative.  Negative for hematuria.   Musculoskeletal: Negative.  Negative for back pain and myalgias.   Neurological: Negative.  Negative for dizziness, speech difficulty, weakness and light-headedness.   Psychiatric/Behavioral: Negative.  Negative for agitation and behavioral problems.           Objective     Vital Signs (Most Recent)     5' 2" (1.575 m)  59.6 kg (131 lb 6.3 oz)     Physical Exam:  Physical Exam  Constitutional:       General: She is not in acute distress.     Appearance: Normal appearance.   HENT:      Head: Normocephalic.   Cardiovascular:      Rate and Rhythm: Normal rate.   Pulmonary:      Effort: Pulmonary effort is normal. No respiratory distress.   Abdominal:      General: There is no distension.      Tenderness: There is no abdominal tenderness.   Musculoskeletal:         General: Normal range of motion.        Feet:    Feet:      Comments: Wound healed, erythema resolved.  Still small amount of swelling, but no drainage.  Nontender to palpation  Skin:     General: Skin is warm.      Coloration: Skin is not jaundiced.   Neurological:      General: No focal deficit present.      Mental Status: She is alert and oriented to person, place, and time.      Cranial Nerves: No cranial nerve deficit.            Assessment and Plan   Right 1st metatarsophalangeal joint osteomyelitis    PLAN:    Bone scan 7/23: Abnormally increased uptake by the 1st MTP joint of the right foot as can be seen with inflammatory arthritis such as septic arthritis with adjacent osteomyelitis. This appearance is similar to the study from July 19, 2024 but moderately improved compared to the study from April 22, 2024. It should be stated that it can take months for osteomyelitis to completely heal. It should also be stated the increased uptake of radiotracer on this exam reflects " bone healing and not specifically active infection.     Stopping IV antibiotics.  Monitor for now.  We will repeat a bone scan in 3 months

## 2024-10-11 ENCOUNTER — OFFICE VISIT (OUTPATIENT)
Dept: NEUROLOGY | Facility: CLINIC | Age: 64
End: 2024-10-11
Payer: MEDICAID

## 2024-10-11 VITALS
WEIGHT: 128 LBS | HEART RATE: 65 BPM | RESPIRATION RATE: 18 BRPM | OXYGEN SATURATION: 100 % | DIASTOLIC BLOOD PRESSURE: 64 MMHG | SYSTOLIC BLOOD PRESSURE: 110 MMHG | HEIGHT: 63 IN | BODY MASS INDEX: 22.68 KG/M2

## 2024-10-11 DIAGNOSIS — M54.12 RADICULOPATHY, CERVICAL REGION: Primary | Chronic | ICD-10-CM

## 2024-10-11 DIAGNOSIS — M54.16 LUMBAR RADICULOPATHY: Chronic | ICD-10-CM

## 2024-10-11 DIAGNOSIS — G25.81 RLS (RESTLESS LEGS SYNDROME): Chronic | ICD-10-CM

## 2024-10-11 PROCEDURE — 3074F SYST BP LT 130 MM HG: CPT | Mod: CPTII,,, | Performed by: PSYCHIATRY & NEUROLOGY

## 2024-10-11 PROCEDURE — 1160F RVW MEDS BY RX/DR IN RCRD: CPT | Mod: CPTII,,, | Performed by: PSYCHIATRY & NEUROLOGY

## 2024-10-11 PROCEDURE — 99215 OFFICE O/P EST HI 40 MIN: CPT | Mod: PBBFAC | Performed by: PSYCHIATRY & NEUROLOGY

## 2024-10-11 PROCEDURE — 3078F DIAST BP <80 MM HG: CPT | Mod: CPTII,,, | Performed by: PSYCHIATRY & NEUROLOGY

## 2024-10-11 PROCEDURE — 99214 OFFICE O/P EST MOD 30 MIN: CPT | Mod: S$PBB,,, | Performed by: PSYCHIATRY & NEUROLOGY

## 2024-10-11 PROCEDURE — 3008F BODY MASS INDEX DOCD: CPT | Mod: CPTII,,, | Performed by: PSYCHIATRY & NEUROLOGY

## 2024-10-11 PROCEDURE — 3044F HG A1C LEVEL LT 7.0%: CPT | Mod: CPTII,,, | Performed by: PSYCHIATRY & NEUROLOGY

## 2024-10-11 PROCEDURE — 99999 PR PBB SHADOW E&M-EST. PATIENT-LVL V: CPT | Mod: PBBFAC,,, | Performed by: PSYCHIATRY & NEUROLOGY

## 2024-10-11 PROCEDURE — 1159F MED LIST DOCD IN RCRD: CPT | Mod: CPTII,,, | Performed by: PSYCHIATRY & NEUROLOGY

## 2024-10-11 RX ORDER — AZITHROMYCIN 250 MG/1
250 TABLET, FILM COATED ORAL
COMMUNITY
Start: 2024-04-15

## 2024-10-11 RX ORDER — FLUCONAZOLE 150 MG/1
150 TABLET ORAL
COMMUNITY
Start: 2024-09-13

## 2024-10-11 RX ORDER — ERGOCALCIFEROL 1.25 MG/1
CAPSULE ORAL
COMMUNITY
Start: 2024-07-11

## 2024-10-11 RX ORDER — FERROUS SULFATE TAB 325 MG (65 MG ELEMENTAL FE) 325 (65 FE) MG
TAB ORAL
COMMUNITY
Start: 2024-07-11

## 2024-10-11 RX ORDER — PREGABALIN 150 MG/1
150 CAPSULE ORAL 2 TIMES DAILY
Qty: 180 CAPSULE | Refills: 3 | Status: SHIPPED | OUTPATIENT
Start: 2024-10-11

## 2024-10-11 NOTE — PROGRESS NOTES
Subjective:       Patient ID: Magali Cheung is a 64 y.o. female     Chief Complaint:    Chief Complaint   Patient presents with    diabetic neuropathy    Medication Refill     Pt. States right  foot have gout.        Allergies:  Opioids - morphine analogues    Current Medications:    Outpatient Encounter Medications as of 10/11/2024   Medication Sig Dispense Refill    albuterol (PROVENTIL/VENTOLIN HFA) 90 mcg/actuation inhaler INHALE ONE TO TWO PUFFS BY MOUTH EVERY 4 HOURS AS NEEDED FOR SHORTNESS OF BREATH OR WHEEZING 18 g 3    atorvastatin (LIPITOR) 80 MG tablet Take 1 tablet (80 mg total) by mouth every evening. (Patient taking differently: Take 80 mg by mouth once daily.)      azithromycin (Z-DK) 250 MG tablet Take 250 mg by mouth.      carvediloL (COREG) 12.5 MG tablet Take 1 tablet (12.5 mg total) by mouth 2 (two) times daily with meals. (Patient taking differently: Take 12.5 mg by mouth 2 (two) times daily.) 180 tablet 0    clonazePAM (KLONOPIN) 1 MG tablet Take 1 mg by mouth daily as needed.      cyanocobalamin 1,000 mcg/mL injection Inject 1,000 mcg into the muscle every 30 days.      ergocalciferol (ERGOCALCIFEROL) 50,000 unit Cap Take 1 capsule every week by oral route.      esomeprazole (NEXIUM) 40 MG capsule Take 40 mg by mouth once daily.      FEROSUL 325 mg (65 mg iron) Tab tablet Take 1 tablet every day by oral route with meal(s).      fluconazole (DIFLUCAN) 150 MG Tab Take 150 mg by mouth.      fluticasone propionate (FLONASE) 50 mcg/actuation nasal spray 2 sprays (100 mcg total) by Each Nostril route once daily. 16 g 3    hydroCHLOROthiazide (HYDRODIURIL) 12.5 MG Tab Take 12.5 mg by mouth once daily.      JANUVIA 100 mg Tab Take 100 mg by mouth once daily.      levothyroxine (SYNTHROID) 50 MCG tablet Take 50 mcg by mouth before breakfast.      LINZESS 145 mcg Cap capsule Take 145 mcg by mouth once daily.      potassium chloride (KLOR-CON) 10 MEQ TbSR Take 10 mEq by mouth once daily.       REXULTI 3 mg Tab Take 1 tablet by mouth once daily.      [DISCONTINUED] DULoxetine (CYMBALTA) 60 MG capsule Take 60 mg by mouth 2 (two) times daily.      [DISCONTINUED] pregabalin (LYRICA) 150 MG capsule Take 1 capsule (150 mg total) by mouth 2 (two) times daily. Take two capsules by mouth three times a day (Patient taking differently: Take 300 mg by mouth 2 (two) times daily.) 180 capsule 3    pregabalin (LYRICA) 150 MG capsule Take 1 capsule (150 mg total) by mouth 2 (two) times daily. Take one capsule by mouth twice a day 180 capsule 3     No facility-administered encounter medications on file as of 10/11/2024.       History of Present Illness  65 yo WF w/ cervical and lumbar radiculopathy and RLS w/ subsequent neuropathy  Lyrica 150 mg bid has worked well for her- she failed Jennifer, Cymbalta, Requip and Mirapex   Controlling DM well          Past Medical History:   Diagnosis Date    Anxiety     Chronic pain syndrome     Depression     Diabetes mellitus     GERD (gastroesophageal reflux disease)     Hypertension     Hypothyroidism     Low back pain     Low vitamin B12 level     Lumbar radiculopathy     Neuropathy        Past Surgical History:   Procedure Laterality Date    Bilateral L3-S1 MBB Bilateral 2019, 2019    Dr Barclay     SECTION      COLON SURGERY      HERNIA REPAIR      Left L3-S1 RFTC Left 10/23/2019    Dr Barclay    RADIOFREQUENCY ABLATION OF LUMBAR MEDIAL BRANCH NERVE AT SINGLE LEVEL Right 10/25/2022    Procedure: Radiofrequency Ablation, Nerve, Spinal, Lumbar, Medial Branch, Level L4-S1;  Surgeon: Roselia Barclay MD;  Location: [facility];  Service: Pain Management;  Laterality: Right;  vaccinated.schedule LEFT after right is done.    RADIOFREQUENCY ABLATION OF LUMBAR MEDIAL BRANCH NERVE AT SINGLE LEVEL Left 2022    Procedure: LEFT L4-S1 RFTC  (HAD RIGHT ON 10-25);  Surgeon: Roselia Barclay MD;  Location: [facility];  Service: Pain Management;  Laterality:  "Left;  VAC SELENE IN EPIC    Right L3-S1 RFTC Right 12/16/2019    Dr Barclay    SPINAL CORD STIMULATOR IMPLANT         Social History  Ms. Cheung  reports that she has been smoking vaping with nicotine. She has been exposed to tobacco smoke. She has never used smokeless tobacco. She reports current alcohol use. She reports that she does not use drugs.    Family History  Ms.'s Cheung family history includes Bipolar disorder in her paternal grandmother; Heart disease in her father and paternal grandfather; Hypertension in her father; Stroke in her maternal grandfather.    Review of Systems  Review of Systems   Neurological:  Positive for tingling and sensory change.   Psychiatric/Behavioral:  Positive for depression.    All other systems reviewed and are negative.     Objective:   /64   Pulse 65   Resp 18   Ht 5' 2.5" (1.588 m)   Wt 58.1 kg (128 lb)   SpO2 100%   BMI 23.04 kg/m²    NEUROLOGICAL EXAMINATION:     MENTAL STATUS   Oriented to person, place, and time.   Level of consciousness: alert  Knowledge: good.     CRANIAL NERVES   Cranial nerves II through XII intact.     MOTOR EXAM     Strength   Strength 5/5 throughout.     SENSORY EXAM        Neuropathy      GAIT AND COORDINATION     Gait  Gait: normal       Physical Exam  Vitals reviewed.   Constitutional:       Appearance: She is normal weight.   Neurological:      General: No focal deficit present.      Mental Status: She is alert and oriented to person, place, and time. Mental status is at baseline.      Cranial Nerves: Cranial nerves 2-12 are intact.      Motor: Motor strength is normal.     Gait: Gait is intact.          Assessment:     Radiculopathy, cervical region    RLS (restless legs syndrome)    Lumbar radiculopathy    Other orders  -     pregabalin (LYRICA) 150 MG capsule; Take 1 capsule (150 mg total) by mouth 2 (two) times daily. Take one capsule by mouth twice a day  Dispense: 180 capsule; Refill: 3         Primary Diagnosis and ICD10  " Radiculopathy, cervical region [M54.12]    Plan:     There are no Patient Instructions on file for this visit.    Medications Discontinued During This Encounter   Medication Reason    DULoxetine (CYMBALTA) 60 MG capsule     pregabalin (LYRICA) 150 MG capsule Reorder       Requested Prescriptions     Signed Prescriptions Disp Refills    pregabalin (LYRICA) 150 MG capsule 180 capsule 3     Sig: Take 1 capsule (150 mg total) by mouth 2 (two) times daily. Take one capsule by mouth twice a day

## 2024-11-04 ENCOUNTER — HOSPITAL ENCOUNTER (OUTPATIENT)
Dept: RADIOLOGY | Facility: HOSPITAL | Age: 64
Discharge: HOME | End: 2024-11-04
Attending: NURSE PRACTITIONER
Payer: MEDICAID

## 2024-11-04 VITALS — WEIGHT: 128 LBS | HEIGHT: 63 IN | BODY MASS INDEX: 22.68 KG/M2

## 2024-11-04 DIAGNOSIS — Z12.31 ENCOUNTER FOR SCREENING MAMMOGRAM FOR BREAST CANCER: ICD-10-CM

## 2024-11-04 PROCEDURE — 77063 BREAST TOMOSYNTHESIS BI: CPT | Mod: 26,,, | Performed by: RADIOLOGY

## 2024-11-04 PROCEDURE — 77067 SCR MAMMO BI INCL CAD: CPT | Mod: TC

## 2024-11-04 PROCEDURE — 77067 SCR MAMMO BI INCL CAD: CPT | Mod: 26,,, | Performed by: RADIOLOGY

## 2024-12-13 ENCOUNTER — HOSPITAL ENCOUNTER (OUTPATIENT)
Dept: RADIOLOGY | Facility: HOSPITAL | Age: 64
Discharge: HOME | End: 2024-12-13
Attending: STUDENT IN AN ORGANIZED HEALTH CARE EDUCATION/TRAINING PROGRAM
Payer: MEDICAID

## 2024-12-13 DIAGNOSIS — M86.9 OSTEOMYELITIS, UNSPECIFIED SITE, UNSPECIFIED TYPE: ICD-10-CM

## 2024-12-13 PROCEDURE — 78315 BONE IMAGING 3 PHASE: CPT | Mod: 26,,, | Performed by: NUCLEAR MEDICINE

## 2024-12-13 PROCEDURE — A9503 TC99M MEDRONATE: HCPCS | Performed by: STUDENT IN AN ORGANIZED HEALTH CARE EDUCATION/TRAINING PROGRAM

## 2024-12-13 PROCEDURE — 78315 BONE IMAGING 3 PHASE: CPT | Mod: TC

## 2024-12-13 RX ADMIN — TECHNETIUM TC 99M MEDRONATE 27 MILLICURIE: 25 INJECTION, POWDER, FOR SOLUTION INTRAVENOUS at 08:12

## 2024-12-16 ENCOUNTER — OFFICE VISIT (OUTPATIENT)
Dept: SURGERY | Facility: CLINIC | Age: 64
End: 2024-12-16
Payer: MEDICAID

## 2024-12-16 VITALS — WEIGHT: 128 LBS | BODY MASS INDEX: 22.68 KG/M2 | HEIGHT: 63 IN

## 2024-12-16 DIAGNOSIS — M86.9 OSTEOMYELITIS, UNSPECIFIED SITE, UNSPECIFIED TYPE: Primary | ICD-10-CM

## 2024-12-16 PROCEDURE — 99999 PR PBB SHADOW E&M-EST. PATIENT-LVL IV: CPT | Mod: PBBFAC,,, | Performed by: STUDENT IN AN ORGANIZED HEALTH CARE EDUCATION/TRAINING PROGRAM

## 2024-12-16 PROCEDURE — 99214 OFFICE O/P EST MOD 30 MIN: CPT | Mod: PBBFAC | Performed by: STUDENT IN AN ORGANIZED HEALTH CARE EDUCATION/TRAINING PROGRAM

## 2024-12-16 PROCEDURE — 3044F HG A1C LEVEL LT 7.0%: CPT | Mod: CPTII,,, | Performed by: STUDENT IN AN ORGANIZED HEALTH CARE EDUCATION/TRAINING PROGRAM

## 2024-12-16 PROCEDURE — 1159F MED LIST DOCD IN RCRD: CPT | Mod: CPTII,,, | Performed by: STUDENT IN AN ORGANIZED HEALTH CARE EDUCATION/TRAINING PROGRAM

## 2024-12-16 PROCEDURE — 3008F BODY MASS INDEX DOCD: CPT | Mod: CPTII,,, | Performed by: STUDENT IN AN ORGANIZED HEALTH CARE EDUCATION/TRAINING PROGRAM

## 2024-12-16 PROCEDURE — 99213 OFFICE O/P EST LOW 20 MIN: CPT | Mod: S$PBB,,, | Performed by: STUDENT IN AN ORGANIZED HEALTH CARE EDUCATION/TRAINING PROGRAM

## 2024-12-16 NOTE — PROGRESS NOTES
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  Patient was seen in the hospital and had a small ulcer with small abscess that was cleaned out; she does have bacteremia of MRSA and has been on IV antibiotics for 2 weeks.  Patient had a bone scan of the foot which showed increased activity all phases overlying the 1st metatarsophalangeal joint on the right foot in indicating osteomyelitis.  Patient denies any pain and states since she has been on IV vancomycin at home through her PICC line, the pain has drastically improved.  Denies any chest pain/shortness of breath, nausea/vomiting/diarrhea, fever/chills.    7/8:  Patient doing well.  Completed 6 weeks of IV antibiotics.  States the area on her right inner great toe has healed.  No inflammation.  Patient states the area still swollen at times, but denies any significant tenderness to palpation.  Patient to have her blood drawn later today by wound care with possible dressing change to the PICC line in the left upper extremity.    9/9:  Patient presents for re-evaluation.  Patient had a PICC line in her arm receiving IV antibiotics chronically for right metatarsophalangeal joint osteomyelitis.  Bone scan shows improvement but still with some activity at the site.  Patient was hospitalized previously due to developing a thrombus in the basilic vein; patient was hospitalized and catheter removed.  Swelling has improved in the arm with elevation, warm compresses and NSAIDs, no oral anticoagulation.  Denies any chest pain/shortness of breath, nausea/vomiting/diarrhea, fever/chills.  Denies any pain to the toe but still states she has some swelling at the site    12/16:  Patient doing well.  Area in the right foot healed.  Bone scan showed improvement.  Denies any chest pain/shortness of breath, nausea/vomiting/diarrhea,  fever/chills.    Chief Complaint   Patient presents with    Follow-up     Bone scan       Review of patient's allergies indicates:   Allergen Reactions    Opioids - morphine analogues Other (See Comments)       Current Outpatient Medications   Medication Sig Dispense Refill    albuterol (PROVENTIL/VENTOLIN HFA) 90 mcg/actuation inhaler INHALE ONE TO TWO PUFFS BY MOUTH EVERY 4 HOURS AS NEEDED FOR SHORTNESS OF BREATH OR WHEEZING 18 g 3    atorvastatin (LIPITOR) 80 MG tablet Take 1 tablet (80 mg total) by mouth every evening. (Patient taking differently: Take 80 mg by mouth once daily.)      carvediloL (COREG) 12.5 MG tablet Take 1 tablet (12.5 mg total) by mouth 2 (two) times daily with meals. (Patient taking differently: Take 12.5 mg by mouth 2 (two) times daily.) 180 tablet 0    clonazePAM (KLONOPIN) 1 MG tablet Take 1 mg by mouth daily as needed.      cyanocobalamin 1,000 mcg/mL injection Inject 1,000 mcg into the muscle every 30 days.      ergocalciferol (ERGOCALCIFEROL) 50,000 unit Cap Take 1 capsule every week by oral route.      esomeprazole (NEXIUM) 40 MG capsule Take 40 mg by mouth once daily.      FEROSUL 325 mg (65 mg iron) Tab tablet Take 1 tablet every day by oral route with meal(s).      fluconazole (DIFLUCAN) 150 MG Tab Take 150 mg by mouth.      fluticasone propionate (FLONASE) 50 mcg/actuation nasal spray 2 sprays (100 mcg total) by Each Nostril route once daily. 16 g 3    hydroCHLOROthiazide (HYDRODIURIL) 12.5 MG Tab Take 12.5 mg by mouth once daily.      JANUVIA 100 mg Tab Take 100 mg by mouth once daily.      levothyroxine (SYNTHROID) 50 MCG tablet Take 50 mcg by mouth before breakfast.      LINZESS 145 mcg Cap capsule Take 145 mcg by mouth once daily.      pregabalin (LYRICA) 150 MG capsule Take 1 capsule (150 mg total) by mouth 2 (two) times daily. Take one capsule by mouth twice a day 180 capsule 3    REXULTI 3 mg Tab Take 1 tablet by mouth once daily.      azithromycin (Z-DK) 250 MG tablet  Take 250 mg by mouth. (Patient not taking: Reported on 2024)      potassium chloride (KLOR-CON) 10 MEQ TbSR Take 10 mEq by mouth once daily. (Patient not taking: Reported on 2024)       No current facility-administered medications for this visit.       Past Medical History:   Diagnosis Date    Anxiety     Chronic pain syndrome     Depression     Diabetes mellitus     GERD (gastroesophageal reflux disease)     Hypertension     Hypothyroidism     Low back pain     Low vitamin B12 level     Lumbar radiculopathy     Neuropathy      Past Surgical History:   Procedure Laterality Date    Bilateral L3-S1 MBB Bilateral 2019, 2019    Dr Barclay     SECTION      COLON SURGERY      HERNIA REPAIR      Left L3-S1 RFTC Left 10/23/2019    Dr Barclay    RADIOFREQUENCY ABLATION OF LUMBAR MEDIAL BRANCH NERVE AT SINGLE LEVEL Right 10/25/2022    Procedure: Radiofrequency Ablation, Nerve, Spinal, Lumbar, Medial Branch, Level L4-S1;  Surgeon: Roselia Barclay MD;  Location: [facility];  Service: Pain Management;  Laterality: Right;  vaccinated.schedule LEFT after right is done.    RADIOFREQUENCY ABLATION OF LUMBAR MEDIAL BRANCH NERVE AT SINGLE LEVEL Left 2022    Procedure: LEFT L4-S1 RFTC  (HAD RIGHT ON 10-25);  Surgeon: Roselia Barclay MD;  Location: [facility];  Service: Pain Management;  Laterality: Left;  VAC SELENE IN EPIC    Right L3-S1 RFTC Right 2019    Dr Barclay    SPINAL CORD STIMULATOR IMPLANT       Family History   Problem Relation Name Age of Onset    Hypertension Father      Heart disease Father      Stroke Maternal Grandfather      Bipolar disorder Paternal Grandmother      Heart disease Paternal Grandfather       Social History     Tobacco Use    Smoking status: Every Day     Types: Vaping with nicotine     Passive exposure: Past    Smokeless tobacco: Never    Tobacco comments:     Smoked cigarettes 46 years. Stopped smoking cigarettes and started vaping with nicotine x  "2 years   Substance Use Topics    Alcohol use: Yes    Drug use: Never        Review of Systems:  Review of Systems   Constitutional: Negative.  Negative for fatigue and unexpected weight change.   HENT: Negative.  Negative for trouble swallowing.    Eyes: Negative.    Respiratory: Negative.  Negative for chest tightness and shortness of breath.    Cardiovascular: Negative.  Negative for chest pain.   Gastrointestinal: Negative.  Negative for abdominal pain, blood in stool and nausea.   Endocrine: Negative.    Genitourinary: Negative.  Negative for hematuria.   Musculoskeletal: Negative.  Negative for back pain and myalgias.   Neurological: Negative.  Negative for dizziness, speech difficulty, weakness and light-headedness.   Psychiatric/Behavioral: Negative.  Negative for agitation and behavioral problems.           Objective     Vital Signs (Most Recent)     5' 2.5" (1.588 m)  58.1 kg (128 lb)     Physical Exam:  Physical Exam  Constitutional:       General: She is not in acute distress.     Appearance: Normal appearance.   HENT:      Head: Normocephalic.   Cardiovascular:      Rate and Rhythm: Normal rate.   Pulmonary:      Effort: Pulmonary effort is normal. No respiratory distress.   Abdominal:      General: There is no distension.      Tenderness: There is no abdominal tenderness.   Musculoskeletal:         General: Normal range of motion.        Feet:    Feet:      Comments: Wound healed, erythema resolved.  No swelling  Nontender to palpation  Skin:     General: Skin is warm.      Coloration: Skin is not jaundiced.   Neurological:      General: No focal deficit present.      Mental Status: She is alert and oriented to person, place, and time.      Cranial Nerves: No cranial nerve deficit.            Assessment and Plan   Right 1st metatarsophalangeal joint osteomyelitis    PLAN:    Patient doing well.  Follow back up with me in 6 months.  If any question, we will repeat bone scan at that time      "

## 2025-02-06 ENCOUNTER — TELEPHONE (OUTPATIENT)
Dept: NEUROLOGY | Facility: CLINIC | Age: 65
End: 2025-02-06
Payer: MEDICAID

## 2025-02-06 DIAGNOSIS — E11.42 DIABETIC POLYNEUROPATHY ASSOCIATED WITH TYPE 2 DIABETES MELLITUS: Primary | ICD-10-CM

## 2025-02-06 RX ORDER — PREGABALIN 150 MG/1
150 CAPSULE ORAL 2 TIMES DAILY
Qty: 180 CAPSULE | Refills: 3 | Status: SHIPPED | OUTPATIENT
Start: 2025-02-06 | End: 2025-02-13 | Stop reason: SDUPTHER

## 2025-02-13 ENCOUNTER — TELEPHONE (OUTPATIENT)
Dept: NEUROLOGY | Facility: CLINIC | Age: 65
End: 2025-02-13
Payer: MEDICAID

## 2025-02-13 DIAGNOSIS — E11.42 DIABETIC POLYNEUROPATHY ASSOCIATED WITH TYPE 2 DIABETES MELLITUS: ICD-10-CM

## 2025-02-13 RX ORDER — PREGABALIN 150 MG/1
150 CAPSULE ORAL 2 TIMES DAILY
Qty: 180 CAPSULE | Refills: 3 | Status: CANCELLED | OUTPATIENT
Start: 2025-02-13

## 2025-02-13 RX ORDER — PREGABALIN 150 MG/1
150 CAPSULE ORAL 2 TIMES DAILY
Qty: 180 CAPSULE | Refills: 3 | Status: SHIPPED | OUTPATIENT
Start: 2025-02-13

## 2025-03-26 ENCOUNTER — HOSPITAL ENCOUNTER (EMERGENCY)
Facility: HOSPITAL | Age: 65
Discharge: HOME | End: 2025-03-26
Payer: MEDICAID

## 2025-03-26 VITALS
BODY MASS INDEX: 22.68 KG/M2 | HEART RATE: 62 BPM | HEIGHT: 63 IN | OXYGEN SATURATION: 99 % | TEMPERATURE: 98 F | SYSTOLIC BLOOD PRESSURE: 161 MMHG | WEIGHT: 128 LBS | DIASTOLIC BLOOD PRESSURE: 88 MMHG | RESPIRATION RATE: 18 BRPM

## 2025-03-26 DIAGNOSIS — S01.01XA SCALP LACERATION, INITIAL ENCOUNTER: Primary | ICD-10-CM

## 2025-03-26 PROCEDURE — 90715 TDAP VACCINE 7 YRS/> IM: CPT | Performed by: NURSE PRACTITIONER

## 2025-03-26 PROCEDURE — 63600175 PHARM REV CODE 636 W HCPCS: Performed by: NURSE PRACTITIONER

## 2025-03-26 PROCEDURE — 99285 EMERGENCY DEPT VISIT HI MDM: CPT | Mod: 25

## 2025-03-26 PROCEDURE — 99284 EMERGENCY DEPT VISIT MOD MDM: CPT | Mod: GF,25,, | Performed by: NURSE PRACTITIONER

## 2025-03-26 PROCEDURE — 12001 RPR S/N/AX/GEN/TRNK 2.5CM/<: CPT

## 2025-03-26 PROCEDURE — 25000003 PHARM REV CODE 250: Performed by: NURSE PRACTITIONER

## 2025-03-26 PROCEDURE — 12001 RPR S/N/AX/GEN/TRNK 2.5CM/<: CPT | Mod: ,,, | Performed by: NURSE PRACTITIONER

## 2025-03-26 PROCEDURE — 90471 IMMUNIZATION ADMIN: CPT | Performed by: NURSE PRACTITIONER

## 2025-03-26 RX ORDER — LIDOCAINE AND PRILOCAINE 25; 25 MG/G; MG/G
CREAM TOPICAL
Status: COMPLETED | OUTPATIENT
Start: 2025-03-26 | End: 2025-03-26

## 2025-03-26 RX ADMIN — CLOSTRIDIUM TETANI TOXOID ANTIGEN (FORMALDEHYDE INACTIVATED), CORYNEBACTERIUM DIPHTHERIAE TOXOID ANTIGEN (FORMALDEHYDE INACTIVATED), BORDETELLA PERTUSSIS TOXOID ANTIGEN (GLUTARALDEHYDE INACTIVATED), BORDETELLA PERTUSSIS FILAMENTOUS HEMAGGLUTININ ANTIGEN (FORMALDEHYDE INACTIVATED), BORDETELLA PERTUSSIS PERTACTIN ANTIGEN, AND BORDETELLA PERTUSSIS FIMBRIAE 2/3 ANTIGEN 0.5 ML: 5; 2; 2.5; 5; 3; 5 INJECTION, SUSPENSION INTRAMUSCULAR at 02:03

## 2025-03-26 RX ADMIN — LIDOCAINE AND PRILOCAINE: 25; 25 CREAM TOPICAL at 02:03

## 2025-03-26 NOTE — ED TRIAGE NOTES
PT ARRIVED TO ER WITH C/O TRIPPING OVER CAT AND CAUSING HER TO FALL AND HIT HEAD ON CORNER OF HER BAR. HAS SMALL SCALP LAC TO BACK OF HEAD. NO LOC OR N/V. SLIGHT HEADACHE TO AREA WHERE SHE HIT HER HEAD. PT TAKES NO ASA OR BLOOD THINNERS.

## 2025-03-26 NOTE — ED PROVIDER NOTES
Encounter Date: 3/26/2025       History     Chief Complaint   Patient presents with    Head Injury    Head Laceration    Fall     History of Present Illness    Patient Identification  Magali Cheung is a 65 y.o. female.    Patient information was obtained from patient.  History/Exam limitations: none.  Patient presented to the Emergency Department by private vehicle.    Chief Complaint   Head Injury, Head Laceration, and Fall      Patient presents for evaluation of a laceration to the occipital head. Injury occurred a few minutes ago. The mechanism of the wound was a edge of kitchen counter. The patient reports pain in scalp. There were no other injuries.  Patient denies loss of consciousness and extremity injury. The tetanus status is unknown.          Review of patient's allergies indicates:   Allergen Reactions    Opioids - morphine analogues Other (See Comments)     Past Medical History:   Diagnosis Date    Anxiety     Chronic pain syndrome     Depression     Diabetes mellitus     GERD (gastroesophageal reflux disease)     Hypertension     Hypothyroidism     Low back pain     Low vitamin B12 level     Lumbar radiculopathy     Neuropathy      Past Surgical History:   Procedure Laterality Date    Bilateral L3-S1 MBB Bilateral 2019, 2019    Dr Barclay     SECTION      COLON SURGERY      HERNIA REPAIR      Left L3-S1 RFTC Left 10/23/2019    Dr Barclay    RADIOFREQUENCY ABLATION OF LUMBAR MEDIAL BRANCH NERVE AT SINGLE LEVEL Right 10/25/2022    Procedure: Radiofrequency Ablation, Nerve, Spinal, Lumbar, Medial Branch, Level L4-S1;  Surgeon: Roselia Barclay MD;  Location: [facility];  Service: Pain Management;  Laterality: Right;  vaccinated.schedule LEFT after right is done.    RADIOFREQUENCY ABLATION OF LUMBAR MEDIAL BRANCH NERVE AT SINGLE LEVEL Left 2022    Procedure: LEFT L4-S1 RFTC  (HAD RIGHT ON 10-25);  Surgeon: Roselia Barclay MD;  Location: [facility];  Service: Pain  Management;  Laterality: Left;  VAC SELENE IN EPIC    Right L3-S1 RFTC Right 12/16/2019    Dr Barclay    SPINAL CORD STIMULATOR IMPLANT       Family History   Problem Relation Name Age of Onset    Hypertension Father      Heart disease Father      Stroke Maternal Grandfather      Bipolar disorder Paternal Grandmother      Heart disease Paternal Grandfather       Social History[1]  Review of Systems   Constitutional:  Negative for activity change, appetite change, fatigue, fever and unexpected weight change.   Respiratory:  Negative for cough and shortness of breath.    Cardiovascular:  Negative for chest pain and leg swelling.   Gastrointestinal:  Negative for abdominal pain, constipation, diarrhea, nausea and vomiting.   Genitourinary:  Negative for difficulty urinating.   Skin:  Positive for wound (scalp laceration).   Neurological:  Negative for tremors, syncope, light-headedness and headaches.   Psychiatric/Behavioral:  Negative for sleep disturbance and suicidal ideas.        Physical Exam     Initial Vitals [03/26/25 1419]   BP Pulse Resp Temp SpO2   (!) 161/88 62 18 97.7 °F (36.5 °C) 99 %      MAP       --         Physical Exam    Vitals reviewed.  Constitutional: Vital signs are normal. She appears well-developed and well-nourished. She is cooperative.   HENT:   Head: Normocephalic and atraumatic.       Right Ear: Hearing normal.   Left Ear: Hearing normal.   Nose: Nose normal. Mouth/Throat: Mucous membranes are normal.   Eyes: Conjunctivae and lids are normal. Right eye exhibits no nystagmus. Left eye exhibits no nystagmus.   Neck: Trachea normal.   Normal range of motion.  Cardiovascular:  Normal rate.           Musculoskeletal:      Cervical back: Normal range of motion.     Neurological: She is alert and oriented to person, place, and time. Gait normal. GCS score is 15. GCS eye subscore is 4. GCS verbal subscore is 5. GCS motor subscore is 6.   Skin: Skin is warm.   Psychiatric: She has a normal mood and  affect. Her speech is normal and behavior is normal. Judgment and thought content normal. Cognition and memory are normal.         Medical Screening Exam   See Full Note    ED Course   Lac Repair    Date/Time: 3/26/2025 2:59 PM    Performed by: Fiorella Abebe FNP  Authorized by: Fiorella Abebe FNP    Consent:     Consent obtained:  Verbal    Consent given by:  Patient    Risks, benefits, and alternatives were discussed: yes      Risks discussed:  Infection    Alternatives discussed:  No treatment  Universal protocol:     Procedure explained and questions answered to patient or proxy's satisfaction: yes      Relevant documents present and verified: yes      Imaging studies available: yes      Site/side marked: yes      Immediately prior to procedure, a time out was called: yes      Patient identity confirmed:  Verbally with patient, arm band and provided demographic data  Anesthesia:     Anesthesia method:  Topical application    Topical anesthetic:  EMLA cream  Laceration details:     Location:  Scalp    Scalp location:  Occipital    Length (cm):  2  Pre-procedure details:     Preparation:  Imaging obtained to evaluate for foreign bodies  Exploration:     Limited defect created (wound extended): yes      Hemostasis achieved with:  Direct pressure    Imaging outcome: foreign body not noted      Wound exploration: entire depth of wound visualized      Contaminated: no    Treatment:     Area cleansed with:  Povidone-iodine    Amount of cleaning:  Standard    Irrigation solution:  Sterile saline    Irrigation method:  Syringe    Visualized foreign bodies/material removed: no      Debridement:  None  Skin repair:     Repair method:  Staples    Number of staples:  5  Approximation:     Approximation:  Loose  Repair type:     Repair type:  Simple  Post-procedure details:     Dressing:  Open (no dressing)    Procedure completion:  Tolerated    Labs Reviewed - No data to display       Imaging Results              CT  Head Without Contrast (Final result)  Result time 03/26/25 15:10:26      Final result by Gera Crenshaw DO (03/26/25 15:10:26)                   Impression:      No acute intracranial findings as detailed above specifically without evidence for acute intracranial hemorrhage.  Further evaluation as warranted..      Electronically signed by: Gera Crenshaw DO  Date:    03/26/2025  Time:    15:10               Narrative:    EXAMINATION:  CT HEAD WITHOUT CONTRAST    CLINICAL HISTORY:  Head trauma, moderate-severe;    TECHNIQUE:  Multiple sequential 5 mm axial images of the head without contrast.  Coronal and sagittal reformatted imaging from the axial acquisition.    COMPARISON:  None    FINDINGS:  There is no evidence for acute intracranial hemorrhage or sulcal effacement.  The ventricles are normal in size without hydrocephalus.  There is no midline shift or mass effect.  Visualized paranasal sinuses and mastoid air cells are clear.  Incidental partially empty sella.                                       Medications   LIDOcaine-prilocaine cream ( Topical (Top) Given 3/26/25 1443)   Tdap vaccine injection 0.5 mL (0.5 mLs Intramuscular Given 3/26/25 1443)     Medical Decision Making  Patient presented with laceration of the right occipital area requiring staple closure. Tetanus was given. Irrigated with betadine & normal saline.  Anesthetized with EMLA cream. Closed with # 5 staples. Wound care instructions provided. Informed to return to ED if concerns for infection including redness/pain/drainage, wound dehiscence, or other concerns          Problems Addressed:  Scalp laceration, initial encounter:     Details: Patient should wash the wound daily with gentle soap and water. Cover with a bandage if desired.  Suture removal in 5 days  Return if signs of infection (erythema, purulent drainage, increase in pain)  Return to ER if has new or worsening symptoms.       Amount and/or Complexity of Data Reviewed  Radiology:  ordered. Decision-making details documented in ED Course.     Details: CT Head Without Contrast  Narrative: EXAMINATION:  CT HEAD WITHOUT CONTRAST    CLINICAL HISTORY:  Head trauma, moderate-severe;    TECHNIQUE:  Multiple sequential 5 mm axial images of the head without contrast.  Coronal and sagittal reformatted imaging from the axial acquisition.    COMPARISON:  None    FINDINGS:  There is no evidence for acute intracranial hemorrhage or sulcal effacement.  The ventricles are normal in size without hydrocephalus.  There is no midline shift or mass effect.  Visualized paranasal sinuses and mastoid air cells are clear.  Incidental partially empty sella.  Impression: No acute intracranial findings as detailed above specifically without evidence for acute intracranial hemorrhage.  Further evaluation as warranted..    Electronically signed by: Gera Crenshaw DO  Date:    03/26/2025  Time:    15:10     Risk  Prescription drug management.  Risk Details: Low               ED Course as of 03/26/25 1818   Wed Mar 26, 2025   1457 CT Head Without Contrast [MM]   1504 CT Head Without Contrast [MM]   1511 CT Head Without Contrast [MM]      ED Course User Index  [MM] Fiorella Abebe, CARROLL                           Clinical Impression:   Final diagnoses:  [S01.01XA] Scalp laceration, initial encounter (Primary)        ED Disposition Condition    Discharge Stable          ED Prescriptions    None       Follow-up Information       Follow up With Specialties Details Why Contact Info    [facility] - Emergency Department Emergency Medicine In 5 days for staple removal [address]  [facility] [address] [phone]               [1]   Social History  Tobacco Use    Smoking status: Every Day     Types: Vaping with nicotine     Passive exposure: Past    Smokeless tobacco: Never    Tobacco comments:     Smoked cigarettes 46 years. Stopped smoking cigarettes and started vaping with nicotine x 2 years   Substance Use  Topics    Alcohol use: Yes    Drug use: Never        Fiorella Abebe, [facility]  03/26/25 1811

## 2025-03-31 ENCOUNTER — HOSPITAL ENCOUNTER (EMERGENCY)
Facility: HOSPITAL | Age: 65
Discharge: HOME | End: 2025-03-31
Payer: MEDICAID

## 2025-03-31 VITALS
TEMPERATURE: 98 F | HEIGHT: 62 IN | SYSTOLIC BLOOD PRESSURE: 129 MMHG | RESPIRATION RATE: 16 BRPM | OXYGEN SATURATION: 97 % | BODY MASS INDEX: 23.19 KG/M2 | DIASTOLIC BLOOD PRESSURE: 76 MMHG | WEIGHT: 126 LBS | HEART RATE: 63 BPM

## 2025-03-31 DIAGNOSIS — Z48.02 ENCOUNTER FOR STAPLE REMOVAL: Primary | ICD-10-CM

## 2025-03-31 PROCEDURE — 99499 UNLISTED E&M SERVICE: CPT | Mod: GF,,, | Performed by: NURSE PRACTITIONER

## 2025-03-31 PROCEDURE — 99282 EMERGENCY DEPT VISIT SF MDM: CPT

## 2025-03-31 PROCEDURE — 99999 HC NO LEVEL OF SERVICE - ED ONLY: CPT

## 2025-04-03 ENCOUNTER — PATIENT OUTREACH (OUTPATIENT)
Facility: OTHER | Age: 65
End: 2025-04-03
Payer: MEDICAID

## 2025-04-03 NOTE — PROGRESS NOTES
Lisa Hale LPN  ED Navigator  Emergency Department    Project: [facility] ED Navigator  Role: Community Health Worker    Date: 04/03/2025  Patient Name: Magali Cheung  MRN: [id number]  PCP: Ariana Pinedo NP    Assessment:     Magali Cheung is a 65 y.o. female who has presented to ED for staple remover. Patient has visited the ED 2 times in the past 3 months. Patient did not contact PCP.     ED Navigator Initial Assessment    ED Navigator Enrollment Documentation  Consent to Services  Does patient consent to completing the assessment?: Yes  Contact  Method of Initial Contact: Phone  Transportation  Insurance Coverage  Do you have coverage/adequate coverage?: Yes  Specialist Appointment  Did the patient come to the ED to see a specialist?: No  Does the patient have a pending specialist referral?: No  Does the patient have a specialist appointment made?: No  PCP Follow Up Appointment  Medications  Is patient able to afford medication?: Yes  Psychological  Food  Communication/Education  Other Financial Concerns  Other Social Barriers/Concerns  Primary Barrier  Plan: Provided information for [facility]7 Nurse triage line, [phone] or [phone] ([phone])         Social History     Socioeconomic History    Marital status:    Tobacco Use    Smoking status: Every Day     Types: Vaping with nicotine     Passive exposure: Past    Smokeless tobacco: Never    Tobacco comments:     Smoked cigarettes 46 years. Stopped smoking cigarettes and started vaping with nicotine x 2 years   Substance and Sexual Activity    Alcohol use: Yes    Drug use: Never    Sexual activity: Yes     Partners: Male     Social Drivers of Health     Financial Resource Strain: Low Risk  (4/3/2025)    Overall Financial Resource Strain (CARDIA)     Difficulty of Paying Living Expenses: Not hard at all   Food Insecurity: No Food Insecurity (4/3/2025)    Hunger Vital Sign     Worried About Running Out of Food in the Last Year: Never  true     Ran Out of Food in the Last Year: Never true   Transportation Needs: No Transportation Needs (4/3/2025)    PRAPARE - Transportation     Lack of Transportation (Medical): No     Lack of Transportation (Non-Medical): No   Physical Activity: Inactive (4/3/2025)    Exercise Vital Sign     Days of Exercise per Week: 0 days     Minutes of Exercise per Session: 0 min   Stress: Stress Concern Present (4/3/2025)    [employer] - Occupational Stress Questionnaire     Feeling of Stress : Very much   Housing Stability: Low Risk  (4/3/2025)    Housing Stability Vital Sign     Unable to Pay for Housing in the Last Year: No     Number of Times Moved in the Last Year: 0     Homeless in the Last Year: No       Plan:   ED navigator attempted to contact patient to assist with scheduling a post ED 7 day follow up with PCP. Unable to reach patient at this time. Patient called ED navigator back. Patient states that she went to the ED to get her staples removed and she hasn't had any pain and it looks good. Patient declined assistance making a follow-up appointment with Bre Lagos NP at this time. Patient states that she sees Yolis Heard NP and she has an appointment this month with her but isn't sure what day. Patient denies any new needs or needing any additional assistance at this time. ED Navigator gave [facility]7 Nurse triage line ([phone] or [facility] ([phone]) contact information, in addition to office contact information if further assistance is needed in the future. ED navigator plans to follow-up with patient on/around 4-24-25.    Lisa Hale ED Navigator   [phone]

## 2025-04-06 NOTE — ED PROVIDER NOTES
Encounter Date: 3/31/2025       History     Chief Complaint   Patient presents with    Suture / Staple Removal     History of Present Illness    Patient Identification  Magali Cheung is a 65 y.o. female.    Patient information was obtained from patient.  History/Exam limitations: none.  Patient presented to the Emergency Department by private vehicle.    Chief Complaint   Suture / Staple Removal    Patient is here for staple removal.  Patient was seen here 5 days ago for scalp laceration, which was repaired with 5 staples.        Review of patient's allergies indicates:   Allergen Reactions    Opioids - morphine analogues Other (See Comments)     Past Medical History:   Diagnosis Date    Anxiety     Chronic pain syndrome     Depression     Diabetes mellitus     GERD (gastroesophageal reflux disease)     Hypertension     Hypothyroidism     Low back pain     Low vitamin B12 level     Lumbar radiculopathy     Neuropathy      Past Surgical History:   Procedure Laterality Date    Bilateral L3-S1 MBB Bilateral 2019, 2019    Dr Barclay     SECTION      COLON SURGERY      HERNIA REPAIR      Left L3-S1 RFTC Left 10/23/2019    Dr Barclay    RADIOFREQUENCY ABLATION OF LUMBAR MEDIAL BRANCH NERVE AT SINGLE LEVEL Right 10/25/2022    Procedure: Radiofrequency Ablation, Nerve, Spinal, Lumbar, Medial Branch, Level L4-S1;  Surgeon: Roselia Barclay MD;  Location: [facility];  Service: Pain Management;  Laterality: Right;  vaccinated.schedule LEFT after right is done.    RADIOFREQUENCY ABLATION OF LUMBAR MEDIAL BRANCH NERVE AT SINGLE LEVEL Left 2022    Procedure: LEFT L4-S1 RFTC  (HAD RIGHT ON 10-25);  Surgeon: Roselia Barclay MD;  Location: [facility];  Service: Pain Management;  Laterality: Left;  VAC SELENE IN EPIC    Right L3-S1 RFTC Right 2019    Dr Barclay    SPINAL CORD STIMULATOR IMPLANT       Family History   Problem Relation Name Age of Onset    Hypertension Father      Heart disease  Father      Stroke Maternal Grandfather      Bipolar disorder Paternal Grandmother      Heart disease Paternal Grandfather       Social History[1]  Review of Systems   Constitutional:  Negative for fever.   HENT:  Negative for sore throat.    Respiratory:  Negative for shortness of breath.    Cardiovascular:  Negative for chest pain.   Gastrointestinal:  Negative for nausea.   Genitourinary:  Negative for dysuria.   Musculoskeletal:  Negative for back pain.   Skin:  Positive for wound. Negative for rash.   Neurological:  Negative for weakness.   Hematological:  Does not bruise/bleed easily.       Physical Exam     Initial Vitals [03/31/25 1334]   BP Pulse Resp Temp SpO2   129/76 63 16 97.6 °F (36.4 °C) 97 %      MAP       --         Physical Exam    Vitals reviewed.  Constitutional: Vital signs are normal. She appears well-developed and well-nourished. She is cooperative.   HENT:   Head: Normocephalic and atraumatic.       Right Ear: Hearing normal.   Left Ear: Hearing normal.   Nose: Nose normal. Mouth/Throat: Mucous membranes are normal.   Eyes: Conjunctivae and lids are normal. Right eye exhibits no nystagmus. Left eye exhibits no nystagmus.   Neck: Trachea normal.   Normal range of motion.  Cardiovascular:  Normal rate and regular rhythm.           No murmur heard.  Musculoskeletal:      Cervical back: Normal range of motion.     Neurological: She is alert and oriented to person, place, and time. Gait normal.   Skin: Skin is warm.   Psychiatric: She has a normal mood and affect. Her speech is normal and behavior is normal. Judgment and thought content normal. Cognition and memory are normal.         Medical Screening Exam   See Full Note    ED Course   Procedures  Labs Reviewed - No data to display       Imaging Results    None          Medications - No data to display  Medical Decision Making  Patient presents for staple removal 5 days post laceration repair. No signs of infection. No c/o pain/discomfort. 5  staples removed without difficulty. Wound edges with good closure.     Problems Addressed:  Encounter for staple removal:     Details: Keep area clean & dry    Risk  Risk Details: Low                                      Clinical Impression:   Final diagnoses:  [Z48.02] Encounter for staple removal (Primary)        ED Disposition Condition    Discharge Stable          ED Prescriptions    None       Follow-up Information       Follow up With Specialties Details Why Contact Info    Ariana Pinedo NP Family Medicine  As needed [address]  [facility] [place] [address]  [phone]                 [1]   Social History  Tobacco Use    Smoking status: Every Day     Types: Vaping with nicotine     Passive exposure: Past    Smokeless tobacco: Never    Tobacco comments:     Smoked cigarettes 46 years. Stopped smoking cigarettes and started vaping with nicotine x 2 years   Substance Use Topics    Alcohol use: Yes    Drug use: Never        Fiorella Abebe, AZIZAP  04/06/25 1659

## 2025-04-24 ENCOUNTER — PATIENT OUTREACH (OUTPATIENT)
Facility: OTHER | Age: 65
End: 2025-04-24
Payer: MEDICAID

## 2025-04-29 ENCOUNTER — OFFICE VISIT (OUTPATIENT)
Dept: NEUROLOGY | Facility: CLINIC | Age: 65
End: 2025-04-29
Payer: MEDICAID

## 2025-04-29 VITALS
RESPIRATION RATE: 18 BRPM | DIASTOLIC BLOOD PRESSURE: 62 MMHG | WEIGHT: 124 LBS | BODY MASS INDEX: 21.97 KG/M2 | OXYGEN SATURATION: 100 % | HEART RATE: 75 BPM | HEIGHT: 63 IN | SYSTOLIC BLOOD PRESSURE: 110 MMHG

## 2025-04-29 DIAGNOSIS — G25.81 RLS (RESTLESS LEGS SYNDROME): Primary | Chronic | ICD-10-CM

## 2025-04-29 DIAGNOSIS — F32.A DEPRESSION, UNSPECIFIED DEPRESSION TYPE: Chronic | ICD-10-CM

## 2025-04-29 DIAGNOSIS — M54.12 RADICULOPATHY, CERVICAL REGION: Chronic | ICD-10-CM

## 2025-04-29 DIAGNOSIS — E11.42 DIABETIC POLYNEUROPATHY ASSOCIATED WITH TYPE 2 DIABETES MELLITUS: ICD-10-CM

## 2025-04-29 PROCEDURE — 3008F BODY MASS INDEX DOCD: CPT | Mod: CPTII,,, | Performed by: PSYCHIATRY & NEUROLOGY

## 2025-04-29 PROCEDURE — 3078F DIAST BP <80 MM HG: CPT | Mod: CPTII,,, | Performed by: PSYCHIATRY & NEUROLOGY

## 2025-04-29 PROCEDURE — 99214 OFFICE O/P EST MOD 30 MIN: CPT | Mod: S$PBB,,, | Performed by: PSYCHIATRY & NEUROLOGY

## 2025-04-29 PROCEDURE — 1159F MED LIST DOCD IN RCRD: CPT | Mod: CPTII,,, | Performed by: PSYCHIATRY & NEUROLOGY

## 2025-04-29 PROCEDURE — 99999 PR PBB SHADOW E&M-EST. PATIENT-LVL V: CPT | Mod: PBBFAC,,, | Performed by: PSYCHIATRY & NEUROLOGY

## 2025-04-29 PROCEDURE — 3074F SYST BP LT 130 MM HG: CPT | Mod: CPTII,,, | Performed by: PSYCHIATRY & NEUROLOGY

## 2025-04-29 PROCEDURE — 99215 OFFICE O/P EST HI 40 MIN: CPT | Mod: PBBFAC | Performed by: PSYCHIATRY & NEUROLOGY

## 2025-04-29 PROCEDURE — 1160F RVW MEDS BY RX/DR IN RCRD: CPT | Mod: CPTII,,, | Performed by: PSYCHIATRY & NEUROLOGY

## 2025-04-29 RX ORDER — LUBIPROSTONE 24 UG/1
24 CAPSULE ORAL 2 TIMES DAILY
COMMUNITY
Start: 2025-03-07

## 2025-04-29 RX ORDER — PREGABALIN 150 MG/1
150 CAPSULE ORAL 2 TIMES DAILY
Qty: 180 CAPSULE | Refills: 3 | Status: SHIPPED | OUTPATIENT
Start: 2025-04-29

## 2025-04-29 RX ORDER — DULOXETIN HYDROCHLORIDE 60 MG/1
CAPSULE, DELAYED RELEASE ORAL
COMMUNITY

## 2025-04-29 NOTE — PROGRESS NOTES
Subjective:       Patient ID: Magali Cheung is a 65 y.o. female     Chief Complaint:    Chief Complaint   Patient presents with    polyneuropathy     Pt. States legs and feet about same.        Allergies:  Opioids - morphine analogues    Current Medications:  Encounter Medications[1]    History of Present Illness  66 yo WF s/p spinal surgery few yrs ago and in f/u fo rLBP w/ radiculopathy and neuroapthy  He current Lyrica 150 mg bid is working well and she had failed TORI, Cymbalta, Requip and Mirapex  She is satisfied with results and nto desring incr dosage       Past Medical History:   Diagnosis Date    Anxiety     Chronic pain syndrome     Depression     Diabetes mellitus     GERD (gastroesophageal reflux disease)     Hypertension     Hypothyroidism     Low back pain     Low vitamin B12 level     Lumbar radiculopathy     Neuropathy        Past Surgical History:   Procedure Laterality Date    Bilateral L3-S1 MBB Bilateral 2019, 2019    Dr Barclay     SECTION      COLON SURGERY      HERNIA REPAIR      Left L3-S1 RFTC Left 10/23/2019    Dr Barclay    RADIOFREQUENCY ABLATION OF LUMBAR MEDIAL BRANCH NERVE AT SINGLE LEVEL Right 10/25/2022    Procedure: Radiofrequency Ablation, Nerve, Spinal, Lumbar, Medial Branch, Level L4-S1;  Surgeon: Roselia Barclay MD;  Location: [facility];  Service: Pain Management;  Laterality: Right;  vaccinated.schedule LEFT after right is done.    RADIOFREQUENCY ABLATION OF LUMBAR MEDIAL BRANCH NERVE AT SINGLE LEVEL Left 2022    Procedure: LEFT L4-S1 RFTC  (HAD RIGHT ON 10-25);  Surgeon: Roselia Barclay MD;  Location: [facility];  Service: Pain Management;  Laterality: Left;  VAC SELENE IN EPIC    Right L3-S1 RFTC Right 2019    Dr Barclay    SPINAL CORD STIMULATOR IMPLANT         Social History  Ms. Cheung  reports that she has been smoking vaping with nicotine. She has been exposed to tobacco smoke. She has never used  "smokeless tobacco. She reports current alcohol use. She reports that she does not use drugs.    Family History  Ms.'s Cheung family history includes Bipolar disorder in her paternal grandmother; Heart disease in her father and paternal grandfather; Hypertension in her father; Stroke in her maternal grandfather.    Review of Systems  Review of Systems   Musculoskeletal:  Positive for back pain and joint pain.   Neurological:  Positive for tingling and sensory change.   All other systems reviewed and are negative.   Objective:   /62 (BP Location: Right arm, Patient Position: Sitting)   Pulse 75   Resp 18   Ht 5' 2.5" (1.588 m)   Wt 56.2 kg (124 lb)   SpO2 100%   BMI 22.32 kg/m²    NEUROLOGICAL EXAMINATION:     MENTAL STATUS   Oriented to person, place, and time.   Level of consciousness: alert  Knowledge: good.     CRANIAL NERVES   Cranial nerves II through XII intact.     MOTOR EXAM     Strength   Strength 5/5 throughout.     SENSORY EXAM        Paresthesias      GAIT AND COORDINATION     Gait  Gait: normal     Physical Exam  Vitals reviewed.   Constitutional:       Appearance: She is normal weight.   Neurological:      General: No focal deficit present.      Mental Status: She is alert and oriented to person, place, and time. Mental status is at baseline.      Cranial Nerves: Cranial nerves 2-12 are intact.      Motor: Motor strength is normal.     Gait: Gait is intact.        Assessment:     RLS (restless legs syndrome)    Radiculopathy, cervical region    Depression, unspecified depression type         Primary Diagnosis and ICD10  RLS (restless legs syndrome) [G25.81]    Plan:     There are no Patient Instructions on file for this visit.    There are no discontinued medications.    Requested Prescriptions      No prescriptions requested or ordered in this encounter              [1]  Outpatient Encounter Medications as of 4/29/2025   Medication Sig Dispense Refill    albuterol (PROVENTIL/VENTOLIN HFA) " 90 mcg/actuation inhaler INHALE ONE TO TWO PUFFS BY MOUTH EVERY 4 HOURS AS NEEDED FOR SHORTNESS OF BREATH OR WHEEZING 18 g 3    atorvastatin (LIPITOR) 80 MG tablet Take 1 tablet (80 mg total) by mouth every evening.      carvediloL (COREG) 12.5 MG tablet Take 1 tablet (12.5 mg total) by mouth 2 (two) times daily with meals. (Patient taking differently: Take 12.5 mg by mouth 2 (two) times daily.) 180 tablet 0    clonazePAM (KLONOPIN) 1 MG tablet Take 1 mg by mouth daily as needed.      cyanocobalamin 1,000 mcg/mL injection Inject 1,000 mcg into the muscle every 30 days.      DULoxetine (CYMBALTA) 60 MG capsule TAKE ONE CAPSULE BY MOUTH TWICE DAILY FOR depression      ergocalciferol (ERGOCALCIFEROL) 50,000 unit Cap Take 1 capsule every week by oral route.      esomeprazole (NEXIUM) 40 MG capsule Take 40 mg by mouth once daily.      FEROSUL 325 mg (65 mg iron) Tab tablet Take 1 tablet every day by oral route with meal(s).      fluconazole (DIFLUCAN) 150 MG Tab Take 150 mg by mouth.      fluticasone propionate (FLONASE) 50 mcg/actuation nasal spray 2 sprays (100 mcg total) by Each Nostril route once daily. 16 g 3    hydroCHLOROthiazide (HYDRODIURIL) 12.5 MG Tab Take 12.5 mg by mouth once daily.      JANUVIA 100 mg Tab Take 100 mg by mouth once daily.      levothyroxine (SYNTHROID) 50 MCG tablet Take 50 mcg by mouth before breakfast.      LINZESS 145 mcg Cap capsule Take 145 mcg by mouth once daily.      lubiprostone (AMITIZA) 24 MCG Cap Take 24 mcg by mouth 2 (two) times daily.      pregabalin (LYRICA) 150 MG capsule Take 1 capsule (150 mg total) by mouth 2 (two) times daily. Take one capsule by mouth twice a day 180 capsule 3    REXULTI 3 mg Tab Take 1 tablet by mouth once daily.       No facility-administered encounter medications on file as of 4/29/2025.

## 2025-05-22 ENCOUNTER — PATIENT OUTREACH (OUTPATIENT)
Facility: OTHER | Age: 65
End: 2025-05-22
Payer: MEDICAID

## 2025-05-22 NOTE — PROGRESS NOTES
ED navigator contacted patient for a follow up. Patient states that she has been doing good. The place where she had the stitches is healed. Patient states that she is seeing Mandy Pascal NP now and she seen her on 5-19-25. Patient states that everything was good and no changes were made when she seen her. Patient states that she seen Dr. Partida on 4-29-25 to get refills on her medicine. ED navigator ensured patient had no other needs at this time. Patient was given [facility] 24/7 Nurse triage line, [phone] or [phone] ([phone]) contact information. ED navigator plans to follow-up with patient on/around 6-23-25.    Lisa Hale ED Navigator   [phone]

## 2025-06-17 ENCOUNTER — OFFICE VISIT (OUTPATIENT)
Dept: SURGERY | Facility: CLINIC | Age: 65
End: 2025-06-17
Payer: MEDICAID

## 2025-06-17 VITALS — WEIGHT: 132 LBS | BODY MASS INDEX: 23.39 KG/M2 | HEIGHT: 63 IN

## 2025-06-17 DIAGNOSIS — M86.9 OSTEOMYELITIS, UNSPECIFIED SITE, UNSPECIFIED TYPE: Primary | ICD-10-CM

## 2025-06-17 PROCEDURE — 3008F BODY MASS INDEX DOCD: CPT | Mod: CPTII,,, | Performed by: STUDENT IN AN ORGANIZED HEALTH CARE EDUCATION/TRAINING PROGRAM

## 2025-06-17 PROCEDURE — 99214 OFFICE O/P EST MOD 30 MIN: CPT | Mod: S$PBB,,, | Performed by: STUDENT IN AN ORGANIZED HEALTH CARE EDUCATION/TRAINING PROGRAM

## 2025-06-17 PROCEDURE — 99213 OFFICE O/P EST LOW 20 MIN: CPT | Mod: PBBFAC | Performed by: STUDENT IN AN ORGANIZED HEALTH CARE EDUCATION/TRAINING PROGRAM

## 2025-06-17 PROCEDURE — 1159F MED LIST DOCD IN RCRD: CPT | Mod: CPTII,,, | Performed by: STUDENT IN AN ORGANIZED HEALTH CARE EDUCATION/TRAINING PROGRAM

## 2025-06-17 PROCEDURE — 99999 PR PBB SHADOW E&M-EST. PATIENT-LVL III: CPT | Mod: PBBFAC,,, | Performed by: STUDENT IN AN ORGANIZED HEALTH CARE EDUCATION/TRAINING PROGRAM

## 2025-06-17 NOTE — PROGRESS NOTES
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  Patient was seen in the hospital and had a small ulcer with small abscess that was cleaned out; she does have bacteremia of MRSA and has been on IV antibiotics for 2 weeks.  Patient had a bone scan of the foot which showed increased activity all phases overlying the 1st metatarsophalangeal joint on the right foot in indicating osteomyelitis.  Patient denies any pain and states since she has been on IV vancomycin at home through her PICC line, the pain has drastically improved.  Denies any chest pain/shortness of breath, nausea/vomiting/diarrhea, fever/chills.    7/8:  Patient doing well.  Completed 6 weeks of IV antibiotics.  States the area on her right inner great toe has healed.  No inflammation.  Patient states the area still swollen at times, but denies any significant tenderness to palpation.  Patient to have her blood drawn later today by wound care with possible dressing change to the PICC line in the left upper extremity.    9/9:  Patient presents for re-evaluation.  Patient had a PICC line in her arm receiving IV antibiotics chronically for right metatarsophalangeal joint osteomyelitis.  Bone scan shows improvement but still with some activity at the site.  Patient was hospitalized previously due to developing a thrombus in the basilic vein; patient was hospitalized and catheter removed.  Swelling has improved in the arm with elevation, warm compresses and NSAIDs, no oral anticoagulation.  Denies any chest pain/shortness of breath, nausea/vomiting/diarrhea, fever/chills.  Denies any pain to the toe but still states she has some swelling at the site    12/16:  Patient doing well.  Area in the right foot healed.  Bone scan showed improvement.  Denies any chest pain/shortness of breath, nausea/vomiting/diarrhea,  fever/chills.    6/17/25:  Patient presents to clinic for re-evaluation.  Area healed.  No complaints.  Denies any chest pain/shortness of breath, nausea/vomiting/diarrhea, fever/chills.    Chief Complaint   Patient presents with    Follow-up     R foot       Review of patient's allergies indicates:   Allergen Reactions    Opioids - morphine analogues Other (See Comments)       Current Outpatient Medications   Medication Sig Dispense Refill    albuterol (PROVENTIL/VENTOLIN HFA) 90 mcg/actuation inhaler INHALE ONE TO TWO PUFFS BY MOUTH EVERY 4 HOURS AS NEEDED FOR SHORTNESS OF BREATH OR WHEEZING 18 g 3    atorvastatin (LIPITOR) 80 MG tablet Take 1 tablet (80 mg total) by mouth every evening.      carvediloL (COREG) 12.5 MG tablet Take 1 tablet (12.5 mg total) by mouth 2 (two) times daily with meals. (Patient taking differently: Take 12.5 mg by mouth 2 (two) times daily.) 180 tablet 0    clonazePAM (KLONOPIN) 1 MG tablet Take 1 mg by mouth daily as needed.      cyanocobalamin 1,000 mcg/mL injection Inject 1,000 mcg into the muscle every 30 days.      DULoxetine (CYMBALTA) 60 MG capsule TAKE ONE CAPSULE BY MOUTH TWICE DAILY FOR depression      ergocalciferol (ERGOCALCIFEROL) 50,000 unit Cap Take 1 capsule every week by oral route.      esomeprazole (NEXIUM) 40 MG capsule Take 40 mg by mouth once daily.      FEROSUL 325 mg (65 mg iron) Tab tablet Take 1 tablet every day by oral route with meal(s).      fluconazole (DIFLUCAN) 150 MG Tab Take 150 mg by mouth.      fluticasone propionate (FLONASE) 50 mcg/actuation nasal spray 2 sprays (100 mcg total) by Each Nostril route once daily. 16 g 3    hydroCHLOROthiazide (HYDRODIURIL) 12.5 MG Tab Take 12.5 mg by mouth once daily.      JANUVIA 100 mg Tab Take 100 mg by mouth once daily.      levothyroxine (SYNTHROID) 50 MCG tablet Take 50 mcg by mouth before breakfast.      LINZESS 145 mcg Cap capsule Take 145 mcg by mouth once daily.      pregabalin (LYRICA) 150 MG capsule Take 1  capsule (150 mg total) by mouth 2 (two) times daily. Take one capsule by mouth twice a day 180 capsule 3    REXULTI 3 mg Tab Take 1 tablet by mouth once daily.      lubiprostone (AMITIZA) 24 MCG Cap Take 24 mcg by mouth 2 (two) times daily. (Patient not taking: Reported on 2025)       No current facility-administered medications for this visit.       Past Medical History:   Diagnosis Date    Anxiety     Chronic pain syndrome     Depression     Diabetes mellitus     GERD (gastroesophageal reflux disease)     Hypertension     Hypothyroidism     Low back pain     Low vitamin B12 level     Lumbar radiculopathy     Neuropathy      Past Surgical History:   Procedure Laterality Date    Bilateral L3-S1 MBB Bilateral 2019, 2019    Dr Barclay     SECTION      COLON SURGERY      HERNIA REPAIR      Left L3-S1 RFTC Left 10/23/2019    Dr Barclay    RADIOFREQUENCY ABLATION OF LUMBAR MEDIAL BRANCH NERVE AT SINGLE LEVEL Right 10/25/2022    Procedure: Radiofrequency Ablation, Nerve, Spinal, Lumbar, Medial Branch, Level L4-S1;  Surgeon: Roselia Barclay MD;  Location: [facility];  Service: Pain Management;  Laterality: Right;  vaccinated.schedule LEFT after right is done.    RADIOFREQUENCY ABLATION OF LUMBAR MEDIAL BRANCH NERVE AT SINGLE LEVEL Left 2022    Procedure: LEFT L4-S1 RFTC  (HAD RIGHT ON 10-25);  Surgeon: Roselia Barclay MD;  Location: [facility];  Service: Pain Management;  Laterality: Left;  VAC SELENE IN EPIC    Right L3-S1 RFTC Right 2019    Dr Barclay    SPINAL CORD STIMULATOR IMPLANT       Family History   Problem Relation Name Age of Onset    Hypertension Father      Heart disease Father      Stroke Maternal Grandfather      Bipolar disorder Paternal Grandmother      Heart disease Paternal Grandfather       Social History     Tobacco Use    Smoking status: Every Day     Types: Vaping with nicotine     Passive exposure: Past    Smokeless tobacco: Never    Tobacco comments:  "    Smoked cigarettes 46 years. Stopped smoking cigarettes and started vaping with nicotine x 2 years   Substance Use Topics    Alcohol use: Yes    Drug use: Never        Review of Systems:  Review of Systems   Constitutional: Negative.  Negative for fatigue and unexpected weight change.   HENT: Negative.  Negative for trouble swallowing.    Eyes: Negative.    Respiratory: Negative.  Negative for chest tightness and shortness of breath.    Cardiovascular: Negative.  Negative for chest pain.   Gastrointestinal: Negative.  Negative for abdominal pain, blood in stool and nausea.   Endocrine: Negative.    Genitourinary: Negative.  Negative for hematuria.   Musculoskeletal: Negative.  Negative for back pain and myalgias.   Neurological: Negative.  Negative for dizziness, speech difficulty, weakness and light-headedness.   Psychiatric/Behavioral: Negative.  Negative for agitation and behavioral problems.           Objective     Vital Signs (Most Recent)     5' 2.5" (1.588 m)  59.9 kg (132 lb)     Physical Exam:  Physical Exam  Constitutional:       General: She is not in acute distress.     Appearance: Normal appearance.   HENT:      Head: Normocephalic.   Cardiovascular:      Rate and Rhythm: Normal rate.   Pulmonary:      Effort: Pulmonary effort is normal. No respiratory distress.   Abdominal:      General: There is no distension.      Tenderness: There is no abdominal tenderness.   Musculoskeletal:         General: Normal range of motion.        Feet:    Feet:      Comments: Wound healed, erythema resolved.  No swelling  Nontender to palpation  Skin:     General: Skin is warm.      Coloration: Skin is not jaundiced.   Neurological:      General: No focal deficit present.      Mental Status: She is alert and oriented to person, place, and time.      Cranial Nerves: No cranial nerve deficit.            Assessment and Plan   Right 1st metatarsophalangeal joint osteomyelitis    PLAN:    Patient doing well.  No need for " further follow-up.  Return sooner for problems

## 2025-06-24 ENCOUNTER — PATIENT OUTREACH (OUTPATIENT)
Facility: OTHER | Age: 65
End: 2025-06-24
Payer: MEDICAID

## 2025-06-24 NOTE — PROGRESS NOTES
ED navigator contacted patient for a follow up. Patient states that she is doing good. Patient went to see Dr. Jade and states that she got released and doesn't have to go back to see him. Patient states that her foot is healed and she doesn't have a PICC line anymore or home health coming out. Patient states that she went to see PCP on 5-19-25 and doesn't have to go back until six months. ED navigator ensured patient had no other needs at this time. ED navigator plans to follow-up with patient on/around 7-23-25.    Lisa Hale ED Navigator   [phone]

## 2025-07-23 ENCOUNTER — PATIENT OUTREACH (OUTPATIENT)
Facility: OTHER | Age: 65
End: 2025-07-23
Payer: MEDICAID

## 2025-07-25 NOTE — PROGRESS NOTES
ED navigator third attempt to contact patient for a follow up. Unable to reach patient at this time or leave a message. ED navigator will close encounter at this time.    Lisa Hale ED Navigator   [phone]

## 2025-08-18 ENCOUNTER — HOSPITAL ENCOUNTER (EMERGENCY)
Facility: HOSPITAL | Age: 65
Discharge: HOME | End: 2025-08-18
Payer: MEDICAID

## 2025-08-18 VITALS
DIASTOLIC BLOOD PRESSURE: 43 MMHG | HEART RATE: 64 BPM | BODY MASS INDEX: 23.55 KG/M2 | HEIGHT: 62 IN | SYSTOLIC BLOOD PRESSURE: 91 MMHG | WEIGHT: 128 LBS | RESPIRATION RATE: 18 BRPM | TEMPERATURE: 99 F | OXYGEN SATURATION: 97 %

## 2025-08-18 DIAGNOSIS — M54.41 ACUTE RIGHT-SIDED LOW BACK PAIN WITH RIGHT-SIDED SCIATICA: Primary | ICD-10-CM

## 2025-08-18 PROCEDURE — 25000003 PHARM REV CODE 250: Performed by: NURSE PRACTITIONER

## 2025-08-18 PROCEDURE — 99284 EMERGENCY DEPT VISIT MOD MDM: CPT | Mod: ,,, | Performed by: NURSE PRACTITIONER

## 2025-08-18 PROCEDURE — 99284 EMERGENCY DEPT VISIT MOD MDM: CPT | Mod: 25

## 2025-08-18 PROCEDURE — 96372 THER/PROPH/DIAG INJ SC/IM: CPT | Performed by: NURSE PRACTITIONER

## 2025-08-18 PROCEDURE — 63600175 PHARM REV CODE 636 W HCPCS: Mod: UD | Performed by: NURSE PRACTITIONER

## 2025-08-18 RX ORDER — KETOROLAC TROMETHAMINE 10 MG/1
10 TABLET, FILM COATED ORAL EVERY 6 HOURS PRN
Qty: 20 TABLET | Refills: 0 | Status: SHIPPED | OUTPATIENT
Start: 2025-08-18 | End: 2025-08-23

## 2025-08-18 RX ORDER — DEXAMETHASONE SODIUM PHOSPHATE 4 MG/ML
4 INJECTION, SOLUTION INTRA-ARTICULAR; INTRALESIONAL; INTRAMUSCULAR; INTRAVENOUS; SOFT TISSUE
Status: COMPLETED | OUTPATIENT
Start: 2025-08-18 | End: 2025-08-18

## 2025-08-18 RX ORDER — KETOROLAC TROMETHAMINE 30 MG/ML
15 INJECTION, SOLUTION INTRAMUSCULAR; INTRAVENOUS
Status: COMPLETED | OUTPATIENT
Start: 2025-08-18 | End: 2025-08-18

## 2025-08-18 RX ORDER — ACETAMINOPHEN AND CODEINE PHOSPHATE 300; 30 MG/1; MG/1
1 TABLET ORAL
Status: COMPLETED | OUTPATIENT
Start: 2025-08-18 | End: 2025-08-18

## 2025-08-18 RX ORDER — CYCLOBENZAPRINE HCL 10 MG
10 TABLET ORAL 2 TIMES DAILY PRN
Qty: 10 TABLET | Refills: 0 | Status: SHIPPED | OUTPATIENT
Start: 2025-08-18 | End: 2025-08-23

## 2025-08-18 RX ORDER — METHYLPREDNISOLONE ACETATE 40 MG/ML
40 INJECTION, SUSPENSION INTRA-ARTICULAR; INTRALESIONAL; INTRAMUSCULAR; SOFT TISSUE
Status: COMPLETED | OUTPATIENT
Start: 2025-08-18 | End: 2025-08-18

## 2025-08-18 RX ADMIN — ACETAMINOPHEN AND CODEINE PHOSPHATE 1 TABLET: 300; 30 TABLET ORAL at 02:08

## 2025-08-18 RX ADMIN — KETOROLAC TROMETHAMINE 15 MG: 30 INJECTION, SOLUTION INTRAMUSCULAR at 02:08

## 2025-08-18 RX ADMIN — DEXAMETHASONE SODIUM PHOSPHATE 4 MG: 4 INJECTION, SOLUTION INTRA-ARTICULAR; INTRALESIONAL; INTRAMUSCULAR; INTRAVENOUS; SOFT TISSUE at 02:08

## 2025-08-18 RX ADMIN — METHYLPREDNISOLONE ACETATE 40 MG: 40 INJECTION, SUSPENSION INTRA-ARTICULAR; INTRALESIONAL; INTRAMUSCULAR; SOFT TISSUE at 02:08

## 2025-08-19 ENCOUNTER — TELEPHONE (OUTPATIENT)
Dept: EMERGENCY MEDICINE | Facility: HOSPITAL | Age: 65
End: 2025-08-19
Payer: MEDICAID

## 2025-08-20 ENCOUNTER — TELEPHONE (OUTPATIENT)
Dept: EMERGENCY MEDICINE | Facility: HOSPITAL | Age: 65
End: 2025-08-20
Payer: MEDICAID

## (undated) DEVICE — CATH IV 22G X 1 AUTOGUARD

## (undated) DEVICE — GLOVE PROTEXIS PI SYN SURG 6.5

## (undated) DEVICE — Device

## (undated) DEVICE — TRAY NERVE BLOCK UNIV 10/CA

## (undated) DEVICE — KIT COOLED RF 100MM DBL PROBE

## (undated) DEVICE — DRAPE THREE-QUARTER 53X77IN

## (undated) DEVICE — PAD ELECTROSURGICAL SPL W/CORD

## (undated) DEVICE — APPLICATOR CHLORAPREP ORN 26ML

## (undated) DEVICE — KIT IV START RUSH

## (undated) DEVICE — SOL CONTINU-FLO SET 2 LAV